# Patient Record
Sex: MALE | Race: OTHER | NOT HISPANIC OR LATINO | Employment: OTHER | URBAN - METROPOLITAN AREA
[De-identification: names, ages, dates, MRNs, and addresses within clinical notes are randomized per-mention and may not be internally consistent; named-entity substitution may affect disease eponyms.]

---

## 2017-05-09 LAB — CRC RECOMMENDATION EXT: NORMAL

## 2022-09-29 ENCOUNTER — OFFICE VISIT (OUTPATIENT)
Dept: FAMILY MEDICINE | Facility: CLINIC | Age: 67
End: 2022-09-29
Payer: MEDICARE

## 2022-09-29 VITALS
BODY MASS INDEX: 30.57 KG/M2 | HEART RATE: 57 BPM | DIASTOLIC BLOOD PRESSURE: 72 MMHG | SYSTOLIC BLOOD PRESSURE: 120 MMHG | OXYGEN SATURATION: 97 % | WEIGHT: 194.81 LBS | HEIGHT: 67 IN

## 2022-09-29 DIAGNOSIS — G47.33 OSA ON CPAP: ICD-10-CM

## 2022-09-29 DIAGNOSIS — J31.0 CHRONIC RHINITIS: ICD-10-CM

## 2022-09-29 DIAGNOSIS — M15.9 PRIMARY OSTEOARTHRITIS INVOLVING MULTIPLE JOINTS: ICD-10-CM

## 2022-09-29 DIAGNOSIS — E29.1 HYPOGONADISM IN MALE: ICD-10-CM

## 2022-09-29 DIAGNOSIS — Z11.59 NEED FOR HEPATITIS C SCREENING TEST: ICD-10-CM

## 2022-09-29 DIAGNOSIS — G89.29 CHRONIC RIGHT SHOULDER PAIN: ICD-10-CM

## 2022-09-29 DIAGNOSIS — E78.2 MIXED HYPERLIPIDEMIA: ICD-10-CM

## 2022-09-29 DIAGNOSIS — E11.9 DIABETES MELLITUS WITHOUT COMPLICATION: Primary | ICD-10-CM

## 2022-09-29 DIAGNOSIS — G89.4 CHRONIC PAIN SYNDROME: ICD-10-CM

## 2022-09-29 DIAGNOSIS — I10 ESSENTIAL HYPERTENSION, BENIGN: ICD-10-CM

## 2022-09-29 DIAGNOSIS — Z12.5 SCREENING PSA (PROSTATE SPECIFIC ANTIGEN): ICD-10-CM

## 2022-09-29 DIAGNOSIS — K21.9 GASTROESOPHAGEAL REFLUX DISEASE WITHOUT ESOPHAGITIS: ICD-10-CM

## 2022-09-29 DIAGNOSIS — M25.511 CHRONIC RIGHT SHOULDER PAIN: ICD-10-CM

## 2022-09-29 DIAGNOSIS — N40.0 BENIGN PROSTATIC HYPERPLASIA WITHOUT LOWER URINARY TRACT SYMPTOMS: ICD-10-CM

## 2022-09-29 PROBLEM — M15.0 PRIMARY OSTEOARTHRITIS INVOLVING MULTIPLE JOINTS: Status: ACTIVE | Noted: 2022-09-29

## 2022-09-29 PROCEDURE — 99204 PR OFFICE/OUTPT VISIT, NEW, LEVL IV, 45-59 MIN: ICD-10-PCS | Mod: S$PBB,,, | Performed by: FAMILY MEDICINE

## 2022-09-29 PROCEDURE — 99999 PR PBB SHADOW E&M-NEW PATIENT-LVL III: CPT | Mod: PBBFAC,,, | Performed by: FAMILY MEDICINE

## 2022-09-29 PROCEDURE — 99203 OFFICE O/P NEW LOW 30 MIN: CPT | Mod: PBBFAC,PN | Performed by: FAMILY MEDICINE

## 2022-09-29 PROCEDURE — 99204 OFFICE O/P NEW MOD 45 MIN: CPT | Mod: S$PBB,,, | Performed by: FAMILY MEDICINE

## 2022-09-29 PROCEDURE — 99999 PR PBB SHADOW E&M-NEW PATIENT-LVL III: ICD-10-PCS | Mod: PBBFAC,,, | Performed by: FAMILY MEDICINE

## 2022-09-29 RX ORDER — TRAZODONE HYDROCHLORIDE 50 MG/1
50 TABLET ORAL NIGHTLY
COMMUNITY
End: 2022-11-25 | Stop reason: SDUPTHER

## 2022-09-29 RX ORDER — NAPROXEN SODIUM 220 MG/1
81 TABLET, FILM COATED ORAL DAILY
COMMUNITY
End: 2023-08-29

## 2022-09-29 RX ORDER — OMEPRAZOLE 40 MG/1
40 CAPSULE, DELAYED RELEASE ORAL DAILY
COMMUNITY
End: 2022-11-25 | Stop reason: SDUPTHER

## 2022-09-29 RX ORDER — AMLODIPINE BESYLATE 5 MG/1
5 TABLET ORAL DAILY
COMMUNITY
End: 2022-11-25 | Stop reason: SDUPTHER

## 2022-09-29 RX ORDER — GLIPIZIDE 5 MG/1
5 TABLET ORAL
COMMUNITY
End: 2022-11-25 | Stop reason: SDUPTHER

## 2022-09-29 RX ORDER — FENOFIBRATE 160 MG/1
160 TABLET ORAL NIGHTLY
COMMUNITY
End: 2022-11-25 | Stop reason: SDUPTHER

## 2022-09-29 RX ORDER — ATORVASTATIN CALCIUM 10 MG/1
10 TABLET, FILM COATED ORAL NIGHTLY
COMMUNITY
End: 2022-11-25 | Stop reason: SDUPTHER

## 2022-09-29 RX ORDER — METFORMIN HYDROCHLORIDE 1000 MG/1
1000 TABLET ORAL 2 TIMES DAILY WITH MEALS
COMMUNITY
End: 2022-11-25 | Stop reason: SDUPTHER

## 2022-09-29 RX ORDER — TESTOSTERONE CYPIONATE 1000 MG/10ML
100 INJECTION, SOLUTION INTRAMUSCULAR
Qty: 5 ML | Refills: 3 | Status: SHIPPED | OUTPATIENT
Start: 2022-09-29 | End: 2022-11-25 | Stop reason: SDUPTHER

## 2022-09-29 RX ORDER — TAMSULOSIN HYDROCHLORIDE 0.4 MG/1
CAPSULE ORAL NIGHTLY
COMMUNITY
End: 2022-11-25 | Stop reason: SDUPTHER

## 2022-09-29 RX ORDER — GUANFACINE 2 MG/1
2 TABLET ORAL EVERY MORNING
COMMUNITY
End: 2022-11-25 | Stop reason: SDUPTHER

## 2022-09-29 RX ORDER — METOPROLOL TARTRATE 25 MG/1
25 TABLET, FILM COATED ORAL 2 TIMES DAILY
COMMUNITY
End: 2022-11-25 | Stop reason: SDUPTHER

## 2022-09-29 RX ORDER — LISINOPRIL AND HYDROCHLOROTHIAZIDE 12.5; 2 MG/1; MG/1
2 TABLET ORAL DAILY
COMMUNITY
End: 2022-11-25 | Stop reason: SDUPTHER

## 2022-09-29 RX ORDER — BUSPIRONE HYDROCHLORIDE 5 MG/1
5 TABLET ORAL NIGHTLY
COMMUNITY
End: 2022-11-25 | Stop reason: SDUPTHER

## 2022-09-29 NOTE — PATIENT INSTRUCTIONS
Thank you for allowing me to participate in your care today. It is an honor to be a part of your healthcare team at [facility]. If you had labs ordered today, you will receive notification via [employer], phone call or mailed letter regarding your results within 7 days. If you have any questions or concerns regarding your visit today, please do not hesitate to contact us.  Sincerely,   Kiley Vasquez M.D.

## 2022-09-29 NOTE — PROGRESS NOTES
Subjective:       Patient ID: Mathew Rodrigues is a 67 y.o. male.    Chief Complaint: Medication Refill (Pt requesting refill on Testosterone and needles)    Mr. Rodrigues presents today for medication refills. He wants to establish in [facility] but he could not get an appt with Dr. Osei as a new patient for some time so he is also getting established as an [facility] patient today but does not want me as a PCP.     Patient Active Problem List:     CRICKET on CPAP     Essential hypertension, benign- well controlled     Diabetes mellitus without complication - Needs labs     Mixed hyperlipidemia      Gastroesophageal reflux disease without esophagitis     Benign prostatic hyperplasia without lower urinary tract symptoms     Chronic pain syndrome     Chronic right shoulder pain     Hypogonadism in male- on testosterone therapy-  reviewed and consistent     Primary osteoarthritis involving multiple joints     Chronic rhinitis    Current Outpatient Medications:  amLODIPine (NORVASC) 5 MG tablet, Take 5 mg by mouth once daily.  aspirin 81 MG Chew, Take 81 mg by mouth once daily.  atorvastatin (LIPITOR) 10 MG tablet, Take 10 mg by mouth every evening.  busPIRone (BUSPAR) 5 MG Tab, Take 5 mg by mouth every evening.  CYANOCOBALAMIN, VITAMIN B-12, INJ, Inject as directed every 14 (fourteen) days.  fenofibrate 160 MG Tab, Take 160 mg by mouth nightly.  glipiZIDE (GLUCOTROL) 5 MG tablet, Take 5 mg by mouth 2 (two) times daily before meals.  guanFACINE (TENEX) 2 MG tablet, Take 2 mg by mouth every morning.  insulin degludec (TRESIBA FLEXTOUCH U-100 SUBQ), Inject 28 Units into the skin every morning.  lisinopriL-hydrochlorothiazide (PRINZIDE,ZESTORETIC) 20-12.5 mg per tablet, Take 2 tablets by mouth once daily.  metFORMIN (GLUCOPHAGE) 1000 MG tablet, Take 1,000 mg by mouth 2 (two) times daily with meals.  metoprolol tartrate (LOPRESSOR) 25 MG tablet, Take 25 mg by mouth 2 (two) times daily.  omeprazole (PRILOSEC) 40 MG capsule,  Take 40 mg by mouth once daily.  tamsulosin (FLOMAX) 0.4 mg Cap, Take by mouth nightly.  traZODone (DESYREL) 50 MG tablet, Take 50 mg by mouth every evening. Take 1-3 tabs nightly  UNABLE TO FIND, 4 mg. medication name: Clortabs 4mg Qam  testosterone cypionate (DEPOTESTOTERONE CYPIONATE) 100 mg/mL injection, Inject 1 mL (100 mg total) into the muscle every 7 days.    No current facility-administered medications for this visit.        Review of Systems   Constitutional:  Negative for activity change, appetite change, fatigue and fever.   Eyes:  Negative for visual disturbance.   Respiratory:  Negative for shortness of breath.    Cardiovascular:  Negative for chest pain.   Gastrointestinal:  Negative for abdominal pain, constipation and diarrhea.   Endocrine: Negative for polydipsia, polyphagia and polyuria.   Genitourinary:  Negative for decreased urine volume.   Integumentary:  Negative for rash.   Neurological:  Negative for headaches.   Psychiatric/Behavioral:  Negative for sleep disturbance.        Objective:      Physical Exam  Vitals and nursing note reviewed.   Constitutional:       General: He is not in acute distress.     Appearance: He is not ill-appearing.   Cardiovascular:      Rate and Rhythm: Normal rate and regular rhythm.      Heart sounds: No murmur heard.  Pulmonary:      Effort: Pulmonary effort is normal.      Breath sounds: Normal breath sounds. No wheezing.   Skin:     General: Skin is warm and dry.      Findings: No rash.   Neurological:      Mental Status: He is alert.   Psychiatric:         Mood and Affect: Mood normal.         Behavior: Behavior normal.       Assessment:       1. Diabetes mellitus without complication    2. CRICKET on CPAP    3. Essential hypertension, benign    4. Mixed hyperlipidemia    5. Gastroesophageal reflux disease without esophagitis    6. Benign prostatic hyperplasia without lower urinary tract symptoms    7. Chronic pain syndrome    8. Chronic right shoulder pain     9. Hypogonadism in male    10. Primary osteoarthritis involving multiple joints    11. Chronic rhinitis    12. Need for hepatitis C screening test    13. Screening PSA (prostate specific antigen)        Plan:       Problem List Items Addressed This Visit          Neuro    Chronic pain syndrome       ENT    Chronic rhinitis       Cardiac/Vascular    Essential hypertension, benign    Relevant Orders    CBC Auto Differential    Comprehensive Metabolic Panel    Mixed hyperlipidemia    Relevant Orders    Lipid Panel       Renal/    Benign prostatic hyperplasia without lower urinary tract symptoms       Endocrine    Diabetes mellitus without complication - Primary    Relevant Medications    metFORMIN (GLUCOPHAGE) 1000 MG tablet    glipiZIDE (GLUCOTROL) 5 MG tablet    insulin degludec (TRESIBA FLEXTOUCH U-100 SUBQ)    Other Relevant Orders    Comprehensive Metabolic Panel    Hemoglobin A1C    TSH    MICROALBUMIN / CREATININE RATIO URINE    Hypogonadism in male    Relevant Orders    Testosterone       GI    Gastroesophageal reflux disease without esophagitis       Orthopedic    Chronic right shoulder pain    Primary osteoarthritis involving multiple joints       Other    CRICKET on CPAP     Other Visit Diagnoses       Need for hepatitis C screening test        Relevant Orders    Hepatitis C Antibody    Screening PSA (prostate specific antigen)        Relevant Orders    PSA, Screening              All problems and medications reviewed. All medical conditions stable for patient at this time. NO labs to review. Medical records requested. Refilled medications as noted. Ordered all patients labs to be reviewed at his PCP visit in November.

## 2022-10-12 ENCOUNTER — PATIENT MESSAGE (OUTPATIENT)
Dept: FAMILY MEDICINE | Facility: CLINIC | Age: 67
End: 2022-10-12
Payer: MEDICARE

## 2022-10-12 DIAGNOSIS — E29.1 HYPOGONADISM IN MALE: Primary | ICD-10-CM

## 2022-10-12 RX ORDER — TESTOSTERONE CYPIONATE 200 MG/ML
100 INJECTION, SOLUTION INTRAMUSCULAR
Qty: 6 ML | Refills: 2 | Status: SHIPPED | OUTPATIENT
Start: 2022-10-12 | End: 2022-11-08

## 2022-10-24 ENCOUNTER — PATIENT MESSAGE (OUTPATIENT)
Dept: FAMILY MEDICINE | Facility: CLINIC | Age: 67
End: 2022-10-24
Payer: MEDICARE

## 2022-10-24 DIAGNOSIS — G89.4 CHRONIC PAIN SYNDROME: Primary | ICD-10-CM

## 2022-10-24 NOTE — TELEPHONE ENCOUNTER
I have signed ordered for Dr. Bentley pain referral. I do not write Nucynta. We will have try to get him in asap.

## 2022-11-04 ENCOUNTER — LAB VISIT (OUTPATIENT)
Dept: LAB | Facility: HOSPITAL | Age: 67
End: 2022-11-04
Attending: FAMILY MEDICINE
Payer: MEDICARE

## 2022-11-04 DIAGNOSIS — E11.9 DIABETES MELLITUS WITHOUT COMPLICATION: ICD-10-CM

## 2022-11-04 DIAGNOSIS — Z12.5 SCREENING PSA (PROSTATE SPECIFIC ANTIGEN): ICD-10-CM

## 2022-11-04 DIAGNOSIS — E29.1 HYPOGONADISM IN MALE: ICD-10-CM

## 2022-11-04 DIAGNOSIS — E78.2 MIXED HYPERLIPIDEMIA: ICD-10-CM

## 2022-11-04 DIAGNOSIS — I10 ESSENTIAL HYPERTENSION, BENIGN: ICD-10-CM

## 2022-11-04 DIAGNOSIS — Z11.59 NEED FOR HEPATITIS C SCREENING TEST: ICD-10-CM

## 2022-11-04 LAB
ALBUMIN SERPL BCP-MCNC: 3.8 G/DL (ref 3.5–5.2)
ALP SERPL-CCNC: 48 U/L (ref 55–135)
ALT SERPL W/O P-5'-P-CCNC: 21 U/L (ref 10–44)
ANION GAP SERPL CALC-SCNC: 10 MMOL/L (ref 8–16)
AST SERPL-CCNC: 15 U/L (ref 10–40)
BASOPHILS # BLD AUTO: 0.11 K/UL (ref 0–0.2)
BASOPHILS NFR BLD: 2 % (ref 0–1.9)
BILIRUB SERPL-MCNC: 0.4 MG/DL (ref 0.1–1)
BUN SERPL-MCNC: 24 MG/DL (ref 8–23)
CALCIUM SERPL-MCNC: 9.5 MG/DL (ref 8.7–10.5)
CHLORIDE SERPL-SCNC: 103 MMOL/L (ref 95–110)
CHOLEST SERPL-MCNC: 100 MG/DL (ref 120–199)
CHOLEST/HDLC SERPL: 2.9 {RATIO} (ref 2–5)
CO2 SERPL-SCNC: 26 MMOL/L (ref 23–29)
COMPLEXED PSA SERPL-MCNC: 1.9 NG/ML (ref 0–4)
CREAT SERPL-MCNC: 1.5 MG/DL (ref 0.5–1.4)
DIFFERENTIAL METHOD: ABNORMAL
EOSINOPHIL # BLD AUTO: 0.6 K/UL (ref 0–0.5)
EOSINOPHIL NFR BLD: 10.5 % (ref 0–8)
ERYTHROCYTE [DISTWIDTH] IN BLOOD BY AUTOMATED COUNT: 15.9 % (ref 11.5–14.5)
EST. GFR  (NO RACE VARIABLE): 50.7 ML/MIN/1.73 M^2
ESTIMATED AVG GLUCOSE: 148 MG/DL (ref 68–131)
GLUCOSE SERPL-MCNC: 83 MG/DL (ref 70–110)
HBA1C MFR BLD: 6.8 % (ref 4–5.6)
HCT VFR BLD AUTO: 43.6 % (ref 40–54)
HCV AB SERPL QL IA: NORMAL
HDLC SERPL-MCNC: 35 MG/DL (ref 40–75)
HDLC SERPL: 35 % (ref 20–50)
HGB BLD-MCNC: 13.9 G/DL (ref 14–18)
IMM GRANULOCYTES # BLD AUTO: 0.04 K/UL (ref 0–0.04)
IMM GRANULOCYTES NFR BLD AUTO: 0.7 % (ref 0–0.5)
LDLC SERPL CALC-MCNC: 41.2 MG/DL (ref 63–159)
LYMPHOCYTES # BLD AUTO: 1.7 K/UL (ref 1–4.8)
LYMPHOCYTES NFR BLD: 30.7 % (ref 18–48)
MCH RBC QN AUTO: 27 PG (ref 27–31)
MCHC RBC AUTO-ENTMCNC: 31.9 G/DL (ref 32–36)
MCV RBC AUTO: 85 FL (ref 82–98)
MONOCYTES # BLD AUTO: 0.6 K/UL (ref 0.3–1)
MONOCYTES NFR BLD: 10.7 % (ref 4–15)
NEUTROPHILS # BLD AUTO: 2.5 K/UL (ref 1.8–7.7)
NEUTROPHILS NFR BLD: 45.4 % (ref 38–73)
NONHDLC SERPL-MCNC: 65 MG/DL
NRBC BLD-RTO: 0 /100 WBC
PLATELET # BLD AUTO: 231 K/UL (ref 150–450)
PMV BLD AUTO: 9.6 FL (ref 9.2–12.9)
POTASSIUM SERPL-SCNC: 4.4 MMOL/L (ref 3.5–5.1)
PROT SERPL-MCNC: 7.3 G/DL (ref 6–8.4)
RBC # BLD AUTO: 5.14 M/UL (ref 4.6–6.2)
SODIUM SERPL-SCNC: 139 MMOL/L (ref 136–145)
TESTOST SERPL-MCNC: 258 NG/DL (ref 304–1227)
TRIGL SERPL-MCNC: 119 MG/DL (ref 30–150)
TSH SERPL DL<=0.005 MIU/L-ACNC: 2.77 UIU/ML (ref 0.4–4)
WBC # BLD AUTO: 5.44 K/UL (ref 3.9–12.7)

## 2022-11-04 PROCEDURE — 86803 HEPATITIS C AB TEST: CPT | Performed by: FAMILY MEDICINE

## 2022-11-04 PROCEDURE — 80061 LIPID PANEL: CPT | Performed by: FAMILY MEDICINE

## 2022-11-04 PROCEDURE — 85025 COMPLETE CBC W/AUTO DIFF WBC: CPT | Performed by: FAMILY MEDICINE

## 2022-11-04 PROCEDURE — 84153 ASSAY OF PSA TOTAL: CPT | Performed by: FAMILY MEDICINE

## 2022-11-04 PROCEDURE — 83036 HEMOGLOBIN GLYCOSYLATED A1C: CPT | Performed by: FAMILY MEDICINE

## 2022-11-04 PROCEDURE — 36415 COLL VENOUS BLD VENIPUNCTURE: CPT | Performed by: FAMILY MEDICINE

## 2022-11-04 PROCEDURE — 80053 COMPREHEN METABOLIC PANEL: CPT | Performed by: FAMILY MEDICINE

## 2022-11-04 PROCEDURE — 84443 ASSAY THYROID STIM HORMONE: CPT | Performed by: FAMILY MEDICINE

## 2022-11-04 PROCEDURE — 84403 ASSAY OF TOTAL TESTOSTERONE: CPT | Performed by: FAMILY MEDICINE

## 2022-11-07 ENCOUNTER — PATIENT MESSAGE (OUTPATIENT)
Dept: FAMILY MEDICINE | Facility: CLINIC | Age: 67
End: 2022-11-07
Payer: MEDICARE

## 2022-11-08 ENCOUNTER — PATIENT OUTREACH (OUTPATIENT)
Dept: ADMINISTRATIVE | Facility: HOSPITAL | Age: 67
End: 2022-11-08
Payer: MEDICARE

## 2022-11-08 ENCOUNTER — OFFICE VISIT (OUTPATIENT)
Dept: FAMILY MEDICINE | Facility: CLINIC | Age: 67
End: 2022-11-08
Payer: MEDICARE

## 2022-11-08 VITALS
SYSTOLIC BLOOD PRESSURE: 114 MMHG | WEIGHT: 194.13 LBS | HEART RATE: 57 BPM | HEIGHT: 67 IN | DIASTOLIC BLOOD PRESSURE: 70 MMHG | OXYGEN SATURATION: 96 % | BODY MASS INDEX: 30.47 KG/M2 | RESPIRATION RATE: 16 BRPM

## 2022-11-08 DIAGNOSIS — Z12.11 ENCOUNTER FOR SCREENING COLONOSCOPY: ICD-10-CM

## 2022-11-08 DIAGNOSIS — G89.29 CHRONIC RIGHT SHOULDER PAIN: ICD-10-CM

## 2022-11-08 DIAGNOSIS — N40.0 BENIGN PROSTATIC HYPERPLASIA WITHOUT LOWER URINARY TRACT SYMPTOMS: ICD-10-CM

## 2022-11-08 DIAGNOSIS — E29.1 HYPOGONADISM IN MALE: ICD-10-CM

## 2022-11-08 DIAGNOSIS — J31.0 CHRONIC RHINITIS: ICD-10-CM

## 2022-11-08 DIAGNOSIS — G47.33 OSA ON CPAP: ICD-10-CM

## 2022-11-08 DIAGNOSIS — G89.4 CHRONIC PAIN SYNDROME: ICD-10-CM

## 2022-11-08 DIAGNOSIS — E11.9 TYPE 2 DIABETES MELLITUS WITHOUT COMPLICATION, WITHOUT LONG-TERM CURRENT USE OF INSULIN: ICD-10-CM

## 2022-11-08 DIAGNOSIS — M25.511 CHRONIC RIGHT SHOULDER PAIN: ICD-10-CM

## 2022-11-08 DIAGNOSIS — E78.2 MIXED HYPERLIPIDEMIA: ICD-10-CM

## 2022-11-08 DIAGNOSIS — G47.00 INSOMNIA, UNSPECIFIED TYPE: ICD-10-CM

## 2022-11-08 DIAGNOSIS — M15.9 PRIMARY OSTEOARTHRITIS INVOLVING MULTIPLE JOINTS: ICD-10-CM

## 2022-11-08 DIAGNOSIS — K21.9 GASTROESOPHAGEAL REFLUX DISEASE WITHOUT ESOPHAGITIS: ICD-10-CM

## 2022-11-08 DIAGNOSIS — I10 ESSENTIAL HYPERTENSION, BENIGN: Primary | ICD-10-CM

## 2022-11-08 DIAGNOSIS — Z76.89 ENCOUNTER TO ESTABLISH CARE WITH NEW DOCTOR: ICD-10-CM

## 2022-11-08 PROCEDURE — 99999 PR PBB SHADOW E&M-EST. PATIENT-LVL V: ICD-10-PCS | Mod: PBBFAC,,, | Performed by: FAMILY MEDICINE

## 2022-11-08 PROCEDURE — 99215 OFFICE O/P EST HI 40 MIN: CPT | Mod: PBBFAC | Performed by: FAMILY MEDICINE

## 2022-11-08 PROCEDURE — 99999 PR PBB SHADOW E&M-EST. PATIENT-LVL V: CPT | Mod: PBBFAC,,, | Performed by: FAMILY MEDICINE

## 2022-11-08 PROCEDURE — 99214 OFFICE O/P EST MOD 30 MIN: CPT | Mod: S$PBB,,, | Performed by: FAMILY MEDICINE

## 2022-11-08 PROCEDURE — 99214 PR OFFICE/OUTPT VISIT, EST, LEVL IV, 30-39 MIN: ICD-10-PCS | Mod: S$PBB,,, | Performed by: FAMILY MEDICINE

## 2022-11-08 RX ORDER — FLUTICASONE PROPIONATE AND SALMETEROL 250; 50 UG/1; UG/1
1 POWDER RESPIRATORY (INHALATION) 2 TIMES DAILY
COMMUNITY
Start: 2022-06-02 | End: 2022-11-25 | Stop reason: SDUPTHER

## 2022-11-08 RX ORDER — TAPENTADOL HYDROCHLORIDE 50 MG/1
1 TABLET, FILM COATED ORAL 2 TIMES DAILY
COMMUNITY
Start: 2022-10-25 | End: 2022-12-13 | Stop reason: SDUPTHER

## 2022-11-08 RX ORDER — SILDENAFIL 100 MG/1
100 TABLET, FILM COATED ORAL DAILY PRN
COMMUNITY
Start: 2022-08-23 | End: 2022-11-25 | Stop reason: SDUPTHER

## 2022-11-08 RX ORDER — FLUTICASONE PROPIONATE 50 MCG
SPRAY, SUSPENSION (ML) NASAL
COMMUNITY
Start: 2015-11-01 | End: 2022-11-25 | Stop reason: SDUPTHER

## 2022-11-08 NOTE — PROGRESS NOTES
Population Health Outreach.  11/07/2022  EFAX SENT TO [facility] ENDOSCOPY MED REC DEPT FOR MOST RECENT COLONOSCOPY RESULTS  [phone]  [phone]  
5

## 2022-11-08 NOTE — LETTER
FAX      AUTHORIZATION FOR RELEASE OF   CONFIDENTIAL INFORMATION      [facility] ENDOSCOPY MED REC DEPT    Dr Kaiden Araya      We are seeing Mathew Rodrigues, date of birth 1955, in the clinic at [facility]. Jennyfer Osei MD is the patient's PCP. Mathew IDA Rodrigues has an outstanding lab/procedure at the time we reviewed their chart. In order to help keep their health information updated, Mathew Rodrigues has authorized us to request the following medical record(s):        ( X )  COLONOSCOPY (MOST RECENT WITHIN 10 YRS)        Please fax records to Jennyfer Osei MD at [facility]  [phone].     If you have any questions, please contact tIzel at [phone].    Itzel Conde LPN  Performance Improvement Coordinator  [facility]  [address] [place] [address] [phone]
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
## 2022-11-14 ENCOUNTER — PATIENT MESSAGE (OUTPATIENT)
Dept: ADMINISTRATIVE | Facility: HOSPITAL | Age: 67
End: 2022-11-14
Payer: MEDICARE

## 2022-11-16 ENCOUNTER — TELEPHONE (OUTPATIENT)
Dept: SURGERY | Facility: CLINIC | Age: 67
End: 2022-11-16
Payer: MEDICARE

## 2022-11-16 NOTE — TELEPHONE ENCOUNTER
Writer spoke to pt's wife.  was verified. Pt's wife stated pt was  not having any current issues, no bleeding, no pain in their rectal area or abdominal pain. Pt's wife verified there was No family hx of colon cancer and pt was not on any prescribed blood thinners. Pt's wife was asked if they would like a consult before procedure with surgeon to discuss anything. Pt's wife declined meeting and requested colonoscopy be scheduled without consult. Pt was scheduled nurse visit on 2022 to go over bowel prep. Pt  is scheduled for colonoscopy on . Pt's wife verbalized understanding

## 2022-11-18 DIAGNOSIS — Z12.11 ENCOUNTER FOR SCREENING COLONOSCOPY: Primary | ICD-10-CM

## 2022-11-18 RX ORDER — SODIUM CHLORIDE 9 MG/ML
INJECTION, SOLUTION INTRAVENOUS CONTINUOUS
Status: CANCELLED | OUTPATIENT
Start: 2022-11-18

## 2022-11-25 ENCOUNTER — PATIENT MESSAGE (OUTPATIENT)
Dept: FAMILY MEDICINE | Facility: CLINIC | Age: 67
End: 2022-11-25
Payer: MEDICARE

## 2022-11-25 DIAGNOSIS — G47.9 SLEEP DISTURBANCE: ICD-10-CM

## 2022-11-25 DIAGNOSIS — J31.0 CHRONIC RHINITIS: ICD-10-CM

## 2022-11-25 DIAGNOSIS — I10 ESSENTIAL HYPERTENSION, BENIGN: Primary | ICD-10-CM

## 2022-11-25 DIAGNOSIS — E11.9 TYPE 2 DIABETES MELLITUS WITHOUT COMPLICATION, WITHOUT LONG-TERM CURRENT USE OF INSULIN: ICD-10-CM

## 2022-11-25 DIAGNOSIS — K21.9 GASTROESOPHAGEAL REFLUX DISEASE WITHOUT ESOPHAGITIS: ICD-10-CM

## 2022-11-25 DIAGNOSIS — E78.2 MIXED HYPERLIPIDEMIA: ICD-10-CM

## 2022-11-25 DIAGNOSIS — F41.9 ANXIETY: ICD-10-CM

## 2022-11-25 DIAGNOSIS — F98.8 ATTENTION DEFICIT DISORDER, UNSPECIFIED HYPERACTIVITY PRESENCE: ICD-10-CM

## 2022-11-25 DIAGNOSIS — N40.0 BENIGN PROSTATIC HYPERPLASIA WITHOUT LOWER URINARY TRACT SYMPTOMS: ICD-10-CM

## 2022-11-25 DIAGNOSIS — N52.9 ERECTILE DYSFUNCTION, UNSPECIFIED ERECTILE DYSFUNCTION TYPE: ICD-10-CM

## 2022-11-25 DIAGNOSIS — E29.1 HYPOGONADISM IN MALE: ICD-10-CM

## 2022-11-25 RX ORDER — TAPENTADOL HYDROCHLORIDE 50 MG/1
50 TABLET, FILM COATED ORAL 2 TIMES DAILY
Qty: 180 TABLET | Refills: 3 | Status: CANCELLED | OUTPATIENT
Start: 2022-11-25 | End: 2023-11-25

## 2022-11-28 RX ORDER — LISINOPRIL AND HYDROCHLOROTHIAZIDE 12.5; 2 MG/1; MG/1
2 TABLET ORAL DAILY
Qty: 180 TABLET | Refills: 3 | Status: SHIPPED | OUTPATIENT
Start: 2022-11-28 | End: 2022-12-19 | Stop reason: SDUPTHER

## 2022-11-28 RX ORDER — OMEPRAZOLE 40 MG/1
40 CAPSULE, DELAYED RELEASE ORAL DAILY
Qty: 90 CAPSULE | Refills: 3 | Status: SHIPPED | OUTPATIENT
Start: 2022-11-28 | End: 2023-06-30

## 2022-11-28 RX ORDER — FENOFIBRATE 160 MG/1
160 TABLET ORAL NIGHTLY
Qty: 90 TABLET | Refills: 3 | Status: SHIPPED | OUTPATIENT
Start: 2022-11-28 | End: 2024-02-05 | Stop reason: SDUPTHER

## 2022-11-28 RX ORDER — FLUTICASONE PROPIONATE 50 MCG
2 SPRAY, SUSPENSION (ML) NASAL DAILY
Qty: 9.9 ML | Refills: 11 | Status: SHIPPED | OUTPATIENT
Start: 2022-11-28 | End: 2023-11-28

## 2022-11-28 RX ORDER — GLIPIZIDE 5 MG/1
5 TABLET ORAL
Qty: 180 TABLET | Refills: 3 | Status: SHIPPED | OUTPATIENT
Start: 2022-11-28 | End: 2024-02-16 | Stop reason: SDUPTHER

## 2022-11-28 RX ORDER — GUANFACINE 2 MG/1
2 TABLET ORAL EVERY MORNING
Qty: 30 TABLET | Refills: 2 | Status: SHIPPED | OUTPATIENT
Start: 2022-11-28 | End: 2023-03-06

## 2022-11-28 RX ORDER — SILDENAFIL 100 MG/1
100 TABLET, FILM COATED ORAL DAILY PRN
Qty: 30 TABLET | Refills: 5 | Status: SHIPPED | OUTPATIENT
Start: 2022-11-28 | End: 2023-12-06 | Stop reason: SDUPTHER

## 2022-11-28 RX ORDER — TESTOSTERONE CYPIONATE 1000 MG/10ML
100 INJECTION, SOLUTION INTRAMUSCULAR
Qty: 4 ML | Refills: 2 | Status: SHIPPED | OUTPATIENT
Start: 2022-11-28 | End: 2023-02-13 | Stop reason: DRUGHIGH

## 2022-11-28 RX ORDER — BUSPIRONE HYDROCHLORIDE 5 MG/1
5 TABLET ORAL NIGHTLY
Qty: 90 TABLET | Refills: 3 | Status: SHIPPED | OUTPATIENT
Start: 2022-11-28 | End: 2024-02-26 | Stop reason: SDUPTHER

## 2022-11-28 RX ORDER — INSULIN DEGLUDEC 100 U/ML
28 INJECTION, SOLUTION SUBCUTANEOUS EVERY MORNING
Qty: 3 PEN | Refills: 11 | Status: SHIPPED | OUTPATIENT
Start: 2022-11-28 | End: 2023-05-12 | Stop reason: SDUPTHER

## 2022-11-28 RX ORDER — TRAZODONE HYDROCHLORIDE 50 MG/1
50 TABLET ORAL NIGHTLY
Qty: 90 TABLET | Refills: 3 | Status: SHIPPED | OUTPATIENT
Start: 2022-11-28 | End: 2023-01-03 | Stop reason: SDUPTHER

## 2022-11-28 RX ORDER — TAMSULOSIN HYDROCHLORIDE 0.4 MG/1
0.4 CAPSULE ORAL NIGHTLY
Qty: 90 CAPSULE | Refills: 3 | Status: SHIPPED | OUTPATIENT
Start: 2022-11-28 | End: 2023-12-28 | Stop reason: SDUPTHER

## 2022-11-28 RX ORDER — AMLODIPINE BESYLATE 5 MG/1
5 TABLET ORAL DAILY
Qty: 90 TABLET | Refills: 3 | Status: SHIPPED | OUTPATIENT
Start: 2022-11-28 | End: 2022-12-19 | Stop reason: SDUPTHER

## 2022-11-28 RX ORDER — ATORVASTATIN CALCIUM 10 MG/1
10 TABLET, FILM COATED ORAL NIGHTLY
Qty: 90 TABLET | Refills: 3 | Status: SHIPPED | OUTPATIENT
Start: 2022-11-28 | End: 2022-12-19 | Stop reason: SDUPTHER

## 2022-11-28 RX ORDER — FLUTICASONE PROPIONATE AND SALMETEROL 250; 50 UG/1; UG/1
1 POWDER RESPIRATORY (INHALATION) 2 TIMES DAILY
Qty: 180 EACH | Refills: 3 | Status: SHIPPED | OUTPATIENT
Start: 2022-11-28 | End: 2023-05-12

## 2022-11-28 RX ORDER — METFORMIN HYDROCHLORIDE 1000 MG/1
1000 TABLET ORAL 2 TIMES DAILY WITH MEALS
Qty: 180 TABLET | Refills: 3 | Status: SHIPPED | OUTPATIENT
Start: 2022-11-28 | End: 2022-12-19 | Stop reason: SDUPTHER

## 2022-11-28 RX ORDER — METOPROLOL TARTRATE 25 MG/1
25 TABLET, FILM COATED ORAL 2 TIMES DAILY
Qty: 180 TABLET | Refills: 3 | Status: SHIPPED | OUTPATIENT
Start: 2022-11-28 | End: 2022-12-23 | Stop reason: SDUPTHER

## 2022-11-29 ENCOUNTER — PATIENT MESSAGE (OUTPATIENT)
Dept: SURGERY | Facility: HOSPITAL | Age: 67
End: 2022-11-29
Payer: MEDICARE

## 2022-11-29 ENCOUNTER — PATIENT MESSAGE (OUTPATIENT)
Dept: FAMILY MEDICINE | Facility: CLINIC | Age: 67
End: 2022-11-29
Payer: MEDICARE

## 2022-12-02 ENCOUNTER — PATIENT MESSAGE (OUTPATIENT)
Dept: FAMILY MEDICINE | Facility: CLINIC | Age: 67
End: 2022-12-02
Payer: MEDICARE

## 2022-12-02 DIAGNOSIS — G89.4 CHRONIC PAIN SYNDROME: Primary | ICD-10-CM

## 2022-12-07 ENCOUNTER — PATIENT OUTREACH (OUTPATIENT)
Dept: ADMINISTRATIVE | Facility: HOSPITAL | Age: 67
End: 2022-12-07
Payer: MEDICARE

## 2022-12-07 ENCOUNTER — PATIENT MESSAGE (OUTPATIENT)
Dept: ADMINISTRATIVE | Facility: HOSPITAL | Age: 67
End: 2022-12-07
Payer: MEDICARE

## 2022-12-07 DIAGNOSIS — Z13.1 DIABETES MELLITUS SCREENING: Primary | ICD-10-CM

## 2022-12-07 NOTE — PROGRESS NOTES
Working EE report for René Julian, chart reviewed at this time. Attempted to reach pt by phone, no answer, left VM, sending portal/letter outreach at this time.  Population Health Outreach.    12/07/2022  2nd fax sent to [facility] Endoscopy Med Rec Dept for most recent colonoscopy results

## 2022-12-08 ENCOUNTER — PATIENT OUTREACH (OUTPATIENT)
Dept: ADMINISTRATIVE | Facility: HOSPITAL | Age: 67
End: 2022-12-08
Payer: MEDICARE

## 2022-12-08 LAB
LEFT EYE DM RETINOPATHY: NEGATIVE
RIGHT EYE DM RETINOPATHY: NEGATIVE

## 2022-12-09 ENCOUNTER — LAB VISIT (OUTPATIENT)
Dept: LAB | Facility: HOSPITAL | Age: 67
End: 2022-12-09
Attending: FAMILY MEDICINE
Payer: MEDICARE

## 2022-12-09 ENCOUNTER — PATIENT MESSAGE (OUTPATIENT)
Dept: FAMILY MEDICINE | Facility: CLINIC | Age: 67
End: 2022-12-09
Payer: MEDICARE

## 2022-12-09 DIAGNOSIS — G89.29 CHRONIC RIGHT SHOULDER PAIN: ICD-10-CM

## 2022-12-09 DIAGNOSIS — G89.4 CHRONIC PAIN SYNDROME: Primary | ICD-10-CM

## 2022-12-09 DIAGNOSIS — M15.9 PRIMARY OSTEOARTHRITIS INVOLVING MULTIPLE JOINTS: ICD-10-CM

## 2022-12-09 DIAGNOSIS — Z13.1 DIABETES MELLITUS SCREENING: ICD-10-CM

## 2022-12-09 DIAGNOSIS — M25.511 CHRONIC RIGHT SHOULDER PAIN: ICD-10-CM

## 2022-12-09 LAB
ALBUMIN/CREAT UR: 13.8 UG/MG (ref 0–30)
CREAT UR-MCNC: 87 MG/DL (ref 23–375)
MICROALBUMIN UR DL<=1MG/L-MCNC: 12 UG/ML

## 2022-12-09 PROCEDURE — 82043 UR ALBUMIN QUANTITATIVE: CPT | Performed by: FAMILY MEDICINE

## 2022-12-09 PROCEDURE — 82570 ASSAY OF URINE CREATININE: CPT | Performed by: FAMILY MEDICINE

## 2022-12-13 ENCOUNTER — PATIENT MESSAGE (OUTPATIENT)
Dept: FAMILY MEDICINE | Facility: CLINIC | Age: 67
End: 2022-12-13
Payer: MEDICARE

## 2022-12-14 ENCOUNTER — PATIENT OUTREACH (OUTPATIENT)
Dept: ADMINISTRATIVE | Facility: HOSPITAL | Age: 67
End: 2022-12-14
Payer: MEDICARE

## 2022-12-14 LAB — CRC RECOMMENDATION EXT: NORMAL

## 2022-12-14 NOTE — PROGRESS NOTES
Records Received, hyper-linked into chart at this time. The following record(s)  below were uploaded for Health Maintenance .    05/09/2017 & 03/24/2000  COLONOSCOPY

## 2022-12-15 ENCOUNTER — PATIENT OUTREACH (OUTPATIENT)
Dept: ADMINISTRATIVE | Facility: HOSPITAL | Age: 67
End: 2022-12-15
Payer: MEDICARE

## 2022-12-15 NOTE — PROGRESS NOTES
Population Health Outreach.  12/15/2022  EFAX SENT TO [facility] EYE CLINIC FOR MOST RECENT DM EYE EXAM RESULTS

## 2022-12-15 NOTE — LETTER
FAX      AUTHORIZATION FOR RELEASE OF   CONFIDENTIAL INFORMATION        [facility] EYE [facility] MED REC DEPT      We are seeing Mathew Rodrigues, date of birth 1955, in the clinic at [facility]. Jennyfer Osei MD is the patient's PCP. Mathew PRIETO Lilia has an outstanding lab/procedure at the time we reviewed their chart. In order to help keep their health information updated, Mathew Rodrigues has authorized us to request the following medical record(s):                  ( X )  DIABETIC EYE EXAM WITH RETINOL SCREENING   (MOST RECENT WITHIN 48 MONTHS)            Please fax records to Jennyfer Osei MD at [facility]  [phone].     If you have any questions, please contact Itzel at [phone].    Itzel Conde LPN  Performance Improvement Coordinator  [facility]  [address] [place] [address] [phone]

## 2022-12-19 ENCOUNTER — PATIENT MESSAGE (OUTPATIENT)
Dept: FAMILY MEDICINE | Facility: CLINIC | Age: 67
End: 2022-12-19
Payer: MEDICARE

## 2022-12-19 DIAGNOSIS — E78.2 MIXED HYPERLIPIDEMIA: ICD-10-CM

## 2022-12-19 DIAGNOSIS — E11.9 TYPE 2 DIABETES MELLITUS WITHOUT COMPLICATION, WITHOUT LONG-TERM CURRENT USE OF INSULIN: ICD-10-CM

## 2022-12-19 DIAGNOSIS — I10 ESSENTIAL HYPERTENSION, BENIGN: ICD-10-CM

## 2022-12-20 RX ORDER — LISINOPRIL AND HYDROCHLOROTHIAZIDE 12.5; 2 MG/1; MG/1
2 TABLET ORAL DAILY
Qty: 180 TABLET | Refills: 3 | Status: SHIPPED | OUTPATIENT
Start: 2022-12-20 | End: 2024-02-27 | Stop reason: SDUPTHER

## 2022-12-20 RX ORDER — TAPENTADOL HYDROCHLORIDE 50 MG/1
50 TABLET, FILM COATED ORAL 2 TIMES DAILY PRN
Qty: 60 TABLET | Refills: 0 | Status: CANCELLED | OUTPATIENT
Start: 2022-12-20

## 2022-12-20 RX ORDER — METFORMIN HYDROCHLORIDE 1000 MG/1
1000 TABLET ORAL 2 TIMES DAILY WITH MEALS
Qty: 180 TABLET | Refills: 3 | Status: SHIPPED | OUTPATIENT
Start: 2022-12-20 | End: 2023-01-10 | Stop reason: SDUPTHER

## 2022-12-20 RX ORDER — TAPENTADOL HYDROCHLORIDE 200 MG/1
1 TABLET, FILM COATED, EXTENDED RELEASE ORAL 2 TIMES DAILY
Qty: 60 TABLET | Refills: 0 | Status: SHIPPED | OUTPATIENT
Start: 2022-12-20 | End: 2022-12-20 | Stop reason: SDUPTHER

## 2022-12-20 RX ORDER — TAPENTADOL HYDROCHLORIDE 50 MG/1
50 TABLET, FILM COATED ORAL 2 TIMES DAILY PRN
Qty: 60 TABLET | Refills: 0 | OUTPATIENT
Start: 2022-12-20

## 2022-12-20 RX ORDER — TAPENTADOL HYDROCHLORIDE 200 MG/1
1 TABLET, FILM COATED, EXTENDED RELEASE ORAL 2 TIMES DAILY
Qty: 60 TABLET | Refills: 0 | Status: SHIPPED | OUTPATIENT
Start: 2022-12-20

## 2022-12-20 RX ORDER — ATORVASTATIN CALCIUM 10 MG/1
10 TABLET, FILM COATED ORAL NIGHTLY
Qty: 90 TABLET | Refills: 3 | Status: SHIPPED | OUTPATIENT
Start: 2022-12-20 | End: 2023-03-03 | Stop reason: SDUPTHER

## 2022-12-20 RX ORDER — AMLODIPINE BESYLATE 5 MG/1
5 TABLET ORAL DAILY
Qty: 90 TABLET | Refills: 3 | Status: SHIPPED | OUTPATIENT
Start: 2022-12-20 | End: 2023-06-11

## 2022-12-22 ENCOUNTER — PATIENT OUTREACH (OUTPATIENT)
Dept: ADMINISTRATIVE | Facility: HOSPITAL | Age: 67
End: 2022-12-22
Payer: MEDICARE

## 2022-12-22 NOTE — PROGRESS NOTES
Records Received, hyper-linked into chart at this time. The following record(s)  below were uploaded for Health Maintenance .    03/14/2023  DM EYE EXAM                                                     No pallor, no cervical/supraclavicular/inguinal adenopathy.  No splenomegaly

## 2022-12-26 ENCOUNTER — OFFICE VISIT (OUTPATIENT)
Dept: URGENT CARE | Facility: CLINIC | Age: 67
End: 2022-12-26
Payer: MEDICARE

## 2022-12-26 VITALS
OXYGEN SATURATION: 96 % | HEIGHT: 67 IN | WEIGHT: 198 LBS | HEART RATE: 54 BPM | SYSTOLIC BLOOD PRESSURE: 114 MMHG | TEMPERATURE: 99 F | BODY MASS INDEX: 31.08 KG/M2 | DIASTOLIC BLOOD PRESSURE: 72 MMHG

## 2022-12-26 DIAGNOSIS — J40 BRONCHITIS: Primary | ICD-10-CM

## 2022-12-26 PROCEDURE — 96372 THER/PROPH/DIAG INJ SC/IM: CPT | Mod: S$GLB,,, | Performed by: NURSE PRACTITIONER

## 2022-12-26 PROCEDURE — 99203 OFFICE O/P NEW LOW 30 MIN: CPT | Mod: 25,S$GLB,, | Performed by: NURSE PRACTITIONER

## 2022-12-26 PROCEDURE — 96372 PR INJECTION,THERAP/PROPH/DIAG2ST, IM OR SUBCUT: ICD-10-PCS | Mod: S$GLB,,, | Performed by: NURSE PRACTITIONER

## 2022-12-26 PROCEDURE — 99203 PR OFFICE/OUTPT VISIT, NEW, LEVL III, 30-44 MIN: ICD-10-PCS | Mod: 25,S$GLB,, | Performed by: NURSE PRACTITIONER

## 2022-12-26 RX ORDER — ALBUTEROL SULFATE 90 UG/1
AEROSOL, METERED RESPIRATORY (INHALATION)
COMMUNITY
End: 2023-05-01 | Stop reason: SDUPTHER

## 2022-12-26 RX ORDER — DEXAMETHASONE SODIUM PHOSPHATE 100 MG/10ML
10 INJECTION INTRAMUSCULAR; INTRAVENOUS
Status: COMPLETED | OUTPATIENT
Start: 2022-12-26 | End: 2022-12-26

## 2022-12-26 RX ORDER — DOXYCYCLINE 100 MG/1
100 CAPSULE ORAL 2 TIMES DAILY
Qty: 20 CAPSULE | Refills: 0 | Status: SHIPPED | OUTPATIENT
Start: 2022-12-26 | End: 2023-01-05

## 2022-12-26 RX ORDER — PROMETHAZINE HYDROCHLORIDE AND DEXTROMETHORPHAN HYDROBROMIDE 6.25; 15 MG/5ML; MG/5ML
5 SYRUP ORAL EVERY 4 HOURS PRN
Qty: 120 ML | Refills: 0 | Status: SHIPPED | OUTPATIENT
Start: 2022-12-26 | End: 2023-01-05

## 2022-12-26 RX ORDER — ALBUTEROL SULFATE 90 UG/1
INHALANT RESPIRATORY (INHALATION)
COMMUNITY
End: 2023-11-09

## 2022-12-26 RX ADMIN — DEXAMETHASONE SODIUM PHOSPHATE 10 MG: 100 INJECTION INTRAMUSCULAR; INTRAVENOUS at 01:12

## 2022-12-26 NOTE — PATIENT INSTRUCTIONS
You must understand that you've received an Urgent Care treatment only and that you may be released before all your medical problems are known or treated. You, the patient, will arrange for follow up care as instructed.  Follow up with your PCP or specialty clinic as directed in the next 1-2 weeks if not improved or as needed.  You can call [phone] to schedule an appointment with the appropriate provider.  If your condition worsens we recommend that you receive another evaluation at the emergency room immediately or contact your primary medical clinics after hours call service to discuss your concerns.  Please return here or go to the Emergency Department for any concerns or worsening of condition.    If you were prescribed a narcotic or controlled medication, do not drive or operate heavy equipment or machinery while taking these medications.    Please drink plenty of fluids.  Please get plenty of rest.  Please return here or go to the Emergency Department for any concerns or worsening of condition.  If you were prescribed antibiotics, please take them to completion.  If you were given wait & see antibiotics, please wait 5-7 days before taking them, and only take them if your symptoms have worsened or not improved.  If you do begin taking the antibiotics, please take them to completion.  If you were prescribed a narcotic medication, do not drive or operate heavy equipment or machinery while taking these medications.  If you were given a steroid shot in the clinic and have also been given a prescription for a steroid such as Prednisone or a Medrol Dose Pack, please begin taking them tomorrow.  If you do not have Hypertension or any history of palpitations, it is ok to take over the counter Sudafed or Mucinex D or Allegra-D or Claritin-D or Zyrtec-D.  If you do take one of the above, it is ok to combine that with plain over the counter Mucinex or Allegra or Claritin or Zyrtec.  If for example you are taking  Zyrtec -D, you can combine that with Mucinex, but not Mucinex-D.  If you are taking Mucinex-D, you can combine that with plain Allegra or Claritin or Zyrtec.   If you do have Hypertension or palpitations, it is safe to take Coricidin HBP for relief of sinus symptoms.  If not allergic, please take over the counter Tylenol (Acetaminophen) and/or Motrin (Ibuprofen) as directed for control of pain and/or fever.  Please follow up with your primary care doctor or specialist as needed.    If you  smoke, please stop smoking.

## 2022-12-26 NOTE — PROGRESS NOTES
"Subjective:       Patient ID: Mathew Rodrigues is a 67 y.o. male.    Vitals:  height is 5' 7" (1.702 m) and weight is 89.8 kg (198 lb). His oral temperature is 98.7 °F (37.1 °C). His blood pressure is 114/72 and his pulse is 54 (abnormal). His oxygen saturation is 96%.     Chief Complaint: Sinus Problem    Sinus congestion with a cough x 1 week.    Sinus Problem  This is a new problem. The current episode started in the past 7 days. The problem has been gradually worsening since onset. There has been no fever. His pain is at a severity of 0/10. He is experiencing no pain. Associated symptoms include congestion, coughing and sneezing. Pertinent negatives include no sore throat. Treatments tried: OTC Mucinex and multiple cough medications. The treatment provided no relief.     HENT:  Positive for congestion. Negative for sore throat.    Respiratory:  Positive for cough.    Allergic/Immunologic: Positive for sneezing.         Objective:      Physical Exam   Constitutional: He is oriented to person, place, and time. He appears well-developed. He is cooperative.  Non-toxic appearance. He does not appear ill. No distress.   HENT:   Head: Normocephalic and atraumatic.   Ears:   Right Ear: Hearing, external ear and ear canal normal. There is tenderness. Tympanic membrane is injected, erythematous and bulging. A middle ear effusion is present.   Left Ear: Hearing, external ear and ear canal normal. There is tenderness. Tympanic membrane is injected, erythematous and bulging. A middle ear effusion is present.   Nose: Rhinorrhea and purulent discharge present. No mucosal edema or nasal deformity. No epistaxis. Right sinus exhibits no maxillary sinus tenderness and no frontal sinus tenderness. Left sinus exhibits no maxillary sinus tenderness and no frontal sinus tenderness.   Mouth/Throat: Uvula is midline and mucous membranes are normal. No trismus in the jaw. Normal dentition. No uvula swelling. Posterior oropharyngeal edema " and posterior oropharyngeal erythema present. No oropharyngeal exudate.   Eyes: Conjunctivae and lids are normal. Right eye exhibits no discharge. Left eye exhibits no discharge. No scleral icterus.   Neck: Trachea normal and phonation normal. Neck supple. No edema present. No erythema present. No neck rigidity present.   Cardiovascular: Normal rate, regular rhythm, normal heart sounds and normal pulses.   Pulmonary/Chest: Effort normal. No respiratory distress. He has no decreased breath sounds. He has no rhonchi. He has rales (mild) in the right upper field and the right lower field.   Abdominal: Normal appearance and bowel sounds are normal. He exhibits no distension and no mass. Soft. There is no abdominal tenderness.   Musculoskeletal: Normal range of motion.         General: No deformity. Normal range of motion.   Neurological: He is alert and oriented to person, place, and time. He exhibits normal muscle tone. Coordination normal.   Skin: Skin is warm, dry, intact, not diaphoretic and not pale.   Psychiatric: His speech is normal and behavior is normal. Judgment and thought content normal.   Nursing note and vitals reviewed.      Past medical history and current medications reviewed.       Assessment:           1. Bronchitis              Plan:         Bronchitis  -     dexAMETHasone injection 10 mg    Other orders  -     doxycycline (VIBRAMYCIN) 100 MG Cap; Take 1 capsule (100 mg total) by mouth 2 (two) times daily. for 10 days  Dispense: 20 capsule; Refill: 0  -     promethazine-dextromethorphan (PROMETHAZINE-DM) 6.25-15 mg/5 mL Syrp; Take 5 mLs by mouth every 4 (four) hours as needed.  Dispense: 120 mL; Refill: 0             Patient Instructions     You must understand that you've received an Urgent Care treatment only and that you may be released before all your medical problems are known or treated. You, the patient, will arrange for follow up care as instructed.  Follow up with your PCP or specialty  clinic as directed in the next 1-2 weeks if not improved or as needed.  You can call [phone] to schedule an appointment with the appropriate provider.  If your condition worsens we recommend that you receive another evaluation at the emergency room immediately or contact your primary medical clinics after hours call service to discuss your concerns.  Please return here or go to the Emergency Department for any concerns or worsening of condition.    If you were prescribed a narcotic or controlled medication, do not drive or operate heavy equipment or machinery while taking these medications.    Please drink plenty of fluids.  Please get plenty of rest.  Please return here or go to the Emergency Department for any concerns or worsening of condition.  If you were prescribed antibiotics, please take them to completion.  If you were given wait & see antibiotics, please wait 5-7 days before taking them, and only take them if your symptoms have worsened or not improved.  If you do begin taking the antibiotics, please take them to completion.  If you were prescribed a narcotic medication, do not drive or operate heavy equipment or machinery while taking these medications.  If you were given a steroid shot in the clinic and have also been given a prescription for a steroid such as Prednisone or a Medrol Dose Pack, please begin taking them tomorrow.  If you do not have Hypertension or any history of palpitations, it is ok to take over the counter Sudafed or Mucinex D or Allegra-D or Claritin-D or Zyrtec-D.  If you do take one of the above, it is ok to combine that with plain over the counter Mucinex or Allegra or Claritin or Zyrtec.  If for example you are taking Zyrtec -D, you can combine that with Mucinex, but not Mucinex-D.  If you are taking Mucinex-D, you can combine that with plain Allegra or Claritin or Zyrtec.   If you do have Hypertension or palpitations, it is safe to take Coricidin HBP for relief of sinus  symptoms.  If not allergic, please take over the counter Tylenol (Acetaminophen) and/or Motrin (Ibuprofen) as directed for control of pain and/or fever.  Please follow up with your primary care doctor or specialist as needed.    If you  smoke, please stop smoking.

## 2022-12-27 ENCOUNTER — PATIENT MESSAGE (OUTPATIENT)
Dept: FAMILY MEDICINE | Facility: CLINIC | Age: 67
End: 2022-12-27
Payer: MEDICARE

## 2022-12-30 ENCOUNTER — PATIENT MESSAGE (OUTPATIENT)
Dept: FAMILY MEDICINE | Facility: CLINIC | Age: 67
End: 2022-12-30
Payer: MEDICARE

## 2022-12-30 DIAGNOSIS — E53.8 VITAMIN B12 DEFICIENCY: Primary | ICD-10-CM

## 2022-12-30 DIAGNOSIS — G47.9 SLEEP DISTURBANCE: ICD-10-CM

## 2023-01-03 ENCOUNTER — PATIENT MESSAGE (OUTPATIENT)
Dept: FAMILY MEDICINE | Facility: CLINIC | Age: 68
End: 2023-01-03
Payer: MEDICARE

## 2023-01-03 RX ORDER — TRAZODONE HYDROCHLORIDE 50 MG/1
50 TABLET ORAL NIGHTLY
Qty: 90 TABLET | Refills: 3 | Status: SHIPPED | OUTPATIENT
Start: 2023-01-03 | End: 2023-12-20 | Stop reason: SDUPTHER

## 2023-01-04 ENCOUNTER — PATIENT OUTREACH (OUTPATIENT)
Dept: ADMINISTRATIVE | Facility: HOSPITAL | Age: 68
End: 2023-01-04
Payer: MEDICARE

## 2023-01-09 ENCOUNTER — CLINICAL SUPPORT (OUTPATIENT)
Dept: SURGERY | Facility: CLINIC | Age: 68
End: 2023-01-09
Payer: MEDICARE

## 2023-01-09 ENCOUNTER — PATIENT MESSAGE (OUTPATIENT)
Dept: FAMILY MEDICINE | Facility: CLINIC | Age: 68
End: 2023-01-09
Payer: MEDICARE

## 2023-01-09 DIAGNOSIS — Z12.11 ENCOUNTER FOR SCREENING COLONOSCOPY: ICD-10-CM

## 2023-01-09 DIAGNOSIS — E11.9 TYPE 2 DIABETES MELLITUS WITHOUT COMPLICATION, WITHOUT LONG-TERM CURRENT USE OF INSULIN: ICD-10-CM

## 2023-01-09 PROCEDURE — 99999 PR PBB SHADOW E&M-EST. PATIENT-LVL II: CPT | Mod: PBBFAC,,,

## 2023-01-09 PROCEDURE — 99999 PR PBB SHADOW E&M-EST. PATIENT-LVL II: ICD-10-PCS | Mod: PBBFAC,,,

## 2023-01-09 PROCEDURE — 99212 OFFICE O/P EST SF 10 MIN: CPT | Mod: PBBFAC

## 2023-01-09 NOTE — PATIENT INSTRUCTIONS
Sutab Bowel Prep Instructions     Date of procedure:  January 13, 2023    Bowel Prep Instructions:  Do not follow packaging instructions for the Sutab.    Day 1 January 12, 2023   All day you will be on a Clear Liquid Diet   You may have the following chicken, beef or bone broth, Jell-O, Popsicles, Sprite, Water, Gatorade, Powerade, and Black Coffee.   Do not eat or drink anything RED OR PURPLE IN COLOR     The Sutab Bowel Kit is split into two (2) dose Regimen:   1st Dose   Step 1: AT 2:00 pm, Take the 1st bottle (12 tablets total) with 16 oz glass of water.     Step 2: You MUST drink 2 more 16 oz glasses of water over the next hour.     2nd Dose   Step 1: At 6:00 pm, Take the 2nd bottle (12 tablets total) with 16 oz glass of water.     Step 2: You MUST drink 2 more 16-ounce glasses of water over the hour.     Do not eat or drink after Midnight       Day 2 January 13, 2023   Do not eat or drink anything till after the procedure    You may take your blood pressure medications with a small sip of water before the procedure. Take all other morning medications after the procedure.     Do not take the following Medications for 3 days prior to your procedure:   Aspirin, Excedrin, BC powder or goodies powders   Ibuprofen or Motrin  Naproxen or Aleve  Fish oils  Vitamin E        Location of Department:   [facility] [address], [place] [address]    Parking:    Use parking lot A  Enter at the main hospital entrance  Check in with outpatient registration    Contact:   Someone from surgery will call you with a time of arrival.   If you surgery is on Monday, someone will contact you on Friday.  If you do not receive a call from surgery by 2pm the business day (January 12, 2023) before your procedure, please call [phone].     Transportation:   You will NOT be able to drive yourself.  You are required to have someone drive you home from the hospital due to the anesthesia.

## 2023-01-09 NOTE — PROGRESS NOTES
Patient, Mathew Rodrigues, was instructed on Suprep Tabs bowel prep. Patient was given instructions on pre-op, tejinder-op, and post-op scheduled appointments.  Patient received blue folder containing all written instructions.  Colonoscopy scheduled for 01/13/2023    Ang Sagastume MA

## 2023-01-10 RX ORDER — SOD SULF/POT CHLORIDE/MAG SULF 1.479 G
24 TABLET ORAL DAILY
Qty: 24 TABLET | Refills: 0 | Status: SHIPPED | OUTPATIENT
Start: 2023-01-10 | End: 2023-08-29

## 2023-01-10 RX ORDER — METFORMIN HYDROCHLORIDE 1000 MG/1
1000 TABLET ORAL 2 TIMES DAILY WITH MEALS
Qty: 180 TABLET | Refills: 3 | Status: SHIPPED | OUTPATIENT
Start: 2023-01-10 | End: 2024-01-17 | Stop reason: SDUPTHER

## 2023-01-13 ENCOUNTER — ANESTHESIA (OUTPATIENT)
Dept: SURGERY | Facility: HOSPITAL | Age: 68
End: 2023-01-13
Payer: MEDICARE

## 2023-01-13 ENCOUNTER — ANESTHESIA EVENT (OUTPATIENT)
Dept: SURGERY | Facility: HOSPITAL | Age: 68
End: 2023-01-13
Payer: MEDICARE

## 2023-01-13 ENCOUNTER — HOSPITAL ENCOUNTER (OUTPATIENT)
Facility: HOSPITAL | Age: 68
Discharge: HOME | End: 2023-01-13
Attending: SURGERY | Admitting: SURGERY
Payer: MEDICARE

## 2023-01-13 VITALS
TEMPERATURE: 98 F | WEIGHT: 192 LBS | OXYGEN SATURATION: 95 % | SYSTOLIC BLOOD PRESSURE: 130 MMHG | DIASTOLIC BLOOD PRESSURE: 79 MMHG | RESPIRATION RATE: 20 BRPM | BODY MASS INDEX: 30.13 KG/M2 | HEIGHT: 67 IN | HEART RATE: 76 BPM

## 2023-01-13 DIAGNOSIS — Z12.11 ENCOUNTER FOR SCREENING COLONOSCOPY: ICD-10-CM

## 2023-01-13 LAB
POCT GLUCOSE: 117 MG/DL (ref 70–110)
POCT GLUCOSE: 163 MG/DL (ref 70–110)

## 2023-01-13 PROCEDURE — 37000009 HC ANESTHESIA EA ADD 15 MINS: Performed by: SURGERY

## 2023-01-13 PROCEDURE — D9220A PRA ANESTHESIA: ICD-10-PCS | Mod: ANES,,, | Performed by: ANESTHESIOLOGY

## 2023-01-13 PROCEDURE — 63600175 PHARM REV CODE 636 W HCPCS: Performed by: ANESTHESIOLOGY

## 2023-01-13 PROCEDURE — G0121 COLON CA SCRN NOT HI RSK IND: ICD-10-PCS | Mod: ,,, | Performed by: SURGERY

## 2023-01-13 PROCEDURE — D9220A PRA ANESTHESIA: ICD-10-PCS | Mod: CRNA,,, | Performed by: NURSE ANESTHETIST, CERTIFIED REGISTERED

## 2023-01-13 PROCEDURE — G0121 COLON CA SCRN NOT HI RSK IND: HCPCS | Mod: ,,, | Performed by: SURGERY

## 2023-01-13 PROCEDURE — G0121 COLON CA SCRN NOT HI RSK IND: HCPCS | Performed by: SURGERY

## 2023-01-13 PROCEDURE — D9220A PRA ANESTHESIA: Mod: CRNA,,, | Performed by: NURSE ANESTHETIST, CERTIFIED REGISTERED

## 2023-01-13 PROCEDURE — D9220A PRA ANESTHESIA: Mod: ANES,,, | Performed by: ANESTHESIOLOGY

## 2023-01-13 PROCEDURE — 25000003 PHARM REV CODE 250: Performed by: NURSE ANESTHETIST, CERTIFIED REGISTERED

## 2023-01-13 PROCEDURE — 63600175 PHARM REV CODE 636 W HCPCS: Performed by: NURSE ANESTHETIST, CERTIFIED REGISTERED

## 2023-01-13 PROCEDURE — 37000008 HC ANESTHESIA 1ST 15 MINUTES: Performed by: SURGERY

## 2023-01-13 RX ORDER — LIDOCAINE HYDROCHLORIDE 20 MG/ML
INJECTION, SOLUTION EPIDURAL; INFILTRATION; INTRACAUDAL; PERINEURAL
Status: DISCONTINUED | OUTPATIENT
Start: 2023-01-13 | End: 2023-01-13

## 2023-01-13 RX ORDER — EPHEDRINE SULFATE 50 MG/ML
INJECTION, SOLUTION INTRAVENOUS
Status: DISCONTINUED | OUTPATIENT
Start: 2023-01-13 | End: 2023-01-13

## 2023-01-13 RX ORDER — PHENYLEPHRINE HYDROCHLORIDE 10 MG/ML
INJECTION INTRAVENOUS
Status: DISCONTINUED | OUTPATIENT
Start: 2023-01-13 | End: 2023-01-13

## 2023-01-13 RX ORDER — SODIUM CHLORIDE 9 MG/ML
INJECTION, SOLUTION INTRAVENOUS CONTINUOUS
Status: DISCONTINUED | OUTPATIENT
Start: 2023-01-13 | End: 2023-01-13 | Stop reason: HOSPADM

## 2023-01-13 RX ORDER — GLUCAGON 1 MG
KIT INJECTION
Status: DISCONTINUED | OUTPATIENT
Start: 2023-01-13 | End: 2023-01-13

## 2023-01-13 RX ORDER — LIDOCAINE HYDROCHLORIDE 10 MG/ML
1 INJECTION, SOLUTION EPIDURAL; INFILTRATION; INTRACAUDAL; PERINEURAL ONCE
Status: DISCONTINUED | OUTPATIENT
Start: 2023-01-13 | End: 2023-01-13 | Stop reason: HOSPADM

## 2023-01-13 RX ORDER — PROPOFOL 10 MG/ML
VIAL (ML) INTRAVENOUS
Status: DISCONTINUED | OUTPATIENT
Start: 2023-01-13 | End: 2023-01-13

## 2023-01-13 RX ORDER — SODIUM CHLORIDE, SODIUM LACTATE, POTASSIUM CHLORIDE, CALCIUM CHLORIDE 600; 310; 30; 20 MG/100ML; MG/100ML; MG/100ML; MG/100ML
125 INJECTION, SOLUTION INTRAVENOUS CONTINUOUS
Status: DISCONTINUED | OUTPATIENT
Start: 2023-01-13 | End: 2023-01-13 | Stop reason: HOSPADM

## 2023-01-13 RX ORDER — ONDANSETRON 2 MG/ML
4 INJECTION INTRAMUSCULAR; INTRAVENOUS DAILY PRN
Status: DISCONTINUED | OUTPATIENT
Start: 2023-01-13 | End: 2023-01-13 | Stop reason: HOSPADM

## 2023-01-13 RX ORDER — SODIUM CHLORIDE, SODIUM LACTATE, POTASSIUM CHLORIDE, CALCIUM CHLORIDE 600; 310; 30; 20 MG/100ML; MG/100ML; MG/100ML; MG/100ML
INJECTION, SOLUTION INTRAVENOUS CONTINUOUS
Status: DISCONTINUED | OUTPATIENT
Start: 2023-01-13 | End: 2023-01-13 | Stop reason: HOSPADM

## 2023-01-13 RX ORDER — DIPHENHYDRAMINE HYDROCHLORIDE 50 MG/ML
12.5 INJECTION INTRAMUSCULAR; INTRAVENOUS
Status: DISCONTINUED | OUTPATIENT
Start: 2023-01-13 | End: 2023-01-13 | Stop reason: HOSPADM

## 2023-01-13 RX ADMIN — EPHEDRINE SULFATE 10 MG: 50 INJECTION INTRAVENOUS at 10:01

## 2023-01-13 RX ADMIN — EPHEDRINE SULFATE 5 MG: 50 INJECTION INTRAVENOUS at 10:01

## 2023-01-13 RX ADMIN — PROPOFOL 50 MG: 10 INJECTION, EMULSION INTRAVENOUS at 10:01

## 2023-01-13 RX ADMIN — SODIUM CHLORIDE, SODIUM LACTATE, POTASSIUM CHLORIDE, AND CALCIUM CHLORIDE: .6; .31; .03; .02 INJECTION, SOLUTION INTRAVENOUS at 09:01

## 2023-01-13 RX ADMIN — PHENYLEPHRINE HYDROCHLORIDE 100 MCG: 10 INJECTION INTRAVENOUS at 10:01

## 2023-01-13 RX ADMIN — PROPOFOL 100 MG: 10 INJECTION, EMULSION INTRAVENOUS at 10:01

## 2023-01-13 RX ADMIN — LIDOCAINE HYDROCHLORIDE 40 MG: 20 INJECTION, SOLUTION EPIDURAL; INFILTRATION; INTRACAUDAL; PERINEURAL at 10:01

## 2023-01-13 RX ADMIN — GLUCAGON HYDROCHLORIDE 0.5 MG: KIT at 10:01

## 2023-01-13 NOTE — DISCHARGE SUMMARY
Discharge Note        SUMMARY     Admit Date: 1/13/2023    Attending Physician: Kenney Keller MD     Discharge Physician: eKnney Keller MD    Discharge Date: 1/13/2023 10:50 AM      Hospital Course: Patient tolerated procedure well.     Disposition: Home or Self Care    Patient Instructions:   Current Discharge Medication List        CONTINUE these medications which have NOT CHANGED    Details   amLODIPine (NORVASC) 5 MG tablet Take 1 tablet (5 mg total) by mouth once daily.  Qty: 90 tablet, Refills: 3    Comments: .  Associated Diagnoses: Essential hypertension, benign      aspirin 81 MG Chew Take 81 mg by mouth once daily.      metoprolol tartrate (LOPRESSOR) 25 MG tablet Take 1 tablet (25 mg total) by mouth 2 (two) times daily.  Qty: 180 tablet, Refills: 3    Comments: .  Associated Diagnoses: Essential hypertension, benign      albuterol (PROVENTIL/VENTOLIN HFA) 90 mcg/actuation inhaler albuterol sulfate HFA 90 mcg/actuation aerosol inhaler      albuterol sulfate (PROAIR DIGIHALER) 90 mcg/actuation aebs Proair Digihaler 90 mcg/actuation aerosol powder breath act, sensor   Inhale 2 puffs every 4 hours by inhalation route.      atorvastatin (LIPITOR) 10 MG tablet Take 1 tablet (10 mg total) by mouth every evening.  Qty: 90 tablet, Refills: 3    Associated Diagnoses: Type 2 diabetes mellitus without complication, without long-term current use of insulin; Mixed hyperlipidemia      busPIRone (BUSPAR) 5 MG Tab Take 1 tablet (5 mg total) by mouth every evening.  Qty: 90 tablet, Refills: 3    Associated Diagnoses: Anxiety      cyanocobalamin, vitamin B-12, 1,000 mcg/mL Kit Inject 1 mL as directed every 14 (fourteen) days.  Qty: 1 kit, Refills: 11    Associated Diagnoses: Vitamin B12 deficiency      fenofibrate 160 MG Tab Take 1 tablet (160 mg total) by mouth nightly.  Qty: 90 tablet, Refills: 3    Associated Diagnoses: Mixed hyperlipidemia      fluticasone propionate (FLONASE) 50 mcg/actuation nasal  spray 2 sprays (100 mcg total) by Each Nostril route once daily.  Qty: 9.9 mL, Refills: 11    Associated Diagnoses: Chronic rhinitis      fluticasone-salmeterol diskus inhaler 250-50 mcg Inhale 1 puff into the lungs 2 (two) times daily.  Qty: 180 each, Refills: 3      glipiZIDE (GLUCOTROL) 5 MG tablet Take 1 tablet (5 mg total) by mouth 2 (two) times daily before meals.  Qty: 180 tablet, Refills: 3    Associated Diagnoses: Type 2 diabetes mellitus without complication, without long-term current use of insulin      guanFACINE (TENEX) 2 MG tablet Take 1 tablet (2 mg total) by mouth every morning.  Qty: 30 tablet, Refills: 2    Associated Diagnoses: Attention deficit disorder, unspecified hyperactivity presence      insulin degludec (TRESIBA FLEXTOUCH U-100) 100 unit/mL (3 mL) insulin pen Inject 28 Units into the skin every morning.  Qty: 3 pen, Refills: 11    Associated Diagnoses: Type 2 diabetes mellitus without complication, without long-term current use of insulin      lisinopriL-hydrochlorothiazide (PRINZIDE,ZESTORETIC) 20-12.5 mg per tablet Take 2 tablets by mouth once daily.  Qty: 180 tablet, Refills: 3    Comments: .  Associated Diagnoses: Essential hypertension, benign      metFORMIN (GLUCOPHAGE) 1000 MG tablet Take 1 tablet (1,000 mg total) by mouth 2 (two) times daily with meals.  Qty: 180 tablet, Refills: 3    Associated Diagnoses: Type 2 diabetes mellitus without complication, without long-term current use of insulin      NUCYNTA 50 mg Tab Take 1 tablet (50 mg total) by mouth 2 (two) times daily as needed (pain).  Qty: 60 tablet, Refills: 0    Comments: n/a       omeprazole (PRILOSEC) 40 MG capsule Take 1 capsule (40 mg total) by mouth once daily.  Qty: 90 capsule, Refills: 3    Associated Diagnoses: Gastroesophageal reflux disease without esophagitis      sildenafiL (VIAGRA) 100 MG tablet Take 1 tablet (100 mg total) by mouth daily as needed for Erectile Dysfunction.  Qty: 30 tablet, Refills: 5     Associated Diagnoses: Erectile dysfunction, unspecified erectile dysfunction type      sod sulf-pot chloride-mag sulf (SUTAB) 1.479-0.188- 0.225 gram tablet Take 24 tablets by mouth once daily. Take according to CLINIC instructions with indicated amount of water.  Qty: 24 tablet, Refills: 0      tamsulosin (FLOMAX) 0.4 mg Cap Take 1 capsule (0.4 mg total) by mouth nightly.  Qty: 90 capsule, Refills: 3    Associated Diagnoses: Benign prostatic hyperplasia without lower urinary tract symptoms      tapentadoL (NUCYNTA ER) 200 mg Tb12 Take 1 tablet by mouth 2 (two) times a day.  Qty: 60 tablet, Refills: 0    Comments: n/a       testosterone cypionate (DEPOTESTOTERONE CYPIONATE) 100 mg/mL injection Inject 1 mL (100 mg total) into the muscle every 7 days.  Qty: 4 mL, Refills: 2    Associated Diagnoses: Hypogonadism in male      traZODone (DESYREL) 50 MG tablet Take 1 tablet (50 mg total) by mouth every evening. Take 1-3 tabs nightly  Qty: 90 tablet, Refills: 3    Associated Diagnoses: Sleep disturbance      UNABLE TO FIND 4 mg. medication name: Clortabs 4mg Qam             Discharge Procedure Orders (must include Diet, Follow-up, Activity):   Discharge Procedure Orders (must include Diet, Follow-up, Activity)   Diet general     Call MD for:  temperature >100.4     Call MD for:  persistent nausea and vomiting     Call MD for:  severe uncontrolled pain     Call MD for:  difficulty breathing, headache or visual disturbances     Call MD for:  redness, tenderness, or signs of infection (pain, swelling, redness, odor or green/yellow discharge around incision site)     Call MD for:  persistent dizziness or light-headedness        Follow Up:  Follow up as scheduled.  Resume routine diet.  Activity as tolerated.    No driving day of procedure.

## 2023-01-13 NOTE — TRANSFER OF CARE
"Anesthesia Transfer of Care Note    Patient: Mathew Rodrigues    Procedure(s) Performed: Procedure(s) (LRB):  COLONOSCOPY (N/A)    Patient location: PACU    Anesthesia Type: general    Transport from OR: Transported from OR on room air with adequate spontaneous ventilation    Post pain: adequate analgesia    Post assessment: no apparent anesthetic complications    Post vital signs: stable    Level of consciousness: awake    Complications: none    Transfer of care protocol was followed      Last vitals:   Visit Vitals  /81 (BP Location: Right arm, Patient Position: Lying)   Pulse 60   Temp 36.9 °C (98.4 °F)   Resp 18   Ht 5' 7" (1.702 m)   Wt 87.1 kg (192 lb)   SpO2 97%   BMI 30.07 kg/m²     "
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
## 2023-01-13 NOTE — ANESTHESIA PREPROCEDURE EVALUATION
01/13/2023  Mathew IDA Rodrigues is a 67 y.o., male.      Pre-op Assessment    I have reviewed the Patient Summary Reports.     I have reviewed the Nursing Notes. I have reviewed the NPO Status.   I have reviewed the Medications.     Review of Systems  Social:  Former Smoker    Cardiovascular:   Hypertension CHF    Pulmonary:   COPD Sleep Apnea, CPAP    Renal/:   Chronic Renal Disease, CKD    Hepatic/GI:   GERD    Musculoskeletal:   Arthritis     Endocrine:   Diabetes    Psych:   Psychiatric History (PTSD)          Physical Exam    Airway:  Mallampati: III   Mouth Opening: Normal  TM Distance: Normal  Tongue: Normal  Neck ROM: Normal ROM    Chest/Lungs:  Clear to auscultation    Heart:  Rate: Normal  Rhythm: Regular Rhythm        Anesthesia Plan  Type of Anesthesia, risks & benefits discussed:    Anesthesia Type: Gen Natural Airway  Intra-op Monitoring Plan: Standard ASA Monitors  Post Op Pain Control Plan: multimodal analgesia  Induction:  IV  Informed Consent: Informed consent signed with the Patient and all parties understand the risks and agree with anesthesia plan.  All questions answered.   ASA Score: 3  Day of Surgery Review of History & Physical: H&P Update referred to the surgeon/provider.    Ready For Surgery From Anesthesia Perspective.     .

## 2023-01-13 NOTE — DISCHARGE INSTRUCTIONS

## 2023-01-13 NOTE — [employer] PATIENT INSTRUCTIONS
Discharge Summary/Instructions after an Endoscopic Procedure  Patient Name: Mathew Rodrigues  Patient MRN: [id number]  Patient YOB: 1955 Friday, January 13, 2023  Kenney Keller MD  RESTRICTIONS:  During your procedure today, you received medications for sedation.  These   medications may affect your judgment, balance and coordination.  Therefore,   for 24 hours, you have the following restrictions:   - DO NOT drive a car, operate machinery, make legal/financial decisions,   sign important papers or drink alcohol.    ACTIVITY:  Today: no heavy lifting, straining or running due to procedural   sedation/anesthesia.  The following day: return to full activity including work.  DIET:  Eat and drink normally unless instructed otherwise.     TREATMENT FOR COMMON SIDE EFFECTS:  - Mild abdominal pain, nausea, belching, bloating or excessive gas:  rest,   eat lightly and use a heating pad.  - Sore Throat: treat with throat lozenges and/or gargle with warm salt   water.  - Because air was used during the procedure, expelling large amounts of air   from your rectum or belching is normal.  - If a bowel prep was taken, you may not have a bowel movement for 1-3 days.    This is normal.  SYMPTOMS TO WATCH FOR AND REPORT TO YOUR PHYSICIAN:  1. Abdominal pain or bloating, other than gas cramps.  2. Chest pain.  3. Back pain.  4. Signs of infection such as: chills or fever occurring within 24 hours   after the procedure.  5. Rectal bleeding, which would show as bright red, maroon, or black stools.   (A tablespoon of blood from the rectum is not serious, especially if   hemorrhoids are present.)  6. Vomiting.  7. Weakness or dizziness.  GO DIRECTLY TO THE NEAREST EMERGENCY ROOM IF YOU HAVE ANY OF THE FOLLOWING:      Difficulty breathing              Chills and/or fever over 101 F   Persistent vomiting and/or vomiting blood   Severe abdominal pain   Severe chest pain   Black, tarry stools   Bleeding- more than one  tablespoon   Any other symptom or condition that you feel may need urgent attention  Your doctor recommends these additional instructions:  If any biopsies were taken, your doctors clinic will contact you in 1 to 2   weeks with any results.  - Patient has a contact number available for emergencies.  The signs and   symptoms of potential delayed complications were discussed with the   patient.  Return to normal activities tomorrow.  Written discharge   instructions were provided to the patient.   - Resume previous diet.   - Discharge patient to home (ambulatory).   - Continue present medications.   - Repeat colonoscopy in 5 years for surveillance.   - Return to GI clinic PRN.  For questions, problems or results please call your physician - Kenney Keller MD at Work:  [phone].  [facility] EMERGENCY ROOM PHONE NUMBER: [phone]  IF A COMPLICATION OR EMERGENCY SITUATION ARISES AND YOU ARE UNABLE TO REACH   YOUR PHYSICIAN - GO DIRECTLY TO THE EMERGENCY ROOM.  MD Kenney Hall MD  1/13/2023 10:51:57 AM  This report has been verified and signed electronically.  Dear patient,  As a result of recent federal legislation (The Federal Cures Act), you may   receive lab or pathology results from your procedure in your [facility]   account before your physician is able to contact you. Your physician or   their representative will relay the results to you with their   recommendations at their soonest availability.  Thank you,  [employer]

## 2023-01-13 NOTE — ANESTHESIA POSTPROCEDURE EVALUATION
Anesthesia Post Evaluation    Patient: Mathewmarielle Rodrigues    Procedure(s) Performed: Procedure(s) (LRB):  COLONOSCOPY (N/A)    Final Anesthesia Type: general      Patient location during evaluation: PACU  Patient participation: Yes- Able to Participate  Level of consciousness: awake and alert  Post-procedure vital signs: reviewed and stable  Pain management: adequate  Airway patency: patent    PONV status at discharge: No PONV  Anesthetic complications: no      Cardiovascular status: blood pressure returned to baseline  Respiratory status: unassisted  Hydration status: euvolemic  Follow-up not needed.          Vitals Value Taken Time   /79 01/13/23 1135   Temp 36.9 °C (98.4 °F) 01/13/23 1050   Pulse 75 01/13/23 1145   Resp 13 01/13/23 1145   SpO2 98 % 01/13/23 1145   Vitals shown include unvalidated device data.      Event Time   Out of Recovery 11:20:00         Pain/Jesus Manuel Score: Jesus Manuel Score: 10 (1/13/2023 11:20 AM)  Modified Jesus Manuel Score: 20 (1/13/2023 11:57 AM)

## 2023-01-13 NOTE — PLAN OF CARE
Has met unit/department guidelines for discharge from each phase of the post procedure continuum. Leaving floor per w/c with RN. DENNIS x3. Resp even and unlabored room air. No distress noted. Tolerating Po fluids without c/o nausea/vomiting. Denies c/o pain. All personal belongings returned to pt.

## 2023-02-13 NOTE — TELEPHONE ENCOUNTER
----- Message from Anita Rice sent at 2/13/2023  2:18 PM CST -----  Regarding: advise pharmacy  Contact: Pharmacy  Type: Needs Medical Advice  Who Called: [facility]  Symptoms (please be specific):  testosterone cypionate (DEPOTESTOTERONE CYPIONATE) 100 mg/mL injection  How long has patient had these symptoms:    Pharmacy name and phone #:    [id number] - [place] [address] AT [facility] [address] [facility] [address]  [address] [address]  Phone: [phone] Fax: [phone]          [facility] Call Back Number: [facility]     Additional Information: They currently do not have this due to there manufacture they are asking for you too call in the 200 mg or something else. Thanks!

## 2023-02-14 ENCOUNTER — PATIENT MESSAGE (OUTPATIENT)
Dept: FAMILY MEDICINE | Facility: CLINIC | Age: 68
End: 2023-02-14
Payer: MEDICARE

## 2023-02-17 ENCOUNTER — PATIENT MESSAGE (OUTPATIENT)
Dept: FAMILY MEDICINE | Facility: CLINIC | Age: 68
End: 2023-02-17
Payer: MEDICARE

## 2023-03-03 ENCOUNTER — PATIENT MESSAGE (OUTPATIENT)
Dept: FAMILY MEDICINE | Facility: CLINIC | Age: 68
End: 2023-03-03
Payer: MEDICARE

## 2023-03-03 DIAGNOSIS — E11.9 TYPE 2 DIABETES MELLITUS WITHOUT COMPLICATION, WITHOUT LONG-TERM CURRENT USE OF INSULIN: ICD-10-CM

## 2023-03-03 DIAGNOSIS — E78.2 MIXED HYPERLIPIDEMIA: ICD-10-CM

## 2023-03-06 RX ORDER — ATORVASTATIN CALCIUM 10 MG/1
10 TABLET, FILM COATED ORAL NIGHTLY
Qty: 90 TABLET | Refills: 3 | Status: SHIPPED | OUTPATIENT
Start: 2023-03-06 | End: 2024-03-05

## 2023-03-14 LAB
LEFT EYE DM RETINOPATHY: NEGATIVE
RIGHT EYE DM RETINOPATHY: NEGATIVE

## 2023-04-27 ENCOUNTER — PATIENT OUTREACH (OUTPATIENT)
Dept: ADMINISTRATIVE | Facility: HOSPITAL | Age: 68
End: 2023-04-27
Payer: MEDICARE

## 2023-04-27 NOTE — PROGRESS NOTES
Records Received, hyper-linked into chart at this time. The following record(s)  below were uploaded for Health Maintenance .    03/14/2023  DM EYE EXAM

## 2023-05-01 ENCOUNTER — OFFICE VISIT (OUTPATIENT)
Dept: FAMILY MEDICINE | Facility: CLINIC | Age: 68
End: 2023-05-01
Payer: MEDICARE

## 2023-05-01 ENCOUNTER — HOSPITAL ENCOUNTER (OUTPATIENT)
Dept: RADIOLOGY | Facility: HOSPITAL | Age: 68
Discharge: HOME | End: 2023-05-01
Attending: FAMILY MEDICINE
Payer: MEDICARE

## 2023-05-01 VITALS
OXYGEN SATURATION: 95 % | BODY MASS INDEX: 32.8 KG/M2 | DIASTOLIC BLOOD PRESSURE: 74 MMHG | SYSTOLIC BLOOD PRESSURE: 131 MMHG | HEART RATE: 54 BPM | RESPIRATION RATE: 15 BRPM | WEIGHT: 209 LBS | HEIGHT: 67 IN

## 2023-05-01 DIAGNOSIS — G47.00 INSOMNIA, UNSPECIFIED TYPE: ICD-10-CM

## 2023-05-01 DIAGNOSIS — F41.9 ANXIETY: ICD-10-CM

## 2023-05-01 DIAGNOSIS — N40.0 BENIGN PROSTATIC HYPERPLASIA WITHOUT LOWER URINARY TRACT SYMPTOMS: ICD-10-CM

## 2023-05-01 DIAGNOSIS — J44.9 CHRONIC OBSTRUCTIVE PULMONARY DISEASE, UNSPECIFIED COPD TYPE: ICD-10-CM

## 2023-05-01 DIAGNOSIS — I10 ESSENTIAL HYPERTENSION, BENIGN: ICD-10-CM

## 2023-05-01 DIAGNOSIS — G47.33 OSA ON CPAP: ICD-10-CM

## 2023-05-01 DIAGNOSIS — E11.9 TYPE 2 DIABETES MELLITUS WITHOUT COMPLICATION, WITH LONG-TERM CURRENT USE OF INSULIN: Primary | ICD-10-CM

## 2023-05-01 DIAGNOSIS — K21.9 GASTROESOPHAGEAL REFLUX DISEASE WITHOUT ESOPHAGITIS: ICD-10-CM

## 2023-05-01 DIAGNOSIS — E78.2 MIXED HYPERLIPIDEMIA: ICD-10-CM

## 2023-05-01 DIAGNOSIS — N52.9 ERECTILE DYSFUNCTION, UNSPECIFIED ERECTILE DYSFUNCTION TYPE: ICD-10-CM

## 2023-05-01 DIAGNOSIS — Z79.4 TYPE 2 DIABETES MELLITUS WITHOUT COMPLICATION, WITH LONG-TERM CURRENT USE OF INSULIN: Primary | ICD-10-CM

## 2023-05-01 DIAGNOSIS — J44.1 CHRONIC OBSTRUCTIVE PULMONARY DISEASE WITH ACUTE EXACERBATION: ICD-10-CM

## 2023-05-01 DIAGNOSIS — E29.1 HYPOGONADISM IN MALE: ICD-10-CM

## 2023-05-01 DIAGNOSIS — N18.31 CHRONIC KIDNEY DISEASE, STAGE 3A: ICD-10-CM

## 2023-05-01 PROCEDURE — 99999 PR PBB SHADOW E&M-EST. PATIENT-LVL V: ICD-10-PCS | Mod: PBBFAC,,, | Performed by: FAMILY MEDICINE

## 2023-05-01 PROCEDURE — 71046 X-RAY EXAM CHEST 2 VIEWS: CPT | Mod: 26,,, | Performed by: RADIOLOGY

## 2023-05-01 PROCEDURE — 71046 X-RAY EXAM CHEST 2 VIEWS: CPT | Mod: TC

## 2023-05-01 PROCEDURE — 99214 PR OFFICE/OUTPT VISIT, EST, LEVL IV, 30-39 MIN: ICD-10-PCS | Mod: S$PBB,,, | Performed by: FAMILY MEDICINE

## 2023-05-01 PROCEDURE — 99214 OFFICE O/P EST MOD 30 MIN: CPT | Mod: S$PBB,,, | Performed by: FAMILY MEDICINE

## 2023-05-01 PROCEDURE — 99999 PR PBB SHADOW E&M-EST. PATIENT-LVL V: CPT | Mod: PBBFAC,,, | Performed by: FAMILY MEDICINE

## 2023-05-01 PROCEDURE — 71046 XR CHEST PA AND LATERAL: ICD-10-PCS | Mod: 26,,, | Performed by: RADIOLOGY

## 2023-05-01 PROCEDURE — 99215 OFFICE O/P EST HI 40 MIN: CPT | Mod: PBBFAC,25 | Performed by: FAMILY MEDICINE

## 2023-05-01 RX ORDER — AZITHROMYCIN 250 MG/1
TABLET, FILM COATED ORAL
Qty: 6 TABLET | Refills: 0 | Status: SHIPPED | OUTPATIENT
Start: 2023-05-01 | End: 2023-05-06

## 2023-05-01 RX ORDER — PREDNISONE 20 MG/1
40 TABLET ORAL DAILY
Qty: 10 TABLET | Refills: 0 | Status: SHIPPED | OUTPATIENT
Start: 2023-05-01 | End: 2023-05-06

## 2023-05-01 RX ORDER — ALBUTEROL SULFATE 90 UG/1
AEROSOL, METERED RESPIRATORY (INHALATION)
Qty: 18 G | Refills: 11 | Status: SHIPPED | OUTPATIENT
Start: 2023-05-01

## 2023-05-01 NOTE — PROGRESS NOTES
[facility] - Clinic Note    Subjective      Mr. Rodrigues is a 68 y.o. male who presents to clinic for a follow up of chronic conditions.        HTN: currently on amlodipine, lisinopril-hctz, metoprolol  Bp well controlled.      Diabetes Type 2: metformin, tresiba 28 units daily, glipizide  A1C 6.8      On testosterone. Feeling fatigued and no energy as well has ED.      On Nucynta from previous PM.        GERD: prilosec     HLD: lipitor and fenofibrate.      Insomnia: trazodone     BPH: flomax       H/o COPD: reports increasing shortness of breath and cough. Using albuterol inhaler.     [facility] Mathew has a past medical history of Arthritis, COPD (chronic obstructive pulmonary disease), Diabetes mellitus, type 2, Hyperlipidemia, Hypertension, Kidney stones, LVH (left ventricular hypertrophy) due to hypertensive disease, with heart failure, Neuropathy, Personal history of colonic polyps (03/24/2000), PTSD (post-traumatic stress disorder), Sleep apnea, unspecified, and Torn rotator cuff.   PSXH Mathew has a past surgical history that includes Rotator cuff repair (Right, 09/25/2015); skin grafts (Right, 07/2016); Rotator cuff repair (Left, 04/08/2016); Hand surgery (Right, 04/25/2017); Hand surgery (Right, 05/12/2017); finger joint replacement (Right, 01/22/2020); Elbow surgery (Right, 07/24/2020); Nasal sinus surgery (08/2015); Vasectomy; Cataract extraction (Left, 2004); Cataract extraction (Left, 2005); achilles repair (Left, 2003); hair follicle (1998); Ankle fracture surgery (Right, 1987); Adenoidectomy; Eye surgery (2005); Tonsillectomy; Colonoscopy w/ polypectomy (N/A, 03/24/2000); Colonoscopy (N/A, 05/09/2017); and Colonoscopy (N/A, 1/13/2023).    Mathew's family history includes Arthritis in his father and mother; COPD in his brother; Cancer in his brother; Diabetes in his father; Hearing loss in his father; Hypertension in his father and mother.    Mathew reports that he has quit smoking. His smoking use included  "cigarettes. He has a 45.00 pack-year smoking history. He has never used smokeless tobacco. He reports current alcohol use. He reports that he does not use drugs.   JOO Carmona is allergic to ibuprofen.   DEYANIRA Carmona has a current medication list which includes the following prescription(s): proair digihaler, aspirin, atorvastatin, buspirone, cyanocobalamin (vitamin b-12), fenofibrate, fluticasone propionate, glipizide, lisinopril-hydrochlorothiazide, metformin, metoprolol tartrate, nucynta, omeprazole, sildenafil, sutab, tamsulosin, nucynta er, trazodone, UNABLE TO FIND, albuterol, amlodipine, fluticasone furoate-vilanterol, trelegy ellipta, guanfacine, insulin degludec, ketorolac, and testosterone cypionate.     Review of Systems   Constitutional:  Positive for fatigue. Negative for activity change, appetite change, chills and fever.   Eyes:  Negative for visual disturbance.   Respiratory:  Negative for cough and shortness of breath.    Cardiovascular:  Negative for chest pain, palpitations and leg swelling.   Gastrointestinal:  Negative for abdominal pain, nausea and vomiting.   Skin:  Negative for wound.   Neurological:  Negative for dizziness and headaches.   Psychiatric/Behavioral:  Negative for confusion.    Objective     /74 (BP Location: Left arm, Patient Position: Sitting, BP Method: Medium (Manual))   Pulse (!) 54   Resp 15   Ht 5' 7" (1.702 m)   Wt 94.8 kg (209 lb)   SpO2 95%   BMI 32.73 kg/m²     Physical Exam   Constitutional: normal appearance. He appears obese.  Non-toxic appearance. No distress. He does not appear ill.   HENT:   Head: Normocephalic and atraumatic.   Eyes: Right eye exhibits no discharge. Left eye exhibits no discharge.   Cardiovascular: Normal rate, regular rhythm, normal heart sounds and normal pulses. Exam reveals no gallop and no friction rub.   No murmur heard.  Pulses:       Dorsalis pedis pulses are 2+ on the right side and 2+ on the left side. Pulmonary:      Effort: " Pulmonary effort is normal. No respiratory distress.      Breath sounds: Normal breath sounds. No wheezing, rhonchi or rales.     Abdominal: Normal appearance.   Musculoskeletal:      Right lower leg: No edema.      Left lower leg: No edema.      Right foot: Normal range of motion. No deformity, bunion, Charcot foot or foot drop.      Left foot: Normal range of motion. No deformity, bunion, Charcot foot or foot drop.   Feet:   Right Foot:   Protective Sensation: 7 sites tested.  7 sites sensed.   Left Foot:   Protective Sensation: 7 sites tested.  7 sites sensed.   Lymphadenopathy:     He has no cervical adenopathy.   Neurological: He is alert.   Skin: Skin is warm and dry. Capillary refill takes less than 2 seconds. He is not diaphoretic.   Psychiatric: His behavior is normal. Mood, judgment and thought content normal.   Vitals reviewed.   Assessment/Plan     Mathew was seen today for follow-up.    Diagnoses and all orders for this visit:    Type 2 diabetes mellitus without complication, with long-term current use of insulin  -     Foot Exam Performed  -     Hemoglobin A1C; Future  -     Comprehensive metabolic panel; Future    Hypogonadism in male  -     Testosterone; Future  -     TSH; Future    Erectile dysfunction, unspecified erectile dysfunction type  -     Testosterone; Future  -     TSH; Future    Chronic obstructive pulmonary disease, unspecified COPD type  -     X-Ray Chest PA And Lateral; Future  -     albuterol (PROVENTIL/VENTOLIN HFA) 90 mcg/actuation inhaler; albuterol sulfate HFA 90 mcg/actuation aerosol inhaler    Chronic obstructive pulmonary disease with acute exacerbation  -     predniSONE (DELTASONE) 20 MG tablet; Take 2 tablets (40 mg total) by mouth once daily. for 5 days  -     azithromycin (Z-BRANDON) 250 MG tablet; Take 2 tablets by mouth on day 1; Take 1 tablet by mouth on days 2-5    Mixed hyperlipidemia  -     Comprehensive metabolic panel; Future    Insomnia, unspecified type    Essential  hypertension, benign  -     Comprehensive metabolic panel; Future    Anxiety    Gastroesophageal reflux disease without esophagitis    Benign prostatic hyperplasia without lower urinary tract symptoms    CRICKET on CPAP    Chronic kidney disease, stage 3a  -avoid nephrotoxic meds. Monitor q6 months.     --chronic conditions stable. Continue current regimen.  -Obtain labs and adjust regimen as indicated      Follow up in about 6 months (around 11/1/2023), or if symptoms worsen or fail to improve.    Future Appointments   Date Time Provider Department Center   8/8/2023  7:00 AM [facility], LABORATORY [facility] LAB [facility]   11/7/2023  8:40 AM Jennyfer Osei MD [facility]   11/13/2023 10:30 AM Feliciano June MD [facility] ORTHO [facility]       Jennyfer Osei MD  Family Medicine  [facility]

## 2023-05-05 ENCOUNTER — LAB VISIT (OUTPATIENT)
Dept: LAB | Facility: HOSPITAL | Age: 68
End: 2023-05-05
Attending: FAMILY MEDICINE
Payer: MEDICARE

## 2023-05-05 ENCOUNTER — PATIENT MESSAGE (OUTPATIENT)
Dept: FAMILY MEDICINE | Facility: CLINIC | Age: 68
End: 2023-05-05
Payer: MEDICARE

## 2023-05-05 DIAGNOSIS — E29.1 HYPOGONADISM IN MALE: ICD-10-CM

## 2023-05-05 DIAGNOSIS — N52.9 ERECTILE DYSFUNCTION, UNSPECIFIED ERECTILE DYSFUNCTION TYPE: ICD-10-CM

## 2023-05-05 DIAGNOSIS — E11.9 TYPE 2 DIABETES MELLITUS WITHOUT COMPLICATION, WITHOUT LONG-TERM CURRENT USE OF INSULIN: ICD-10-CM

## 2023-05-05 LAB
ALBUMIN SERPL BCP-MCNC: 4 G/DL (ref 3.5–5.2)
ALP SERPL-CCNC: 55 U/L (ref 55–135)
ALT SERPL W/O P-5'-P-CCNC: 25 U/L (ref 10–44)
ANION GAP SERPL CALC-SCNC: 14 MMOL/L (ref 8–16)
AST SERPL-CCNC: 14 U/L (ref 10–40)
BILIRUB SERPL-MCNC: 0.4 MG/DL (ref 0.1–1)
BUN SERPL-MCNC: 43 MG/DL (ref 8–23)
CALCIUM SERPL-MCNC: 10 MG/DL (ref 8.7–10.5)
CHLORIDE SERPL-SCNC: 101 MMOL/L (ref 95–110)
CO2 SERPL-SCNC: 24 MMOL/L (ref 23–29)
CREAT SERPL-MCNC: 1.8 MG/DL (ref 0.5–1.4)
EST. GFR  (NO RACE VARIABLE): 40.5 ML/MIN/1.73 M^2
ESTIMATED AVG GLUCOSE: 177 MG/DL (ref 68–131)
GLUCOSE SERPL-MCNC: 127 MG/DL (ref 70–110)
HBA1C MFR BLD: 7.8 % (ref 4–5.6)
POTASSIUM SERPL-SCNC: 4.2 MMOL/L (ref 3.5–5.1)
PROT SERPL-MCNC: 7.4 G/DL (ref 6–8.4)
SODIUM SERPL-SCNC: 139 MMOL/L (ref 136–145)
TESTOST SERPL-MCNC: 349 NG/DL (ref 304–1227)
TSH SERPL DL<=0.005 MIU/L-ACNC: 1.54 UIU/ML (ref 0.4–4)

## 2023-05-05 PROCEDURE — 84403 ASSAY OF TOTAL TESTOSTERONE: CPT | Performed by: FAMILY MEDICINE

## 2023-05-05 PROCEDURE — 84443 ASSAY THYROID STIM HORMONE: CPT | Performed by: FAMILY MEDICINE

## 2023-05-05 PROCEDURE — 83036 HEMOGLOBIN GLYCOSYLATED A1C: CPT | Performed by: FAMILY MEDICINE

## 2023-05-05 PROCEDURE — 36415 COLL VENOUS BLD VENIPUNCTURE: CPT | Performed by: FAMILY MEDICINE

## 2023-05-05 PROCEDURE — 80053 COMPREHEN METABOLIC PANEL: CPT | Performed by: FAMILY MEDICINE

## 2023-05-12 ENCOUNTER — PATIENT MESSAGE (OUTPATIENT)
Dept: FAMILY MEDICINE | Facility: CLINIC | Age: 68
End: 2023-05-12
Payer: MEDICARE

## 2023-05-12 DIAGNOSIS — E11.9 TYPE 2 DIABETES MELLITUS WITHOUT COMPLICATION, WITHOUT LONG-TERM CURRENT USE OF INSULIN: ICD-10-CM

## 2023-05-12 DIAGNOSIS — J44.9 CHRONIC OBSTRUCTIVE PULMONARY DISEASE, UNSPECIFIED COPD TYPE: Primary | ICD-10-CM

## 2023-05-12 DIAGNOSIS — E29.1 HYPOGONADISM IN MALE: ICD-10-CM

## 2023-05-12 RX ORDER — INSULIN DEGLUDEC 100 U/ML
32 INJECTION, SOLUTION SUBCUTANEOUS EVERY MORNING
Qty: 10 PEN | Refills: 3 | Status: SHIPPED | OUTPATIENT
Start: 2023-05-12 | End: 2023-06-13 | Stop reason: SDUPTHER

## 2023-05-12 RX ORDER — FLUTICASONE FUROATE, UMECLIDINIUM BROMIDE AND VILANTEROL TRIFENATATE 200; 62.5; 25 UG/1; UG/1; UG/1
1 POWDER RESPIRATORY (INHALATION) DAILY
Qty: 28 EACH | Refills: 11 | Status: SHIPPED | OUTPATIENT
Start: 2023-05-12 | End: 2023-08-29

## 2023-05-12 NOTE — TELEPHONE ENCOUNTER
Spoke to wife in clinic about this message as she sent it via patient's portal.   Will change advair to trelegy for COPD as he continues to be short of breath.   Elevated serum Cr. Increase hydration. Avoid nsaids.   A1C elevated. Taking 28 units lantus, metformin, and glipizide. Admits to not eating healthy. Will increase insulin to 30 units daily for 3 days and if FBS >130s in 3 dayson 30 units then increase to 32 units daily. If fbs>130s on 32 unit then to inform me.   Testosterone on lower limit of normal. Takes 100mg weekly. Feeling fatigue and having problems with ED. Will increase to 150mg weekly.   Recheck BMP, A1C, and testosterone in 3 months.

## 2023-05-18 ENCOUNTER — PATIENT MESSAGE (OUTPATIENT)
Dept: FAMILY MEDICINE | Facility: CLINIC | Age: 68
End: 2023-05-18
Payer: MEDICARE

## 2023-05-18 DIAGNOSIS — J44.9 CHRONIC OBSTRUCTIVE PULMONARY DISEASE, UNSPECIFIED COPD TYPE: Primary | ICD-10-CM

## 2023-05-18 NOTE — TELEPHONE ENCOUNTER
Dear Dr. Vasquez     In the absence of Jennyfer Roberts MD, can you please address this patients concern or request?       Alternative?      Thank you,  Aditi Booker LPN

## 2023-05-24 RX ORDER — FLUTICASONE FUROATE AND VILANTEROL 100; 25 UG/1; UG/1
1 POWDER RESPIRATORY (INHALATION) DAILY
Qty: 60 EACH | Refills: 1 | Status: SHIPPED | OUTPATIENT
Start: 2023-05-24 | End: 2023-12-24

## 2023-05-25 ENCOUNTER — TELEPHONE (OUTPATIENT)
Dept: SPORTS MEDICINE | Facility: CLINIC | Age: 68
End: 2023-05-25
Payer: MEDICARE

## 2023-05-25 NOTE — TELEPHONE ENCOUNTER
Left v/m for patient. Trying to get him scheduled with Flako Encarnacion for trish shoulder workers' comp injury.    [facility]   Athletic   [facility]

## 2023-05-30 ENCOUNTER — PATIENT MESSAGE (OUTPATIENT)
Dept: SPORTS MEDICINE | Facility: CLINIC | Age: 68
End: 2023-05-30
Payer: MEDICARE

## 2023-05-30 ENCOUNTER — TELEPHONE (OUTPATIENT)
Dept: SPORTS MEDICINE | Facility: CLINIC | Age: 68
End: 2023-05-30
Payer: MEDICARE

## 2023-06-05 ENCOUNTER — OFFICE VISIT (OUTPATIENT)
Dept: SPORTS MEDICINE | Facility: CLINIC | Age: 68
End: 2023-06-05
Payer: OTHER GOVERNMENT

## 2023-06-05 ENCOUNTER — HOSPITAL ENCOUNTER (OUTPATIENT)
Dept: RADIOLOGY | Facility: HOSPITAL | Age: 68
Discharge: HOME | End: 2023-06-05
Attending: PHYSICIAN ASSISTANT
Payer: OTHER GOVERNMENT

## 2023-06-05 VITALS
SYSTOLIC BLOOD PRESSURE: 98 MMHG | DIASTOLIC BLOOD PRESSURE: 61 MMHG | HEIGHT: 66 IN | WEIGHT: 204 LBS | HEART RATE: 51 BPM | BODY MASS INDEX: 32.78 KG/M2

## 2023-06-05 DIAGNOSIS — G89.29 CHRONIC PAIN OF BOTH SHOULDERS: Primary | ICD-10-CM

## 2023-06-05 DIAGNOSIS — M25.511 CHRONIC PAIN OF BOTH SHOULDERS: ICD-10-CM

## 2023-06-05 DIAGNOSIS — G89.29 CHRONIC PAIN OF BOTH SHOULDERS: ICD-10-CM

## 2023-06-05 DIAGNOSIS — M19.111 POST-TRAUMATIC OSTEOARTHRITIS OF SHOULDERS, BILATERAL: ICD-10-CM

## 2023-06-05 DIAGNOSIS — M25.512 CHRONIC PAIN OF BOTH SHOULDERS: Primary | ICD-10-CM

## 2023-06-05 DIAGNOSIS — M75.122 COMPLETE TEAR OF LEFT ROTATOR CUFF, UNSPECIFIED WHETHER TRAUMATIC: ICD-10-CM

## 2023-06-05 DIAGNOSIS — M25.512 CHRONIC PAIN OF BOTH SHOULDERS: ICD-10-CM

## 2023-06-05 DIAGNOSIS — M25.511 CHRONIC PAIN OF BOTH SHOULDERS: Primary | ICD-10-CM

## 2023-06-05 DIAGNOSIS — M19.112 POST-TRAUMATIC OSTEOARTHRITIS OF SHOULDERS, BILATERAL: ICD-10-CM

## 2023-06-05 DIAGNOSIS — M75.121 COMPLETE TEAR OF RIGHT ROTATOR CUFF, UNSPECIFIED WHETHER TRAUMATIC: ICD-10-CM

## 2023-06-05 PROCEDURE — 99204 OFFICE O/P NEW MOD 45 MIN: CPT | Mod: 25,S$GLB,, | Performed by: PHYSICIAN ASSISTANT

## 2023-06-05 PROCEDURE — 73030 XR SHOULDER COMPLETE 2 OR MORE VIEWS BILATERAL: ICD-10-PCS | Mod: 26,50,, | Performed by: RADIOLOGY

## 2023-06-05 PROCEDURE — 73030 X-RAY EXAM OF SHOULDER: CPT | Mod: 26,50,, | Performed by: RADIOLOGY

## 2023-06-05 PROCEDURE — 20610 PR DRAIN/INJECT LARGE JOINT/BURSA: ICD-10-PCS | Mod: 50,S$GLB,, | Performed by: PHYSICIAN ASSISTANT

## 2023-06-05 PROCEDURE — 99204 PR OFFICE/OUTPT VISIT, NEW, LEVL IV, 45-59 MIN: ICD-10-PCS | Mod: 25,S$GLB,, | Performed by: PHYSICIAN ASSISTANT

## 2023-06-05 PROCEDURE — 20610 DRAIN/INJ JOINT/BURSA W/O US: CPT | Mod: 50,S$GLB,, | Performed by: PHYSICIAN ASSISTANT

## 2023-06-05 PROCEDURE — 99999 PR PBB SHADOW E&M-EST. PATIENT-LVL V: CPT | Mod: PBBFAC,,, | Performed by: PHYSICIAN ASSISTANT

## 2023-06-05 PROCEDURE — 99999 PR PBB SHADOW E&M-EST. PATIENT-LVL V: ICD-10-PCS | Mod: PBBFAC,,, | Performed by: PHYSICIAN ASSISTANT

## 2023-06-05 PROCEDURE — 73030 X-RAY EXAM OF SHOULDER: CPT | Mod: TC,50

## 2023-06-05 RX ORDER — TRIAMCINOLONE ACETONIDE 40 MG/ML
40 INJECTION, SUSPENSION INTRA-ARTICULAR; INTRAMUSCULAR
Status: COMPLETED | OUTPATIENT
Start: 2023-06-05 | End: 2023-06-05

## 2023-06-05 RX ADMIN — TRIAMCINOLONE ACETONIDE 40 MG: 40 INJECTION, SUSPENSION INTRA-ARTICULAR; INTRAMUSCULAR at 11:06

## 2023-06-05 NOTE — PROGRESS NOTES
Subjective:     Chief Complaint: Mathew Rodrigues is a 68 y.o. male who had concerns including Pain of the Left Shoulder and Pain of the Right Shoulder.    Mathew Rodrigues is a right handed on disability, WC injury 9/1/2015, previously treated in [place]. Followed by pain management in [place]. Previous surgeon said he is a candidate for right total shoulder replacement and hes currently managing the pain with CSI twice a year and physical therapy 2x weekly for 2+ year. Last CSI was July 2022. Pain is located over (points to) anterior and lateral shoulder bilaterally. He reports that the pain is a 8 /10 sore, aching, throbbing, and pain with movement pain today. Currently on Nucenta and Lyrica. Pain not responding adequately to conservative measures which have included activity modifications, rest, and oral medication. Is affecting ADLs and limiting desired level of activity. Baseline intermittent numbness, tingling, radiation, and neck pain or radicular symptoms.  Pain is 10 /10 at its worst    Mechanical symptoms: none  Subjective instability: (--)   Worse with reaching and overhead movements  Better with rest.   Nocturnal symptoms: (+)    PSXH Mathew has a past surgical history that includes Rotator cuff repair (Right, 09/25/2015); skin grafts (Right, 07/2016); Rotator cuff repair (Left, 04/08/2016); Hand surgery (Right, 04/25/2017); Hand surgery (Right, 05/12/2017); finger joint replacement (Right, 01/22/2020); Elbow surgery (Right, 07/24/2020);           Review of Systems   Constitutional: Negative for chills and fever.   HENT:  Negative for congestion and sore throat.    Eyes:  Negative for discharge and double vision.   Cardiovascular:  Negative for chest pain, palpitations and syncope.   Respiratory:  Negative for cough and shortness of breath.    Endocrine: Negative for cold intolerance and heat intolerance.   Skin:  Negative for dry skin and rash.   Musculoskeletal:  Positive for arthritis, back pain,  joint pain, muscle weakness, myalgias and neck pain.   Gastrointestinal:  Negative for abdominal pain, nausea and vomiting.   Neurological:  Negative for focal weakness, numbness and paresthesias.               Objective:     General: Mathew is well-developed, well-nourished, appears stated age, in no acute distress, alert and oriented to time, place and person.     General    Nursing note and vitals reviewed.  Constitutional: He is oriented to person, place, and time. He appears well-developed and well-nourished. No distress.   HENT:   Head: Normocephalic and atraumatic.   Nose: Nose normal.   Eyes: Conjunctivae and EOM are normal. Pupils are equal, round, and reactive to light.   Cardiovascular:  Normal rate, regular rhythm and intact distal pulses.            Pulmonary/Chest: Effort normal and breath sounds normal.   Abdominal: Soft. Bowel sounds are normal. He exhibits no distension.   Neurological: He is alert and oriented to person, place, and time. He has normal reflexes.   Psychiatric: He has a normal mood and affect. His behavior is normal. Judgment and thought content normal.         Right Shoulder Exam     Inspection/Observation   Swelling: absent  Bruising: absent  Scars: present  Deformity: absent  Scapular Winging: absent  Scapular Dyskinesia: negative  Atrophy: absent    Tenderness   The patient is tender to palpation of the greater tuberosity and biceps tendon.    Crepitus   The patient has crepitus of the greater tuberosity.    Range of Motion   Active abduction:  100 abnormal   Passive abduction:  110 abnormal   Extension:  50   Forward Flexion:  110 abnormal   Forward Elevation: 110 abnormal  Adduction: 30   External Rotation 0 degrees:  40 abnormal   Internal rotation 0 degrees:  Sacrum abnormal   Internal rotation 90 degrees:  40     Tests & Signs   Apprehension: negative  Cross arm: positive  Drop arm: positive  Mcclendon test: positive  Impingement: positive  Rotator Cuff Painful Arc/Range:  moderate  Lift Off Sign: negative  Belly Press: negative  Active Compression Test (Yoakum's Sign): negative  Yergason's Test: negative  Speed's Test: positive  Relocation 90 degrees: negative  Jerk Test: negative    Other   Sensation: normal    Left Shoulder Exam     Inspection/Observation   Swelling: absent  Bruising: absent  Scars: present  Deformity: absent  Scapular Winging: absent  Scapular Dyskinesia: negative  Atrophy: absent    Tenderness   The patient is tender to palpation of the greater tuberosity and biceps tendon.    Range of Motion   Active abduction:  110 abnormal   Passive abduction:  110 abnormal   Extension:  50   Forward Flexion:  100 abnormal   Forward Elevation: 100 abnormal  Adduction: 30   External Rotation 0 degrees:  50 abnormal   Internal rotation 0 degrees:  Sacrum abnormal   Internal rotation 90 degrees:  60     Tests & Signs   Apprehension: negative  Cross arm: negative  Drop arm: positive  Mcclendon test: positive  Impingement: positive  Rotator Cuff Painful Arc/Range: moderate  Anterosuperior Escape: positive  Lift Off Sign: negative  Belly Press: positive  Active Compression test (Yoakum's Sign): negative  Yergasons's Test: negative  Speed's Test: positive  Relocation 90 degrees: negative  Jerk Test: negative    Other   Sensation: normal       Muscle Strength   Right Upper Extremity   Shoulder Abduction: 3/5   Shoulder Internal Rotation: 4/5   Shoulder External Rotation: 4/5   Supraspinatus: 3/5   Subscapularis: 4/5   Biceps: 4/5   Left Upper Extremity  Shoulder Abduction: 3/5   Shoulder Internal Rotation: 4/5   Shoulder External Rotation: 4/5   Supraspinatus: 3/5   Subscapularis: 5/5   Biceps: 4/5     Vascular Exam     Right Pulses      Radial:                    2+      Left Pulses      Radial:                    2+      Capillary Refill  Right Hand: normal capillary refill  Left Hand: normal capillary refill      Radiographic findings today:      Xrays of the right shoulder and  left shoulder were ordered and reviewed by me today. These findings were discussed and reviewed with the patient.      Assessment:     Encounter Diagnoses   Name Primary?    Chronic pain of both shoulders Yes    Complete tear of left rotator cuff, unspecified whether traumatic     Complete tear of right rotator cuff, unspecified whether traumatic     Post-traumatic osteoarthritis of shoulders, bilateral         Plan:     We have discussed a variety of treatment options including medications, injections, physical therapy and other alternative treatments. I also explained the indications, risks and benefits of surgery. Patient chooses to proceed with bilateral CSI and to establish care with a surgeon in [place] at this time.    I made the decision to obtain old records of the patient including previous notes and imaging. I independently reviewed and interpreted lab results today as well as prior imaging.     1. Injection Procedure  A time out was performed, including verification of patient ID, procedure, site and side, availability of information and equipment, review of safety issues, and agreement with consent, the procedure site was marked.    After time out was performed, the patient was prepped aseptically with povidone-iodine swabsticks. A diagnostic and therapeutic injection of 1:4cc Kenalog/Marcaine was given under sterile technique using a 22g x 1.5 needle from the Superolateral  aspect of the bilateral Subacromial in the sitting position.      Mathew Rodrigues had no adverse reactions to the medication. Pain decreased. He was instructed to apply ice to the joint for 20 minutes and avoid strenuous activities for 24-36 hours following the injection. He was warned of possible blood sugar and/or blood pressure changes during that time. Following that time, he can resume regular activities.    He was reminded to call the clinic immediately for any adverse side effects as explained in clinic today.    2. Shoulder  pain controlled with CSI x2 annually. He has been told he needs TSA, would like to hold off for now.   3. Ambulatory referral to Sports Medicine in [place] to establish care.     All of the patient's questions were answered and the patient will contact us if they have any questions or concerns in the interim.      Patient questionnaires may have been collected.

## 2023-06-09 DIAGNOSIS — I10 ESSENTIAL HYPERTENSION, BENIGN: ICD-10-CM

## 2023-06-09 DIAGNOSIS — F98.8 ATTENTION DEFICIT DISORDER, UNSPECIFIED HYPERACTIVITY PRESENCE: ICD-10-CM

## 2023-06-11 ENCOUNTER — PATIENT MESSAGE (OUTPATIENT)
Dept: FAMILY MEDICINE | Facility: CLINIC | Age: 68
End: 2023-06-11
Payer: MEDICARE

## 2023-06-11 RX ORDER — AMLODIPINE BESYLATE 5 MG/1
TABLET ORAL
Qty: 30 TABLET | Refills: 1 | Status: SHIPPED | OUTPATIENT
Start: 2023-06-11 | End: 2023-07-31

## 2023-06-11 RX ORDER — GUANFACINE 2 MG/1
TABLET ORAL
Qty: 30 TABLET | Refills: 1 | Status: SHIPPED | OUTPATIENT
Start: 2023-06-11 | End: 2023-07-31

## 2023-06-13 ENCOUNTER — PATIENT MESSAGE (OUTPATIENT)
Dept: FAMILY MEDICINE | Facility: CLINIC | Age: 68
End: 2023-06-13
Payer: MEDICARE

## 2023-06-13 DIAGNOSIS — E29.1 HYPOGONADISM IN MALE: ICD-10-CM

## 2023-06-13 DIAGNOSIS — E11.9 TYPE 2 DIABETES MELLITUS WITHOUT COMPLICATION, WITHOUT LONG-TERM CURRENT USE OF INSULIN: ICD-10-CM

## 2023-06-13 RX ORDER — INSULIN DEGLUDEC 100 U/ML
32 INJECTION, SOLUTION SUBCUTANEOUS EVERY MORNING
Qty: 10 PEN | Refills: 3 | Status: SHIPPED | OUTPATIENT
Start: 2023-06-13 | End: 2024-06-12

## 2023-06-14 ENCOUNTER — PATIENT MESSAGE (OUTPATIENT)
Dept: FAMILY MEDICINE | Facility: CLINIC | Age: 68
End: 2023-06-14
Payer: MEDICARE

## 2023-06-15 ENCOUNTER — HOSPITAL ENCOUNTER (EMERGENCY)
Facility: HOSPITAL | Age: 68
Discharge: HOME | End: 2023-06-15
Attending: EMERGENCY MEDICINE
Payer: MEDICARE

## 2023-06-15 ENCOUNTER — TELEPHONE (OUTPATIENT)
Dept: URGENT CARE | Facility: CLINIC | Age: 68
End: 2023-06-15
Payer: MEDICARE

## 2023-06-15 VITALS
DIASTOLIC BLOOD PRESSURE: 83 MMHG | RESPIRATION RATE: 18 BRPM | WEIGHT: 204 LBS | HEIGHT: 68 IN | SYSTOLIC BLOOD PRESSURE: 133 MMHG | OXYGEN SATURATION: 96 % | HEART RATE: 60 BPM | TEMPERATURE: 98 F | BODY MASS INDEX: 30.92 KG/M2

## 2023-06-15 DIAGNOSIS — L02.91 ABSCESS: Primary | ICD-10-CM

## 2023-06-15 LAB
ALBUMIN SERPL BCP-MCNC: 3.8 G/DL (ref 3.5–5.2)
ALP SERPL-CCNC: 57 U/L (ref 55–135)
ALT SERPL W/O P-5'-P-CCNC: 22 U/L (ref 10–44)
ANION GAP SERPL CALC-SCNC: 12 MMOL/L (ref 8–16)
AST SERPL-CCNC: 12 U/L (ref 10–40)
BASOPHILS # BLD AUTO: 0.08 K/UL (ref 0–0.2)
BASOPHILS NFR BLD: 0.9 % (ref 0–1.9)
BILIRUB SERPL-MCNC: 0.3 MG/DL (ref 0.1–1)
BUN SERPL-MCNC: 29 MG/DL (ref 8–23)
CALCIUM SERPL-MCNC: 9.3 MG/DL (ref 8.7–10.5)
CHLORIDE SERPL-SCNC: 104 MMOL/L (ref 95–110)
CO2 SERPL-SCNC: 21 MMOL/L (ref 23–29)
CREAT SERPL-MCNC: 1.6 MG/DL (ref 0.5–1.4)
DIFFERENTIAL METHOD: ABNORMAL
EOSINOPHIL # BLD AUTO: 0.2 K/UL (ref 0–0.5)
EOSINOPHIL NFR BLD: 1.7 % (ref 0–8)
ERYTHROCYTE [DISTWIDTH] IN BLOOD BY AUTOMATED COUNT: 15.9 % (ref 11.5–14.5)
EST. GFR  (NO RACE VARIABLE): 46.6 ML/MIN/1.73 M^2
ESTIMATED AVG GLUCOSE: 180 MG/DL (ref 68–131)
GLUCOSE SERPL-MCNC: 214 MG/DL (ref 70–110)
HBA1C MFR BLD: 7.9 % (ref 4–5.6)
HCT VFR BLD AUTO: 45.1 % (ref 40–54)
HCV AB SERPL QL IA: NORMAL
HGB BLD-MCNC: 14.6 G/DL (ref 14–18)
HIV 1+2 AB+HIV1 P24 AG SERPL QL IA: NORMAL
IMM GRANULOCYTES # BLD AUTO: 0.11 K/UL (ref 0–0.04)
IMM GRANULOCYTES NFR BLD AUTO: 1.2 % (ref 0–0.5)
LYMPHOCYTES # BLD AUTO: 1.2 K/UL (ref 1–4.8)
LYMPHOCYTES NFR BLD: 13.2 % (ref 18–48)
MCH RBC QN AUTO: 26.2 PG (ref 27–31)
MCHC RBC AUTO-ENTMCNC: 32.4 G/DL (ref 32–36)
MCV RBC AUTO: 81 FL (ref 82–98)
MONOCYTES # BLD AUTO: 0.8 K/UL (ref 0.3–1)
MONOCYTES NFR BLD: 8.5 % (ref 4–15)
NEUTROPHILS # BLD AUTO: 6.8 K/UL (ref 1.8–7.7)
NEUTROPHILS NFR BLD: 74.5 % (ref 38–73)
NRBC BLD-RTO: 0 /100 WBC
PLATELET # BLD AUTO: 279 K/UL (ref 150–450)
PMV BLD AUTO: 9.6 FL (ref 9.2–12.9)
POTASSIUM SERPL-SCNC: 4.5 MMOL/L (ref 3.5–5.1)
PROT SERPL-MCNC: 7 G/DL (ref 6–8.4)
RBC # BLD AUTO: 5.58 M/UL (ref 4.6–6.2)
SODIUM SERPL-SCNC: 137 MMOL/L (ref 136–145)
WBC # BLD AUTO: 9.08 K/UL (ref 3.9–12.7)

## 2023-06-15 PROCEDURE — 86803 HEPATITIS C AB TEST: CPT | Performed by: EMERGENCY MEDICINE

## 2023-06-15 PROCEDURE — 63600175 PHARM REV CODE 636 W HCPCS: Performed by: EMERGENCY MEDICINE

## 2023-06-15 PROCEDURE — 25000003 PHARM REV CODE 250: Performed by: EMERGENCY MEDICINE

## 2023-06-15 PROCEDURE — 83036 HEMOGLOBIN GLYCOSYLATED A1C: CPT | Performed by: EMERGENCY MEDICINE

## 2023-06-15 PROCEDURE — 87389 HIV-1 AG W/HIV-1&-2 AB AG IA: CPT | Performed by: EMERGENCY MEDICINE

## 2023-06-15 PROCEDURE — 80053 COMPREHEN METABOLIC PANEL: CPT | Performed by: EMERGENCY MEDICINE

## 2023-06-15 PROCEDURE — 85025 COMPLETE CBC W/AUTO DIFF WBC: CPT | Performed by: EMERGENCY MEDICINE

## 2023-06-15 PROCEDURE — 99284 EMERGENCY DEPT VISIT MOD MDM: CPT | Mod: 25

## 2023-06-15 PROCEDURE — 96365 THER/PROPH/DIAG IV INF INIT: CPT

## 2023-06-15 PROCEDURE — 96375 TX/PRO/DX INJ NEW DRUG ADDON: CPT

## 2023-06-15 RX ORDER — KETOROLAC TROMETHAMINE 10 MG/1
10 TABLET, FILM COATED ORAL EVERY 6 HOURS
Qty: 15 TABLET | Refills: 0 | Status: SHIPPED | OUTPATIENT
Start: 2023-06-15 | End: 2023-08-29

## 2023-06-15 RX ORDER — CLINDAMYCIN PHOSPHATE 900 MG/50ML
900 INJECTION, SOLUTION INTRAVENOUS
Status: COMPLETED | OUTPATIENT
Start: 2023-06-15 | End: 2023-06-15

## 2023-06-15 RX ORDER — CLINDAMYCIN HYDROCHLORIDE 150 MG/1
300 CAPSULE ORAL 4 TIMES DAILY
Qty: 56 CAPSULE | Refills: 0 | Status: SHIPPED | OUTPATIENT
Start: 2023-06-15 | End: 2023-06-22

## 2023-06-15 RX ORDER — KETOROLAC TROMETHAMINE 30 MG/ML
30 INJECTION, SOLUTION INTRAMUSCULAR; INTRAVENOUS
Status: COMPLETED | OUTPATIENT
Start: 2023-06-15 | End: 2023-06-15

## 2023-06-15 RX ADMIN — KETOROLAC TROMETHAMINE 30 MG: 30 INJECTION, SOLUTION INTRAMUSCULAR; INTRAVENOUS at 08:06

## 2023-06-15 RX ADMIN — CLINDAMYCIN PHOSPHATE 900 MG: 900 INJECTION, SOLUTION INTRAVENOUS at 08:06

## 2023-06-15 NOTE — DISCHARGE INSTRUCTIONS
Follow-up with the primary care physician in a week for further evaluation.  Return emergency room for further definitive therapy if this does not improve over the next 2-3 days.  Return with any signs of worsening redness, swelling, pain or possibly drainage.  Take all the antibiotics without fail until completed.  These antibiotics may cause a particular type of diarrhea called Clostridium difficile.  Be aware of this and should you develop any profuse diarrhea return to the emergency room for further evaluation and treatment of this individually.

## 2023-06-15 NOTE — Clinical Note
"Mathew "Junie Rodrigues was seen and treated in our emergency department on 6/15/2023.  He may return to work on 06/17/2023.       If you have any questions or concerns, please don't hesitate to call.      Arash Morfin MD"

## 2023-06-15 NOTE — ED PROVIDER NOTES
Encounter Date: 6/15/2023       History     Chief Complaint   Patient presents with    Abscess     Patient states has a painful bump close to his anus that started about 4 days ago.     68-year-old male comes emergency with complaints of possible abscess at the superior aspect of his buttocks crease.  Patient states 3-4 days duration.  No fevers or chills.  No previous episodes in this region.  Has had abscesses in the axillary region secondary to hair follicles that became infected.  No recent episodes.  No drainage.  Only painful with sitting.  Denies any bowel problems associated with this.  Past medical history of arthritis COPD diabetes hyperlipidemia hypertension kidney stones LVH neuropathy occasional sleep apnea.  PTSD as well.  Stable otherwise.    Review of patient's allergies indicates:   Allergen Reactions    Ibuprofen Itching     Past Medical History:   Diagnosis Date    Arthritis     COPD (chronic obstructive pulmonary disease)     Diabetes mellitus, type 2     Hyperlipidemia     Hypertension     Kidney stones     LVH (left ventricular hypertrophy) due to hypertensive disease, with heart failure     Neuropathy     Personal history of colonic polyps 03/24/2000    colonoscopy report in media    PTSD (post-traumatic stress disorder)     Sleep apnea, unspecified     Torn rotator cuff      Past Surgical History:   Procedure Laterality Date    achilles repair Left 2003    ADENOIDECTOMY      ANKLE FRACTURE SURGERY Right 1987    CATARACT EXTRACTION Left 2004    CATARACT EXTRACTION Left 2005 2nd    COLONOSCOPY N/A 05/09/2017    results in media    COLONOSCOPY N/A 1/13/2023    Procedure: COLONOSCOPY;  Surgeon: Kenney Keller MD;  Location: [facility];  Service: General;  Laterality: N/A;    COLONOSCOPY W/ POLYPECTOMY N/A 03/24/2000    results in media    ELBOW SURGERY Right 07/24/2020    EYE SURGERY  2005    finger joint replacement Right 01/22/2020    middle finger    hair follicle  1998    HAND  SURGERY Right 04/25/2017    HAND SURGERY Right 05/12/2017    2nd surgery    NASAL SINUS SURGERY  08/2015    ROTATOR CUFF REPAIR Right 09/25/2015    ROTATOR CUFF REPAIR Left 04/08/2016    skin grafts Right 07/2016    shin from stingray wound    TONSILLECTOMY      VASECTOMY       Family History   Problem Relation Age of Onset    Hypertension Mother     Arthritis Mother     Hypertension Father     Diabetes Father     Arthritis Father     Hearing loss Father     Cancer Brother     COPD Brother      Social History     Tobacco Use    Smoking status: Former     Packs/day: 1.50     Years: 30.00     Pack years: 45.00     Types: Cigarettes    Smokeless tobacco: Never   Substance Use Topics    Alcohol use: Yes     Comment: occassionally    Drug use: Never     Review of Systems   Constitutional:  Negative for diaphoresis, fatigue, fever and unexpected weight change.   HENT:  Negative for congestion and sore throat.    Respiratory:  Negative for chest tightness and shortness of breath.    Cardiovascular:  Negative for chest pain.   Gastrointestinal:  Negative for abdominal pain, constipation, nausea and vomiting.   Genitourinary:  Negative for dysuria and frequency.   Musculoskeletal:  Negative for back pain.   Skin:  Negative for rash.        Abscess for the last 3 days superior buttocks crease.   Neurological:  Negative for dizziness and weakness.   Hematological:  Does not bruise/bleed easily.   All other systems reviewed and are negative.    Physical Exam     Initial Vitals [06/15/23 0724]   BP Pulse Resp Temp SpO2   (!) 146/75 72 14 98 °F (36.7 °C) 96 %      MAP       --         Physical Exam    Nursing note and vitals reviewed.  Constitutional: He appears well-developed and well-nourished.   HENT:   Head: Normocephalic and atraumatic.   Eyes: EOM are normal. Pupils are equal, round, and reactive to light. No scleral icterus.   Neck: Neck supple.   Normal range of motion.  Cardiovascular:  Normal rate and regular rhythm.            Murmur (4/6 systolic flow murmur.  Radiates down to the left lower quadrant.  Likely mitral valve oriented) heard.  Pulmonary/Chest: Breath sounds normal. He has no wheezes. He has no rales. He exhibits no tenderness.   Abdominal: Abdomen is soft. Bowel sounds are normal.   Musculoskeletal:         General: Normal range of motion.      Cervical back: Normal range of motion and neck supple.     Neurological: He is alert and oriented to person, place, and time. He has normal strength. GCS score is 15. GCS eye subscore is 4. GCS verbal subscore is 5. GCS motor subscore is 6.   Skin: Skin is warm and dry. Abscess (Superior midline buttocks crease demonstrates a very mildly fluctuant mostly firm non indurated non red non warmth to touch early abscess.  Possibly a cm by 0.5 cm.  Very deep.) noted. No pallor.   Psychiatric: He has a normal mood and affect. His behavior is normal. Judgment and thought content normal.       ED Course   Procedures  Labs Reviewed   CBC W/ AUTO DIFFERENTIAL - Abnormal; Notable for the following components:       Result Value    MCV 81 (*)     MCH 26.2 (*)     RDW 15.9 (*)     Immature Granulocytes 1.2 (*)     Immature Grans (Abs) 0.11 (*)     Gran % 74.5 (*)     Lymph % 13.2 (*)     All other components within normal limits   HIV 1 / 2 ANTIBODY   HEPATITIS C ANTIBODY   COMPREHENSIVE METABOLIC PANEL   HEMOGLOBIN A1C          Imaging Results    None          Medications   clindamycin in D5W 900 mg/50 mL IVPB 900 mg (900 mg Intravenous New Bag 6/15/23 0846)   ketorolac injection 30 mg (30 mg Intravenous Given 6/15/23 0843)     Medical Decision Making:   Differential Diagnosis:   Abscess, pilonidal cyst, fissure.  ED Management:  This is certainly an early abscess forming.  Does not appear to be ready for incision and drainage at this point in time.  It may respond well to IV antibiotics with oral antibiotics afterwards.  I explained this to the patient.  I will give the patient NSAIDs.   Antibiotics.  Treat expectantly.  Patient clearly understands with his wife that should this become worse to include increasing redness, pain, swelling then the patient will return emergency room for further evaluation and likely incision and drainage.                        Clinical Impression:   Final diagnoses:  [L02.91] Abscess (Primary)        ED Disposition Condition    Discharge Stable          ED Prescriptions       Medication Sig Dispense Start Date End Date Auth. Provider    clindamycin (CLEOCIN) 150 MG capsule Take 2 capsules (300 mg total) by mouth 4 (four) times daily. for 7 days 56 capsule 6/15/2023 6/22/2023 Arash Morfin MD    ketorolac (TORADOL) 10 mg tablet Take 1 tablet (10 mg total) by mouth every 6 (six) hours. 15 tablet 6/15/2023 -- Arash Morfin MD          Follow-up Information       Follow up With Specialties Details Why Contact Info    Jennyfer Osei MD Family Medicine In 1 week As needed, If symptoms worsen [address] [facility] [place] [address] [phone]               rAash Morfin MD  06/15/23 0967

## 2023-06-20 ENCOUNTER — TELEPHONE (OUTPATIENT)
Dept: URGENT CARE | Facility: CLINIC | Age: 68
End: 2023-06-20
Payer: MEDICARE

## 2023-06-20 ENCOUNTER — TELEPHONE (OUTPATIENT)
Dept: ORTHOPEDICS | Facility: CLINIC | Age: 68
End: 2023-06-20
Payer: MEDICARE

## 2023-06-20 NOTE — TELEPHONE ENCOUNTER
Left message to schedule WC second opinion, [facility] closer to [place]  OK per Sonny Stroud

## 2023-06-26 PROBLEM — N18.31 CHRONIC KIDNEY DISEASE, STAGE 3A: Status: ACTIVE | Noted: 2023-06-26

## 2023-06-26 PROBLEM — Z79.4 TYPE 2 DIABETES MELLITUS WITHOUT COMPLICATION, WITH LONG-TERM CURRENT USE OF INSULIN: Status: ACTIVE | Noted: 2022-09-29

## 2023-06-26 PROBLEM — G47.00 INSOMNIA: Status: ACTIVE | Noted: 2023-06-26

## 2023-06-30 ENCOUNTER — OFFICE VISIT (OUTPATIENT)
Dept: OTOLARYNGOLOGY | Facility: CLINIC | Age: 68
End: 2023-06-30
Payer: MEDICARE

## 2023-06-30 VITALS
SYSTOLIC BLOOD PRESSURE: 110 MMHG | WEIGHT: 196.81 LBS | BODY MASS INDEX: 29.92 KG/M2 | DIASTOLIC BLOOD PRESSURE: 64 MMHG

## 2023-06-30 DIAGNOSIS — K21.9 LARYNGOPHARYNGEAL REFLUX (LPR): Primary | ICD-10-CM

## 2023-06-30 DIAGNOSIS — R49.0 HOARSENESS: ICD-10-CM

## 2023-06-30 PROCEDURE — 99999 PR PBB SHADOW E&M-EST. PATIENT-LVL IV: CPT | Mod: PBBFAC,,, | Performed by: OTOLARYNGOLOGY

## 2023-06-30 PROCEDURE — 31575 DIAGNOSTIC LARYNGOSCOPY: CPT | Mod: PBBFAC,PN | Performed by: OTOLARYNGOLOGY

## 2023-06-30 PROCEDURE — 31575 DIAGNOSTIC LARYNGOSCOPY: CPT | Mod: S$PBB,,, | Performed by: OTOLARYNGOLOGY

## 2023-06-30 PROCEDURE — 31575 PR LARYNGOSCOPY, FLEXIBLE; DIAGNOSTIC: ICD-10-PCS | Mod: S$PBB,,, | Performed by: OTOLARYNGOLOGY

## 2023-06-30 PROCEDURE — 99999 PR PBB SHADOW E&M-EST. PATIENT-LVL IV: ICD-10-PCS | Mod: PBBFAC,,, | Performed by: OTOLARYNGOLOGY

## 2023-06-30 PROCEDURE — 99203 PR OFFICE/OUTPT VISIT, NEW, LEVL III, 30-44 MIN: ICD-10-PCS | Mod: 25,S$PBB,, | Performed by: OTOLARYNGOLOGY

## 2023-06-30 PROCEDURE — 99203 OFFICE O/P NEW LOW 30 MIN: CPT | Mod: 25,S$PBB,, | Performed by: OTOLARYNGOLOGY

## 2023-06-30 PROCEDURE — 99214 OFFICE O/P EST MOD 30 MIN: CPT | Mod: PBBFAC,PN,25 | Performed by: OTOLARYNGOLOGY

## 2023-06-30 RX ORDER — OMEPRAZOLE 40 MG/1
40 CAPSULE, DELAYED RELEASE ORAL
Qty: 60 CAPSULE | Refills: 11 | Status: SHIPPED | OUTPATIENT
Start: 2023-06-30 | End: 2023-08-29

## 2023-06-30 NOTE — PROGRESS NOTES
Subjective:       Patient ID: Mathew Rodrigues is a 68 y.o. male.    Chief Complaint: Sore Throat (Pt c/o dry throat, drainage,cough and hoarseness for two months. )      This 68-year-old gentleman with sleep apnea who wear CPAP nightly has a three to four-week history of fairly prominent hoarseness but for a much longer period of time he is had an issue with chronic cough multiple throat clearing sometimes at night waking him up at throughout the day and he does this fairly forceful throat clearing type behavior that he demonstrates and that bothers his wife because it is so loud he says he just has to do it that hard to get relief.  This been going on for a very long time he attributes a postnasal drip he says it feels very dry back there he drinks tons of water he suggests but it does not help this particular problem.  He has not smoked in 30 years and he does take omeprazole but he takes it at 8:30 a.m. every night perhaps not well timed in general.    [place] sinuplasty and a septoplasty and a turbinate reduction.    Sore Throat         Objective:      ENT Physical Exam  Constitutional  Appearance: patient appears well-developed, well-nourished and well-groomed,  Communication/Voice: communication appropriate for developmental age; Communication comments: His voice is weak and rough clearly abnormal there was moments where he has pauses but it is not breathy and that is times when he just feels like he can not talk.  Head and Face  Appearance: head appears normal, face appears normal and face appears atraumatic;  Salivary: glands normal;  Ear  Hearing: intact;  Auricles: right auricle normal; left auricle normal;  Ear Canals: right ear canal normal; left ear canal normal;  Tympanic Membranes: right tympanic membrane normal; left tympanic membrane normal;  Nose  External Nose: nares patent bilaterally; external nose normal;  Internal Nose: nasal mucosa normal; septum  normal;  Nose comments: He is had fairly aggressive inferior turbinate reductions bilaterally and he has a very straight septum suggesting a septoplasty.  Oral Cavity/Oropharynx  Lips: normal;  Teeth: normal;  Gums: gingiva normal;  Tongue: normal;  Oral mucosa: normal;  Hard palate: normal;  Soft palate: normal;  Tonsils: normal;  Base of Tongue: normal;  Posterior pharyngeal wall: normal;  Nasopharynx/Hypopharynx/Larynx  NP/HP/Lx comments: We discussed the benefits of a fiberoptic exam that include a better view of the larynx a better view of the anterior aspect of the larynx a more magnified view of the larynx hypopharynx and nasopharynx in the context of the patient's particular complaint and the patient wishes to proceed with this minor examination.    Afrin and lidocaine were atomized into the nasal airway 5 or so minutes were given for the decongestant and anesthetic to take effect and then the flexible fiberoptic laryngoscope was passed through the nasal cavity and the exam proceeded.     The scope was passed easily on the left side he is had fairly aggressive inferior turbinate reduction so there was plenty of room for the scope to maneuver.  There was no evidence of inflammation or abnormality in his nasal cavity except for the postsurgical changes mentioned    The scope was then passed into the nasopharynx which was normal with no evidence of inflammation crusting or purulence or lesions.  The larynx was easy to examine as was the hypopharynx base of tongue and vallecula which were normal with the exception of the larynx which had fairly obvious bilateral near ulcerations and on the left probably a complete ulceration of the medial arytenoid a bit more superior on the arytenoid that is sometimes seen and symmetric with that on the other side.  The arytenoid redness was confined to the medial aspect but it was relatively prominent and there was not much in the way of swelling of the esophageal inlet and  the true cords themselves were free of any kind of nodular masses and both cords move symmetrically and normally.          Neck  Neck: neck normal; neck palpation normal;  Thyroid: thyroid normal;  Lymphatic  Palpation: lymph nodes normal;        Assessment:       1. Laryngopharyngeal reflux (LPR)    2. Hoarseness         Plan:          His exam in his story are consistent with nighttime reflux and despite him already being on a PPIs his timing isn't very good I wanted him to take it twice a day 30 minutes before breakfast and 30 minutes before supper and then see him back in six weeks to discuss his progress or lack thereof and if he has not done much better consider reexamine him.

## 2023-07-03 ENCOUNTER — PATIENT MESSAGE (OUTPATIENT)
Dept: OTOLARYNGOLOGY | Facility: CLINIC | Age: 68
End: 2023-07-03
Payer: MEDICARE

## 2023-07-05 RX ORDER — DEXAMETHASONE 2 MG/1
TABLET ORAL
Qty: 5 TABLET | Refills: 2 | Status: SHIPPED | OUTPATIENT
Start: 2023-07-05 | End: 2023-08-29

## 2023-07-05 RX ORDER — OMEPRAZOLE 40 MG/1
40 CAPSULE, DELAYED RELEASE ORAL
Qty: 60 CAPSULE | Refills: 11 | Status: SHIPPED | OUTPATIENT
Start: 2023-07-05 | End: 2024-07-04

## 2023-07-21 DIAGNOSIS — Z12.5 SCREENING FOR PROSTATE CANCER: ICD-10-CM

## 2023-07-21 DIAGNOSIS — E11.9 TYPE 2 DIABETES MELLITUS WITHOUT COMPLICATION, WITHOUT LONG-TERM CURRENT USE OF INSULIN: Primary | ICD-10-CM

## 2023-07-29 DIAGNOSIS — F98.8 ATTENTION DEFICIT DISORDER, UNSPECIFIED HYPERACTIVITY PRESENCE: ICD-10-CM

## 2023-07-29 DIAGNOSIS — I10 ESSENTIAL HYPERTENSION, BENIGN: ICD-10-CM

## 2023-07-31 RX ORDER — AMLODIPINE BESYLATE 5 MG/1
TABLET ORAL
Qty: 90 TABLET | Refills: 3 | Status: SHIPPED | OUTPATIENT
Start: 2023-07-31

## 2023-07-31 RX ORDER — GUANFACINE 2 MG/1
TABLET ORAL
Qty: 30 TABLET | Refills: 5 | Status: SHIPPED | OUTPATIENT
Start: 2023-07-31 | End: 2024-02-05 | Stop reason: SDUPTHER

## 2023-08-07 ENCOUNTER — HOSPITAL ENCOUNTER (EMERGENCY)
Facility: HOSPITAL | Age: 68
Discharge: HOME | End: 2023-08-07
Attending: EMERGENCY MEDICINE
Payer: MEDICARE

## 2023-08-07 VITALS
SYSTOLIC BLOOD PRESSURE: 136 MMHG | BODY MASS INDEX: 29.82 KG/M2 | DIASTOLIC BLOOD PRESSURE: 87 MMHG | TEMPERATURE: 100 F | HEART RATE: 73 BPM | OXYGEN SATURATION: 95 % | RESPIRATION RATE: 14 BRPM | HEIGHT: 67 IN | WEIGHT: 190 LBS

## 2023-08-07 DIAGNOSIS — I82.812 THROMBOSIS OF LEFT SAPHENOUS VEIN: Primary | ICD-10-CM

## 2023-08-07 DIAGNOSIS — M79.662 PAIN OF LEFT CALF: ICD-10-CM

## 2023-08-07 PROCEDURE — 93971 EXTREMITY STUDY: CPT | Mod: 26,LT,, | Performed by: RADIOLOGY

## 2023-08-07 PROCEDURE — 99284 EMERGENCY DEPT VISIT MOD MDM: CPT | Mod: 25

## 2023-08-07 PROCEDURE — 93971 EXTREMITY STUDY: CPT | Mod: TC,LT

## 2023-08-07 PROCEDURE — 93971 US LOWER EXTREMITY VEINS LEFT: ICD-10-PCS | Mod: 26,LT,, | Performed by: RADIOLOGY

## 2023-08-07 NOTE — ED PROVIDER NOTES
Encounter Date: 8/7/2023       History     Chief Complaint   Patient presents with    Leg Pain     Patient states since Saturday his left calf has had a painful red streak.       68-year-old male here from home via private vehicle for evaluation and treatment left calf pain, with a painful red streak.  Symptoms started on Saturday, about 3 days ago.  Denies fever or chills.  Denies injury.  Denies any skin wounds.  Denies history of clotting disorder or DVT.  Positive history of hypertension, diabetes, hyperlipidemia, and COPD,      Review of patient's allergies indicates:   Allergen Reactions    Ibuprofen Itching     Past Medical History:   Diagnosis Date    Arthritis     COPD (chronic obstructive pulmonary disease)     Diabetes mellitus, type 2     Hyperlipidemia     Hypertension     Kidney stones     LVH (left ventricular hypertrophy) due to hypertensive disease, with heart failure     Neuropathy     Personal history of colonic polyps 03/24/2000    colonoscopy report in media    PTSD (post-traumatic stress disorder)     Sleep apnea, unspecified     Torn rotator cuff      Past Surgical History:   Procedure Laterality Date    achilles repair Left 2003    ADENOIDECTOMY      ANKLE FRACTURE SURGERY Right 1987    CATARACT EXTRACTION Left 2004    CATARACT EXTRACTION Left 2005    2nd    COLONOSCOPY N/A 05/09/2017    results in media    COLONOSCOPY N/A 1/13/2023    Procedure: COLONOSCOPY;  Surgeon: Kenney Keller MD;  Location: [facility];  Service: General;  Laterality: N/A;    COLONOSCOPY W/ POLYPECTOMY N/A 03/24/2000    results in media    ELBOW SURGERY Right 07/24/2020    EYE SURGERY  2005    finger joint replacement Right 01/22/2020    middle finger    hair follicle  1998    HAND SURGERY Right 04/25/2017    HAND SURGERY Right 05/12/2017    2nd surgery    NASAL SINUS SURGERY  08/2015    ROTATOR CUFF REPAIR Right 09/25/2015    ROTATOR CUFF REPAIR Left 04/08/2016    skin grafts Right 07/2016    shin from stingray  wound    TONSILLECTOMY      VASECTOMY       Family History   Problem Relation Age of Onset    Hypertension Mother     Arthritis Mother     Hypertension Father     Diabetes Father     Arthritis Father     Hearing loss Father     Cancer Brother     COPD Brother      Social History     Tobacco Use    Smoking status: Former     Current packs/day: 1.50     Average packs/day: 1.5 packs/day for 30.0 years (45.0 ttl pk-yrs)     Types: Cigarettes    Smokeless tobacco: Never   Substance Use Topics    Alcohol use: Yes     Comment: occassionally    Drug use: Never     Review of Systems   Constitutional: Negative.  Negative for chills and fever.   HENT: Negative.  Negative for congestion, rhinorrhea and sore throat.    Eyes: Negative.    Respiratory:  Negative for cough, shortness of breath and wheezing.    Cardiovascular:  Negative for chest pain, palpitations and leg swelling.        No leg swelling, but patient a palpable cord over the left lower greater saphenous vein, with mild erythema of the skin overlying the vein.   Gastrointestinal:  Negative for blood in stool, constipation, diarrhea, nausea and vomiting.   Endocrine: Negative.    Genitourinary: Negative.    Musculoskeletal: Negative.    Allergic/Immunologic: Negative.    Neurological: Negative.    Psychiatric/Behavioral: Negative.         Physical Exam     Initial Vitals [08/07/23 0921]   BP Pulse Resp Temp SpO2   (!) 140/88 70 16 99.7 °F (37.6 °C) (!) 94 %      MAP       --         Physical Exam    Nursing note and vitals reviewed.  Constitutional: He appears well-developed and well-nourished. He is not diaphoretic. No distress.   HENT:   Head: Normocephalic and atraumatic.   Nose: Nose normal.   Mouth/Throat: Oropharynx is clear and moist. No oropharyngeal exudate.   Eyes: Conjunctivae and EOM are normal. Pupils are equal, round, and reactive to light. No scleral icterus.   Neck: Neck supple. No JVD present.   Normal range of motion.  Cardiovascular:  Normal rate,  regular rhythm, normal heart sounds and intact distal pulses.           No murmur heard.  Pulmonary/Chest: Breath sounds normal. No stridor. No respiratory distress. He has no wheezes. He has no rhonchi. He has no rales.   Abdominal: Abdomen is soft. Bowel sounds are normal. He exhibits no distension. There is no abdominal tenderness.   Musculoskeletal:         General: No tenderness or edema. Normal range of motion.      Cervical back: Normal range of motion and neck supple.      Comments: Palpable cord corresponding to the location of the left lower greater saphenous vein, with mild erythema overlying the vein.  Distal pulses normal, capillary refill normal, sensory and motor normal, compartments are soft and nontender.     Neurological: He is alert and oriented to person, place, and time. He has normal strength. No cranial nerve deficit or sensory deficit. GCS score is 15. GCS eye subscore is 4. GCS verbal subscore is 5. GCS motor subscore is 6.   Skin: Skin is warm and dry. Capillary refill takes less than 2 seconds. No rash noted. No erythema.   Psychiatric: He has a normal mood and affect.         ED Course   Procedures  Labs Reviewed - No data to display       Imaging Results               US Lower Extremity Veins Left (Final result)  Result time 08/07/23 11:13:53      Final result by Maynor Martinez MD (08/07/23 11:13:53)                   Impression:      Intraluminal thrombus involving the proximal and distal greater saphenous vein.    This report was flagged in Epic as abnormal.      Electronically signed by: Maynor Martinez  Date:    08/07/2023  Time:    11:13               Narrative:    EXAMINATION:  US LOWER EXTREMITY VEINS LEFT    CLINICAL HISTORY:  Pain in left lower leg    TECHNIQUE:  Duplex and color flow Doppler evaluation and graded compression of the left lower extremity veins was performed.    COMPARISON:  None    FINDINGS:  Left thigh veins: The common femoral, femoral, popliteal and deep  femoral veins are patent and free of thrombus. The veins are normally compressible and have normal phasic flow and augmentation response.  There is intraluminal thrombus involving the proximal and distal greater saphenous vein.    Left calf veins: The visualized calf veins are patent.    Contralateral CFV: The contralateral (right) common femoral vein is patent and free of thrombus.    Miscellaneous: None                                    X-Rays:   Independently Interpreted Readings:   Other Readings:  Ultrasound of the left leg personally reviewed by me shows thrombus in both the upper and lower greater saphenous veins of the left leg.    Medications - No data to display  Medical Decision Making:   Differential Diagnosis:   Phlebitis, thrombophlebitis, DVT, superficial in his thrombosis, lymphadenitis, etc.  ED Management:  Ultrasound shows venous thrombus in both the upper and lower greater saphenous veins of the left leg.  Literature supports treating with anticoagulation.  Will start on Eliquis.  Patient will follow-up with PCP via telephone after leaving here, and will return here as needed or if worse in any way.  Patient is comfortable, normal vital signs, minimal discomfort                          Clinical Impression:   Final diagnoses:  [M79.662] Pain of left calf  [I82.812] Thrombosis of left saphenous vein (Primary)        ED Disposition Condition    Discharge Stable          ED Prescriptions       Medication Sig Dispense Start Date End Date Auth. Provider    apixaban (ELIQUIS) 5 mg Tab Take 1 tablet (5 mg total) by mouth 2 (two) times daily. 60 tablet 8/7/2023 9/6/2023 Álvaro López MD          Follow-up Information       Follow up With Specialties Details Why Contact Info    Jennyfer Osei MD Family Medicine Call today  [address] [facility] [place] [address] [phone]      [facility] [place], operated by [facility] Emergency Dept Emergency Medicine  As needed, If symptoms worsen [address] [facility]  [place] [address]  [phone]             Álvaro López MD  08/07/23 115

## 2023-08-07 NOTE — DISCHARGE INSTRUCTIONS
Start taking Eliquis as prescribed, and call primary care doctor's office today for an appointment within the next few days.  Elevated when able, and apply warm compresses.  Avoid NSAIDs but continue to take your daily aspirin until told otherwise by your doctors.    Return here for any worsening signs or symptoms.

## 2023-08-08 ENCOUNTER — LAB VISIT (OUTPATIENT)
Dept: LAB | Facility: HOSPITAL | Age: 68
End: 2023-08-08
Attending: FAMILY MEDICINE
Payer: MEDICARE

## 2023-08-08 DIAGNOSIS — E11.9 TYPE 2 DIABETES MELLITUS WITHOUT COMPLICATION, WITHOUT LONG-TERM CURRENT USE OF INSULIN: ICD-10-CM

## 2023-08-08 DIAGNOSIS — Z12.5 SCREENING FOR PROSTATE CANCER: ICD-10-CM

## 2023-08-08 DIAGNOSIS — E29.1 HYPOGONADISM IN MALE: ICD-10-CM

## 2023-08-08 LAB
ALBUMIN/CREAT UR: 21.6 UG/MG (ref 0–30)
ANION GAP SERPL CALC-SCNC: 10 MMOL/L (ref 8–16)
BASOPHILS # BLD AUTO: 0.11 K/UL (ref 0–0.2)
BASOPHILS NFR BLD: 1.8 % (ref 0–1.9)
BUN SERPL-MCNC: 28 MG/DL (ref 8–23)
CALCIUM SERPL-MCNC: 9.7 MG/DL (ref 8.7–10.5)
CHLORIDE SERPL-SCNC: 100 MMOL/L (ref 95–110)
CHOLEST SERPL-MCNC: 126 MG/DL (ref 120–199)
CHOLEST/HDLC SERPL: 3.4 {RATIO} (ref 2–5)
CO2 SERPL-SCNC: 26 MMOL/L (ref 23–29)
COMPLEXED PSA SERPL-MCNC: 1.8 NG/ML (ref 0–4)
CREAT SERPL-MCNC: 1.7 MG/DL (ref 0.5–1.4)
CREAT UR-MCNC: 116 MG/DL (ref 23–375)
DIFFERENTIAL METHOD: ABNORMAL
EOSINOPHIL # BLD AUTO: 0.3 K/UL (ref 0–0.5)
EOSINOPHIL NFR BLD: 5 % (ref 0–8)
ERYTHROCYTE [DISTWIDTH] IN BLOOD BY AUTOMATED COUNT: 18.3 % (ref 11.5–14.5)
EST. GFR  (NO RACE VARIABLE): 43.4 ML/MIN/1.73 M^2
ESTIMATED AVG GLUCOSE: 177 MG/DL (ref 68–131)
GLUCOSE SERPL-MCNC: 133 MG/DL (ref 70–110)
HBA1C MFR BLD: 7.8 % (ref 4–5.6)
HCT VFR BLD AUTO: 45.4 % (ref 40–54)
HDLC SERPL-MCNC: 37 MG/DL (ref 40–75)
HDLC SERPL: 29.4 % (ref 20–50)
HGB BLD-MCNC: 14.3 G/DL (ref 14–18)
IMM GRANULOCYTES # BLD AUTO: 0.07 K/UL (ref 0–0.04)
IMM GRANULOCYTES NFR BLD AUTO: 1.2 % (ref 0–0.5)
LDLC SERPL CALC-MCNC: 56.6 MG/DL (ref 63–159)
LYMPHOCYTES # BLD AUTO: 1.9 K/UL (ref 1–4.8)
LYMPHOCYTES NFR BLD: 31.5 % (ref 18–48)
MCH RBC QN AUTO: 26.4 PG (ref 27–31)
MCHC RBC AUTO-ENTMCNC: 31.5 G/DL (ref 32–36)
MCV RBC AUTO: 84 FL (ref 82–98)
MICROALBUMIN UR DL<=1MG/L-MCNC: 25 UG/ML
MONOCYTES # BLD AUTO: 0.6 K/UL (ref 0.3–1)
MONOCYTES NFR BLD: 10.6 % (ref 4–15)
NEUTROPHILS # BLD AUTO: 3 K/UL (ref 1.8–7.7)
NEUTROPHILS NFR BLD: 49.9 % (ref 38–73)
NONHDLC SERPL-MCNC: 89 MG/DL
NRBC BLD-RTO: 0 /100 WBC
PLATELET # BLD AUTO: 258 K/UL (ref 150–450)
PMV BLD AUTO: 10.1 FL (ref 9.2–12.9)
POTASSIUM SERPL-SCNC: 4.2 MMOL/L (ref 3.5–5.1)
RBC # BLD AUTO: 5.42 M/UL (ref 4.6–6.2)
SODIUM SERPL-SCNC: 136 MMOL/L (ref 136–145)
TESTOST SERPL-MCNC: 807 NG/DL (ref 304–1227)
TRIGL SERPL-MCNC: 162 MG/DL (ref 30–150)
WBC # BLD AUTO: 5.96 K/UL (ref 3.9–12.7)

## 2023-08-08 PROCEDURE — 83036 HEMOGLOBIN GLYCOSYLATED A1C: CPT | Performed by: FAMILY MEDICINE

## 2023-08-08 PROCEDURE — 80061 LIPID PANEL: CPT | Performed by: FAMILY MEDICINE

## 2023-08-08 PROCEDURE — 85025 COMPLETE CBC W/AUTO DIFF WBC: CPT | Performed by: FAMILY MEDICINE

## 2023-08-08 PROCEDURE — 36415 COLL VENOUS BLD VENIPUNCTURE: CPT | Performed by: FAMILY MEDICINE

## 2023-08-08 PROCEDURE — 80048 BASIC METABOLIC PNL TOTAL CA: CPT | Performed by: FAMILY MEDICINE

## 2023-08-08 PROCEDURE — 84153 ASSAY OF PSA TOTAL: CPT | Performed by: FAMILY MEDICINE

## 2023-08-08 PROCEDURE — 82570 ASSAY OF URINE CREATININE: CPT | Performed by: FAMILY MEDICINE

## 2023-08-08 PROCEDURE — 84403 ASSAY OF TOTAL TESTOSTERONE: CPT | Performed by: FAMILY MEDICINE

## 2023-08-11 DIAGNOSIS — E53.8 VITAMIN B12 DEFICIENCY: ICD-10-CM

## 2023-08-13 RX ORDER — CYANOCOBALAMIN 1000 UG/ML
INJECTION, SOLUTION INTRAMUSCULAR; SUBCUTANEOUS
Qty: 1 ML | Refills: 2 | Status: SHIPPED | OUTPATIENT
Start: 2023-08-13 | End: 2023-09-25

## 2023-08-17 ENCOUNTER — OFFICE VISIT (OUTPATIENT)
Dept: OTOLARYNGOLOGY | Facility: CLINIC | Age: 68
End: 2023-08-17
Payer: MEDICARE

## 2023-08-17 VITALS — WEIGHT: 199.63 LBS | BODY MASS INDEX: 31.26 KG/M2 | SYSTOLIC BLOOD PRESSURE: 98 MMHG | DIASTOLIC BLOOD PRESSURE: 62 MMHG

## 2023-08-17 DIAGNOSIS — K21.9 LARYNGOPHARYNGEAL REFLUX (LPR): Primary | ICD-10-CM

## 2023-08-17 DIAGNOSIS — R49.0 HOARSENESS: ICD-10-CM

## 2023-08-17 PROCEDURE — 99999 PR PBB SHADOW E&M-EST. PATIENT-LVL IV: CPT | Mod: PBBFAC,,, | Performed by: OTOLARYNGOLOGY

## 2023-08-17 PROCEDURE — 99213 OFFICE O/P EST LOW 20 MIN: CPT | Mod: S$PBB,,, | Performed by: OTOLARYNGOLOGY

## 2023-08-17 PROCEDURE — 99213 PR OFFICE/OUTPT VISIT, EST, LEVL III, 20-29 MIN: ICD-10-PCS | Mod: S$PBB,,, | Performed by: OTOLARYNGOLOGY

## 2023-08-17 PROCEDURE — 99999 PR PBB SHADOW E&M-EST. PATIENT-LVL IV: ICD-10-PCS | Mod: PBBFAC,,, | Performed by: OTOLARYNGOLOGY

## 2023-08-17 PROCEDURE — 99214 OFFICE O/P EST MOD 30 MIN: CPT | Mod: PBBFAC,PN | Performed by: OTOLARYNGOLOGY

## 2023-08-17 NOTE — PROGRESS NOTES
Subjective:       Patient ID: Mathew Rodrigues is a 68 y.o. male.    Chief Complaint: Follow-up (Pt is here to follow up on laryngopharyngeal reflux )      This gentleman presents with follow-up for hoarseness sore throat chronic cough and a finding on fiberoptic exam last visit suggestive of reflux laryngitis I put him on b.i.d. PPIs which was not up dose from his usual q.day and he seeing great improvement in his voice sore throat and not complete resolution but improvement in his cough.        Follow-up          Objective:      ENT Physical Exam    I did not repeat a fiberoptic scope was he is doing better and there was nothing dangerous found last visit his oropharynx is normal in his neck is without adenopathy today.        Assessment:       1. Laryngopharyngeal reflux (LPR)    2. Hoarseness         Plan:          He is doing much better our discussion today mostly centered around how to proceed with the medication I have asked him to stay on the twice a day PPIs for another couple of weeks in the hopes that we will get more control the cough and then revert back to the once a day dosing he has been on for a long time.  We have also discussed how in the future if he gets flare-ups of this type he can retreat himself with twice a day PPIs for some period of time and hopefully he will not need this to often or for too long of a stretch it each flare-up should they occur again.

## 2023-08-29 ENCOUNTER — OFFICE VISIT (OUTPATIENT)
Dept: FAMILY MEDICINE | Facility: CLINIC | Age: 68
End: 2023-08-29
Payer: MEDICARE

## 2023-08-29 VITALS
HEART RATE: 79 BPM | BODY MASS INDEX: 31.55 KG/M2 | DIASTOLIC BLOOD PRESSURE: 72 MMHG | OXYGEN SATURATION: 99 % | WEIGHT: 201 LBS | HEIGHT: 67 IN | SYSTOLIC BLOOD PRESSURE: 138 MMHG

## 2023-08-29 DIAGNOSIS — G47.00 INSOMNIA, UNSPECIFIED TYPE: ICD-10-CM

## 2023-08-29 DIAGNOSIS — Z79.4 TYPE 2 DIABETES MELLITUS WITHOUT COMPLICATION, WITH LONG-TERM CURRENT USE OF INSULIN: ICD-10-CM

## 2023-08-29 DIAGNOSIS — N40.0 BENIGN PROSTATIC HYPERPLASIA WITHOUT LOWER URINARY TRACT SYMPTOMS: ICD-10-CM

## 2023-08-29 DIAGNOSIS — Z00.00 ANNUAL PHYSICAL EXAM: Primary | ICD-10-CM

## 2023-08-29 DIAGNOSIS — J44.9 CHRONIC OBSTRUCTIVE PULMONARY DISEASE, UNSPECIFIED COPD TYPE: ICD-10-CM

## 2023-08-29 DIAGNOSIS — F41.9 ANXIETY: ICD-10-CM

## 2023-08-29 DIAGNOSIS — I82.812 THROMBOSIS OF LEFT SAPHENOUS VEIN: ICD-10-CM

## 2023-08-29 DIAGNOSIS — E29.1 HYPOGONADISM IN MALE: ICD-10-CM

## 2023-08-29 DIAGNOSIS — G47.33 OSA ON CPAP: ICD-10-CM

## 2023-08-29 DIAGNOSIS — E11.9 TYPE 2 DIABETES MELLITUS WITHOUT COMPLICATION, WITH LONG-TERM CURRENT USE OF INSULIN: ICD-10-CM

## 2023-08-29 DIAGNOSIS — E78.2 MIXED HYPERLIPIDEMIA: ICD-10-CM

## 2023-08-29 DIAGNOSIS — K21.9 GASTROESOPHAGEAL REFLUX DISEASE WITHOUT ESOPHAGITIS: ICD-10-CM

## 2023-08-29 DIAGNOSIS — I10 ESSENTIAL HYPERTENSION, BENIGN: ICD-10-CM

## 2023-08-29 PROCEDURE — 99999 PR PBB SHADOW E&M-EST. PATIENT-LVL V: ICD-10-PCS | Mod: PBBFAC,,, | Performed by: FAMILY MEDICINE

## 2023-08-29 PROCEDURE — 99999 PR PBB SHADOW E&M-EST. PATIENT-LVL V: CPT | Mod: PBBFAC,,, | Performed by: FAMILY MEDICINE

## 2023-08-29 PROCEDURE — 99397 PR PREVENTIVE VISIT,EST,65 & OVER: ICD-10-PCS | Mod: S$PBB,GZ,, | Performed by: FAMILY MEDICINE

## 2023-08-29 PROCEDURE — 99397 PER PM REEVAL EST PAT 65+ YR: CPT | Mod: S$PBB,GZ,, | Performed by: FAMILY MEDICINE

## 2023-08-29 PROCEDURE — 99215 OFFICE O/P EST HI 40 MIN: CPT | Mod: PBBFAC | Performed by: FAMILY MEDICINE

## 2023-08-29 NOTE — PROGRESS NOTES
[facility] - Clinic Note    Subjective      Mr. Rodrigues is a 68 y.o. male who presents to clinic for an annual.    Seen in the ED 3 weeks ago for left calf pain.   US revealed Intraluminal thrombus involving the proximal and distal greater saphenous vein.  Patient on eliquis.     HTN: currently on amlodipine, lisinopril-hctz, metoprolol  Bp well controlled.      Diabetes Type 2: metformin, tresiba 30 units daily, glipizide  A1C 7.8  Microalbumin/cr ratio normal.   On a statin     On testosterone. recent level was WNL.     On Nucynta from previous PM.      Anxiety: on buspar    GERD: prilosec     HLD: lipitor and fenofibrate.      Insomnia: trazodone     BPH: flomax     H/o COPD: ob breo ellipta and albuterol prn.    Labs done prior to appt and discussed with patient.      [facility] Mathew has a past medical history of Arthritis, COPD (chronic obstructive pulmonary disease), Diabetes mellitus, type 2, Hyperlipidemia, Hypertension, Kidney stones, LVH (left ventricular hypertrophy) due to hypertensive disease, with heart failure, Neuropathy, Personal history of colonic polyps (03/24/2000), PTSD (post-traumatic stress disorder), Sleep apnea, unspecified, and Torn rotator cuff.   PSX[facility] Mathew has a past surgical history that includes Rotator cuff repair (Right, 09/25/2015); skin grafts (Right, 07/2016); Rotator cuff repair (Left, 04/08/2016); Hand surgery (Right, 04/25/2017); Hand surgery (Right, 05/12/2017); finger joint replacement (Right, 01/22/2020); Elbow surgery (Right, 07/24/2020); Nasal sinus surgery (08/2015); Vasectomy; Cataract extraction (Left, 2004); Cataract extraction (Left, 2005); achilles repair (Left, 2003); hair follicle (1998); Ankle fracture surgery (Right, 1987); Adenoidectomy; Eye surgery (2005); Tonsillectomy; Colonoscopy w/ polypectomy (N/A, 03/24/2000); Colonoscopy (N/A, 05/09/2017); and Colonoscopy (N/A, 1/13/2023).    Mathew's family history includes Arthritis in his father and mother; COPD in his  "brother; Cancer in his brother; Diabetes in his father; Hearing loss in his father; Hypertension in his father and mother.   JUSTYN Carmona reports that he has quit smoking. His smoking use included cigarettes. He has a 45.0 pack-year smoking history. He has never used smokeless tobacco. He reports current alcohol use. He reports that he does not use drugs.   JOO Carmona is allergic to ibuprofen.   DEYANIRA Carmona has a current medication list which includes the following prescription(s): albuterol, proair digihaler, amlodipine, apixaban, atorvastatin, buspirone, cyanocobalamin, fenofibrate, fenofibrate nanocrystallized, fluticasone furoate-vilanterol, fluticasone propionate, glipizide, guanfacine, insulin degludec, lisinopril-hydrochlorothiazide, metformin, metoprolol dolan/hydrochlorothiaz, metoprolol tartrate, omeprazole, sildenafil, tamsulosin, nucynta er, testosterone cypionate, trazodone, UNABLE TO FIND, and nucynta.     Review of Systems   Constitutional:  Negative for activity change, appetite change, chills, fatigue and fever.   Eyes:  Negative for visual disturbance.   Respiratory:  Negative for cough and shortness of breath.    Cardiovascular:  Negative for chest pain, palpitations and leg swelling.   Gastrointestinal:  Negative for abdominal pain, nausea and vomiting.   Skin:  Negative for wound.   Neurological:  Negative for dizziness and headaches.   Psychiatric/Behavioral:  Negative for confusion.      Objective     /72   Pulse 79   Ht 5' 7" (1.702 m)   Wt 91.2 kg (201 lb)   SpO2 99%   BMI 31.48 kg/m²     Physical Exam   Constitutional: normal appearance. He appears obese.  Non-toxic appearance. No distress. He does not appear ill.   HENT:   Head: Normocephalic and atraumatic.   Eyes: Right eye exhibits no discharge. Left eye exhibits no discharge.   Cardiovascular: Normal rate, regular rhythm, normal heart sounds and normal pulses. Exam reveals no gallop and no friction rub.   No murmur heard.Pulmonary:      " Effort: Pulmonary effort is normal. No respiratory distress.      Breath sounds: Normal breath sounds. No wheezing, rhonchi or rales.     Abdominal: Normal appearance.   Musculoskeletal:      Right lower leg: No edema.      Left lower leg: No edema.   Lymphadenopathy:     He has no cervical adenopathy.   Neurological: He is alert.   Skin: Skin is warm and dry. Capillary refill takes less than 2 seconds. He is not diaphoretic.   Psychiatric: His behavior is normal. Mood, judgment and thought content normal.   Vitals reviewed.     Assessment/Plan     Mathew was seen today for follow-up.    Diagnoses and all orders for this visit:    Annual physical exam    Thrombosis of left saphenous vein    Type 2 diabetes mellitus without complication, with long-term current use of insulin    Hypogonadism in male    Essential hypertension, benign    Insomnia, unspecified type    Mixed hyperlipidemia    Chronic obstructive pulmonary disease, unspecified COPD type    Anxiety    Gastroesophageal reflux disease without esophagitis    Benign prostatic hyperplasia without lower urinary tract symptoms    CRICKET on CPAP      --chronic conditions stable. Continue current regimen.  -continue eliquis for 6 months and then repeat US.     Follow up in about 6 months (around 2/29/2024), or if symptoms worsen or fail to improve.    Future Appointments   Date Time Provider Department Center   11/7/2023  8:40 AM Jennyfer Osei MD [facility]   11/13/2023 10:30 AM Feliciano June MD [facility]       Jennyfer Osei MD  Family Medicine  [facility]

## 2023-09-06 ENCOUNTER — PATIENT MESSAGE (OUTPATIENT)
Dept: FAMILY MEDICINE | Facility: CLINIC | Age: 68
End: 2023-09-06
Payer: MEDICARE

## 2023-09-06 DIAGNOSIS — I82.812 THROMBOSIS OF LEFT SAPHENOUS VEIN: ICD-10-CM

## 2023-09-22 DIAGNOSIS — E53.8 VITAMIN B12 DEFICIENCY: ICD-10-CM

## 2023-09-25 RX ORDER — CYANOCOBALAMIN 1000 UG/ML
INJECTION, SOLUTION INTRAMUSCULAR; SUBCUTANEOUS
Qty: 1 ML | Refills: 2 | Status: SHIPPED | OUTPATIENT
Start: 2023-09-25 | End: 2023-09-26 | Stop reason: SDUPTHER

## 2023-09-26 ENCOUNTER — PATIENT MESSAGE (OUTPATIENT)
Dept: FAMILY MEDICINE | Facility: CLINIC | Age: 68
End: 2023-09-26
Payer: MEDICARE

## 2023-09-26 DIAGNOSIS — E53.8 VITAMIN B12 DEFICIENCY: ICD-10-CM

## 2023-09-26 RX ORDER — CYANOCOBALAMIN 1000 UG/ML
1000 INJECTION, SOLUTION INTRAMUSCULAR; SUBCUTANEOUS
Qty: 3 ML | Refills: 1 | Status: SHIPPED | OUTPATIENT
Start: 2023-09-26 | End: 2023-10-09 | Stop reason: SDUPTHER

## 2023-10-06 ENCOUNTER — PATIENT MESSAGE (OUTPATIENT)
Dept: FAMILY MEDICINE | Facility: CLINIC | Age: 68
End: 2023-10-06
Payer: MEDICARE

## 2023-10-09 DIAGNOSIS — E53.8 VITAMIN B12 DEFICIENCY: ICD-10-CM

## 2023-10-09 DIAGNOSIS — E29.1 HYPOGONADISM IN MALE: Primary | ICD-10-CM

## 2023-10-09 DIAGNOSIS — E29.1 HYPOGONADISM IN MALE: ICD-10-CM

## 2023-10-09 RX ORDER — TESTOSTERONE CYPIONATE 200 MG/ML
INJECTION, SOLUTION INTRAMUSCULAR
COMMUNITY
Start: 2023-08-25 | End: 2023-10-09 | Stop reason: SDUPTHER

## 2023-10-09 NOTE — TELEPHONE ENCOUNTER
Pt needs refill for testosterone. The pharmacy received a order for a kit.  The auth for this medicaion expires in November. Also, The pt is requesting refills for the cyanocobalamin.

## 2023-10-11 RX ORDER — CYANOCOBALAMIN 1000 UG/ML
1000 INJECTION, SOLUTION INTRAMUSCULAR; SUBCUTANEOUS
Qty: 3 ML | Refills: 3 | Status: SHIPPED | OUTPATIENT
Start: 2023-10-11 | End: 2023-12-28 | Stop reason: SDUPTHER

## 2023-10-11 RX ORDER — TESTOSTERONE CYPIONATE 200 MG/ML
150 INJECTION, SOLUTION INTRAMUSCULAR
Qty: 3 ML | Refills: 2 | Status: SHIPPED | OUTPATIENT
Start: 2023-10-11 | End: 2023-12-20 | Stop reason: SDUPTHER

## 2023-11-01 ENCOUNTER — PATIENT MESSAGE (OUTPATIENT)
Dept: FAMILY MEDICINE | Facility: CLINIC | Age: 68
End: 2023-11-01
Payer: MEDICARE

## 2023-11-08 DIAGNOSIS — M25.512 BILATERAL SHOULDER PAIN, UNSPECIFIED CHRONICITY: Primary | ICD-10-CM

## 2023-11-08 DIAGNOSIS — M25.511 BILATERAL SHOULDER PAIN, UNSPECIFIED CHRONICITY: Primary | ICD-10-CM

## 2023-11-09 ENCOUNTER — HOSPITAL ENCOUNTER (EMERGENCY)
Facility: HOSPITAL | Age: 68
Discharge: HOME | End: 2023-11-09
Attending: EMERGENCY MEDICINE
Payer: MEDICARE

## 2023-11-09 VITALS
BODY MASS INDEX: 31.56 KG/M2 | RESPIRATION RATE: 20 BRPM | DIASTOLIC BLOOD PRESSURE: 78 MMHG | OXYGEN SATURATION: 98 % | HEART RATE: 66 BPM | SYSTOLIC BLOOD PRESSURE: 161 MMHG | TEMPERATURE: 98 F | HEIGHT: 67 IN | WEIGHT: 201.06 LBS

## 2023-11-09 DIAGNOSIS — M77.32 HEEL SPUR, LEFT: ICD-10-CM
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
## 2023-11-09 DIAGNOSIS — S90.32XA CONTUSION OF LEFT HEEL, INITIAL ENCOUNTER: ICD-10-CM

## 2023-11-09 PROCEDURE — 99283 EMERGENCY DEPT VISIT LOW MDM: CPT

## 2023-11-09 PROCEDURE — 25000003 PHARM REV CODE 250: Performed by: NURSE PRACTITIONER

## 2023-11-09 PROCEDURE — 73630 X-RAY EXAM OF FOOT: CPT | Mod: 26,LT,, | Performed by: RADIOLOGY

## 2023-11-09 PROCEDURE — 73630 XR FOOT COMPLETE 3 VIEW LEFT: ICD-10-PCS | Mod: 26,LT,, | Performed by: RADIOLOGY

## 2023-11-09 PROCEDURE — 73630 X-RAY EXAM OF FOOT: CPT | Mod: TC,LT

## 2023-11-09 RX ORDER — HYDROCODONE BITARTRATE AND ACETAMINOPHEN 5; 325 MG/1; MG/1
1 TABLET ORAL
Status: COMPLETED | OUTPATIENT
Start: 2023-11-09 | End: 2023-11-09

## 2023-11-09 RX ADMIN — HYDROCODONE BITARTRATE AND ACETAMINOPHEN 1 TABLET: 5; 325 TABLET ORAL at 06:11
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
## 2023-11-10 ENCOUNTER — OFFICE VISIT (OUTPATIENT)
Dept: PODIATRY | Facility: CLINIC | Age: 68
End: 2023-11-10
Payer: MEDICARE

## 2023-11-10 ENCOUNTER — PATIENT OUTREACH (OUTPATIENT)
Dept: ADMINISTRATIVE | Facility: HOSPITAL | Age: 68
End: 2023-11-10
Payer: MEDICARE

## 2023-11-10 VITALS — HEIGHT: 67 IN | WEIGHT: 201 LBS | BODY MASS INDEX: 31.55 KG/M2

## 2023-11-10 DIAGNOSIS — E11.9 COMPREHENSIVE DIABETIC FOOT EXAMINATION, TYPE 2 DM, ENCOUNTER FOR: ICD-10-CM

## 2023-11-10 DIAGNOSIS — S90.32XD CONTUSION OF LEFT HEEL, SUBSEQUENT ENCOUNTER: ICD-10-CM

## 2023-11-10 PROCEDURE — 99203 PR OFFICE/OUTPT VISIT, NEW, LEVL III, 30-44 MIN: ICD-10-PCS | Mod: S$GLB,,, | Performed by: PODIATRIST

## 2023-11-10 PROCEDURE — 99203 OFFICE O/P NEW LOW 30 MIN: CPT | Mod: S$GLB,,, | Performed by: PODIATRIST

## 2023-11-10 RX ORDER — PEN NEEDLE, DIABETIC 32GX 5/32"
NEEDLE, DISPOSABLE MISCELLANEOUS
COMMUNITY
Start: 2023-09-29

## 2023-11-10 NOTE — DISCHARGE INSTRUCTIONS
Apply ice on your heel, take OTC acetaminophen as needed for pain. Return for any worsening or new symptoms. Follow up with podiatry in the next 2-3 days.

## 2023-11-10 NOTE — PROGRESS NOTES
ED FOLLOW UP REPORT:                                                  Will follow with specialty/externally

## 2023-11-13 ENCOUNTER — OFFICE VISIT (OUTPATIENT)
Dept: ORTHOPEDICS | Facility: CLINIC | Age: 68
End: 2023-11-13
Payer: OTHER GOVERNMENT

## 2023-11-13 ENCOUNTER — HOSPITAL ENCOUNTER (OUTPATIENT)
Dept: RADIOLOGY | Facility: HOSPITAL | Age: 68
Discharge: HOME | End: 2023-11-13
Attending: ORTHOPAEDIC SURGERY
Payer: OTHER GOVERNMENT

## 2023-11-13 VITALS — BODY MASS INDEX: 31.55 KG/M2 | HEIGHT: 67 IN | WEIGHT: 201 LBS

## 2023-11-13 DIAGNOSIS — M25.512 BILATERAL SHOULDER PAIN, UNSPECIFIED CHRONICITY: ICD-10-CM

## 2023-11-13 DIAGNOSIS — M75.100 ROTATOR CUFF SYNDROME, UNSPECIFIED LATERALITY: ICD-10-CM

## 2023-11-13 DIAGNOSIS — M25.512 BILATERAL SHOULDER PAIN, UNSPECIFIED CHRONICITY: Primary | ICD-10-CM

## 2023-11-13 DIAGNOSIS — M25.511 BILATERAL SHOULDER PAIN, UNSPECIFIED CHRONICITY: ICD-10-CM

## 2023-11-13 DIAGNOSIS — M25.511 BILATERAL SHOULDER PAIN, UNSPECIFIED CHRONICITY: Primary | ICD-10-CM

## 2023-11-13 PROCEDURE — 20610 DRAIN/INJ JOINT/BURSA W/O US: CPT | Mod: 50,S$GLB,, | Performed by: ORTHOPAEDIC SURGERY

## 2023-11-13 PROCEDURE — 99999 PR PBB SHADOW E&M-EST. PATIENT-LVL II: CPT | Mod: PBBFAC,,, | Performed by: ORTHOPAEDIC SURGERY

## 2023-11-13 PROCEDURE — 99999 PR PBB SHADOW E&M-EST. PATIENT-LVL II: ICD-10-PCS | Mod: PBBFAC,,, | Performed by: ORTHOPAEDIC SURGERY

## 2023-11-13 PROCEDURE — 73030 X-RAY EXAM OF SHOULDER: CPT | Mod: 26,50,, | Performed by: RADIOLOGY

## 2023-11-13 PROCEDURE — 20610 LARGE JOINT ASPIRATION/INJECTION: BILATERAL SUBACROMIAL BURSA: ICD-10-PCS | Mod: 50,S$GLB,, | Performed by: ORTHOPAEDIC SURGERY

## 2023-11-13 PROCEDURE — 73030 XR SHOULDER COMPLETE 2 OR MORE VIEWS BILATERAL: ICD-10-PCS | Mod: 26,50,, | Performed by: RADIOLOGY

## 2023-11-13 PROCEDURE — 73030 X-RAY EXAM OF SHOULDER: CPT | Mod: TC,50,PO

## 2023-11-13 PROCEDURE — 99214 OFFICE O/P EST MOD 30 MIN: CPT | Mod: 25,S$GLB,, | Performed by: ORTHOPAEDIC SURGERY

## 2023-11-13 PROCEDURE — 99214 PR OFFICE/OUTPT VISIT, EST, LEVL IV, 30-39 MIN: ICD-10-PCS | Mod: 25,S$GLB,, | Performed by: ORTHOPAEDIC SURGERY

## 2023-11-13 RX ORDER — TRIAMCINOLONE ACETONIDE 40 MG/ML
40 INJECTION, SUSPENSION INTRA-ARTICULAR; INTRAMUSCULAR
Status: DISCONTINUED | OUTPATIENT
Start: 2023-11-13 | End: 2023-11-13 | Stop reason: HOSPADM

## 2023-11-13 RX ADMIN — TRIAMCINOLONE ACETONIDE 40 MG: 40 INJECTION, SUSPENSION INTRA-ARTICULAR; INTRAMUSCULAR at 10:11

## 2023-11-13 NOTE — PROCEDURES
Large Joint Aspiration/Injection: bilateral subacromial bursa    Date/Time: 11/13/2023 10:30 AM    Performed by: Feliciano June MD  Authorized by: Feliciano June MD    Consent Done?:  Yes (Verbal)  Indications:  Pain  Site marked: the procedure site was marked    Timeout: prior to procedure the correct patient, procedure, and site was verified      Local anesthesia used?: Yes    Local anesthetic: Ropivicaine.  Anesthetic total (ml):  3      Details:  Needle Size:  20 G  Ultrasonic Guidance for needle placement?: No    Approach:  Posterior  Location:  Shoulder  Laterality:  Bilateral  Site:  Bilateral subacromial bursa  Medications (Right):  40 mg triamcinolone acetonide 40 mg/mL  Medications (Left):  40 mg triamcinolone acetonide 40 mg/mL  Patient tolerance:  Patient tolerated the procedure well with no immediate complications
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
## 2023-11-29 NOTE — PROGRESS NOTES
Subjective:     Patient ID: Mathew Rodrigues is a 68 y.o. male.    Chief Complaint: Diabetes Mellitus and Heel Pain    Mathew is a 68 y.o. male who presents to the clinic complaining of heel pain in the left foot, especially with the first step in the morning. The pain is described as Throbbing, Grabbing, and Tight. The onset of the pain was sudden and has moderately improved over the past several days.  Patient reports that the pain was initiated by a jump down from a counter inside his home work shop.  Mathew rates the pain as 2/10. He denies a history of trauma. Prior treatments include rest and OTC analgesics which have been moderately effective.  Patient also requests diabetic foot examination.    Review of Systems   Constitutional: Negative for chills and fever.   Cardiovascular:  Negative for chest pain and leg swelling.   Respiratory:  Negative for cough and shortness of breath.    Gastrointestinal:  Negative for diarrhea, nausea and vomiting.        Objective:     Physical Exam  Vitals reviewed.   Constitutional:       General: He is not in acute distress.     Appearance: Normal appearance. He is not ill-appearing.   HENT:      Head: Normocephalic.      Nose: Nose normal.   Cardiovascular:      Rate and Rhythm: Normal rate.   Pulmonary:      Effort: Pulmonary effort is normal. No respiratory distress.   Skin:     Capillary Refill: Capillary refill takes 2 to 3 seconds.   Neurological:      Mental Status: He is alert and oriented to person, place, and time.   Psychiatric:         Mood and Affect: Mood normal.         Behavior: Behavior normal.         Thought Content: Thought content normal.     Neurologic: Protective and light touch sensation intact bilateral lower extremity, positive paresthesias reported  Musculoskeletal:  5/5 muscle strength noted bilateral foot, ankle joint range of motion is full without pain, mild tenderness with palpation of the plantar aspect of the left foot at site of previous foot  contusion, no no masses noted bilateral foot   Dermatologic:  No ecchymosis or bruising noted to the plantar left heel or swelling, mild hyperkeratotic skin lesion noted to the plantar aspect of the right 5th MPJ, no open lesions noted bilateral foot, no rashes noted bilateral foot, no interdigital maceration noted bilateral foot   Vascular: DP and PT pulses palpable 1/4 bilateral foot, capillary fill time less than 3 seconds to distal aspect of the digits bilateral foot      Assessment:      Encounter Diagnoses   Name Primary?    Comprehensive diabetic foot examination, type 2 DM, encounter for     Contusion of left heel, subsequent encounter      Plan:     Mathew was seen today for diabetes mellitus and heel pain.    Diagnoses and all orders for this visit:    Comprehensive diabetic foot examination, type 2 DM, encounter for    Contusion of left heel, subsequent encounter      I counseled the patient on his conditions, their implications and medical management.        1. Patient was examined and evaluated.    2. Reviewed patient's previous radiographic examination obtain at the ER for possible left foot injury.  Patient made aware that there was no fracture in the area.  Patient was advised to continue with ice and elevation of the left lower extremity at end of days.  Patient was encouraged to adjunct with OTC analgesics or NSAIDs for pain relief.  Discussed a walking boot with the patient.  Patient states his pain symptoms seem not to warrant dispensing a walking boot.    3. Discussed with patient possible progression to local corticosteroid injection or oral Medrol Dosepak should left heel pain continue over time.     4. Patient was advised to continue with daily foot monitoring.  Patient will continue efforts proper glycemic control, lowering hemoglobin A1c, and adherence to diabetic medication regimen.  Patient was made aware that he should seek annual diabetic foot examination  only.    5. Patient will  continue with comfortable shoes inside and outside the home   6. Patient will follow-up in 2 weeks or p.r.n. for complaints

## 2023-12-05 ENCOUNTER — PATIENT MESSAGE (OUTPATIENT)
Dept: FAMILY MEDICINE | Facility: CLINIC | Age: 68
End: 2023-12-05
Payer: MEDICARE

## 2023-12-06 DIAGNOSIS — N52.9 ERECTILE DYSFUNCTION, UNSPECIFIED ERECTILE DYSFUNCTION TYPE: ICD-10-CM

## 2023-12-06 RX ORDER — SILDENAFIL 100 MG/1
100 TABLET, FILM COATED ORAL DAILY PRN
Qty: 30 TABLET | Refills: 5 | Status: SHIPPED | OUTPATIENT
Start: 2023-12-06 | End: 2024-12-05

## 2023-12-20 ENCOUNTER — OFFICE VISIT (OUTPATIENT)
Dept: FAMILY MEDICINE | Facility: CLINIC | Age: 68
End: 2023-12-20
Payer: MEDICARE

## 2023-12-20 VITALS
HEIGHT: 67 IN | RESPIRATION RATE: 17 BRPM | DIASTOLIC BLOOD PRESSURE: 78 MMHG | WEIGHT: 208.38 LBS | HEART RATE: 60 BPM | SYSTOLIC BLOOD PRESSURE: 126 MMHG | OXYGEN SATURATION: 98 % | TEMPERATURE: 99 F | BODY MASS INDEX: 32.71 KG/M2

## 2023-12-20 DIAGNOSIS — I10 ESSENTIAL HYPERTENSION, BENIGN: Primary | ICD-10-CM

## 2023-12-20 DIAGNOSIS — E29.1 HYPOGONADISM IN MALE: ICD-10-CM

## 2023-12-20 DIAGNOSIS — I82.812 THROMBOSIS OF LEFT SAPHENOUS VEIN: ICD-10-CM

## 2023-12-20 DIAGNOSIS — N18.31 CHRONIC KIDNEY DISEASE, STAGE 3A: ICD-10-CM

## 2023-12-20 DIAGNOSIS — E78.2 MIXED HYPERLIPIDEMIA: ICD-10-CM

## 2023-12-20 DIAGNOSIS — J20.9 ACUTE BRONCHITIS, UNSPECIFIED ORGANISM: ICD-10-CM

## 2023-12-20 DIAGNOSIS — N40.0 BENIGN PROSTATIC HYPERPLASIA WITHOUT LOWER URINARY TRACT SYMPTOMS: ICD-10-CM

## 2023-12-20 DIAGNOSIS — G47.9 SLEEP DISTURBANCE: ICD-10-CM

## 2023-12-20 DIAGNOSIS — Z79.4 TYPE 2 DIABETES MELLITUS WITHOUT COMPLICATION, WITH LONG-TERM CURRENT USE OF INSULIN: ICD-10-CM

## 2023-12-20 DIAGNOSIS — E11.9 TYPE 2 DIABETES MELLITUS WITHOUT COMPLICATION, WITH LONG-TERM CURRENT USE OF INSULIN: ICD-10-CM

## 2023-12-20 PROCEDURE — 99999PBSHW PR PBB SHADOW TECHNICAL ONLY FILED TO HB: Mod: PBBFAC,,,

## 2023-12-20 PROCEDURE — 99214 PR OFFICE/OUTPT VISIT, EST, LEVL IV, 30-39 MIN: ICD-10-PCS | Mod: S$PBB,,, | Performed by: FAMILY MEDICINE

## 2023-12-20 PROCEDURE — 99214 OFFICE O/P EST MOD 30 MIN: CPT | Mod: S$PBB,,, | Performed by: FAMILY MEDICINE

## 2023-12-20 PROCEDURE — 96372 THER/PROPH/DIAG INJ SC/IM: CPT | Mod: PBBFAC,PN

## 2023-12-20 PROCEDURE — 99999PBSHW PR PBB SHADOW TECHNICAL ONLY FILED TO HB: ICD-10-PCS | Mod: PBBFAC,,,

## 2023-12-20 PROCEDURE — 99215 OFFICE O/P EST HI 40 MIN: CPT | Mod: PBBFAC,PN | Performed by: FAMILY MEDICINE

## 2023-12-20 PROCEDURE — 99999 PR PBB SHADOW E&M-EST. PATIENT-LVL V: CPT | Mod: PBBFAC,,, | Performed by: FAMILY MEDICINE

## 2023-12-20 PROCEDURE — 99999 PR PBB SHADOW E&M-EST. PATIENT-LVL V: ICD-10-PCS | Mod: PBBFAC,,, | Performed by: FAMILY MEDICINE

## 2023-12-20 RX ORDER — SEMAGLUTIDE 0.68 MG/ML
INJECTION, SOLUTION SUBCUTANEOUS
Qty: 9 ML | Refills: 0 | Status: SHIPPED | OUTPATIENT
Start: 2023-12-20 | End: 2024-03-13

## 2023-12-20 RX ORDER — TRAZODONE HYDROCHLORIDE 50 MG/1
50-150 TABLET ORAL NIGHTLY
Qty: 270 TABLET | Refills: 3 | Status: SHIPPED | OUTPATIENT
Start: 2023-12-20 | End: 2024-12-19

## 2023-12-20 RX ORDER — TESTOSTERONE CYPIONATE 200 MG/ML
150 INJECTION, SOLUTION INTRAMUSCULAR
Qty: 10 ML | Refills: 1 | Status: SHIPPED | OUTPATIENT
Start: 2023-12-20 | End: 2024-04-01

## 2023-12-20 RX ORDER — PROMETHAZINE HYDROCHLORIDE AND DEXTROMETHORPHAN HYDROBROMIDE 6.25; 15 MG/5ML; MG/5ML
5 SYRUP ORAL EVERY 4 HOURS PRN
Qty: 240 ML | Refills: 0 | Status: SHIPPED | OUTPATIENT
Start: 2023-12-20 | End: 2023-12-30

## 2023-12-20 RX ORDER — DEXAMETHASONE SODIUM PHOSPHATE 4 MG/ML
8 INJECTION, SOLUTION INTRA-ARTICULAR; INTRALESIONAL; INTRAMUSCULAR; INTRAVENOUS; SOFT TISSUE
Status: COMPLETED | OUTPATIENT
Start: 2023-12-20 | End: 2023-12-20

## 2023-12-20 RX ADMIN — DEXAMETHASONE SODIUM PHOSPHATE 8 MG: 4 INJECTION INTRA-ARTICULAR; INTRALESIONAL; INTRAMUSCULAR; INTRAVENOUS; SOFT TISSUE at 09:12

## 2023-12-20 NOTE — PROGRESS NOTES
"Subjective:       Patient ID: Mathew Rodrigues is a 68 y.o. male.    Chief Complaint: Medication Refill    Mathew Rodrigues presents today to establish care.   They are new to me but established with [facility].  Former patient of Dr. Osei.     Patient Active Problem List:     CRICKET on CPAP     Essential hypertension, benign     Type 2 diabetes mellitus without complication, with long-term current use of insulin     Mixed hyperlipidemia     Gastroesophageal reflux disease without esophagitis     Benign prostatic hyperplasia without lower urinary tract symptoms     Chronic pain syndrome     Chronic right shoulder pain     Hypogonadism in male     Primary osteoarthritis involving multiple joints     Chronic rhinitis     Insomnia     Chronic kidney disease, stage 3a    Current Outpatient Medications:  albuterol (PROVENTIL/VENTOLIN HFA) 90 mcg/actuation inhaler, albuterol sulfate HFA 90 mcg/actuation aerosol inhaler  amLODIPine (NORVASC) 5 MG tablet, TAKE 1 TABLET BY MOUTH EVERY DAY  apixaban (ELIQUIS) 5 mg Tab, Take 5 mg by mouth 2 (two) times daily.  atorvastatin (LIPITOR) 10 MG tablet, Take 1 tablet (10 mg total) by mouth every evening.  BD SHAHZAD 2ND GEN PEN NEEDLE 32 gauge x 5/32" Ndle, USE ONCE DAILY WITH TRESIBA  busPIRone (BUSPAR) 5 MG Tab, Take 1 tablet (5 mg total) by mouth every evening.  cyanocobalamin 1,000 mcg/mL injection, Inject 1 mL (1,000 mcg total) into the muscle every 30 days.  fenofibrate 160 MG Tab, Take 1 tablet (160 mg total) by mouth nightly.  fluticasone furoate-vilanteroL (BREO ELLIPTA) 100-25 mcg/dose diskus inhaler, Inhale 1 puff into the lungs once daily. Controller  glipiZIDE (GLUCOTROL) 5 MG tablet, Take 1 tablet (5 mg total) by mouth 2 (two) times daily before meals.  guanFACINE (TENEX) 2 MG tablet, TAKE 1 TABLET(2 MG) BY MOUTH EVERY MORNING  insulin degludec (TRESIBA FLEXTOUCH U-100) 100 unit/mL (3 mL) insulin pen, Inject 32 Units into the skin every " morning.  lisinopriL-hydrochlorothiazide (PRINZIDE,ZESTORETIC) 20-12.5 mg per tablet, Take 2 tablets by mouth once daily.  metFORMIN (GLUCOPHAGE) 1000 MG tablet, Take 1 tablet (1,000 mg total) by mouth 2 (two) times daily with meals.  metoprolol tartrate (LOPRESSOR) 25 MG tablet, Take 1 tablet (25 mg total) by mouth 2 (two) times daily.  omeprazole (PRILOSEC) 40 MG capsule, Take 1 capsule (40 mg total) by mouth 2 (two) times daily before meals.  sildenafiL (VIAGRA) 100 MG tablet, Take 1 tablet (100 mg total) by mouth daily as needed for Erectile Dysfunction.  tamsulosin (FLOMAX) 0.4 mg Cap, Take 1 capsule (0.4 mg total) by mouth nightly.  tapentadoL (NUCYNTA ER) 200 mg Tb12, Take 1 tablet by mouth 2 (two) times a day.  testosterone cypionate (DEPOTESTOTERONE CYPIONATE) 200 mg/mL injection, Inject 0.75 mLs (150 mg total) into the muscle every 7 days.  traZODone (DESYREL) 50 MG tablet, Take 1 tablet (50 mg total) by mouth every evening. Take 1-3 tabs nightly  UNABLE TO FIND, 4 mg. medication name: Clortabs 4mg Qam    Head and chest cold. Caught wife's illness.   She is flu, covid, strep negative.       Review of Systems   Constitutional:  Negative for activity change, appetite change, fatigue and fever.   Respiratory:  Negative for shortness of breath.    Gastrointestinal:  Negative for abdominal pain.   Integumentary:  Negative for rash.         Objective:      Physical Exam  Vitals and nursing note reviewed.   Constitutional:       General: He is not in acute distress.     Appearance: He is not ill-appearing.   Cardiovascular:      Rate and Rhythm: Normal rate and regular rhythm.      Heart sounds: No murmur heard.  Pulmonary:      Effort: Pulmonary effort is normal.      Breath sounds: Normal breath sounds. No wheezing.   Skin:     General: Skin is warm and dry.      Findings: No rash.   Neurological:      Mental Status: He is alert.   Psychiatric:         Mood and Affect: Mood normal.         Behavior: Behavior  normal.         Assessment:       1. Essential hypertension, benign    2. Mixed hyperlipidemia    3. Chronic kidney disease, stage 3a    4. Benign prostatic hyperplasia without lower urinary tract symptoms    5. Type 2 diabetes mellitus without complication, with long-term current use of insulin    6. Acute bronchitis, unspecified organism    7. Hypogonadism in male    8. Thrombosis of left saphenous vein    9. Sleep disturbance        Plan:       Problem List Items Addressed This Visit          Cardiac/Vascular    Essential hypertension, benign - Primary     Stable. Well controlled. Continue current medications.            Relevant Orders    Basic Metabolic Panel    Mixed hyperlipidemia       Renal/    Benign prostatic hyperplasia without lower urinary tract symptoms    Chronic kidney disease, stage 3a       Endocrine    Type 2 diabetes mellitus without complication, with long-term current use of insulin     Lab Results   Component Value Date    HGBA1C 7.8 (H) 08/08/2023            Relevant Medications    semaglutide (OZEMPIC) 0.25 mg or 0.5 mg (2 mg/3 mL) pen injector    Other Relevant Orders    Hemoglobin A1C    Hypogonadism in male    Relevant Medications    testosterone cypionate (DEPOTESTOTERONE CYPIONATE) 200 mg/mL injection    Other Relevant Orders    Testosterone    CBC Auto Differential     Other Visit Diagnoses       Acute bronchitis, unspecified organism        Relevant Medications    dexAMETHasone injection 8 mg    promethazine-dextromethorphan (PROMETHAZINE-DM) 6.25-15 mg/5 mL Syrp    Thrombosis of left saphenous vein        Relevant Orders    US Lower Extremity Veins Left    Sleep disturbance        Relevant Medications    traZODone (DESYREL) 50 MG tablet

## 2023-12-24 DIAGNOSIS — J44.9 CHRONIC OBSTRUCTIVE PULMONARY DISEASE, UNSPECIFIED COPD TYPE: ICD-10-CM

## 2023-12-24 RX ORDER — FLUTICASONE FUROATE AND VILANTEROL 100; 25 UG/1; UG/1
1 POWDER RESPIRATORY (INHALATION)
Qty: 180 EACH | Refills: 3 | Status: SHIPPED | OUTPATIENT
Start: 2023-12-24

## 2023-12-24 NOTE — TELEPHONE ENCOUNTER
Provider Staff:  Action required for this patient    Requires labs      Please see care gap opportunities below in Care Due Message.    Thanks!  [facility]     Appointments      Date Provider   Last Visit   12/20/2023 Kiley Vasquez MD   Next Visit   Visit date not found Kiley Vasquez MD     Refill Decision Note   Mathewmarielle Rodrigues  is requesting a refill authorization.  Brief Assessment and Rationale for Refill:  Approve     Medication Therapy Plan:         Comments:     Note composed:2:15 PM 12/24/2023

## 2023-12-24 NOTE — TELEPHONE ENCOUNTER
Care Due:                  Date            Visit Type   Department     Provider  --------------------------------------------------------------------------------                                MYCHART                              FOLLOWUP/OF  [facility]  Last Visit: 12-      FICE VISIT   MEDICINE       Kiley Vasquez  Next Visit: None Scheduled  None         None Found                                                            Last  Test          Frequency    Reason                     Performed    Due Date  --------------------------------------------------------------------------------    HBA1C.......  6 months...  semaglutide..............  08- 02-    [facility] Embedded Care Due Messages. Reference number: [id number].   12/24/2023 2:14:32 PM CST

## 2023-12-28 ENCOUNTER — PATIENT MESSAGE (OUTPATIENT)
Dept: FAMILY MEDICINE | Facility: CLINIC | Age: 68
End: 2023-12-28
Payer: MEDICARE

## 2023-12-28 DIAGNOSIS — E53.8 VITAMIN B12 DEFICIENCY: ICD-10-CM

## 2023-12-28 DIAGNOSIS — N40.0 BENIGN PROSTATIC HYPERPLASIA WITHOUT LOWER URINARY TRACT SYMPTOMS: ICD-10-CM

## 2023-12-28 RX ORDER — TAMSULOSIN HYDROCHLORIDE 0.4 MG/1
0.4 CAPSULE ORAL NIGHTLY
Qty: 90 CAPSULE | Refills: 3 | Status: SHIPPED | OUTPATIENT
Start: 2023-12-28 | End: 2024-12-27

## 2023-12-28 RX ORDER — CYANOCOBALAMIN 1000 UG/ML
1000 INJECTION, SOLUTION INTRAMUSCULAR; SUBCUTANEOUS
Qty: 3 ML | Refills: 3 | Status: SHIPPED | OUTPATIENT
Start: 2023-12-28 | End: 2023-12-29 | Stop reason: SDUPTHER

## 2023-12-28 NOTE — TELEPHONE ENCOUNTER
No care due was identified.  [facility] Embedded Care Due Messages. Reference number: [id number].   12/28/2023 10:09:51 AM CST

## 2023-12-29 RX ORDER — CYANOCOBALAMIN 1000 UG/ML
1000 INJECTION, SOLUTION INTRAMUSCULAR; SUBCUTANEOUS
Qty: 6 ML | Refills: 3 | Status: SHIPPED | OUTPATIENT
Start: 2023-12-29

## 2024-01-02 ENCOUNTER — PATIENT MESSAGE (OUTPATIENT)
Dept: FAMILY MEDICINE | Facility: CLINIC | Age: 69
End: 2024-01-02
Payer: MEDICARE

## 2024-01-05 ENCOUNTER — LAB VISIT (OUTPATIENT)
Dept: LAB | Facility: HOSPITAL | Age: 69
End: 2024-01-05
Attending: FAMILY MEDICINE
Payer: MEDICARE

## 2024-01-05 DIAGNOSIS — E11.9 TYPE 2 DIABETES MELLITUS WITHOUT COMPLICATION, WITH LONG-TERM CURRENT USE OF INSULIN: ICD-10-CM

## 2024-01-05 DIAGNOSIS — I10 ESSENTIAL HYPERTENSION, BENIGN: ICD-10-CM

## 2024-01-05 DIAGNOSIS — E29.1 HYPOGONADISM IN MALE: ICD-10-CM

## 2024-01-05 DIAGNOSIS — Z79.4 TYPE 2 DIABETES MELLITUS WITHOUT COMPLICATION, WITH LONG-TERM CURRENT USE OF INSULIN: ICD-10-CM

## 2024-01-05 LAB
ANION GAP SERPL CALC-SCNC: 12 MMOL/L (ref 8–16)
BASOPHILS # BLD AUTO: 0.11 K/UL (ref 0–0.2)
BASOPHILS NFR BLD: 1.9 % (ref 0–1.9)
BUN SERPL-MCNC: 26 MG/DL (ref 8–23)
CALCIUM SERPL-MCNC: 9.8 MG/DL (ref 8.7–10.5)
CHLORIDE SERPL-SCNC: 103 MMOL/L (ref 95–110)
CO2 SERPL-SCNC: 24 MMOL/L (ref 23–29)
CREAT SERPL-MCNC: 1.7 MG/DL (ref 0.5–1.4)
DIFFERENTIAL METHOD BLD: ABNORMAL
EOSINOPHIL # BLD AUTO: 0.2 K/UL (ref 0–0.5)
EOSINOPHIL NFR BLD: 3.5 % (ref 0–8)
ERYTHROCYTE [DISTWIDTH] IN BLOOD BY AUTOMATED COUNT: 16.8 % (ref 11.5–14.5)
EST. GFR  (NO RACE VARIABLE): 43.4 ML/MIN/1.73 M^2
ESTIMATED AVG GLUCOSE: 186 MG/DL (ref 68–131)
GLUCOSE SERPL-MCNC: 136 MG/DL (ref 70–110)
HBA1C MFR BLD: 8.1 % (ref 4–5.6)
HCT VFR BLD AUTO: 47.5 % (ref 40–54)
HGB BLD-MCNC: 14.6 G/DL (ref 14–18)
IMM GRANULOCYTES # BLD AUTO: 0.04 K/UL (ref 0–0.04)
IMM GRANULOCYTES NFR BLD AUTO: 0.7 % (ref 0–0.5)
LYMPHOCYTES # BLD AUTO: 1.6 K/UL (ref 1–4.8)
LYMPHOCYTES NFR BLD: 27.5 % (ref 18–48)
MCH RBC QN AUTO: 25.5 PG (ref 27–31)
MCHC RBC AUTO-ENTMCNC: 30.7 G/DL (ref 32–36)
MCV RBC AUTO: 83 FL (ref 82–98)
MONOCYTES # BLD AUTO: 0.6 K/UL (ref 0.3–1)
MONOCYTES NFR BLD: 10 % (ref 4–15)
NEUTROPHILS # BLD AUTO: 3.3 K/UL (ref 1.8–7.7)
NEUTROPHILS NFR BLD: 56.4 % (ref 38–73)
NRBC BLD-RTO: 0 /100 WBC
PLATELET # BLD AUTO: 245 K/UL (ref 150–450)
PMV BLD AUTO: 9.5 FL (ref 9.2–12.9)
POTASSIUM SERPL-SCNC: 5.2 MMOL/L (ref 3.5–5.1)
RBC # BLD AUTO: 5.72 M/UL (ref 4.6–6.2)
SODIUM SERPL-SCNC: 139 MMOL/L (ref 136–145)
TESTOST SERPL-MCNC: 804 NG/DL (ref 304–1227)
WBC # BLD AUTO: 5.79 K/UL (ref 3.9–12.7)

## 2024-01-05 PROCEDURE — 36415 COLL VENOUS BLD VENIPUNCTURE: CPT | Performed by: FAMILY MEDICINE

## 2024-01-05 PROCEDURE — 85025 COMPLETE CBC W/AUTO DIFF WBC: CPT | Performed by: FAMILY MEDICINE

## 2024-01-05 PROCEDURE — 84403 ASSAY OF TOTAL TESTOSTERONE: CPT | Performed by: FAMILY MEDICINE

## 2024-01-05 PROCEDURE — 83036 HEMOGLOBIN GLYCOSYLATED A1C: CPT | Performed by: FAMILY MEDICINE

## 2024-01-05 PROCEDURE — 80048 BASIC METABOLIC PNL TOTAL CA: CPT | Performed by: FAMILY MEDICINE

## 2024-01-17 DIAGNOSIS — E11.9 TYPE 2 DIABETES MELLITUS WITHOUT COMPLICATION, WITHOUT LONG-TERM CURRENT USE OF INSULIN: ICD-10-CM

## 2024-01-17 NOTE — TELEPHONE ENCOUNTER
Refill Routing Note   Medication(s) are not appropriate for processing by [facility] for the following reason(s):        No active prescription written by provider    [facility] action(s):  Defer               Appointments  past 12m or future 3m with PCP    Date Provider   Last Visit   12/20/2023 Kiley Vasquez MD   Next Visit   5/15/2024 Kiley Vasquez MD   ED visits in past 90 days: 1        Note composed:2:40 PM 01/17/2024

## 2024-01-17 NOTE — TELEPHONE ENCOUNTER
No care due was identified.  [facility] Embedded Care Due Messages. Reference number: [id number].   1/17/2024 2:38:31 PM CST

## 2024-01-18 ENCOUNTER — PATIENT MESSAGE (OUTPATIENT)
Dept: FAMILY MEDICINE | Facility: CLINIC | Age: 69
End: 2024-01-18
Payer: MEDICARE

## 2024-01-18 RX ORDER — METFORMIN HYDROCHLORIDE 1000 MG/1
1000 TABLET ORAL 2 TIMES DAILY WITH MEALS
Qty: 180 TABLET | Refills: 1 | Status: SHIPPED | OUTPATIENT
Start: 2024-01-18

## 2024-02-02 ENCOUNTER — PATIENT MESSAGE (OUTPATIENT)
Dept: FAMILY MEDICINE | Facility: CLINIC | Age: 69
End: 2024-02-02
Payer: MEDICARE

## 2024-02-05 DIAGNOSIS — E78.2 MIXED HYPERLIPIDEMIA: ICD-10-CM

## 2024-02-05 DIAGNOSIS — F98.8 ATTENTION DEFICIT DISORDER, UNSPECIFIED HYPERACTIVITY PRESENCE: ICD-10-CM

## 2024-02-05 RX ORDER — GUANFACINE 2 MG/1
TABLET ORAL
Qty: 90 TABLET | Refills: 0 | Status: SHIPPED | OUTPATIENT
Start: 2024-02-05 | End: 2024-04-29

## 2024-02-05 RX ORDER — FENOFIBRATE 160 MG/1
160 TABLET ORAL NIGHTLY
Qty: 90 TABLET | Refills: 1 | Status: SHIPPED | OUTPATIENT
Start: 2024-02-05

## 2024-02-05 NOTE — TELEPHONE ENCOUNTER
Refill Routing Note   Medication(s) are not appropriate for processing by [facility] for the following reason(s):        No active prescription written by provider: Tricor  Required labs abnormal: Tricor (Cr)  Outside of protocol: Tenex    [facility] action(s):  Defer  Route               Appointments  past 12m or future 3m with PCP    Date Provider   Last Visit   12/20/2023 Kiley Vasquez MD   Next Visit   5/15/2024 Kiley Vasquez MD   ED visits in past 90 days: 1        Note composed:11:55 AM 02/05/2024

## 2024-02-05 NOTE — TELEPHONE ENCOUNTER
No care due was identified.  [facility] Embedded Care Due Messages. Reference number: [id number].   2/05/2024 10:40:56 AM CST

## 2024-02-07 ENCOUNTER — HOSPITAL ENCOUNTER (OUTPATIENT)
Dept: RADIOLOGY | Facility: HOSPITAL | Age: 69
Discharge: HOME | End: 2024-02-07
Attending: FAMILY MEDICINE
Payer: MEDICARE

## 2024-02-07 ENCOUNTER — PATIENT MESSAGE (OUTPATIENT)
Dept: FAMILY MEDICINE | Facility: CLINIC | Age: 69
End: 2024-02-07
Payer: MEDICARE

## 2024-02-07 DIAGNOSIS — I82.812 THROMBOSIS OF LEFT SAPHENOUS VEIN: ICD-10-CM

## 2024-02-07 PROCEDURE — 93971 EXTREMITY STUDY: CPT | Mod: 26,LT,, | Performed by: RADIOLOGY

## 2024-02-07 PROCEDURE — 93971 EXTREMITY STUDY: CPT | Mod: TC,LT

## 2024-02-16 ENCOUNTER — PATIENT MESSAGE (OUTPATIENT)
Dept: FAMILY MEDICINE | Facility: CLINIC | Age: 69
End: 2024-02-16
Payer: MEDICARE

## 2024-02-16 DIAGNOSIS — E11.9 TYPE 2 DIABETES MELLITUS WITHOUT COMPLICATION, WITHOUT LONG-TERM CURRENT USE OF INSULIN: ICD-10-CM

## 2024-02-16 RX ORDER — GLIPIZIDE 5 MG/1
5 TABLET ORAL
Qty: 180 TABLET | Refills: 1 | Status: SHIPPED | OUTPATIENT
Start: 2024-02-16

## 2024-02-16 NOTE — TELEPHONE ENCOUNTER
Refill Authorization Note     Refill Decision Note   Mathewmarielle Rodrigues  is requesting a refill authorization.  Brief Assessment and Rationale for Refill:  Approve     Medication Therapy Plan:       Medication Reconciliation Completed: No   Comments:     No Care Gaps recommended.     Note composed:11:16 AM 02/16/2024

## 2024-02-16 NOTE — TELEPHONE ENCOUNTER
No care due was identified.  [facility] Embedded Care Due Messages. Reference number: [id number].   2/16/2024 10:47:48 AM CST

## 2024-02-26 ENCOUNTER — PATIENT MESSAGE (OUTPATIENT)
Dept: FAMILY MEDICINE | Facility: CLINIC | Age: 69
End: 2024-02-26
Payer: MEDICARE

## 2024-02-26 DIAGNOSIS — F41.9 ANXIETY: ICD-10-CM

## 2024-02-26 RX ORDER — BUSPIRONE HYDROCHLORIDE 5 MG/1
5 TABLET ORAL NIGHTLY
Qty: 90 TABLET | Refills: 3 | Status: SHIPPED | OUTPATIENT
Start: 2024-02-26 | End: 2025-02-25

## 2024-02-26 NOTE — TELEPHONE ENCOUNTER
No care due was identified.  [facility] Embedded Care Due Messages. Reference number: [id number].   2/26/2024 1:53:55 PM CST

## 2024-02-26 NOTE — TELEPHONE ENCOUNTER
Refill Routing Note   Medication(s) are not appropriate for processing by [facility] for the following reason(s):        Outside of protocol    [facility] action(s):  Route               Appointments  past 12m or future 3m with PCP    Date Provider   Last Visit   12/20/2023 Kiley Vasquez MD   Next Visit   5/15/2024 Kiley Vasquez MD   ED visits in past 90 days: 0        Note composed:2:03 PM 02/26/2024

## 2024-02-27 ENCOUNTER — PATIENT MESSAGE (OUTPATIENT)
Dept: FAMILY MEDICINE | Facility: CLINIC | Age: 69
End: 2024-02-27
Payer: MEDICARE

## 2024-02-27 DIAGNOSIS — I10 ESSENTIAL HYPERTENSION, BENIGN: ICD-10-CM

## 2024-02-27 NOTE — TELEPHONE ENCOUNTER
No care due was identified.  Health [facility] Embedded Care Due Messages. Reference number: [id number].   2/27/2024 4:59:03 PM CST

## 2024-02-28 RX ORDER — LISINOPRIL AND HYDROCHLOROTHIAZIDE 12.5; 2 MG/1; MG/1
2 TABLET ORAL DAILY
Qty: 180 TABLET | Refills: 3 | Status: SHIPPED | OUTPATIENT
Start: 2024-02-28

## 2024-02-28 NOTE — TELEPHONE ENCOUNTER
Refill Routing Note   Medication(s) are not appropriate for processing by [facility] for the following reason(s):        Required labs abnormal  No active prescription written by provider    [facility] action(s):  Defer        Medication Therapy Plan:  on the med list 23.      Appointments  past 12m or future 3m with PCP    Date Provider   Last Visit   2023 Kiley Vasquez MD   Next Visit   5/15/2024 Kiley Vasquez MD   ED visits in past 90 days: 0        Note composed:9:38 PM 2024

## 2024-03-08 ENCOUNTER — PATIENT MESSAGE (OUTPATIENT)
Dept: FAMILY MEDICINE | Facility: CLINIC | Age: 69
End: 2024-03-08
Payer: MEDICARE

## 2024-03-08 DIAGNOSIS — I10 ESSENTIAL HYPERTENSION, BENIGN: ICD-10-CM

## 2024-03-08 RX ORDER — METOPROLOL TARTRATE 25 MG/1
25 TABLET, FILM COATED ORAL 2 TIMES DAILY
Qty: 180 TABLET | Refills: 3 | Status: SHIPPED | OUTPATIENT
Start: 2024-03-08

## 2024-03-08 NOTE — TELEPHONE ENCOUNTER
No care due was identified.  Health [facility] Embedded Care Due Messages. Reference number: [id number].   3/08/2024 10:00:03 AM CST

## 2024-03-08 NOTE — TELEPHONE ENCOUNTER
Refill Routing Note   Medication(s) are not appropriate for processing by [facility] for the following reason(s):        No active prescription written by provider: not yet signed by current PCP    [facility] action(s):  Defer      Medication Therapy Plan:         Appointments  past 12m or future 3m with PCP    Date Provider   Last Visit   12/20/2023 Kiley Vasquez MD   Next Visit   5/15/2024 Kiley Vasquez MD   ED visits in past 90 days: 0        Note composed:11:32 AM 03/08/2024

## 2024-03-19 ENCOUNTER — HOSPITAL ENCOUNTER (EMERGENCY)
Facility: HOSPITAL | Age: 69
Discharge: HOME | End: 2024-03-20
Attending: FAMILY MEDICINE
Payer: MEDICARE

## 2024-03-19 DIAGNOSIS — R10.84 GENERALIZED ABDOMINAL PAIN: ICD-10-CM

## 2024-03-19 DIAGNOSIS — R10.9 ABDOMINAL PAIN: ICD-10-CM

## 2024-03-19 DIAGNOSIS — R19.7 DIARRHEA, UNSPECIFIED TYPE: Primary | ICD-10-CM

## 2024-03-19 LAB
ALBUMIN SERPL BCP-MCNC: 3.6 G/DL (ref 3.5–5.2)
ALP SERPL-CCNC: 70 U/L (ref 55–135)
ALT SERPL W/O P-5'-P-CCNC: 12 U/L (ref 10–44)
ANION GAP SERPL CALC-SCNC: 15 MMOL/L (ref 8–16)
AST SERPL-CCNC: 11 U/L (ref 10–40)
BASOPHILS # BLD AUTO: 0.05 K/UL (ref 0–0.2)
BASOPHILS NFR BLD: 0.6 % (ref 0–1.9)
BILIRUB SERPL-MCNC: 0.4 MG/DL (ref 0.1–1)
BUN SERPL-MCNC: 16 MG/DL (ref 8–23)
CALCIUM SERPL-MCNC: 9 MG/DL (ref 8.7–10.5)
CHLORIDE SERPL-SCNC: 105 MMOL/L (ref 95–110)
CO2 SERPL-SCNC: 19 MMOL/L (ref 23–29)
CREAT SERPL-MCNC: 1.6 MG/DL (ref 0.5–1.4)
DIFFERENTIAL METHOD BLD: ABNORMAL
EOSINOPHIL # BLD AUTO: 0.7 K/UL (ref 0–0.5)
EOSINOPHIL NFR BLD: 7.9 % (ref 0–8)
ERYTHROCYTE [DISTWIDTH] IN BLOOD BY AUTOMATED COUNT: 18.1 % (ref 11.5–14.5)
EST. GFR  (NO RACE VARIABLE): 46.4 ML/MIN/1.73 M^2
GLUCOSE SERPL-MCNC: 116 MG/DL (ref 70–110)
HCT VFR BLD AUTO: 51.3 % (ref 40–54)
HGB BLD-MCNC: 16.3 G/DL (ref 14–18)
IMM GRANULOCYTES # BLD AUTO: 0.06 K/UL (ref 0–0.04)
IMM GRANULOCYTES NFR BLD AUTO: 0.7 % (ref 0–0.5)
LYMPHOCYTES # BLD AUTO: 1.7 K/UL (ref 1–4.8)
LYMPHOCYTES NFR BLD: 19.3 % (ref 18–48)
MAGNESIUM SERPL-MCNC: 1.5 MG/DL (ref 1.6–2.6)
MCH RBC QN AUTO: 25.9 PG (ref 27–31)
MCHC RBC AUTO-ENTMCNC: 31.8 G/DL (ref 32–36)
MCV RBC AUTO: 81 FL (ref 82–98)
MONOCYTES # BLD AUTO: 0.9 K/UL (ref 0.3–1)
MONOCYTES NFR BLD: 9.7 % (ref 4–15)
NEUTROPHILS # BLD AUTO: 5.6 K/UL (ref 1.8–7.7)
NEUTROPHILS NFR BLD: 61.8 % (ref 38–73)
NRBC BLD-RTO: 0 /100 WBC
PLATELET # BLD AUTO: 320 K/UL (ref 150–450)
PMV BLD AUTO: 10.1 FL (ref 9.2–12.9)
POTASSIUM SERPL-SCNC: 4.1 MMOL/L (ref 3.5–5.1)
PROT SERPL-MCNC: 7.3 G/DL (ref 6–8.4)
RBC # BLD AUTO: 6.3 M/UL (ref 4.6–6.2)
SODIUM SERPL-SCNC: 139 MMOL/L (ref 136–145)
WBC # BLD AUTO: 9.01 K/UL (ref 3.9–12.7)

## 2024-03-19 PROCEDURE — 96361 HYDRATE IV INFUSION ADD-ON: CPT

## 2024-03-19 PROCEDURE — 87449 NOS EACH ORGANISM AG IA: CPT | Performed by: FAMILY MEDICINE

## 2024-03-19 PROCEDURE — 74019 RADEX ABDOMEN 2 VIEWS: CPT | Mod: 26,,, | Performed by: RADIOLOGY

## 2024-03-19 PROCEDURE — 99284 EMERGENCY DEPT VISIT MOD MDM: CPT | Mod: 25

## 2024-03-19 PROCEDURE — 85025 COMPLETE CBC W/AUTO DIFF WBC: CPT | Performed by: FAMILY MEDICINE

## 2024-03-19 PROCEDURE — 63600175 PHARM REV CODE 636 W HCPCS: Performed by: FAMILY MEDICINE

## 2024-03-19 PROCEDURE — 96374 THER/PROPH/DIAG INJ IV PUSH: CPT

## 2024-03-19 PROCEDURE — 74019 RADEX ABDOMEN 2 VIEWS: CPT | Mod: TC

## 2024-03-19 PROCEDURE — 83735 ASSAY OF MAGNESIUM: CPT | Performed by: FAMILY MEDICINE

## 2024-03-19 PROCEDURE — 80053 COMPREHEN METABOLIC PANEL: CPT | Performed by: FAMILY MEDICINE

## 2024-03-19 PROCEDURE — 25000003 PHARM REV CODE 250: Performed by: FAMILY MEDICINE

## 2024-03-19 RX ORDER — HYOSCYAMINE SULFATE 0.12 MG/1
0.12 TABLET SUBLINGUAL
Status: COMPLETED | OUTPATIENT
Start: 2024-03-19 | End: 2024-03-19

## 2024-03-19 RX ORDER — HYDROMORPHONE HYDROCHLORIDE 1 MG/ML
1 INJECTION, SOLUTION INTRAMUSCULAR; INTRAVENOUS; SUBCUTANEOUS
Status: COMPLETED | OUTPATIENT
Start: 2024-03-19 | End: 2024-03-19

## 2024-03-19 RX ADMIN — HYOSCYAMINE SULFATE 0.12 MG: 0.12 TABLET, ORALLY DISINTEGRATING SUBLINGUAL at 09:03

## 2024-03-19 RX ADMIN — SODIUM CHLORIDE 1000 ML: 9 INJECTION, SOLUTION INTRAVENOUS at 09:03

## 2024-03-19 RX ADMIN — HYDROMORPHONE HYDROCHLORIDE 1 MG: 1 INJECTION, SOLUTION INTRAMUSCULAR; INTRAVENOUS; SUBCUTANEOUS at 10:03

## 2024-03-20 ENCOUNTER — PATIENT OUTREACH (OUTPATIENT)
Dept: ADMINISTRATIVE | Facility: HOSPITAL | Age: 69
End: 2024-03-20
Payer: MEDICARE

## 2024-03-20 VITALS
BODY MASS INDEX: 30.55 KG/M2 | DIASTOLIC BLOOD PRESSURE: 39 MMHG | HEIGHT: 67 IN | WEIGHT: 194.63 LBS | TEMPERATURE: 98 F | OXYGEN SATURATION: 95 % | SYSTOLIC BLOOD PRESSURE: 117 MMHG | RESPIRATION RATE: 16 BRPM | HEART RATE: 92 BPM

## 2024-03-20 LAB
C DIFF GDH STL QL: NEGATIVE
C DIFF TOX A+B STL QL IA: NEGATIVE

## 2024-03-20 PROCEDURE — 25000003 PHARM REV CODE 250: Performed by: FAMILY MEDICINE

## 2024-03-20 RX ORDER — HYDROCODONE BITARTRATE AND ACETAMINOPHEN 10; 325 MG/1; MG/1
1 TABLET ORAL
Status: COMPLETED | OUTPATIENT
Start: 2024-03-20 | End: 2024-03-20

## 2024-03-20 RX ORDER — HYOSCYAMINE SULFATE 0.12 MG/1
0.12 TABLET SUBLINGUAL
Status: COMPLETED | OUTPATIENT
Start: 2024-03-20 | End: 2024-03-20

## 2024-03-20 RX ORDER — HYOSCYAMINE SULFATE 0.12 MG/1
0.12 TABLET SUBLINGUAL EVERY 4 HOURS PRN
Qty: 20 TABLET | Refills: 0 | Status: SHIPPED | OUTPATIENT
Start: 2024-03-20 | End: 2024-06-18

## 2024-03-20 RX ADMIN — HYOSCYAMINE SULFATE 0.12 MG: 0.12 TABLET, ORALLY DISINTEGRATING SUBLINGUAL at 12:03

## 2024-03-20 RX ADMIN — HYDROCODONE BITARTRATE AND ACETAMINOPHEN 1 TABLET: 10; 325 TABLET ORAL at 12:03

## 2024-03-20 NOTE — DISCHARGE INSTRUCTIONS
Follow-up with primary care physician.  Follow dietary plan as discussed.  Take medications as needed.

## 2024-03-20 NOTE — ED PROVIDER NOTES
"Encounter Date: 3/19/2024       History     Chief Complaint   Patient presents with    Abdominal Pain     Pt c/o Abd pain since Friday, Fever this evening, Diarrhea, gas, cramping since Sunday; stool odor becoming foul smell. Stool has "fatty ring around it". Last tylenol around 1840pm tonight.     Fever     The patient is a 69-year-old male with a past medical history COPD, diabetes, hypertension, hyperlipidemia will heart failure, obstructive sleep apnea who comes our facility with complaints of abdominal pain and diarrhea.  Patient states that he has had abdominal pain for about 4 days.  He has had low-grade fevers.  Patient stated that he has had diarrhea over the last 3 days with multiple bowel movements and flatulence.  Patient describes a diffuse sore abdomen and describes abdominal cramping.  Patient states he has had 4-5 loose bowel movements on 03/19/2024.  Patient denies any blood per rectum.  He denies any consumption of any suspicious food.  Patient denies any contact with any sick individuals.  Patient denies any travel anywhere or recent antibiotic use.      Review of patient's allergies indicates:   Allergen Reactions    Ibuprofen Itching     Past Medical History:   Diagnosis Date    Arthritis     COPD (chronic obstructive pulmonary disease)     Diabetes mellitus, type 2     DVT (deep vein thrombosis) in pregnancy     Hyperlipidemia     Hypertension     Kidney stones     LVH (left ventricular hypertrophy) due to hypertensive disease, with heart failure     Neuropathy     Personal history of colonic polyps 03/24/2000    colonoscopy report in media    PTSD (post-traumatic stress disorder)     Sleep apnea, unspecified     Torn rotator cuff      Past Surgical History:   Procedure Laterality Date    achilles repair Left 2003    ADENOIDECTOMY      ANKLE FRACTURE SURGERY Right 1987    CATARACT EXTRACTION Left 2004    CATARACT EXTRACTION Left 2005    2nd    COLONOSCOPY N/A 05/09/2017    results in media    " COLONOSCOPY N/A 01/13/2023    Procedure: COLONOSCOPY;  Surgeon: Kenney Keller MD;  Location: [facility];  Service: General;  Laterality: N/A;    COLONOSCOPY W/ POLYPECTOMY N/A 03/24/2000    results in media    ELBOW SURGERY Right 07/24/2020    EYE SURGERY  2005    finger joint replacement Right 01/22/2020    middle finger    hair follicle  1998    HAND SURGERY Right 04/25/2017    HAND SURGERY Right 05/12/2017    2nd surgery    NASAL SINUS SURGERY  08/2015    ROTATOR CUFF REPAIR Right 09/25/2015    ROTATOR CUFF REPAIR Left 04/08/2016    skin grafts Right 07/2016    shin from stingray wound    VASECTOMY       Family History   Problem Relation Age of Onset    Hypertension Mother     Arthritis Mother     Hypertension Father     Diabetes Father     Arthritis Father     Hearing loss Father     Cancer Brother     COPD Brother      Social History     Tobacco Use    Smoking status: Former     Current packs/day: 1.50     Average packs/day: 1.5 packs/day for 30.0 years (45.0 ttl pk-yrs)     Types: Cigarettes    Smokeless tobacco: Never   Substance Use Topics    Alcohol use: Yes     Comment: occassionally    Drug use: Never     Review of Systems   Constitutional:  Positive for fever.   Gastrointestinal:  Positive for abdominal pain and diarrhea. Negative for abdominal distention, blood in stool, constipation, nausea and vomiting.   All other systems reviewed and are negative.      Physical Exam     Initial Vitals [03/19/24 1957]   BP Pulse Resp Temp SpO2   132/82 92 18 98.1 °F (36.7 °C) 96 %      MAP       --         Physical Exam    Nursing note and vitals reviewed.  Constitutional: He appears well-developed and well-nourished. He is not diaphoretic. No distress.   HENT:   Head: Normocephalic and atraumatic.   Nose: Nose normal.   Eyes: Conjunctivae and EOM are normal. Right eye exhibits no discharge. Left eye exhibits no discharge.   Neck: Neck supple. No thyromegaly present. No tracheal deviation present.   Normal  range of motion.  Cardiovascular:  Normal rate, regular rhythm, normal heart sounds and intact distal pulses.           No murmur heard.  Pulmonary/Chest: Breath sounds normal. No respiratory distress. He exhibits no tenderness.   Abdominal: Abdomen is soft. Bowel sounds are normal. He exhibits no distension and no mass. There is abdominal tenderness.   Mild abdominal discomfort on palpation mainly central abdominal and left lower quadrant. There is no rebound and no guarding.   Musculoskeletal:         General: No tenderness or edema. Normal range of motion.      Cervical back: Normal range of motion and neck supple.     Neurological: He is alert and oriented to person, place, and time. He has normal strength. No cranial nerve deficit or sensory deficit. GCS score is 15. GCS eye subscore is 4. GCS verbal subscore is 5. GCS motor subscore is 6.   Skin: Skin is dry. Capillary refill takes less than 2 seconds. No rash and no abscess noted. No erythema. No pallor.   Psychiatric: He has a normal mood and affect. His behavior is normal. Judgment and thought content normal.         ED Course   Procedures  Labs Reviewed   CBC W/ AUTO DIFFERENTIAL - Abnormal; Notable for the following components:       Result Value    RBC 6.30 (*)     MCV 81 (*)     MCH 25.9 (*)     MCHC 31.8 (*)     RDW 18.1 (*)     Immature Granulocytes 0.7 (*)     Immature Grans (Abs) 0.06 (*)     Eos # 0.7 (*)     All other components within normal limits   COMPREHENSIVE METABOLIC PANEL - Abnormal; Notable for the following components:    CO2 19 (*)     Glucose 116 (*)     Creatinine 1.6 (*)     eGFR 46.4 (*)     All other components within normal limits   MAGNESIUM - Abnormal; Notable for the following components:    Magnesium 1.5 (*)     All other components within normal limits   CLOSTRIDIUM DIFFICILE          Imaging Results              X-Ray Abdomen Flat And Erect (Final result)  Result time 03/19/24 22:27:36      Final result by Sushil Mike  MD CONRADO (03/19/24 22:27:36)                   Impression:      Nonobstructed bowel-gas pattern.      Electronically signed by: Sushil Mike MD  Date:    03/19/2024  Time:    22:27               Narrative:    EXAMINATION:  XR ABDOMEN FLAT AND ERECT    CLINICAL HISTORY:  Unspecified abdominal pain    TECHNIQUE:  Flat and erect AP views of the abdomen were preformed.    COMPARISON:  None    FINDINGS:  There are no calcifications overlying the kidneys.  Bowel gas pattern is non-obstructive.  No evidence for pneumoperitoneum.  Regional osseous structures are unremarkable.                                       Medications   hyoscyamine ODT tablet 0.125 mg (0.125 mg Oral Given 3/19/24 2116)   sodium chloride 0.9% bolus 1,000 mL 1,000 mL (0 mLs Intravenous Stopped 3/19/24 2318)   HYDROmorphone injection 1 mg (1 mg Intravenous Given 3/19/24 2229)   HYDROcodone-acetaminophen  mg per tablet 1 tablet (1 tablet Oral Given 3/20/24 0056)   hyoscyamine ODT tablet 0.125 mg (0.125 mg Oral Given 3/20/24 0056)     Medical Decision Making  The patient endorses a story of diarrhea over the last 4-5 days with abdominal cramping.  Patient's electrolytes are still within normal limits.  Patient's vitals have been stable.  Patient was given hyoscyamine with some mild improvement of his abdominal cramping.  Frequency of diarrhea appears to be slowing down.  C diff was negative.  KUB showed no acute findings.  Patient did have mild-to-moderate increase in bowel gas.  I believe this is primarily where patient's discomfort is coming from.  Patient was discharged home with prescription of Levsin.  Patient was given some pain medication in the ER.  Patient is on pain management and did not receive any prescriptions for narcotics.    Amount and/or Complexity of Data Reviewed  Labs: ordered.  Radiology: ordered.    Risk  Prescription drug management.                                      Clinical Impression:  Final diagnoses:  [R10.9]  Abdominal pain  [R19.7] Diarrhea, unspecified type (Primary)  [R10.84] Generalized abdominal pain - cramping          ED Disposition Condition    Discharge Stable          ED Prescriptions       Medication Sig Dispense Start Date End Date Auth. Provider    hyoscyamine (LEVSIN) 0.125 mg Subl Place 1 tablet (0.125 mg total) under the tongue every 4 (four) hours as needed (abdominal cramping). 20 tablet 3/20/2024 -- Joseph Tran MD          Follow-up Information    None     Follow-up with PCP.  Return to ER as needed.     Joseph Tran MD  03/20/24 0258

## 2024-03-20 NOTE — PROGRESS NOTES
Population Health Chart Review & Patient Outreach Details      Additional [facility] Health Notes:               Updates Requested / Reviewed:      Updated Care Coordination Note, Care Everywhere, , and Immunizations Reconciliation Completed or Queried: [facility] Topics Overdue:      [facility] Score: 2     Eye Exam  AAA Screening    Pneumonia Vaccine  Tetanus Vaccine  Shingles/Zoster Vaccine  RSV Vaccine                  Health Maintenance Topic(s) Outreach Outcomes & Actions Taken:    Medication Adherence / Statins - Outreach Outcomes & Actions Taken  : Primary Care Appt Scheduled

## 2024-03-21 ENCOUNTER — PATIENT OUTREACH (OUTPATIENT)
Dept: EMERGENCY MEDICINE | Facility: HOSPITAL | Age: 69
End: 2024-03-21

## 2024-03-29 DIAGNOSIS — E29.1 HYPOGONADISM IN MALE: ICD-10-CM

## 2024-03-29 NOTE — TELEPHONE ENCOUNTER
No care due was identified.  Health [facility] Embedded Care Due Messages. Reference number: [id number].   3/29/2024 10:28:30 AM CDT

## 2024-04-01 RX ORDER — TESTOSTERONE CYPIONATE 200 MG/ML
INJECTION, SOLUTION INTRAMUSCULAR
Qty: 12 ML | Refills: 0 | Status: SHIPPED | OUTPATIENT
Start: 2024-04-01 | End: 2024-06-10

## 2024-04-12 DIAGNOSIS — E11.9 TYPE 2 DIABETES MELLITUS WITHOUT COMPLICATION, WITH LONG-TERM CURRENT USE OF INSULIN: ICD-10-CM

## 2024-04-12 DIAGNOSIS — Z79.4 TYPE 2 DIABETES MELLITUS WITHOUT COMPLICATION, WITH LONG-TERM CURRENT USE OF INSULIN: ICD-10-CM

## 2024-04-12 NOTE — TELEPHONE ENCOUNTER
Care Due:                  Date            Visit Type   Department     Provider  --------------------------------------------------------------------------------                                MYCHART                              FOLLOWUP/OF  [facility] FAMILY  Last Visit: 12-      FICE VISIT   MEDICINE       Kiley Vasquez                              EP -                              PRIMARY      [facility]  Next Visit: 05-      CARE ([facility])   MEDICINE       Kiley Vasquez                                                            Last  Test          Frequency    Reason                     Performed    Due Date  --------------------------------------------------------------------------------    HBA1C.......  6 months...  glipiZIDE, metFORMIN.....  01- 07-    Health [facility] Embedded Care Due Messages. Reference number: [id number].   4/12/2024 9:22:24 AM CDT

## 2024-04-13 NOTE — TELEPHONE ENCOUNTER
Refill Routing Note   Medication(s) are not appropriate for processing by [facility] for the following reason(s):        ED/Hospital Visit since last OV with provider: 3/19/24 for diarrhea  No active prescription written by provider: script  3/13/24    [facility] action(s):  Defer     Requires labs : Yes - A1C due 7/3/24            Appointments  past 12m or future 3m with PCP    Date Provider   Last Visit   2023 Kiley Vasquez MD   Next Visit   5/15/2024 Kiley Vasquez MD   ED visits in past 90 days: 1        Note composed:10:59 AM 2024

## 2024-04-15 RX ORDER — SEMAGLUTIDE 0.68 MG/ML
0.5 INJECTION, SOLUTION SUBCUTANEOUS
Qty: 9 ML | Refills: 0 | Status: SHIPPED | OUTPATIENT
Start: 2024-04-15 | End: 2024-05-12

## 2024-04-17 ENCOUNTER — PATIENT MESSAGE (OUTPATIENT)
Dept: ADMINISTRATIVE | Facility: HOSPITAL | Age: 69
End: 2024-04-17
Payer: MEDICARE

## 2024-04-17 DIAGNOSIS — E11.9 TYPE 2 DIABETES MELLITUS WITHOUT COMPLICATION: ICD-10-CM

## 2024-04-23 ENCOUNTER — PATIENT MESSAGE (OUTPATIENT)
Dept: FAMILY MEDICINE | Facility: CLINIC | Age: 69
End: 2024-04-23
Payer: MEDICARE

## 2024-04-24 ENCOUNTER — LAB VISIT (OUTPATIENT)
Dept: LAB | Facility: HOSPITAL | Age: 69
End: 2024-04-24
Attending: FAMILY MEDICINE
Payer: MEDICARE

## 2024-04-24 DIAGNOSIS — E11.9 TYPE 2 DIABETES MELLITUS WITHOUT COMPLICATION: ICD-10-CM

## 2024-04-24 LAB
ESTIMATED AVG GLUCOSE: 140 MG/DL (ref 68–131)
HBA1C MFR BLD: 6.5 % (ref 4–5.6)

## 2024-04-24 PROCEDURE — 36415 COLL VENOUS BLD VENIPUNCTURE: CPT | Performed by: FAMILY MEDICINE

## 2024-04-24 PROCEDURE — 83036 HEMOGLOBIN GLYCOSYLATED A1C: CPT | Performed by: FAMILY MEDICINE

## 2024-04-27 DIAGNOSIS — F98.8 ATTENTION DEFICIT DISORDER, UNSPECIFIED HYPERACTIVITY PRESENCE: ICD-10-CM

## 2024-04-27 NOTE — TELEPHONE ENCOUNTER
No care due was identified.  Health [facility] Embedded Care Due Messages. Reference number: [id number].   4/27/2024 10:26:26 AM CDT

## 2024-04-29 RX ORDER — GUANFACINE 2 MG/1
TABLET ORAL
Qty: 90 TABLET | Refills: 0 | Status: SHIPPED | OUTPATIENT
Start: 2024-04-29

## 2024-04-29 NOTE — TELEPHONE ENCOUNTER
Refill Routing Note   Medication(s) are not appropriate for processing by [facility] for the following reason(s):        Outside of protocol    [facility] action(s):  Route               Appointments  past 12m or future 3m with PCP    Date Provider   Last Visit   12/20/2023 Kiley Vasquez MD   Next Visit   5/15/2024 Kiley Vasquez MD   ED visits in past 90 days: 1        Note composed:9:12 AM 04/29/2024

## 2024-05-06 DIAGNOSIS — E11.9 TYPE 2 DIABETES MELLITUS WITHOUT COMPLICATION, WITHOUT LONG-TERM CURRENT USE OF INSULIN: ICD-10-CM

## 2024-05-06 DIAGNOSIS — E78.2 MIXED HYPERLIPIDEMIA: ICD-10-CM

## 2024-05-06 RX ORDER — ATORVASTATIN CALCIUM 10 MG/1
10 TABLET, FILM COATED ORAL NIGHTLY
Qty: 90 TABLET | Refills: 3 | Status: SHIPPED | OUTPATIENT
Start: 2024-05-06 | End: 2025-05-06

## 2024-05-10 DIAGNOSIS — E11.9 TYPE 2 DIABETES MELLITUS WITHOUT COMPLICATION, WITH LONG-TERM CURRENT USE OF INSULIN: ICD-10-CM

## 2024-05-10 DIAGNOSIS — Z79.4 TYPE 2 DIABETES MELLITUS WITHOUT COMPLICATION, WITH LONG-TERM CURRENT USE OF INSULIN: ICD-10-CM

## 2024-05-10 NOTE — TELEPHONE ENCOUNTER
Care Due:                  Date            Visit Type   Department     Provider  --------------------------------------------------------------------------------                                MYCHART                              FOLLOWUP/OF  [facility] FAMILY  Last Visit: 12-      FICE VISIT   MEDICINE       Kiley Vasquez                              EP -                              PRIMARY      [facility]  Next Visit: 05-      CARE ([facility])   MEDICINE       Kiley Vasquez                                                            Last  Test          Frequency    Reason                     Performed    Due Date  --------------------------------------------------------------------------------    Lipid Panel.  12 months..  atorvastatin, fenofibrate  08- 08-    [facility] Embedded Care Due Messages. Reference number: [id number].   5/10/2024 3:46:22 PM CDT

## 2024-05-11 NOTE — TELEPHONE ENCOUNTER
Refill Routing Note   Medication(s) are not appropriate for processing by [facility] for the following reason(s):        New or recently adjusted medication  ED/Hospital Visit since last OV with provider    [facility] action(s):  Defer     Requires labs : Yes      Medication Therapy Plan: New start (4/15/24)      Appointments  past 12m or future 3m with PCP    Date Provider   Last Visit   12/20/2023 Kiley Vasquez MD   Next Visit   5/15/2024 Kiley Vasquez MD   ED visits in past 90 days: 1        Note composed:10:03 PM 05/10/2024

## 2024-05-12 RX ORDER — SEMAGLUTIDE 0.68 MG/ML
INJECTION, SOLUTION SUBCUTANEOUS
Qty: 9 ML | Refills: 0 | Status: SHIPPED | OUTPATIENT
Start: 2024-05-12

## 2024-05-15 ENCOUNTER — OFFICE VISIT (OUTPATIENT)
Dept: FAMILY MEDICINE | Facility: CLINIC | Age: 69
End: 2024-05-15
Payer: MEDICARE

## 2024-05-15 VITALS
BODY MASS INDEX: 32.22 KG/M2 | HEIGHT: 67 IN | OXYGEN SATURATION: 97 % | WEIGHT: 205.31 LBS | HEART RATE: 63 BPM | SYSTOLIC BLOOD PRESSURE: 136 MMHG | DIASTOLIC BLOOD PRESSURE: 72 MMHG | RESPIRATION RATE: 18 BRPM

## 2024-05-15 DIAGNOSIS — J44.9 CHRONIC OBSTRUCTIVE PULMONARY DISEASE, UNSPECIFIED COPD TYPE: ICD-10-CM

## 2024-05-15 DIAGNOSIS — Z12.5 SCREENING PSA (PROSTATE SPECIFIC ANTIGEN): ICD-10-CM

## 2024-05-15 DIAGNOSIS — I50.30 DIASTOLIC HEART FAILURE, UNSPECIFIED HF CHRONICITY: ICD-10-CM

## 2024-05-15 DIAGNOSIS — J44.1 CHRONIC OBSTRUCTIVE PULMONARY DISEASE WITH ACUTE EXACERBATION: Primary | ICD-10-CM

## 2024-05-15 DIAGNOSIS — E11.9 TYPE 2 DIABETES MELLITUS WITHOUT COMPLICATION, WITH LONG-TERM CURRENT USE OF INSULIN: ICD-10-CM

## 2024-05-15 DIAGNOSIS — Z79.4 TYPE 2 DIABETES MELLITUS WITHOUT COMPLICATION, WITH LONG-TERM CURRENT USE OF INSULIN: ICD-10-CM

## 2024-05-15 DIAGNOSIS — N18.31 CHRONIC KIDNEY DISEASE, STAGE 3A: ICD-10-CM

## 2024-05-15 PROCEDURE — 99215 OFFICE O/P EST HI 40 MIN: CPT | Mod: PBBFAC,PN | Performed by: FAMILY MEDICINE

## 2024-05-15 PROCEDURE — 99999 PR PBB SHADOW E&M-EST. PATIENT-LVL V: CPT | Mod: PBBFAC,,, | Performed by: FAMILY MEDICINE

## 2024-05-15 PROCEDURE — 99214 OFFICE O/P EST MOD 30 MIN: CPT | Mod: S$PBB,,, | Performed by: FAMILY MEDICINE

## 2024-05-15 NOTE — PATIENT INSTRUCTIONS
Thank you for allowing me to participate in your care today. It is an honor to be a part of your healthcare team at [facility]. If you had labs ordered today, you will receive notification via Bergt, phone call or mailed letter regarding your results within 7 days. If you have any questions or concerns regarding your visit today, please do not hesitate to contact us.  Sincerely,   Kiley Vasquez M.D.

## 2024-05-15 NOTE — PROGRESS NOTES
Subjective:       Patient ID: Mathew Rodrigues is a 69 y.o. male.    Chief Complaint: Annual Exam (Coughing up mucus, shortness of breath, low endurance )    Mr. Rodrigues presents today for follow up.     He is chronically coughing up phlegm all morning and during the day. He is over it.   He had covid back in 2020.         .  Patient Active Problem List   Diagnosis    CRICKET on CPAP    Essential hypertension, benign    Type 2 diabetes mellitus without complication, with long-term current use of insulin    Mixed hyperlipidemia    Gastroesophageal reflux disease without esophagitis    Benign prostatic hyperplasia without lower urinary tract symptoms    Chronic pain syndrome    Chronic right shoulder pain    Hypogonadism in male    Primary osteoarthritis involving multiple joints    Chronic rhinitis    Insomnia    Chronic kidney disease, stage 3a    Chronic obstructive lung disease    Diastolic heart failure, unspecified HF chronicity     Mathew has a current medication list which includes the following prescription(s): albuterol, amlodipine, apixaban, atorvastatin, bd almas 2nd gen pen needle, buspirone, cyanocobalamin, fenofibrate, glipizide, guanfacine, insulin degludec, lisinopril-hydrochlorothiazide, metformin, metoprolol tartrate, omeprazole, ozempic, sildenafil, tamsulosin, nucynta er, testosterone cypionate, trazodone, UNABLE TO FIND, fluticasone-umeclidin-vilanter, and hyoscyamine.    Review of Systems   Constitutional:  Negative for activity change, appetite change, fatigue and fever.   Respiratory:  Negative for shortness of breath.    Gastrointestinal:  Negative for abdominal pain.   Integumentary:  Negative for rash.         Health Maintenance Due   Topic Date Due    TETANUS VACCINE  Never done    Shingles Vaccine (1 of 2) Never done    RSV Vaccine (Age 60+ and Pregnant patients) (1 - 1-dose 60+ series) Never done    Abdominal Aortic Aneurysm Screening  Never done    Pneumococcal Vaccines (Age 65+) (2 of 2 - PCV)  08/24/2021    COVID-19 Vaccine (5 - 2023-24 season) 09/01/2023    Eye Exam  03/14/2024      Health Maintenance reviewed and discussed- No vaccines desired.   Objective:      Physical Exam  Vitals and nursing note reviewed.   Constitutional:       General: He is not in acute distress.     Appearance: He is not ill-appearing.   Cardiovascular:      Rate and Rhythm: Normal rate and regular rhythm.      Heart sounds: No murmur heard.  Pulmonary:      Effort: Pulmonary effort is normal.      Breath sounds: Normal breath sounds. No wheezing.   Skin:     General: Skin is warm and dry.      Findings: No rash.   Neurological:      Mental Status: He is alert.   Psychiatric:         Mood and Affect: Mood normal.         Behavior: Behavior normal.         Assessment:       1. Chronic obstructive pulmonary disease with acute exacerbation    2. Chronic obstructive pulmonary disease, unspecified COPD type    3. Diastolic heart failure, unspecified HF chronicity    4. Type 2 diabetes mellitus without complication, with long-term current use of insulin    5. Chronic kidney disease, stage 3a        Plan:       1. Chronic obstructive pulmonary disease with acute exacerbation  -     Ambulatory referral/consult to Pulmonology; Future; Expected date: 05/22/2024  -     fluticasone-umeclidin-vilanter (TRELEGY ELLIPTA) 200-62.5-25 mcg inhaler; Inhale 1 puff into the lungs once daily.  Dispense: 180 each; Refill: 1    2. Chronic obstructive pulmonary disease, unspecified COPD type    3. Diastolic heart failure, unspecified HF chronicity  Assessment & Plan:  Has upcoming appt with cardiology.         4. Type 2 diabetes mellitus without complication, with long-term current use of insulin  Assessment & Plan:  Lab Results   Component Value Date    HGBA1C 6.5 (H) 04/24/2024   Very well controlled.       5. Chronic kidney disease, stage 3a  Assessment & Plan:  BMP  Lab Results   Component Value Date     03/19/2024    K 4.1 03/19/2024    CL  105 03/19/2024    CO2 19 (L) 03/19/2024    BUN 16 03/19/2024    CREATININE 1.6 (H) 03/19/2024    CALCIUM 9.0 03/19/2024    ANIONGAP 15 03/19/2024    EGFRNORACEVR 46.4 (A) 03/19/2024    Chronic Stable. Most recent labs show stability.

## 2024-05-15 NOTE — ASSESSMENT & PLAN NOTE
BMP  Lab Results   Component Value Date     03/19/2024    K 4.1 03/19/2024     03/19/2024    CO2 19 (L) 03/19/2024    BUN 16 03/19/2024    CREATININE 1.6 (H) 03/19/2024    CALCIUM 9.0 03/19/2024    ANIONGAP 15 03/19/2024    EGFRNORACEVR 46.4 (A) 03/19/2024    Chronic Stable. Most recent labs show stability.

## 2024-05-16 ENCOUNTER — PATIENT OUTREACH (OUTPATIENT)
Dept: ADMINISTRATIVE | Facility: HOSPITAL | Age: 69
End: 2024-05-16
Payer: MEDICARE

## 2024-05-16 NOTE — PROGRESS NOTES
Population Health Chart Review & Patient Outreach Details      Additional [facility] Health Notes:               Updates Requested / Reviewed:      Care Team Updated         Health Maintenance Topics Overdue:      VB Score: 2     Eye Exam  AAA Screening    Pneumonia Vaccine  Tetanus Vaccine  Shingles/Zoster Vaccine  RSV Vaccine                  Health Maintenance Topic(s) Outreach Outcomes & Actions Taken:    Eye Exam - Outreach Outcomes & Actions Taken  : External Records Requested & Care Team Updated if Applicable

## 2024-05-16 NOTE — LETTER
AUTHORIZATION FOR RELEASE OF   CONFIDENTIAL INFORMATION    Dear Kiel Stephens OD    We are seeing Mathew Rodrigues, date of birth 1955, in the clinic at [facility]. Kiley Vasquez MD is the patient's PCP. Mathew Rodrigues has an outstanding lab/procedure at the time we reviewed his chart. In order to help keep his health information updated, he has authorized us to request the following medical record(s):                                              ( XX )  EYE EXAM                  Please fax records to [facility], [place], Sarah E., MD, [phone]    Thanks in advance, REAL Patel CCC          Patient Name: Mathew Rodrigues  : 1955  Patient Phone #: [phone]   Mathew Rodrigues  MRN: [id number]  : 1955  Age: 68 y.o.  Sex: male         Patient/Legal Guardian Signature  This signature was collected at 2023           _______________________________   Printed Name/Relationship to Patient      Consent for Examination and Treatment: I hereby authorize the providers and employees of [facility] ([facility]) to provide medical treatment/services which includes, but is not limited to, performing and administering tests and diagnostic procedures that are deemed necessary, including, but not limited to, imaging examinations, blood tests and other laboratory procedures as may be required by the hospital, clinic, or may be ordered by my physician(s) or persons working under the general and/or special instructions of my physician(s).      I understand and agree that this consent covers all authorized persons, including but not limited to physicians, residents, nurse practitioners, physicians' assistants, specialists, consultants, student nurses, and independently contracted physicians, who are called upon by the physician in charge, to carry out the diagnostic procedures and medical or surgical treatment.     I hereby authorize [email or web address] to retain or dispose of any specimens or tissue,  should there be such remaining from any test or procedure.     I hereby authorize and give consent for [facility] providers and employees to take photographs, images or videotapes of such diagnostic, surgical or treatment procedures of Patient as may be required by [facility] or as may be ordered by a physician. I further acknowledge and agree that [facility] may use cameras or other devices for patient monitoring.     I am aware that the practice of medicine is not an exact science, and I acknowledge that no guarantees have been made to me as to the outcome of any tests, procedures or treatment.     Authorization for Release of Information: I understand that my insurance company and/or their agents may need information necessary to make determinations about payment/reimbursement. I hereby provide authorization to release to all insurance companies, their successors, assignees, other parties with whom they may have contracted, or others acting on their behalf, that are involved with payment for any hospital and/or clinic charges incurred by the patient, any information that they request and deem necessary for payment/reimbursement, and/or quality review.  I further authorize the release of my health information to physicians or other health care practitioners on staff who are involved in my health care now and in the future, and to other health care providers, entities, or institutions for the purpose of my continued care and treatment, including referrals.     REGISTRATION AUTHORIZATION  Form No. 23303 (Rev. 7/13/2022)       Medicare Patient's Certification and Authorization to Release Information and Payment Request:  I certify that the information given by me in applying for payment under Title XVIII of the Social Security Act is correct. I authorize any toth of medical or other information about me to release to the Social SecurityAdministration, or its intermediaries or carriers, any information needed for this or a  related Medicare claim. I request that payment of authorized benefits be made on my behalf.     Assignment of Insurance Benefits:   I hereby authorize any and all insurance companies, health plans, defined   benefit plans, health insurers or any entity that is or may be responsible for payment of my medical expenses to pay all hospital and medical benefits now due, and to become due and payable to me under any hospital benefits, sick benefits, injury benefits or any other benefit for services rendered to me, including Major Medical Benefits, direct to [facility] and all independently contracted physicians. I assign any and all rights that I may have against any and all insurance companies, health plans, defined benefit plans, health insurers or any entity that is or may be responsible for payment of my medical expenses, including, but not limited to any right to appeal a denial of a claim, any right to bring any action, lawsuit, administrative proceeding, or other cause of action on my behalf. I specifically assign my right to pursue litigation against any and all insurance companies, health plans, defined benefit plans, health insurers or any entity that is or may be responsible for payment of my medical expenses based upon a refusal to pay charges.            E. Valuables: It is understood and agreed that [facility] is not liable for the damage to or loss of any money, jewelry,   documents, dentures, eye glasses, hearing aids, prosthetics, or other property of value.     F. Computer Equipment: I understand and agree that should I choose to use computer equipment owned by [facility] or if I choose to access the Internet via [facility]s network, I do so at my own risk. [facility] is not responsible for any damage to my computer equipment or to any damages of any type that might arise from my loss of equipment or data.     G. Acceptance of Financial Responsibility:  I agree that in consideration of the services and   supplies that  have been   or will be furnished to the patient, I am hereby obligated to pay all charges made for or on the account of the patient according to the standard rates (in effect at the time the services and supplies are delivered) established by [facility], including its Patient Financial Assistance Policy to the extent it is applicable. I understand that I am responsible for all charges, or portions thereof, not covered by insurance or other sources. Patient refunds will be distributed only after balances at all [facility] facilities are paid.     H. Communication Authorization:  I hereby authorize [facility] and its representatives, along with any billing service   or  who may work on their behalf, to contact me on   my cell phone and/or home phone using pre- recorded messages, artificial voice messages, automatic telephone dialing devices or other computer assisted technology, or by electronic      mail, text messaging, or by any other form of electronic communication. This includes, but is not limited to, appointment reminders, yearly physical exam reminders, preventive care reminders, patient campaigns, welcome calls, and calls about account balances on my account or any account on which I am listed as a guarantor. I understand I have the right to opt out of these communications at any time.      Relationship  Between  Facility and  Provider:      I understand that some, but not all, providers furnishing services to the patient are not employees or agents of [facility]. The patient is under the care and supervision of his/her attending physician, and it is the responsibility of the facility and its nursing staff to carry out the instructions of such physicians. It is the responsibility of the patient's physician/designee to obtain the patient's informed consent, when required, for medical or surgical treatment, special diagnostic or therapeutic procedures, or hospital services rendered for the patient under  the special instructions of the physician/designee.     REGISTRATION AUTHORIZATION  Form No. 58847 (Rev. 7/13/2022)      Notice of Privacy Practices: I acknowledge I have received a copy of [facility]'s Notice of Privacy Practices.     Facility  Directory: I have discussed with the organization my desire to be either included or excluded  in the facility directory in the event of my being an inpatient at an [facility] facility. I understand that if my choice is to opt-out of being identified in the facility directory that the facility will not provide any information about me such as my condition (e.g. fair, stable, etc.) or my location in the facility (e.g., room number, department).     Immunizations: [facility] shares immunization information with state sponsored health departments to help you and your doctor keep track of your immunization records. By signing, you consent to have this information shared with the health department in your state:                                [place] - LINKS ([place] Immunization Network for Kids Statewide)                                [place] - MIIX ([place] Immunization Information eXchange)                                [place] - ImmPRINT (Immunization Patient Registry with Integrated Technology)     TERM: This authorization is valid for this and subsequent care/treatment I receive at [facility] and will remain valid unless/until revoked in writing by me.     [facility]: As used in this document, [facility] means all [facility] owned and managed facilities, including, but not limited to, all health centers, surgery centers, clinics, urgent care centers, and hospitals.         [facility] complies with applicable Federal civil rights laws and does not discriminate on the basis of race, color,

## 2024-05-16 NOTE — PROGRESS NOTES
Population Health Chart Review & Patient Outreach Details      Additional [facility] Notes:               Updates Requested / Reviewed:      Updated Care Coordination Note, Care Everywhere, , External Sources: DIS, Care Team Updated, and Immunizations Reconciliation Completed or Queried: [facility] Topics Overdue:      [facility] Score: 2     Eye Exam  AAA Screening    Pneumonia Vaccine  Tetanus Vaccine  Shingles/Zoster Vaccine  RSV Vaccine                  Health Maintenance Topic(s) Outreach Outcomes & Actions Taken:    Eye Exam - Outreach Outcomes & Actions Taken  : External Records Requested & Care Team Updated if Applicable    AAA Screening - Outreach Outcomes & Actions Taken  : External Records Requested & Care Team Updated if Applicable

## 2024-05-16 NOTE — LETTER
"       AUTHORIZATION FOR RELEASE OF   CONFIDENTIAL INFORMATION    Dear [facility],    We are seeing Mathew Rodrigues, date of birth 1955, in the clinic at [facility]. Kiley Vasquez MD is the patient's PCP. Mathew Rodrigues has an outstanding lab/procedure at the time we reviewed his chart. In order to help keep his health information updated, he has authorized us to request the following medical record(s):                                                 ( X )  EYE EXAM                    Please fax records to [facility], [place], Sarah E., MD, [phone].    Thank you  Karen Mari LPN  Clinical Care Coordinator  [facility]           Patient Name: Mathew Rodrigues  : 1955  Patient Phone #: [phone]              A. Consent for Examination and Treatment: I hereby authorize the providers and employees of [facility] ("[facility] to provide medical treatment/services which includes, but is not limited to, performing and administering tests and diagnostic procedures that are deemed necessary, Including, but not limited to, imaging examinations, blood tests and other laboratory procedures as may be required by the hospital, clinic, or may be ordered by my physician(s) or persons working under the general and/or special instructions of my physician(s).                   1.      I understand and agree that this consent covers all authorized persons, including but not limited to physicians, residents, nurse practitioners, physicians' assistants, specialists, consultants, student nurses, and independently contracted physicians, who are called upon by the physician in charge, to carry out the diagnostic procedures and medical or surgical treatment.  2.      I hereby authorize [facility] to retain or dispose of any specimens or tissue, should there be such remaining from any test or procedure.  3.      I hereby authorize and give consent for [facility] providers and " employees to take photographs, images or videotapes of such diagnostic, surgical or treatment procedures of Patient as may be required by [facility] or as may be ordered by a physician.  I further acknowledge and agree that [facility] may use cameras or other devices for patient monitoring.  4.      I am aware that the practice of medicine is not an exact science, and I acknowledge that no guarantees have been made to me as to the outcome of any tests, procedures or treatment.                   B. Authorization for Release of Information:  I understand that my insurance company and/or their agents may need information necessary to make determinations about payment/reimbursement.  I hereby provide authorization to release to all insurance companies, their successors, assignees, other parties with whom they may have contracted, or others acting on their behalf, that are involved with payment for any hospital and/or clinic charges incurred by the patient, any information that they request and deem necessary for payment/reimbursement, and/or quality review.  I further authorize the release of my health information to physicians or other health care practitioners on staff who are involved in my health care now and in the future, and to other health care providers, entities, or institutions for the purpose of my continued care and treatment, including referrals.     C. Medicare Patient's Certification and Authorization to Release Information and Payment Request:  I certify that the information given by me in applying for payment under Title XVIII of the Social Security Act is correct.  I authorize any toth of medical or other information about me to release to the Social Security Administration, or its intermediaries or carriers, any information needed for this or a related Medicare claim.  I request that payment of authorized benefits be made on my behalf.     D. Assignment of Insurance Benefits:  I hereby authorize any and  all insurance companies, health plans, defined benefit plans, health insurers or any entity that is or may be responsible for payment of my medical expenses to pay all hospital and medical benefits now due, and to become due and payable to me under any hospital benefits, sick benefits, injury benefits or any other benefit for services rendered to me, including Major Medical Benefits, direct to [facility] and all independently contracted physicians.  I assign any and all rights that I may have against any and all insurance companies, health plans, defined benefit plans, health insurers or any entity that is or may be responsible for payment of my medical expenses, including, but not limited to any right to appeal a denial of a claim, any right to bring any action, lawsuit, administrative proceeding, or other cause of action on my behalf.  I specifically assign my right to pursue litigation against any and all insurance companies, health plans, defined benefit plans, health insurers or any entity that is or may be responsible for payment of medical expenses based upon a refusal to pay charges.              REGISTRATION  AUTHORIZATION                    E. Valuables:  It is understood and agreed that [facility] is not liable for the damage to or loss of any money, jewelry, documents, dentures, eye glasses, hearing aids, prosthetics, or other property of value.     F. Computer Equipment:  I understand and agree that should I choose to use computer equipment owned by [facility] or if I choose to access the Internet via [facility]'s network, I do so at my own risk.  [facility] is not responsible for any damage to my computer equipment or to any damages of any type that might arise from my loss of equipment or data.                   G. Acceptance of Financial Responsibility:  I agree that in consideration of the services and supplies that have been or will be furnished to the patient,  I am hereby obligated to pay all charges made for  or on the account of the patient according to the standard rates (in effect at the time the services and supplies are delivered) established by [facility], including its Patient Financial Assistance Policy to the extent it is applicable.  I understand that I am responsible for all charges, or portions thereof, not covered by insurance or other sources.  Patient refunds will be distributed only after balances at all [facility] facilities are paid.                   H. Communication Authorization:  I hereby authorize [facility] and its representatives, along with any billing service or  who may work on their behalf, to contact me on my cell phone and/or home phone using prerecorded messages, artificial voice messages, automatic telephone dialing devices or other computer assisted technology, or by electronic mail, text messaging, or by any other form of electronic communication.  This includes, but is not limited to appointment reminders, yearly physical exam reminders, preventive care reminders, patient campaigns, welcome calls, and calls about account balances on my account or any account on which I am listed as a guarantor.  I understand I have the right to opt out of these communications at any time.     I.  Relationship Between Facility and Physician:  I understand that some, but not all, providers furnishing services to the patient are not employees or agents of [facility].  The patient is under the care and supervision of his/her attending physician, and it is the responsibility of the facility and its nursing staff to carry out the instructions of such physicians.  It is the responsibility of the patient's physician/designee to obtain the patient's informed consent, when required, for medical or surgical treatment, special diagnostic or therapeutic procedures, or hospital services rendered for the patient under the special instructions of the physician/designee.     J. Notice of Private Practices:  I  acknowledge I have received a copy of [facility]'s Notice of Privacy Practices.     K. Facility Directory:  I have discussed with the organization my desire to be either included or excluded in the facility directory.  I understand that if my choice is to opt-out of being identified in the facility directory that the facility will not provide any information about me such as my condition (e.g. Fair, stable, etc.) or my location in the facility (e.g. Room number, department).     L. LINKS:  For [place] Residents:  [facility] is a LINKS ([place] Immunization [facility]) participating facility.  LINKS is a [facility]-sponsored confidential computer system that helps you and your doctor keep track of you and your child's immunization history.  I acknowledge that I am allowing [facility] to share this information with [facility].  For [place] Residents:  [facility] is a MIIX ([place] Immunization Information eXchange) participant.  MIeshtery is a [place] Department of Health-sponsored confidential computer system that helps you and your doctor keep track of you and your child's immunization history.  I acknowledge that I am allowing [facility] to share information with [employer].     M. Term:  This authorization is valid for this and subsequent care/treatment I receive at [facility] and will remain valid unless/until revoked in writing by me.      ACKNOWLEDGMENT OF POTENTIAL RISKS OF COVID-19 VACCINE  It is important that you, as the patient or patients legally authorized representative(s), understand and acknowledge the following, with regard to administration of the COVID-19 vaccine offered by [facility]:  The SARS-CoV-2 virus (COVID-19) has caused an unprecedented modern global pandemic that has mobilized scientists and drug manufacturers to work to create safe and effective vaccines to get the crisis under control.  No vaccine is released in the [place] without undergoing rigorous, multi-layered testing and  approval by the Food and Drug Administration.  During a public health emergency, however, vaccines can be released for patient administration by the FDA prior to completion of multi-phase clinical trials and approval. This is done by the FDAs granting of Emergency Use Authorization (EUA) when the vaccine meets reasonable thresholds for safety and effectiveness and people are in urgent need of care. Under an EUA, the FDA has found that known and potential benefits outweigh its known and potential risks.  The vaccine for which you are presenting to [facility] has been released under an EUA, which [facility] is honoring in its distribution of the vaccine to the public. While the FDAs authorization indicates its belief that usage is recommended over possible risks, there is still the possibility that unknown risks of the vaccine could exist.  By signing this document, you acknowledge and assume these risks. Further, you waive any and all claims of liability against and hold harmless any [facility] entity or provider for any harm caused to you by said possible unknown risks of the vaccine.        N. [facility]:  As used in this document, [facility] means all [facility] affiliated entities including all health centers, surgery centers, clinics, and hospitals.  It includes more specifically, the following entities: [facility], a not for profit [place] [employer], and its subsidiaries and affiliates, including [facility], [facility], [facility], L[facility]iden[facility]., [facility], [facility], [facility], L.LKaidenC., [facility]ANC[facility].d/b/a [facility], L.LKaidenC.d/b/a [facility] [employer], L.L.C. As manager of Daniel ADAM [facility], [facility], L.LFRANCISCO [facility]  [employer], L.L.C.d/b/a [facility], [facility], L[facility] and [facility] as manager of [facility].                                                                Patient/Legal Guardian Signature                                                    This signature was collected at 03/19/2024      JAMIEIRENE/Self                                                                            Printed Name/Relationship to Patient

## 2024-05-16 NOTE — LETTER
"       AUTHORIZATION FOR RELEASE OF   CONFIDENTIAL INFORMATION    Dear [facility] Medical Records,    We are seeing Mathew Rodrigues, date of birth 1955, in the clinic at [facility]. Kiley Vasquez MD is the patient's PCP. Mathew Rodrigues has an outstanding lab/procedure at the time we reviewed his chart. In order to help keep his health information updated, he has authorized us to request the following medical record(s):                                              ( X ) AAA Abdominal US         Please fax records to [facility], [place], Sarah E., MD, [phone].     Thank you  Karen Mari LPN  Clinical Care Coordinator  [facility]           Patient Name: Mathew Rodrigues  : 1955  Patient Phone #: [phone]              A. Consent for Examination and Treatment: I hereby authorize the providers and employees of [facility] to provide medical treatment/services which includes, but is not limited to, performing and administering tests and diagnostic procedures that are deemed necessary, Including, but not limited to, imaging examinations, blood tests and other laboratory procedures as may be required by the hospital, clinic, or may be ordered by my physician(s) or persons working under the general and/or special instructions of my physician(s).                   1.      I understand and agree that this consent covers all authorized persons, including but not limited to physicians, residents, nurse practitioners, physicians' assistants, specialists, consultants, student nurses, and independently contracted physicians, who are called upon by the physician in charge, to carry out the diagnostic procedures and medical or surgical treatment.  2.      I hereby authorize [facility] to retain or dispose of any specimens or tissue, should there be such remaining from any test or procedure.  3.      I hereby authorize and give consent for [facility] providers and employees " to take photographs, images or videotapes of such diagnostic, surgical or treatment procedures of Patient as may be required by [facility] or as may be ordered by a physician.  I further acknowledge and agree that [facility] may use cameras or other devices for patient monitoring.  4.      I am aware that the practice of medicine is not an exact science, and I acknowledge that no guarantees have been made to me as to the outcome of any tests, procedures or treatment.                   B. Authorization for Release of Information:  I understand that my insurance company and/or their agents may need information necessary to make determinations about payment/reimbursement.  I hereby provide authorization to release to all insurance companies, their successors, assignees, other parties with whom they may have contracted, or others acting on their behalf, that are involved with payment for any hospital and/or clinic charges incurred by the patient, any information that they request and deem necessary for payment/reimbursement, and/or quality review.  I further authorize the release of my health information to physicians or other health care practitioners on staff who are involved in my health care now and in the future, and to other health care providers, entities, or institutions for the purpose of my continued care and treatment, including referrals.     C. Medicare Patient's Certification and Authorization to Release Information and Payment Request:  I certify that the information given by me in applying for payment under Title XVIII of the Social Security Act is correct.  I authorize any toth of medical or other information about me to release to the Social Security Administration, or its intermediaries or carriers, any information needed for this or a related Medicare claim.  I request that payment of authorized benefits be made on my behalf.     D. Assignment of Insurance Benefits:  I hereby authorize any and all  insurance companies, health plans, defined benefit plans, health insurers or any entity that is or may be responsible for payment of my medical expenses to pay all hospital and medical benefits now due, and to become due and payable to me under any hospital benefits, sick benefits, injury benefits or any other benefit for services rendered to me, including Major Medical Benefits, direct to [facility] and all independently contracted physicians.  I assign any and all rights that I may have against any and all insurance companies, health plans, defined benefit plans, health insurers or any entity that is or may be responsible for payment of my medical expenses, including, but not limited to any right to appeal a denial of a claim, any right to bring any action, lawsuit, administrative proceeding, or other cause of action on my behalf.  I specifically assign my right to pursue litigation against any and all insurance companies, health plans, defined benefit plans, health insurers or any entity that is or may be responsible for payment of medical expenses based upon a refusal to pay charges.              REGISTRATION  AUTHORIZATION                    E. Valuables:  It is understood and agreed that [facility] is not liable for the damage to or loss of any money, jewelry, documents, dentures, eye glasses, hearing aids, prosthetics, or other property of value.     F. Computer Equipment:  I understand and agree that should I choose to use computer equipment owned by [facility] or if I choose to access the Internet via [facility]'s network, I do so at my own risk.  [facility] is not responsible for any damage to my computer equipment or to any damages of any type that might arise from my loss of equipment or data.                   G. Acceptance of Financial Responsibility:  I agree that in consideration of the services and supplies that have been or will be furnished to the patient,  I am hereby obligated to pay all charges made for or on  the account of the patient according to the standard rates (in effect at the time the services and supplies are delivered) established by [facility], including its Patient Financial Assistance Policy to the extent it is applicable.  I understand that I am responsible for all charges, or portions thereof, not covered by insurance or other sources.  Patient refunds will be distributed only after balances at all [facility] facilities are paid.                   H. Communication Authorization:  I hereby authorize [facility] and its representatives, along with any billing service or  who may work on their behalf, to contact me on my cell phone and/or home phone using prerecorded messages, artificial voice messages, automatic telephone dialing devices or other computer assisted technology, or by electronic mail, text messaging, or by any other form of electronic communication.  This includes, but is not limited to appointment reminders, yearly physical exam reminders, preventive care reminders, patient campaigns, welcome calls, and calls about account balances on my account or any account on which I am listed as a guarantor.  I understand I have the right to opt out of these communications at any time.     I.  Relationship Between Facility and Physician:  I understand that some, but not all, providers furnishing services to the patient are not employees or agents of [facility].  The patient is under the care and supervision of his/her attending physician, and it is the responsibility of the facility and its nursing staff to carry out the instructions of such physicians.  It is the responsibility of the patient's physician/designee to obtain the patient's informed consent, when required, for medical or surgical treatment, special diagnostic or therapeutic procedures, or hospital services rendered for the patient under the special instructions of the physician/designee.     J. Notice of Private Practices:  I acknowledge  I have received a copy of [facility]'s Notice of Privacy Practices.     K. Facility Directory:  I have discussed with the organization my desire to be either included or excluded in the facility directory.  I understand that if my choice is to opt-out of being identified in the facility directory that the facility will not provide any information about me such as my condition (e.g. Fair, stable, etc.) or my location in the facility (e.g. Room number, department).     L. LINKS:  For [place] Residents:  [facility] is a LINKS ([place] [facility]) participating facility.  LINKS is a [facility]-sponsored confidential computer system that helps you and your doctor keep track of you and your child's immunization history.  I acknowledge that I am allowing [facility] to share this information with [facility].  For [place] Residents:  [facility] is a MIIX ([place] Immunization Information eXchange) participant.  [email or web address].ng is a [place] Department of Health-sponsored confidential computer system that helps you and your doctor keep track of you and your child's immunization history.  I acknowledge that I am allowing [facility] to share information with [facility].     M. Term:  This authorization is valid for this and subsequent care/treatment I receive at [facility] and will remain valid unless/until revoked in writing by me.      ACKNOWLEDGMENT OF POTENTIAL RISKS OF COVID-19 VACCINE  It is important that you, as the patient or patients legally authorized representative(s), understand and acknowledge the following, with regard to administration of the COVID-19 vaccine offered by [facility]:  The SARS-CoV-2 virus (COVID-19) has caused an unprecedented modern global pandemic that has mobilized scientists and drug manufacturers to work to create safe and effective vaccines to get the crisis under control.  No vaccine is released in the [place] without undergoing rigorous, multi-layered testing and approval by the  Food and Drug Administration.  During a public health emergency, however, vaccines can be released for patient administration by the FDA prior to completion of multi-phase clinical trials and approval. This is done by the FDAs granting of Emergency Use Authorization (EUA) when the vaccine meets reasonable thresholds for safety and effectiveness and people are in urgent need of care. Under an EUA, the FDA has found that known and potential benefits outweigh its known and potential risks.  The vaccine for which you are presenting to [facility] has been released under an EUA, which [facility] is honoring in its distribution of the vaccine to the public. While the FDAs authorization indicates its belief that usage is recommended over possible risks, there is still the possibility that unknown risks of the vaccine could exist.  By signing this document, you acknowledge and assume these risks. Further, you waive any and all claims of liability against and hold harmless any [facility] entity or provider for any harm caused to you by said possible unknown risks of the vaccine.        N. [facility]:  As used in this document, [facility] means all [facility] affiliated entities including all health centers, surgery centers, clinics, and hospitals.  It includes more specifically, the following entities: [facility], a not for profit [facility], and its subsidiaries and affiliates, including [facility], [facility], [facility], [facility]., [facility], [facility].d/b/a [facility] As manager of [facility], [facility], L.L.C.d/b/a [facility], [facility] and [facility] as manager of [facility].                                                                Patient/Legal Guardian Signature                                                    This signature was collected at 03/19/2024      JAMIEIRENE/Self                                                                            Printed Name/Relationship to Patient

## 2024-05-22 ENCOUNTER — PATIENT OUTREACH (OUTPATIENT)
Dept: ADMINISTRATIVE | Facility: HOSPITAL | Age: 69
End: 2024-05-22
Payer: MEDICARE

## 2024-05-22 NOTE — LETTER
AUTHORIZATION FOR RELEASE OF   CONFIDENTIAL INFORMATION    Dear Dr.Phillip Stephens,    We are seeing Mathew Rodrigues, date of birth 1955, in the clinic at [facility]. Kiley Vasquez MD is the patient's PCP. Mathew Rodrigues has an outstanding lab/procedure at the time we reviewed his chart. In order to help keep his health information updated, he has authorized us to request the following medical record(s):        (  )  MAMMOGRAM                                      (  )  COLONOSCOPY      (  )  PAP SMEAR                                          (  )  OUTSIDE LAB RESULTS     (  )  DEXA SCAN                                          (X )  EYE EXAM            (  )  FOOT EXAM                                          (  )  ENTIRE RECORD     (  )  OUTSIDE IMMUNIZATIONS                 (  )  _______________         Please fax records to [facility], [place], Sarah E., MD, [phone]    Thanks in advance,REAL Patel CCC          Patient Name: Mathew Rodrigues  : 1955  Patient Phone #: [phone]   Mathew Rodrigues  MRN: [id number]  : 1955  Age: 68 y.o.  Sex: male         Patient/Legal Guardian Signature  This signature was collected at 2023           _______________________________   Printed Name/Relationship to Patient      Consent for Examination and Treatment: I hereby authorize the providers and employees of [facility] ([facility]) to provide medical treatment/services which includes, but is not limited to, performing and administering tests and diagnostic procedures that are deemed necessary, including, but not limited to, imaging examinations, blood tests and other laboratory procedures as may be required by the hospital, clinic, or may be ordered by my physician(s) or persons working under the general and/or special instructions of my physician(s).      I understand and agree that this consent covers all authorized persons, including but not limited to physicians, residents,  nurse practitioners, physicians' assistants, specialists, consultants, student nurses, and independently contracted physicians, who are called upon by the physician in charge, to carry out the diagnostic procedures and medical or surgical treatment.     I hereby authorize [facility] to retain or dispose of any specimens or tissue, should there be such remaining from any test or procedure.     I hereby authorize and give consent for [facility] providers and employees to take photographs, images or videotapes of such diagnostic, surgical or treatment procedures of Patient as may be required by [facility] or as may be ordered by a physician. I further acknowledge and agree that [facility] may use cameras or other devices for patient monitoring.     I am aware that the practice of medicine is not an exact science, and I acknowledge that no guarantees have been made to me as to the outcome of any tests, procedures or treatment.     Authorization for Release of Information: I understand that my insurance company and/or their agents may need information necessary to make determinations about payment/reimbursement. I hereby provide authorization to release to all insurance companies, their successors, assignees, other parties with whom they may have contracted, or others acting on their behalf, that are involved with payment for any hospital and/or clinic charges incurred by the patient, any information that they request and deem necessary for payment/reimbursement, and/or quality review.  I further authorize the release of my health information to physicians or other health care practitioners on staff who are involved in my health care now and in the future, and to other health care providers, entities, or institutions for the purpose of my continued care and treatment, including referrals.     REGISTRATION AUTHORIZATION  Form No. 35538 (Rev. 7/13/2022)       Medicare Patient's Certification and Authorization to Release Information  and Payment Request:  I certify that the information given by me in applying for payment under Title XVIII of the Social Security Act is correct. I authorize any toth of medical or other information about me to release to the Social SecurityAdministration, or its intermediaries or carriers, any information needed for this or a related Medicare claim. I request that payment of authorized benefits be made on my behalf.     Assignment of Insurance Benefits:   I hereby authorize any and all insurance companies, health plans, defined   benefit plans, health insurers or any entity that is or may be responsible for payment of my medical expenses to pay all hospital and medical benefits now due, and to become due and payable to me under any hospital benefits, sick benefits, injury benefits or any other benefit for services rendered to me, including Major Medical Benefits, direct to [facility] and all independently contracted physicians. I assign any and all rights that I may have against any and all insurance companies, health plans, defined benefit plans, health insurers or any entity that is or may be responsible for payment of my medical expenses, including, but not limited to any right to appeal a denial of a claim, any right to bring any action, lawsuit, administrative proceeding, or other cause of action on my behalf. I specifically assign my right to pursue litigation against any and all insurance companies, health plans, defined benefit plans, health insurers or any entity that is or may be responsible for payment of my medical expenses based upon a refusal to pay charges.            E. Valuables: It is understood and agreed that [facility] is not liable for the damage to or loss of any money, jewelry,   documents, dentures, eye glasses, hearing aids, prosthetics, or other property of value.     F. Computer Equipment: I understand and agree that should I choose to use computer equipment owned by [facility] or if I choose  to access the Internet via [facility]s network, I do so at my own risk. [facility] is not responsible for any damage to my computer equipment or to any damages of any type that might arise from my loss of equipment or data.     G. Acceptance of Financial Responsibility:  I agree that in consideration of the services and   supplies that have been   or will be furnished to the patient, I am hereby obligated to pay all charges made for or on the account of the patient according to the standard rates (in effect at the time the services and supplies are delivered) established by [facility], including its Patient Financial Assistance Policy to the extent it is applicable. I understand that I am responsible for all charges, or portions thereof, not covered by insurance or other sources. Patient refunds will be distributed only after balances at all [facility] facilities are paid.     H. Communication Authorization:  I hereby authorize [facility] and its representatives, along with any billing service   or  who may work on their behalf, to contact me on   my cell phone and/or home phone using pre- recorded messages, artificial voice messages, automatic telephone dialing devices or other computer assisted technology, or by electronic      mail, text messaging, or by any other form of electronic communication. This includes, but is not limited to, appointment reminders, yearly physical exam reminders, preventive care reminders, patient campaigns, welcome calls, and calls about account balances on my account or any account on which I am listed as a guarantor. I understand I have the right to opt out of these communications at any time.      Relationship  Between  Facility and  Provider:      I understand that some, but not all, providers furnishing services to the patient are not employees or agents of [facility]. The patient is under the care and supervision of his/her attending physician, and it is the responsibility of the  facility and its nursing staff to carry out the instructions of such physicians. It is the responsibility of the patient's physician/designee to obtain the patient's informed consent, when required, for medical or surgical treatment, special diagnostic or therapeutic procedures, or hospital services rendered for the patient under the special instructions of the physician/designee.     REGISTRATION AUTHORIZATION  Form No. 83806 (Rev. 7/13/2022)      Notice of Privacy Practices: I acknowledge I have received a copy of [facility]'s Notice of Privacy Practices.     Facility  Directory: I have discussed with the organization my desire to be either included or excluded  in the facility directory in the event of my being an inpatient at an [facility] facility. I understand that if my choice is to opt-out of being identified in the facility directory that the facility will not provide any information about me such as my condition (e.g. fair, stable, etc.) or my location in the facility (e.g., room number, department).     Immunizations: [facility] shares immunization information with state sponsored health departments to help you and your doctor keep track of your immunization records. By signing, you consent to have this information shared with the health department in your state:                                [place] - LINKS ([place] Immunization Network for Kids Statewide)                                [place] - MIIX ([place] Immunization Information eXchange)                                [place] - ImmPRINT (Immunization Patient Registry with Integrated Technology)     TERM: This authorization is valid for this and subsequent care/treatment I receive at [facility] and will remain valid unless/until revoked in writing by me.     [facility]: As used in this document, [facility] means all [facility] owned and managed facilities, including, but not limited to, all health centers, surgery centers, clinics,  urgent care centers, and hospitals.         [facility] complies with applicable Federal civil rights laws and does not discriminate on the basis of race, color, national origin, age, disability, or sex.  ATENCIÓN: si habla abdulkadir, tiene a dolan disposición servicios gratuitos de asistencia lingüística. Llfrida al [phone].  CHÚ Ý: N?u b?n nói Ti?ng Vi?t, có các d?ch v? h? tr? ngôn ng? mi?n phí dành cho b?n. G?i s? [phone].        REGISTRATION AUTHORIZATION  Form No. 96248 (Rev. 7/13/2022)

## 2024-05-22 NOTE — PROGRESS NOTES
Population Health Chart Review & Patient Outreach Details      Additional [facility] Health Notes:               Updates Requested / Reviewed:      Other    2nd request for eye exam          Health Maintenance Topics Overdue:      [facility] Score: 2     Eye Exam  AAA Screening    Pneumonia Vaccine  Tetanus Vaccine  Shingles/Zoster Vaccine  RSV Vaccine                  Health Maintenance Topic(s) Outreach Outcomes & Actions Taken:    Eye Exam - Outreach Outcomes & Actions Taken  : External Records Requested & Care Team Updated if Applicable

## 2024-05-23 ENCOUNTER — PATIENT MESSAGE (OUTPATIENT)
Dept: FAMILY MEDICINE | Facility: CLINIC | Age: 69
End: 2024-05-23
Payer: MEDICARE

## 2024-05-23 DIAGNOSIS — J44.9 CHRONIC OBSTRUCTIVE PULMONARY DISEASE, UNSPECIFIED COPD TYPE: ICD-10-CM

## 2024-05-23 NOTE — TELEPHONE ENCOUNTER
No care due was identified.  [facility] Embedded Care Due Messages. Reference number: [id number].   5/23/2024 1:57:59 PM CDT

## 2024-05-23 NOTE — TELEPHONE ENCOUNTER
Refill Routing Note   Medication(s) are not appropriate for processing by [facility] for the following reason(s):        No active prescription written by provider    [facility] action(s):  Defer        Medication Therapy Plan: Last ordered: 1 year ago (5/1/2023) by Jennyfer Osei MD      Appointments  past 12m or future 3m with PCP    Date Provider   Last Visit   5/15/2024 Kiley Vasquez MD   Next Visit   8/16/2024 Kiley Vasquez MD   ED visits in past 90 days: 1        Note composed:5:34 PM 05/23/2024

## 2024-05-24 RX ORDER — ALBUTEROL SULFATE 90 UG/1
AEROSOL, METERED RESPIRATORY (INHALATION)
Qty: 18 G | Refills: 11 | Status: SHIPPED | OUTPATIENT
Start: 2024-05-24

## 2024-06-01 ENCOUNTER — HOSPITAL ENCOUNTER (EMERGENCY)
Facility: HOSPITAL | Age: 69
Discharge: HOME | End: 2024-06-01
Attending: EMERGENCY MEDICINE
Payer: MEDICARE

## 2024-06-01 VITALS
SYSTOLIC BLOOD PRESSURE: 148 MMHG | DIASTOLIC BLOOD PRESSURE: 75 MMHG | WEIGHT: 195 LBS | TEMPERATURE: 98 F | RESPIRATION RATE: 22 BRPM | HEIGHT: 68 IN | BODY MASS INDEX: 29.55 KG/M2 | HEART RATE: 84 BPM | OXYGEN SATURATION: 96 %

## 2024-06-01 DIAGNOSIS — M53.3 PAIN OF LEFT SACROILIAC JOINT: ICD-10-CM
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
## 2024-06-01 LAB
ABO + RH BLD: NORMAL
ALBUMIN SERPL BCP-MCNC: 3.7 G/DL (ref 3.5–5.2)
ALLENS TEST: ABNORMAL
ALP SERPL-CCNC: 62 U/L (ref 55–135)
ALT SERPL W/O P-5'-P-CCNC: 17 U/L (ref 10–44)
AMPHET+METHAMPHET UR QL: NEGATIVE
ANION GAP SERPL CALC-SCNC: 13 MMOL/L (ref 8–16)
APTT PPP: 27.6 SEC (ref 21–32)
AST SERPL-CCNC: 16 U/L (ref 10–40)
BARBITURATES UR QL SCN>200 NG/ML: NEGATIVE
BASOPHILS # BLD AUTO: 0.07 K/UL (ref 0–0.2)
BASOPHILS NFR BLD: 0.6 % (ref 0–1.9)
BENZODIAZ UR QL SCN>200 NG/ML: NEGATIVE
BILIRUB SERPL-MCNC: 0.4 MG/DL (ref 0.1–1)
BILIRUB UR QL STRIP: NEGATIVE
BLD GP AB SCN CELLS X3 SERPL QL: NORMAL
BUN SERPL-MCNC: 23 MG/DL (ref 8–23)
BZE UR QL SCN: NEGATIVE
CALCIUM SERPL-MCNC: 9.6 MG/DL (ref 8.7–10.5)
CANNABINOIDS UR QL SCN: NEGATIVE
CHLORIDE SERPL-SCNC: 104 MMOL/L (ref 95–110)
CLARITY UR: CLEAR
CO2 SERPL-SCNC: 20 MMOL/L (ref 23–29)
COLOR UR: YELLOW
CREAT SERPL-MCNC: 1.6 MG/DL (ref 0.5–1.4)
CREAT SERPL-MCNC: 1.8 MG/DL (ref 0.5–1.4)
CREAT UR-MCNC: 62.4 MG/DL (ref 23–375)
DIFFERENTIAL METHOD BLD: ABNORMAL
EOSINOPHIL # BLD AUTO: 1.4 K/UL (ref 0–0.5)
EOSINOPHIL NFR BLD: 12.6 % (ref 0–8)
ERYTHROCYTE [DISTWIDTH] IN BLOOD BY AUTOMATED COUNT: 16 % (ref 11.5–14.5)
EST. GFR  (NO RACE VARIABLE): 46.4 ML/MIN/1.73 M^2
ETHANOL SERPL-MCNC: <10 MG/DL (ref 0–10)
GLUCOSE SERPL-MCNC: 147 MG/DL (ref 70–110)
GLUCOSE UR QL STRIP: NEGATIVE
HCT VFR BLD AUTO: 45.4 % (ref 40–54)
HGB BLD-MCNC: 14.1 G/DL (ref 14–18)
HGB UR QL STRIP: NEGATIVE
IMM GRANULOCYTES # BLD AUTO: 0.16 K/UL (ref 0–0.04)
IMM GRANULOCYTES NFR BLD AUTO: 1.5 % (ref 0–0.5)
INR PPP: 1.2 (ref 0.8–1.2)
KETONES UR QL STRIP: NEGATIVE
LACTATE SERPL-SCNC: 2.5 MMOL/L (ref 0.5–2.2)
LEUKOCYTE ESTERASE UR QL STRIP: NEGATIVE
LYMPHOCYTES # BLD AUTO: 2.4 K/UL (ref 1–4.8)
LYMPHOCYTES NFR BLD: 21.8 % (ref 18–48)
MCH RBC QN AUTO: 25.8 PG (ref 27–31)
MCHC RBC AUTO-ENTMCNC: 31.1 G/DL (ref 32–36)
MCV RBC AUTO: 83 FL (ref 82–98)
METHADONE UR QL SCN>300 NG/ML: ABNORMAL
MONOCYTES # BLD AUTO: 0.7 K/UL (ref 0.3–1)
MONOCYTES NFR BLD: 6.2 % (ref 4–15)
NEUTROPHILS # BLD AUTO: 6.3 K/UL (ref 1.8–7.7)
NEUTROPHILS NFR BLD: 57.3 % (ref 38–73)
NITRITE UR QL STRIP: NEGATIVE
NRBC BLD-RTO: 0 /100 WBC
OPIATES UR QL SCN: NEGATIVE
PCP UR QL SCN>25 NG/ML: NEGATIVE
PH UR STRIP: 7 [PH] (ref 5–8)
PLATELET # BLD AUTO: 280 K/UL (ref 150–450)
PMV BLD AUTO: 9.7 FL (ref 9.2–12.9)
POTASSIUM SERPL-SCNC: 4.2 MMOL/L (ref 3.5–5.1)
PROT SERPL-MCNC: 7.3 G/DL (ref 6–8.4)
PROT UR QL STRIP: NEGATIVE
PROTHROMBIN TIME: 12.4 SEC (ref 9–12.5)
RBC # BLD AUTO: 5.46 M/UL (ref 4.6–6.2)
SAMPLE: ABNORMAL
SITE: ABNORMAL
SODIUM SERPL-SCNC: 137 MMOL/L (ref 136–145)
SP GR UR STRIP: 1.01 (ref 1–1.03)
SPECIMEN OUTDATE: NORMAL
TOXICOLOGY INFORMATION: ABNORMAL
URN SPEC COLLECT METH UR: NORMAL
UROBILINOGEN UR STRIP-ACNC: NEGATIVE EU/DL
WBC # BLD AUTO: 10.94 K/UL (ref 3.9–12.7)

## 2024-06-01 PROCEDURE — 63600175 PHARM REV CODE 636 W HCPCS: Performed by: EMERGENCY MEDICINE

## 2024-06-01 PROCEDURE — 80053 COMPREHEN METABOLIC PANEL: CPT | Performed by: EMERGENCY MEDICINE

## 2024-06-01 PROCEDURE — 70450 CT HEAD/BRAIN W/O DYE: CPT | Mod: 26,,, | Performed by: RADIOLOGY

## 2024-06-01 PROCEDURE — 85730 THROMBOPLASTIN TIME PARTIAL: CPT | Performed by: EMERGENCY MEDICINE

## 2024-06-01 PROCEDURE — 81003 URINALYSIS AUTO W/O SCOPE: CPT | Performed by: EMERGENCY MEDICINE

## 2024-06-01 PROCEDURE — G0390 TRAUMA RESPONS W/HOSP CRITI: HCPCS | Performed by: EMERGENCY MEDICINE

## 2024-06-01 PROCEDURE — 82077 ASSAY SPEC XCP UR&BREATH IA: CPT | Performed by: EMERGENCY MEDICINE

## 2024-06-01 PROCEDURE — 73630 X-RAY EXAM OF FOOT: CPT | Mod: 26,RT,, | Performed by: RADIOLOGY

## 2024-06-01 PROCEDURE — 73610 X-RAY EXAM OF ANKLE: CPT | Mod: 26,RT,, | Performed by: RADIOLOGY

## 2024-06-01 PROCEDURE — 96374 THER/PROPH/DIAG INJ IV PUSH: CPT | Mod: 59

## 2024-06-01 PROCEDURE — 36415 COLL VENOUS BLD VENIPUNCTURE: CPT | Performed by: EMERGENCY MEDICINE

## 2024-06-01 PROCEDURE — 85025 COMPLETE CBC W/AUTO DIFF WBC: CPT | Performed by: EMERGENCY MEDICINE

## 2024-06-01 PROCEDURE — 25000003 PHARM REV CODE 250: Performed by: EMERGENCY MEDICINE

## 2024-06-01 PROCEDURE — 74177 CT ABD & PELVIS W/CONTRAST: CPT | Mod: 26,,, | Performed by: RADIOLOGY

## 2024-06-01 PROCEDURE — 71045 X-RAY EXAM CHEST 1 VIEW: CPT | Mod: 26,,, | Performed by: RADIOLOGY

## 2024-06-01 PROCEDURE — 83605 ASSAY OF LACTIC ACID: CPT | Performed by: EMERGENCY MEDICINE

## 2024-06-01 PROCEDURE — 86850 RBC ANTIBODY SCREEN: CPT | Performed by: EMERGENCY MEDICINE

## 2024-06-01 PROCEDURE — 73630 X-RAY EXAM OF FOOT: CPT | Mod: TC,RT

## 2024-06-01 PROCEDURE — 70450 CT HEAD/BRAIN W/O DYE: CPT | Mod: TC

## 2024-06-01 PROCEDURE — 74177 CT ABD & PELVIS W/CONTRAST: CPT | Mod: TC

## 2024-06-01 PROCEDURE — 73610 X-RAY EXAM OF ANKLE: CPT | Mod: TC,RT

## 2024-06-01 PROCEDURE — 99284 EMERGENCY DEPT VISIT MOD MDM: CPT | Mod: 25

## 2024-06-01 PROCEDURE — 96375 TX/PRO/DX INJ NEW DRUG ADDON: CPT

## 2024-06-01 PROCEDURE — 25500020 PHARM REV CODE 255: Performed by: EMERGENCY MEDICINE

## 2024-06-01 PROCEDURE — 63600175 PHARM REV CODE 636 W HCPCS

## 2024-06-01 PROCEDURE — 96376 TX/PRO/DX INJ SAME DRUG ADON: CPT

## 2024-06-01 PROCEDURE — 96361 HYDRATE IV INFUSION ADD-ON: CPT

## 2024-06-01 PROCEDURE — 85610 PROTHROMBIN TIME: CPT | Performed by: EMERGENCY MEDICINE

## 2024-06-01 PROCEDURE — 71045 X-RAY EXAM CHEST 1 VIEW: CPT | Mod: TC

## 2024-06-01 PROCEDURE — 80307 DRUG TEST PRSMV CHEM ANLYZR: CPT | Performed by: EMERGENCY MEDICINE

## 2024-06-01 RX ORDER — OXYCODONE AND ACETAMINOPHEN 5; 325 MG/1; MG/1
2 TABLET ORAL EVERY 4 HOURS PRN
Qty: 28 TABLET | Refills: 0 | Status: SHIPPED | OUTPATIENT
Start: 2024-06-01 | End: 2024-06-08

## 2024-06-01 RX ORDER — ONDANSETRON HYDROCHLORIDE 2 MG/ML
4 INJECTION, SOLUTION INTRAVENOUS
Status: COMPLETED | OUTPATIENT
Start: 2024-06-01 | End: 2024-06-01

## 2024-06-01 RX ORDER — ONDANSETRON HYDROCHLORIDE 2 MG/ML
INJECTION, SOLUTION INTRAVENOUS
Status: COMPLETED
Start: 2024-06-01 | End: 2024-06-01

## 2024-06-01 RX ORDER — MORPHINE SULFATE 4 MG/ML
4 INJECTION, SOLUTION INTRAMUSCULAR; INTRAVENOUS
Status: COMPLETED | OUTPATIENT
Start: 2024-06-01 | End: 2024-06-01

## 2024-06-01 RX ORDER — LIDOCAINE 50 MG/G
1 PATCH TOPICAL
Status: DISCONTINUED | OUTPATIENT
Start: 2024-06-01 | End: 2024-06-01 | Stop reason: HOSPADM

## 2024-06-01 RX ORDER — MORPHINE SULFATE 4 MG/ML
6 INJECTION, SOLUTION INTRAMUSCULAR; INTRAVENOUS
Status: COMPLETED | OUTPATIENT
Start: 2024-06-01 | End: 2024-06-01

## 2024-06-01 RX ORDER — LIDOCAINE 50 MG/G
1 PATCH TOPICAL DAILY PRN
Qty: 15 PATCH | Refills: 0 | Status: SHIPPED | OUTPATIENT
Start: 2024-06-01 | End: 2024-06-16

## 2024-06-01 RX ORDER — HYDROMORPHONE HYDROCHLORIDE 1 MG/ML
1 INJECTION, SOLUTION INTRAMUSCULAR; INTRAVENOUS; SUBCUTANEOUS
Status: COMPLETED | OUTPATIENT
Start: 2024-06-01 | End: 2024-06-01

## 2024-06-01 RX ORDER — DICLOFENAC SODIUM 10 MG/G
2 GEL TOPICAL 3 TIMES DAILY PRN
Qty: 100 G | Refills: 0 | Status: SHIPPED | OUTPATIENT
Start: 2024-06-01 | End: 2024-06-08

## 2024-06-01 RX ADMIN — IOHEXOL 75 ML: 350 INJECTION, SOLUTION INTRAVENOUS at 12:06

## 2024-06-01 RX ADMIN — MORPHINE SULFATE 6 MG: 4 INJECTION, SOLUTION INTRAMUSCULAR; INTRAVENOUS at 02:06

## 2024-06-01 RX ADMIN — ONDANSETRON 4 MG: 2 INJECTION INTRAMUSCULAR; INTRAVENOUS at 11:06

## 2024-06-01 RX ADMIN — LIDOCAINE 5% 1 PATCH: 700 PATCH TOPICAL at 02:06

## 2024-06-01 RX ADMIN — ONDANSETRON 4 MG: 2 INJECTION INTRAMUSCULAR; INTRAVENOUS at 12:06

## 2024-06-01 RX ADMIN — HYDROMORPHONE HYDROCHLORIDE 1 MG: 1 INJECTION, SOLUTION INTRAMUSCULAR; INTRAVENOUS; SUBCUTANEOUS at 11:06

## 2024-06-01 RX ADMIN — MORPHINE SULFATE 4 MG: 4 INJECTION, SOLUTION INTRAMUSCULAR; INTRAVENOUS at 12:06

## 2024-06-01 RX ADMIN — SODIUM CHLORIDE, POTASSIUM CHLORIDE, SODIUM LACTATE AND CALCIUM CHLORIDE 1000 ML: 600; 310; 30; 20 INJECTION, SOLUTION INTRAVENOUS at 12:06

## 2024-06-01 NOTE — ED PROVIDER NOTES
History     Chief Complaint   Patient presents with    Fall     HPI:  Mathew Rodrigues is a 69 y.o. male with PMH as below who presents to the [facility] emergency department for evaluation of a fall 8 feet from his home elevator he was working on just prior to arrival. He is on anticoagulants foir DVT. He fell, landing on his buttock/low back and right foot/leg. His pain level is severe. He has no other complaints.     Patient activated as a trauma bravo on arrival with immediate team response.     PCP: Kiley Vasquez MD    Review of patient's allergies indicates:   Allergen Reactions    Ibuprofen Itching      Past Medical History:   Diagnosis Date    Arthritis     COPD (chronic obstructive pulmonary disease)     Diabetes mellitus, type 2     DVT (deep vein thrombosis) in pregnancy     Hyperlipidemia     Hypertension     Kidney stones     LVH (left ventricular hypertrophy) due to hypertensive disease, with heart failure     Neuropathy     Personal history of colonic polyps 03/24/2000    colonoscopy report in media    PTSD (post-traumatic stress disorder)     Sleep apnea, unspecified     Torn rotator cuff      Past Surgical History:   Procedure Laterality Date    achilles repair Left 2003    ADENOIDECTOMY      ANKLE FRACTURE SURGERY Right 1987    CATARACT EXTRACTION Left 2004    CATARACT EXTRACTION Left 2005    2nd    COLONOSCOPY N/A 05/09/2017    results in media    COLONOSCOPY N/A 01/13/2023    Procedure: COLONOSCOPY;  Surgeon: Kenney Keller MD;  Location: [facility];  Service: General;  Laterality: N/A;    COLONOSCOPY W/ POLYPECTOMY N/A 03/24/2000    results in media    ELBOW SURGERY Right 07/24/2020    EYE SURGERY  2005    finger joint replacement Right 01/22/2020    middle finger    hair follicle  1998    HAND SURGERY Right 04/25/2017    HAND SURGERY Right 05/12/2017    2nd surgery    NASAL SINUS SURGERY  08/2015    ROTATOR CUFF REPAIR Right 09/25/2015    ROTATOR CUFF REPAIR Left 04/08/2016     skin grafts Right 07/2016    shin from stingray wound    VASECTOMY         Family History   Problem Relation Name Age of Onset    Hypertension Mother Sierra     Arthritis Mother Sierra     Hypertension Father Misael     Diabetes Father Misael     Arthritis Father Misael     Hearing loss Father Misael     Cancer Brother Mickey Douglas     COPD Brother Mickey Douglas      Social History     Tobacco Use    Smoking status: Former     Current packs/day: 1.50     Average packs/day: 1.5 packs/day for 30.0 years (45.0 ttl pk-yrs)     Types: Cigarettes    Smokeless tobacco: Never   Substance and Sexual Activity    Alcohol use: Yes     Comment: occassionally    Drug use: Never    Sexual activity: Yes     Partners: Female      Review of Systems     Review of Systems   Constitutional: Negative.    HENT: Negative.     Eyes: Negative.    Respiratory: Negative.     Cardiovascular: Negative.  Negative for chest pain.   Gastrointestinal:  Positive for abdominal pain.   Endocrine: Negative.    Genitourinary: Negative.    Musculoskeletal: Negative.    Skin: Negative.    Allergic/Immunologic: Negative.    Neurological: Negative.    Hematological: Negative.    Psychiatric/Behavioral: Negative.     All other systems reviewed and are negative.       Physical Exam     Initial Vitals [06/01/24 1131]   BP Pulse Resp Temp SpO2   (!) 173/85 70 (!) 24 97.6 °F (36.4 °C) 98 %      MAP       --          Nursing notes and vital signs reviewed.  Constitutional: Patient is in severe acute distress.   Head: Normocephalic. Atraumatic.   Eyes:  Conjunctivae are not pale. No scleral icterus.   ENT: Mucous membranes moist.   Neck: Supple.   Cardiovascular: Regular rate. Regular rhythm. No murmurs, rubs, or gallops   Pulmonary: No respiratory distress. Clear to auscultation bilaterally   Abdominal: Soft. Moderate generalized tenderness. Nondistended.    Musculoskeletal: Moves all extremities. All extremity joints isolated w/ FROM and NTTP except right ankle and heel  "tenderness..  Midline spine with no TTP. Left parasacral tenderness. Radial/DP/PT pulses 2+ bilat.    Skin: Warm and dry.   Neurological:  Alert, awake, and appropriate. Normal speech. No acute lateralizing neurologic deficits appreciated.   Psychiatric: Normal affect.       ED Course   Critical Care    Date/Time: 6/1/2024 11:42 AM    Performed by: Braden Villarreal MD  Authorized by: Braden Villarreal MD  Direct patient critical care time: 15 minutes  Additional history critical care time: 5 minutes  Ordering / reviewing critical care time: 7 minutes  Documentation critical care time: 7 minutes  Total critical care time (exclusive of procedural time) : 34 minutes  Critical care time was exclusive of separately billable procedures and treating other patients and teaching time.  Critical care was time spent personally by me on the following activities: blood draw for specimens, development of treatment plan with patient or surrogate, interpretation of cardiac output measurements, evaluation of patient's response to treatment, examination of patient, obtaining history from patient or surrogate, ordering and performing treatments and interventions, ordering and review of laboratory studies, ordering and review of radiographic studies, pulse oximetry, re-evaluation of patient's condition and review of old charts.        Vitals:    06/01/24 1131 06/01/24 1153 06/01/24 1245 06/01/24 1355   BP: (!) 173/85   (!) 148/75   Pulse: 70   84   Resp: (!) 24 (!) 24 (!) 22 (!) 22   Temp: 97.6 °F (36.4 °C)      TempSrc: Oral      SpO2: 98%   96%   Weight: 88.5 kg (195 lb)      Height: 5' 8" (1.727 m)        Lab Results Interpreted as Abnormal:  Labs Reviewed   CBC W/ AUTO DIFFERENTIAL - Abnormal; Notable for the following components:       Result Value    MCH 25.8 (*)     MCHC 31.1 (*)     RDW 16.0 (*)     Immature Granulocytes 1.5 (*)     Immature Grans (Abs) 0.16 (*)     Eos # 1.4 (*)     Eosinophil % 12.6 (*)     All other " components within normal limits   COMPREHENSIVE METABOLIC PANEL - Abnormal; Notable for the following components:    CO2 20 (*)     Glucose 147 (*)     Creatinine 1.6 (*)     eGFR 46.4 (*)     All other components within normal limits   LACTIC ACID, PLASMA - Abnormal; Notable for the following components:    Lactate (Lactic Acid) 2.5 (*)     All other components within normal limits   DRUG SCREEN PANEL, URINE EMERGENCY - Abnormal; Notable for the following components:    Methadone metabolites Presumptive Positive (*)     All other components within normal limits    Narrative:     Specimen Source->Urine   ISTAT CREATININE - Abnormal; Notable for the following components:    POC Creatinine 1.8 (*)     All other components within normal limits   PROTIME-INR   APTT   URINALYSIS, REFLEX TO URINE CULTURE    Narrative:     Specimen Source->Urine   ALCOHOL,MEDICAL (ETHANOL)   TYPE & SCREEN      All Lab Results:  Results for orders placed or performed during the hospital encounter of 06/01/24   CBC auto differential   Result Value Ref Range    WBC 10.94 3.90 - 12.70 K/uL    RBC 5.46 4.60 - 6.20 M/uL    Hemoglobin 14.1 14.0 - 18.0 g/dL    Hematocrit 45.4 40.0 - 54.0 %    MCV 83 82 - 98 fL    MCH 25.8 (L) 27.0 - 31.0 pg    MCHC 31.1 (L) 32.0 - 36.0 g/dL    RDW 16.0 (H) 11.5 - 14.5 %    Platelets 280 150 - 450 K/uL    MPV 9.7 9.2 - 12.9 fL    Immature Granulocytes 1.5 (H) 0.0 - 0.5 %    Gran # (ANC) 6.3 1.8 - 7.7 K/uL    Immature Grans (Abs) 0.16 (H) 0.00 - 0.04 K/uL    Lymph # 2.4 1.0 - 4.8 K/uL    Mono # 0.7 0.3 - 1.0 K/uL    Eos # 1.4 (H) 0.0 - 0.5 K/uL    Baso # 0.07 0.00 - 0.20 K/uL    nRBC 0 0 /100 WBC    Gran % 57.3 38.0 - 73.0 %    Lymph % 21.8 18.0 - 48.0 %    Mono % 6.2 4.0 - 15.0 %    Eosinophil % 12.6 (H) 0.0 - 8.0 %    Basophil % 0.6 0.0 - 1.9 %    Differential Method Automated    Comprehensive metabolic panel   Result Value Ref Range    Sodium 137 136 - 145 mmol/L    Potassium 4.2 3.5 - 5.1 mmol/L    Chloride 104  95 - 110 mmol/L    CO2 20 (L) 23 - 29 mmol/L    Glucose 147 (H) 70 - 110 mg/dL    BUN 23 8 - 23 mg/dL    Creatinine 1.6 (H) 0.5 - 1.4 mg/dL    Calcium 9.6 8.7 - 10.5 mg/dL    Total Protein 7.3 6.0 - 8.4 g/dL    Albumin 3.7 3.5 - 5.2 g/dL    Total Bilirubin 0.4 0.1 - 1.0 mg/dL    Alkaline Phosphatase 62 55 - 135 U/L    AST 16 10 - 40 U/L    ALT 17 10 - 44 U/L    eGFR 46.4 (A) >60 mL/min/1.73 m^2    Anion Gap 13 8 - 16 mmol/L   Protime-INR   Result Value Ref Range    Prothrombin Time 12.4 9.0 - 12.5 sec    INR 1.2 0.8 - 1.2   APTT   Result Value Ref Range    aPTT 27.6 21.0 - 32.0 sec   Lactic Acid, Plasma   Result Value Ref Range    Lactate (Lactic Acid) 2.5 (H) 0.5 - 2.2 mmol/L   Urinalysis, Reflex to Urine Culture    Specimen: Urine   Result Value Ref Range    Specimen UA Urine, Unspecified     Color, UA Yellow Yellow, Straw, Meron    Appearance, UA Clear Clear    pH, UA 7.0 5.0 - 8.0    Specific Gravity, UA 1.015 1.005 - 1.030    Protein, UA Negative Negative    Glucose, UA Negative Negative    Ketones, UA Negative Negative    Bilirubin (UA) Negative Negative    Occult Blood UA Negative Negative    Nitrite, UA Negative Negative    Urobilinogen, UA Negative Negative EU/dL    Leukocytes, UA Negative Negative   Drug screen panel, emergency   Result Value Ref Range    Benzodiazepines Negative Negative    Methadone metabolites Presumptive Positive (A) Negative    Cocaine (Metab.) Negative Negative    Opiate Scrn, Ur Negative Negative    Barbiturate Screen, Ur Negative Negative    Amphetamine Screen, Ur Negative Negative    THC Negative Negative    Phencyclidine Negative Negative    Creatinine, Urine 62.4 23.0 - 375.0 mg/dL    Toxicology Information SEE COMMENT    Ethanol   Result Value Ref Range    Alcohol, Serum <10 0 - 10 mg/dL   Type & Screen   Result Value Ref Range    Group & Rh A POS     [place] NEG     Specimen Outdate 06/04/2024 23:59    ISTAT CREATININE   Result Value Ref Range    POC Creatinine 1.8 (H)  0.5 - 1.4 mg/dL    Sample Other     Site Other     Allens Test N/A      Imaging Results              CT Abdomen Pelvis With IV Contrast NO Oral Contrast (Final result)  Result time 06/01/24 13:22:36      Final result by Huang Luis MD (06/01/24 13:22:36)                   Impression:      No acute findings in the abdomen.  Simple right renal cysts.  Prostate enlargement warranting correlation with PSA.  Colonic diverticulosis.      Electronically signed by: Huang Luis  Date:    06/01/2024  Time:    13:22               Narrative:    EXAMINATION:  CT ABDOMEN PELVIS WITH IV CONTRAST    CLINICAL HISTORY:  Abdominal trauma, blunt;    TECHNIQUE:  Low dose axial images, sagittal and coronal reformations were obtained from the lung bases to the pubic symphysis following the IV administration of 75 mL of Omnipaque 350 .  Oral contrast was not given.    COMPARISON:  None.    FINDINGS:  Lung bases are clear.  Normal size heart.  Unremarkable gallbladder.    Subcentimeter hypodense lesion inferior right hepatic lobe possibly a cyst but too small to adequately characterize.  Four fluid density lesions along the right renal cortex and several additional subcentimeter hypodense lesions along each kidney cortex which are too small to adequately characterize.  Remaining solid abdominal organs are unremarkable.    There is no enteric contrast which limits bowel assessment.  Stomach is mildly distended.  Remaining bowel loops are normal in caliber.  There are a few scattered colonic diverticula.  Moderate degree of stool in the right colon.    Atherosclerosis.  No focal abdominal fluid collection.  Prostate 5 cm diameter.    There is advanced degenerative change throughout the lumbar spine worst at L1-2 and L2-3 levels.  There is between mild-to-moderate multilevel spinal degenerative change elsewhere.  No acute osseous finding.                                       CT Head Without Contrast (Final result)  Result time  "06/01/24 13:16:05      Final result by Huang Luis MD (06/01/24 13:16:05)                   Impression:      No acute intracranial finding.      Electronically signed by: Huang Luis  Date:    06/01/2024  Time:    13:16               Narrative:    EXAMINATION:  CT HEAD WITHOUT CONTRAST    CLINICAL HISTORY:  Head trauma, moderate-severe;    TECHNIQUE:  Low dose axial images were obtained through the head.  Coronal and sagittal reformations were also performed. Contrast was not administered.    COMPARISON:  None.    FINDINGS:  Normal size of the ventricular system. No hemorrhage or major vascular distribution infarct. No intra or extra-axial mass. No mass effect or midline shift. Surgical changes of the globes.   Paranasal sinuses and mastoid air cells are clear. No acute osseous abnormality.                                       X-Ray Ankle Complete Right (Final result)  Result time 06/01/24 12:58:32      Final result by Huang Luis MD (06/01/24 12:58:32)                   Narrative:    EXAMINATION:  XR FOOT COMPLETE 3 VIEW RIGHT; XR ANKLE COMPLETE 3 VIEW RIGHT    CLINICAL HISTORY:  . Unspecified fall, initial encounter    TECHNIQUE:  AP, lateral, and oblique views of the right foot and ankle were performed.    COMPARISON:  None    FINDINGS:  No displaced fracture. Os peroneum, anatomic variant.  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    There is advanced arthropathy of the talonavicular joint.  There is advanced arthropathy and/or partial bony ankylosis of the subtalar facet joints.  There is flexion deformity of the 2nd toe.    There is subchondral lucency along the medial talar dome which could be projectional as it is only seen in one view.  Osteochondral lesion is in the differential, however.      Electronically signed by: Huang Luis  Date:    06/01/2024  Time:    12:58                                     X-Ray Foot Complete " "Right (Final result)  Result time 06/01/24 12:58:32      Final result by Huang Luis MD (06/01/24 12:58:32)                   Narrative:    EXAMINATION:  XR FOOT COMPLETE 3 VIEW RIGHT; XR ANKLE COMPLETE 3 VIEW RIGHT    CLINICAL HISTORY:  . Unspecified fall, initial encounter    TECHNIQUE:  AP, lateral, and oblique views of the right foot and ankle were performed.    COMPARISON:  None    FINDINGS:  No displaced fracture. Os peroneum, anatomic variant.  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    There is advanced arthropathy of the talonavicular joint.  There is advanced arthropathy and/or partial bony ankylosis of the subtalar facet joints.  There is flexion deformity of the 2nd toe.    There is subchondral lucency along the medial talar dome which could be projectional as it is only seen in one view.  Osteochondral lesion is in the differential, however.      Electronically signed by: Huang Luis  Date:    06/01/2024  Time:    12:58                                     X-Ray Chest 1 View (Final result)  Result time 06/01/24 12:52:52      Final result by Huang Luis MD (06/01/24 12:52:52)                   Narrative:    EXAMINATION:  XR CHEST 1 VIEW    CLINICAL HISTORY:  r/o bleeding or hemorrhage;    TECHNIQUE:  Single frontal view of the chest was performed.    COMPARISON:  05/01/2023    FINDINGS:  Lungs are clear.Normal cardiomediastinal silhouette.Normal pulmonary vascular distribution.No pleural effusion or pneumothorax.No acute osseous abnormality.      Electronically signed by: Huang Luis  Date:    06/01/2024  Time:    12:52                                   The emergency physician reviewed the vital signs and test results, which are outlined above.     ED Discussion      Patient's evaluation in the ED does not suggest any emergent or life-threatening medical conditions requiring immediate intervention beyond what was " provided in the ED, and I believe patient is safe for discharge. Regardless, an unremarkable evaluation in the ED does not preclude the development or presence of a serious or life-threatening condition. As such, patient was given return instructions for any change or worsening of symptoms.       ED Medication(s) Administered:  Medications   HYDROmorphone injection 1 mg (1 mg Intravenous Given 6/1/24 1153)   ondansetron injection 4 mg (4 mg Intravenous Given 6/1/24 1154)   iohexoL (OMNIPAQUE 350) injection 75 mL (75 mLs Intravenous Given 6/1/24 1225)   lactated ringers bolus 1,000 mL (0 mLs Intravenous Stopped 6/1/24 1354)   morphine injection 4 mg (4 mg Intravenous Given 6/1/24 1245)   ondansetron 4 mg/2 mL injection (4 mg  Given 6/1/24 1257)   morphine injection 6 mg (6 mg Intravenous Given 6/1/24 1400)       Prescription Management: I performed a review of the patient's current Rx medication list as input by nursing staff.    Discharge Medication List as of 6/1/2024  1:38 PM        START taking these medications    Details   diclofenac sodium (VOLTAREN ARTHRITIS PAIN) 1 % Gel Apply 2 g topically 3 (three) times daily as needed (pain)., Starting Sat 6/1/2024, Until Sat 6/8/2024 at 2359, Normal      LIDOcaine (LIDODERM) 5 % Place 1 patch onto the skin daily as needed (pain). Remove & Discard patch within 12 hours, Starting Sat 6/1/2024, Until Sun 6/16/2024 at 2359, Normal           CONTINUE these medications which have NOT CHANGED    Details   albuterol (PROVENTIL/VENTOLIN HFA) 90 mcg/actuation inhaler albuterol sulfate HFA 90 mcg/actuation aerosol inhaler, Normal      amLODIPine (NORVASC) 5 MG tablet TAKE 1 TABLET BY MOUTH EVERY DAY, Normal      apixaban (ELIQUIS) 5 mg Tab Take 5 mg by mouth 2 (two) times daily., Historical Med      atorvastatin (LIPITOR) 10 MG tablet Take 1 tablet (10 mg total) by mouth every evening., Starting Mon 5/6/2024, Until Tue 5/6/2025, Normal      BD SHAHZAD 2ND GEN PEN NEEDLE 32 gauge x  "5/32" Ndle USE ONCE DAILY WITH TRESIBA, Historical Med      busPIRone (BUSPAR) 5 MG Tab Take 1 tablet (5 mg total) by mouth every evening., Starting Mon 2/26/2024, Until Tue 2/25/2025, Normal      cyanocobalamin 1,000 mcg/mL injection Inject 1 mL (1,000 mcg total) into the muscle every 14 (fourteen) days., Starting Fri 12/29/2023, Normal      fenofibrate 160 MG Tab Take 1 tablet (160 mg total) by mouth nightly., Starting Mon 2/5/2024, Normal      fluticasone-umeclidin-vilanter (TRELEGY ELLIPTA) 200-62.5-25 mcg inhaler Inhale 1 puff into the lungs once daily., Starting Wed 5/15/2024, Normal      glipiZIDE (GLUCOTROL) 5 MG tablet Take 1 tablet (5 mg total) by mouth 2 (two) times daily before meals., Starting Fri 2/16/2024, Normal      guanFACINE (TENEX) 2 MG tablet TAKE 1 TABLET(2 MG) BY MOUTH EVERY MORNING, Normal      hyoscyamine (LEVSIN) 0.125 mg Subl Place 1 tablet (0.125 mg total) under the tongue every 4 (four) hours as needed (abdominal cramping)., Starting Wed 3/20/2024, Normal      insulin degludec (TRESIBA FLEXTOUCH U-100) 100 unit/mL (3 mL) insulin pen Inject 32 Units into the skin every morning., Starting Tue 6/13/2023, Until Wed 6/12/2024, Normal      lisinopriL-hydrochlorothiazide (PRINZIDE,ZESTORETIC) 20-12.5 mg per tablet Take 2 tablets by mouth once daily., Starting Wed 2/28/2024, Normal      metFORMIN (GLUCOPHAGE) 1000 MG tablet Take 1 tablet (1,000 mg total) by mouth 2 (two) times daily with meals., Starting Thu 1/18/2024, Normal      metoprolol tartrate (LOPRESSOR) 25 MG tablet Take 1 tablet (25 mg total) by mouth 2 (two) times daily., Starting Fri 3/8/2024, Normal      omeprazole (PRILOSEC) 40 MG capsule Take 1 capsule (40 mg total) by mouth 2 (two) times daily before meals., Starting Wed 7/5/2023, Until Thu 7/4/2024, Normal      OZEMPIC 0.25 mg or 0.5 mg (2 mg/3 mL) pen injector INJECT 0.5 MG UNDER THE SKIN EVERY 7 DAYS, Normal      sildenafiL (VIAGRA) 100 MG tablet Take 1 tablet (100 mg total) " by mouth daily as needed for Erectile Dysfunction., Starting Wed 12/6/2023, Until Thu 12/5/2024 at 2359, Normal      tamsulosin (FLOMAX) 0.4 mg Cap Take 1 capsule (0.4 mg total) by mouth nightly., Starting Thu 12/28/2023, Until Fri 12/27/2024, Normal      tapentadoL (NUCYNTA ER) 200 mg Tb12 Take 1 tablet by mouth 2 (two) times a day., Starting Tue 12/20/2022, Normal      testosterone cypionate (DEPOTESTOTERONE CYPIONATE) 200 mg/mL injection ADMINISTER 0.75 ML(150 MG) IN THE MUSCLE EVERY 7 DAYS, Normal      traZODone (DESYREL) 50 MG tablet Take 1-3 tablets ( mg total) by mouth every evening., Starting Wed 12/20/2023, Until Thu 12/19/2024, Normal      UNABLE TO FIND 4 mg. medication name: Clortabs 4mg Qam, Historical Med               Follow-up Information       Schedule an appointment as soon as possible for a visit  with Kiley Vasquez MD.    Specialty: Family Medicine  Contact information:  [address] [place] [address]  [address]  [place] [address] [phone]               [facility] Emergency Dept.    Specialty: Emergency Medicine  Why: As needed, If symptoms worsen  Contact information:  [address] [place] [id number] [phone]                          Clinical Impression       ICD-10-CM ICD-9-CM   1. Fall  W19.XXXA E888.9   2. Pain of left sacroiliac joint  M53.3 724.6      ED Disposition Condition    Discharge Stable             Braden Villarreal MD  06/02/24 3365

## 2024-06-01 NOTE — PROGRESS NOTES
Omni 350 75 cc used (75cc due to GFR @ 40)  IV Blood Return verified.  Per communication w provider (Dr Villarreal) pts GFR is @ 40 & he will hydrated pt. JESSICA  ISTAT creatinine done for CT exam - creatinine @ 1.8 & GFR calculated @  40 using [employer] GFR calculator. JESSICA    1139 hrs - code bravo called  1137 hrs CT/xray orders placed  1153 xrays ended  1210 CTs started

## 2024-06-01 NOTE — ED TRIAGE NOTES
Pt brought in by wife s/p fall from home elevator. Pt reports elevator fell 8 ft then tumbled out landing on buttocks. Pt c/o back pain. C collar placed. Pt denies LOC. Reports being on blood thinners, unsure which one. Pt bravo activation per MD.

## 2024-06-04 ENCOUNTER — HOSPITAL ENCOUNTER (OUTPATIENT)
Dept: RADIOLOGY | Facility: HOSPITAL | Age: 69
Discharge: HOME | End: 2024-06-04
Attending: STUDENT IN AN ORGANIZED HEALTH CARE EDUCATION/TRAINING PROGRAM
Payer: MEDICARE

## 2024-06-04 ENCOUNTER — OFFICE VISIT (OUTPATIENT)
Dept: FAMILY MEDICINE | Facility: CLINIC | Age: 69
End: 2024-06-04
Payer: MEDICARE

## 2024-06-04 VITALS
HEIGHT: 68 IN | SYSTOLIC BLOOD PRESSURE: 117 MMHG | BODY MASS INDEX: 30.46 KG/M2 | OXYGEN SATURATION: 96 % | HEART RATE: 76 BPM | DIASTOLIC BLOOD PRESSURE: 70 MMHG | RESPIRATION RATE: 16 BRPM | WEIGHT: 201 LBS

## 2024-06-04 DIAGNOSIS — M54.50 ACUTE MIDLINE LOW BACK PAIN WITHOUT SCIATICA: ICD-10-CM
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
## 2024-06-04 DIAGNOSIS — M25.571 ACUTE RIGHT ANKLE PAIN: ICD-10-CM

## 2024-06-04 DIAGNOSIS — G89.4 CHRONIC PAIN SYNDROME: ICD-10-CM
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
## 2024-06-04 PROCEDURE — 99999 PR PBB SHADOW E&M-EST. PATIENT-LVL V: CPT | Mod: PBBFAC,,, | Performed by: STUDENT IN AN ORGANIZED HEALTH CARE EDUCATION/TRAINING PROGRAM

## 2024-06-04 PROCEDURE — 73630 X-RAY EXAM OF FOOT: CPT | Mod: TC,PN,RT

## 2024-06-04 PROCEDURE — 72220 X-RAY EXAM SACRUM TAILBONE: CPT | Mod: TC,PN

## 2024-06-04 PROCEDURE — 73610 X-RAY EXAM OF ANKLE: CPT | Mod: TC,PN,RT

## 2024-06-04 PROCEDURE — 72100 X-RAY EXAM L-S SPINE 2/3 VWS: CPT | Mod: 26,,, | Performed by: RADIOLOGY

## 2024-06-04 PROCEDURE — 73610 X-RAY EXAM OF ANKLE: CPT | Mod: 26,RT,, | Performed by: RADIOLOGY

## 2024-06-04 PROCEDURE — 99215 OFFICE O/P EST HI 40 MIN: CPT | Mod: PBBFAC,PN,25 | Performed by: STUDENT IN AN ORGANIZED HEALTH CARE EDUCATION/TRAINING PROGRAM

## 2024-06-04 PROCEDURE — 72220 X-RAY EXAM SACRUM TAILBONE: CPT | Mod: 26,,, | Performed by: RADIOLOGY

## 2024-06-04 PROCEDURE — 73630 X-RAY EXAM OF FOOT: CPT | Mod: 26,RT,, | Performed by: RADIOLOGY

## 2024-06-04 PROCEDURE — 99214 OFFICE O/P EST MOD 30 MIN: CPT | Mod: S$PBB,,, | Performed by: STUDENT IN AN ORGANIZED HEALTH CARE EDUCATION/TRAINING PROGRAM

## 2024-06-04 PROCEDURE — 72100 X-RAY EXAM L-S SPINE 2/3 VWS: CPT | Mod: TC,PN

## 2024-06-04 NOTE — PROGRESS NOTES
Subjective:       Patient ID: Mathew Rodrigues is a 69 y.o. male.    Chief Complaint: Follow-up (Fell 8 to 10ft in elevator on Saturday )    Mr Rodrigues had an accident  He was in his cable elevator and the cable broke and it caused him to fall 10 feet.  He is having pain where he had trauma to his shoulder, back, hip/abdomen/buttocks, knee, ankle  He has had extensive bruising.  He was given pain meds in the ED and that has caused constipation  He was given percocet but did not fill it  He is on pain management with a different doctor.  He is still having pain.  He has been using lidocaine patches, using flexeril, and his pain meds with tylenol  He is walking with a walker  He went to the ED and was evaluated    CT abdomen  Lung bases are clear.  Normal size heart.  Unremarkable gallbladder.  Subcentimeter hypodense lesion inferior right hepatic lobe possibly a cyst but too small to adequately characterize.  Four fluid density lesions along the right renal cortex and several additional subcentimeter hypodense lesions along each kidney cortex which are too small to adequately characterize.  Remaining solid abdominal organs are unremarkable.  There is no enteric contrast which limits bowel assessment.  Stomach is mildly distended.  Remaining bowel loops are normal in caliber.  There are a few scattered colonic diverticula.  Moderate degree of stool in the right colon.  Atherosclerosis.  No focal abdominal fluid collection.  Prostate 5 cm diameter.  There is advanced degenerative change throughout the lumbar spine worst at L1-2 and L2-3 levels.  There is between mild-to-moderate multilevel spinal degenerative change elsewhere.  No acute osseous finding.     Impression:  No acute findings in the abdomen.  Simple right renal cysts.   Since the fall he has continued pain and difficulty sitting, lying down, manipulating  His pain is improving some around a 4 now (10 at his max)  He has a hard time walking and putting  "pressure on his right foot when he walks.     CT head:  Normal size of the ventricular system. No hemorrhage or major vascular distribution infarct. No intra or extra-axial mass. No mass effect or midline shift. Surgical changes of the globes.   Paranasal sinuses and mastoid air cells are clear. No acute osseous abnormality.  Impression:No acute intracranial finding.    Xray ankle  No displaced fracture. Os peroneum, anatomic variant.  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     There is advanced arthropathy of the talonavicular joint.  There is advanced arthropathy and/or partial bony ankylosis of the subtalar facet joints.  There is flexion deformity of the 2nd toe.     There is subchondral lucency along the medial talar dome which could be projectional as it is only seen in one view.  Osteochondral lesion is in the differential, however.    XR foot:  No displaced fracture. Os peroneum, anatomic variant.  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     There is advanced arthropathy of the talonavicular joint.  There is advanced arthropathy and/or partial bony ankylosis of the subtalar facet joints.  There is flexion deformity of the 2nd toe.     There is subchondral lucency along the medial talar dome which could be projectional as it is only seen in one view.  Osteochondral lesion is in the differential, however.               .  Patient Active Problem List   Diagnosis    CRICKET on CPAP    Essential hypertension, benign    Type 2 diabetes mellitus without complication, with long-term current use of insulin    Mixed hyperlipidemia    Gastroesophageal reflux disease without esophagitis    Benign prostatic hyperplasia without lower urinary tract symptoms    Chronic pain syndrome    Chronic right shoulder pain    Hypogonadism in male    Primary osteoarthritis involving multiple joints    Chronic rhinitis "    Insomnia    Chronic kidney disease, stage 3a    Chronic obstructive lung disease    Diastolic heart failure, unspecified HF chronicity     Mathew has a current medication list which includes the following prescription(s): albuterol, amlodipine, apixaban, atorvastatin, bd almas 2nd gen pen needle, buspirone, cyanocobalamin, diclofenac sodium, fenofibrate, fluticasone-umeclidin-vilanter, glipizide, guanfacine, hyoscyamine, insulin degludec, lidocaine, lisinopril-hydrochlorothiazide, metformin, metoprolol tartrate, omeprazole, oxycodone-acetaminophen, ozempic, sildenafil, tamsulosin, nucynta er, testosterone cypionate, trazodone, and UNABLE TO FIND.    Review of Systems   Constitutional:  Negative for activity change and appetite change.   Respiratory:  Negative for shortness of breath.    Cardiovascular:  Negative for chest pain.   Gastrointestinal:  Negative for abdominal pain.   Genitourinary:  Negative for dysuria.   Musculoskeletal:  Positive for back pain, gait problem and leg pain.        Foot pain   Integumentary:  Negative for rash.   Psychiatric/Behavioral:  Negative for sleep disturbance.          Health Maintenance Due   Topic Date Due    TETANUS VACCINE  Never done    Shingles Vaccine (1 of 2) Never done    RSV Vaccine (Age 60+ and Pregnant patients) (1 - 1-dose 60+ series) Never done    Abdominal Aortic Aneurysm Screening  Never done    Pneumococcal Vaccines (Age 65+) (2 of 2 - PCV) 08/24/2021    COVID-19 Vaccine (5 - 2023-24 season) 09/01/2023    Eye Exam  03/14/2024        Objective:      Physical Exam  Constitutional:       General: He is not in acute distress.     Appearance: Normal appearance. He is not ill-appearing.   Eyes:      Conjunctiva/sclera: Conjunctivae normal.   Cardiovascular:      Rate and Rhythm: Normal rate and regular rhythm.      Heart sounds: Normal heart sounds. No murmur heard.  Pulmonary:      Effort: Pulmonary effort is normal. No respiratory distress.      Breath sounds: Normal  breath sounds.   Musculoskeletal:      Right lower leg: No edema.      Left lower leg: No edema.   Skin:     General: Skin is warm and dry.   Neurological:      Mental Status: He is alert. Mental status is at baseline.      Gait: Gait abnormal (walking with a walker).   Psychiatric:         Mood and Affect: Mood normal.         Behavior: Behavior normal.         Thought Content: Thought content normal.         Judgment: Judgment normal.         Assessment:       1. Fall, initial encounter    2. Acute right ankle pain    3. Acute midline low back pain without sciatica    4. Chronic pain syndrome        Plan:       1. Fall, initial encounter  Comments:  from an elevator.  doing some better. continued back and foot pain.  Orders:  -     X-Ray Lumbar Spine AP And Lateral; Future; Expected date: 06/04/2024  -     X-Ray Sacrum And Coccyx; Future; Expected date: 06/04/2024  -     X-Ray Ankle Complete Right; Future; Expected date: 06/04/2024  -     X-Ray Foot Complete Right; Future; Expected date: 06/04/2024    2. Acute right ankle pain  Comments:  repeat xray  Orders:  -     X-Ray Ankle Complete Right; Future; Expected date: 06/04/2024  -     X-Ray Foot Complete Right; Future; Expected date: 06/04/2024    3. Acute midline low back pain without sciatica  Comments:  repeat xray  Orders:  -     X-Ray Lumbar Spine AP And Lateral; Future; Expected date: 06/04/2024  -     X-Ray Sacrum And Coccyx; Future; Expected date: 06/04/2024    4. Chronic pain syndrome  Assessment & Plan:  Continue following with pain management

## 2024-06-06 ENCOUNTER — PATIENT MESSAGE (OUTPATIENT)
Dept: FAMILY MEDICINE | Facility: CLINIC | Age: 69
End: 2024-06-06
Payer: MEDICARE

## 2024-06-07 DIAGNOSIS — E29.1 HYPOGONADISM IN MALE: ICD-10-CM

## 2024-06-07 NOTE — TELEPHONE ENCOUNTER
No care due was identified.  [facility] Embedded Care Due Messages. Reference number: [id number].   6/07/2024 12:31:45 PM CDT

## 2024-06-10 ENCOUNTER — PATIENT MESSAGE (OUTPATIENT)
Dept: FAMILY MEDICINE | Facility: CLINIC | Age: 69
End: 2024-06-10
Payer: MEDICARE

## 2024-06-10 RX ORDER — TESTOSTERONE CYPIONATE 200 MG/ML
INJECTION, SOLUTION INTRAMUSCULAR
Qty: 12 ML | Refills: 0 | Status: SHIPPED | OUTPATIENT
Start: 2024-06-10

## 2024-06-18 ENCOUNTER — OFFICE VISIT (OUTPATIENT)
Dept: PODIATRY | Facility: CLINIC | Age: 69
End: 2024-06-18
Payer: MEDICARE

## 2024-06-18 VITALS
HEART RATE: 105 BPM | HEIGHT: 68 IN | RESPIRATION RATE: 18 BRPM | BODY MASS INDEX: 30.46 KG/M2 | SYSTOLIC BLOOD PRESSURE: 165 MMHG | DIASTOLIC BLOOD PRESSURE: 87 MMHG | WEIGHT: 201 LBS

## 2024-06-18 DIAGNOSIS — L84 FOOT CALLUS: ICD-10-CM

## 2024-06-18 DIAGNOSIS — G57.81 SURAL NEURITIS, RIGHT: ICD-10-CM

## 2024-06-18 DIAGNOSIS — S93.691A RUPTURE OF PLANTAR FASCIA OF RIGHT FOOT, INITIAL ENCOUNTER: Primary | ICD-10-CM

## 2024-06-18 DIAGNOSIS — M21.961 ACQUIRED DEFORMITY OF RIGHT FOOT: ICD-10-CM

## 2024-06-18 DIAGNOSIS — E11.9 TYPE 2 DIABETES MELLITUS WITHOUT COMPLICATION, WITH LONG-TERM CURRENT USE OF INSULIN: ICD-10-CM

## 2024-06-18 DIAGNOSIS — S90.32XD CONTUSION OF LEFT HEEL, SUBSEQUENT ENCOUNTER: ICD-10-CM

## 2024-06-18 DIAGNOSIS — Z79.4 TYPE 2 DIABETES MELLITUS WITHOUT COMPLICATION, WITH LONG-TERM CURRENT USE OF INSULIN: ICD-10-CM

## 2024-06-18 PROCEDURE — 99999PBSHW PR PBB SHADOW TECHNICAL ONLY FILED TO HB: Mod: PBBFAC,,,

## 2024-06-18 PROCEDURE — 99213 OFFICE O/P EST LOW 20 MIN: CPT | Mod: S$PBB,,, | Performed by: PODIATRIST

## 2024-06-18 PROCEDURE — 99213 OFFICE O/P EST LOW 20 MIN: CPT | Mod: PBBFAC,25 | Performed by: PODIATRIST

## 2024-06-18 PROCEDURE — 96372 THER/PROPH/DIAG INJ SC/IM: CPT | Mod: PBBFAC,59

## 2024-06-18 PROCEDURE — 99999 PR PBB SHADOW E&M-EST. PATIENT-LVL III: CPT | Mod: PBBFAC,,, | Performed by: PODIATRIST

## 2024-06-18 RX ORDER — TIZANIDINE 4 MG/1
4 TABLET ORAL EVERY 12 HOURS
COMMUNITY
Start: 2024-06-05

## 2024-06-18 RX ORDER — TRIAMCINOLONE ACETONIDE 40 MG/ML
80 INJECTION, SUSPENSION INTRA-ARTICULAR; INTRAMUSCULAR
Status: COMPLETED | OUTPATIENT
Start: 2024-06-18 | End: 2024-06-18

## 2024-06-18 RX ORDER — TAPENTADOL HYDROCHLORIDE 150 MG/1
1 TABLET, FILM COATED, EXTENDED RELEASE ORAL EVERY 12 HOURS
COMMUNITY
Start: 2024-05-15

## 2024-06-18 RX ORDER — TAPENTADOL HYDROCHLORIDE 100 MG/1
1 TABLET, FILM COATED, EXTENDED RELEASE ORAL EVERY 12 HOURS PRN
COMMUNITY
Start: 2024-06-15

## 2024-06-18 RX ORDER — KETOROLAC TROMETHAMINE 10 MG/1
TABLET, FILM COATED ORAL
COMMUNITY
Start: 2024-06-05

## 2024-06-18 RX ORDER — KETOROLAC TROMETHAMINE 30 MG/ML
60 INJECTION, SOLUTION INTRAMUSCULAR; INTRAVENOUS
Status: COMPLETED | OUTPATIENT
Start: 2024-06-18 | End: 2024-06-18

## 2024-06-18 RX ORDER — LIDOCAINE HYDROCHLORIDE 10 MG/ML
1 INJECTION INFILTRATION; PERINEURAL
Status: COMPLETED | OUTPATIENT
Start: 2024-06-18 | End: 2024-06-18

## 2024-06-18 RX ADMIN — LIDOCAINE HYDROCHLORIDE 1 ML: 10 INJECTION, SOLUTION INFILTRATION; PERINEURAL at 12:06

## 2024-06-18 RX ADMIN — KETOROLAC TROMETHAMINE 60 MG: 30 INJECTION, SOLUTION INTRAMUSCULAR at 12:06

## 2024-06-18 RX ADMIN — TRIAMCINOLONE ACETONIDE 80 MG: 40 INJECTION, SUSPENSION INTRA-ARTICULAR; INTRAMUSCULAR at 12:06

## 2024-06-19 NOTE — PROGRESS NOTES
Subjective:       Patient ID: Mathew Rodrigues is a 69 y.o. male.    Chief Complaint: Foot Injury, Heel Pain, Diabetes Mellitus, and Callouses  Patient presents with his wife with complaint of heel pain right following elevate her accident about 2-1/2 weeks ago on 6/1/2024 Cable broke on their home elevator, dropped about 10 ft.  Fractured tailbone, injured left hip in the right foot and ankle.  He relates problems with the right foot and ankle since an injury where he severely both broke in torn ligaments on the inside which required extensive surgical repair including repair of ligament in tendons.  Following this surgery he developed a clubfoot and nerve pain.  He favors the outside of this foot develops a callus under the 5th met head.  With recent injury nerve pain on the outside of the foot in the bottom of his right heel has been moderate pain constantly in more severe at times.  X-rays were taken initially and then again recently by the PCP since he was not improving which ruled out a fracture patient relates this is not improving.  Pain level 6/10  Wife showed me a photo of bottom and inside of the right foot shortly after the injury with significant bruising in the arch and forefoot to the entire inside of the right foot, arch and ankle area    Past Medical History:   Diagnosis Date    Arthritis     COPD (chronic obstructive pulmonary disease)     Diabetes mellitus, type 2     DVT (deep vein thrombosis) in pregnancy     Hyperlipidemia     Hypertension     Kidney stones     LVH (left ventricular hypertrophy) due to hypertensive disease, with heart failure     Neuropathy     Personal history of colonic polyps 03/24/2000    colonoscopy report in media    PTSD (post-traumatic stress disorder)     Sleep apnea, unspecified     Torn rotator cuff      Past Surgical History:   Procedure Laterality Date    achilles repair Left 2003    ADENOIDECTOMY      ANKLE FRACTURE SURGERY Right 1987    CATARACT EXTRACTION Left  2004    CATARACT EXTRACTION Left 2005    2nd    COLONOSCOPY N/A 05/09/2017    results in media    COLONOSCOPY N/A 01/13/2023    Procedure: COLONOSCOPY;  Surgeon: Kenney Keller MD;  Location: [facility];  Service: General;  Laterality: N/A;    COLONOSCOPY W/ POLYPECTOMY N/A 03/24/2000    results in media    ELBOW SURGERY Right 07/24/2020    EYE SURGERY  2005    finger joint replacement Right 01/22/2020    middle finger    hair follicle  1998    HAND SURGERY Right 04/25/2017    HAND SURGERY Right 05/12/2017    2nd surgery    NASAL SINUS SURGERY  08/2015    ROTATOR CUFF REPAIR Right 09/25/2015    ROTATOR CUFF REPAIR Left 04/08/2016    skin grafts Right 07/2016    shin from stingray wound    TONSILLECTOMY      VASECTOMY       Family History   Problem Relation Name Age of Onset    Hypertension Mother Sierra     Arthritis Mother Sierra     Hypertension Father Misael     Diabetes Father Misael     Arthritis Father Misael     Hearing loss Father Misael     Cancer Brother Mickey Douglas     COPD Brother Mickey Douglas      Social History     Socioeconomic History    Marital status:    Tobacco Use    Smoking status: Former     Current packs/day: 1.50     Average packs/day: 1.5 packs/day for 30.0 years (45.0 ttl pk-yrs)     Types: Cigarettes    Smokeless tobacco: Never   Substance and Sexual Activity    Alcohol use: Yes     Comment: occasionally    Drug use: Never    Sexual activity: Yes     Partners: Female     Birth control/protection: None     Social Determinants of Health     Financial Resource Strain: Low Risk  (12/18/2023)    Overall Financial Resource Strain (CARDIA)     Difficulty of Paying Living Expenses: Not hard at all   Food Insecurity: No Food Insecurity (12/18/2023)    Hunger Vital Sign     Worried About Running Out of Food in the Last Year: Never true     Ran Out of Food in the Last Year: Never true   Transportation Needs: No Transportation Needs (12/18/2023)    PRAPARE - Transportation     Lack of  "Transportation (Medical): No     Lack of Transportation (Non-Medical): No   Physical Activity: Insufficiently Active (12/18/2023)    Exercise Vital Sign     Days of Exercise per Week: 1 day     Minutes of Exercise per Session: 20 min   Stress: No Stress Concern Present (12/18/2023)    [employer] - Occupational Stress Questionnaire     Feeling of Stress : Not at all   Housing Stability: Low Risk  (12/18/2023)    Housing Stability Vital Sign     Unable to Pay for Housing in the Last Year: No     Number of Places Lived in the Last Year: 1     Unstable Housing in the Last Year: No       Current Outpatient Medications   Medication Sig Dispense Refill    loratadine (CLARITIN ORAL)       NUCYNTA  mg Tb12 Take 1 tablet by mouth every 12 (twelve) hours as needed.      NUCYNTA  mg Tb12 Take 1 tablet by mouth every 12 (twelve) hours.      tiZANidine (ZANAFLEX) 4 MG tablet Take 4 mg by mouth every 12 (twelve) hours.      albuterol (PROVENTIL/VENTOLIN HFA) 90 mcg/actuation inhaler albuterol sulfate HFA 90 mcg/actuation aerosol inhaler 18 g 11    amLODIPine (NORVASC) 5 MG tablet TAKE 1 TABLET BY MOUTH EVERY DAY 90 tablet 3    apixaban (ELIQUIS) 5 mg Tab Take 5 mg by mouth 2 (two) times daily.      atorvastatin (LIPITOR) 10 MG tablet Take 1 tablet (10 mg total) by mouth every evening. 90 tablet 3    BD SHAHZAD 2ND GEN PEN NEEDLE 32 gauge x 5/32" Ndle USE ONCE DAILY WITH TRESIBA      busPIRone (BUSPAR) 5 MG Tab Take 1 tablet (5 mg total) by mouth every evening. 90 tablet 3    cyanocobalamin 1,000 mcg/mL injection Inject 1 mL (1,000 mcg total) into the muscle every 14 (fourteen) days. 6 mL 3    diclofenac sodium (VOLTAREN ARTHRITIS PAIN) 1 % Gel Apply 2 g topically 3 (three) times daily as needed (pain). 100 g 0    fenofibrate 160 MG Tab Take 1 tablet (160 mg total) by mouth nightly. 90 tablet 1    fluticasone-umeclidin-vilanter (TRELEGY ELLIPTA) 200-62.5-25 mcg inhaler Inhale 1 puff into the " "lungs once daily. 180 each 1    glipiZIDE (GLUCOTROL) 5 MG tablet Take 1 tablet (5 mg total) by mouth 2 (two) times daily before meals. 180 tablet 1    guanFACINE (TENEX) 2 MG tablet TAKE 1 TABLET(2 MG) BY MOUTH EVERY MORNING 90 tablet 0    ketorolac (TORADOL) 10 mg tablet Take by mouth.      lisinopriL-hydrochlorothiazide (PRINZIDE,ZESTORETIC) 20-12.5 mg per tablet Take 2 tablets by mouth once daily. 180 tablet 3    metFORMIN (GLUCOPHAGE) 1000 MG tablet Take 1 tablet (1,000 mg total) by mouth 2 (two) times daily with meals. 180 tablet 1    metoprolol tartrate (LOPRESSOR) 25 MG tablet Take 1 tablet (25 mg total) by mouth 2 (two) times daily. 180 tablet 3    omeprazole (PRILOSEC) 40 MG capsule Take 1 capsule (40 mg total) by mouth 2 (two) times daily before meals. 60 capsule 11    OZEMPIC 0.25 mg or 0.5 mg (2 mg/3 mL) pen injector INJECT 0.5 MG UNDER THE SKIN EVERY 7 DAYS 9 mL 0    sildenafiL (VIAGRA) 100 MG tablet Take 1 tablet (100 mg total) by mouth daily as needed for Erectile Dysfunction. 30 tablet 5    tamsulosin (FLOMAX) 0.4 mg Cap Take 1 capsule (0.4 mg total) by mouth nightly. 90 capsule 3    tapentadoL (NUCYNTA ER) 200 mg Tb12 Take 1 tablet by mouth 2 (two) times a day. 60 tablet 0    testosterone cypionate (DEPOTESTOTERONE CYPIONATE) 200 mg/mL injection ADMINISTER 0.75 ML(150 MG) IN THE MUSCLE EVERY 7 DAYS 12 mL 0    traZODone (DESYREL) 50 MG tablet Take 1-3 tablets ( mg total) by mouth every evening. 270 tablet 3    UNABLE TO FIND 4 mg. medication name: Clortabs 4mg Qam       No current facility-administered medications for this visit.     Review of patient's allergies indicates:   Allergen Reactions    Ibuprofen Itching       Review of Systems   Musculoskeletal:  Positive for gait problem.        Walker   All other systems reviewed and are negative.      Objective:      Vitals:    06/18/24 1136   BP: (!) 165/87   Pulse: 105   Resp: 18   Weight: 91.2 kg (201 lb)   Height: 5' 8" (1.727 m) "     Physical Exam  Vitals and nursing note reviewed. Exam conducted with a chaperone present.   Constitutional:       Appearance: Normal appearance.   Cardiovascular:      Pulses:           Dorsalis pedis pulses are 2+ on the right side and 2+ on the left side.        Posterior tibial pulses are 2+ on the right side and 2+ on the left side.   Musculoskeletal:         General: Tenderness and deformity present.      Right foot: Decreased range of motion. Deformity (Clubfoot with limited range of motion right) and prominent metatarsal heads (Prominent 5th met head right) present.      Comments: Severe pain at the plantar fascial insertion calcaneus right with edema, no erythema, calor or remaining ecchymosis present at this time   Feet:      Right foot:      Skin integrity: Callus (Very tender hyperkeratotic lesion, chronic sub 5th met head right without discoloration) present.      Left foot:      Skin integrity: Skin integrity normal.   Skin:     Capillary Refill: Capillary refill takes less than 2 seconds.   Neurological:      General: No focal deficit present.      Mental Status: He is alert.      Sensory: Sensory deficit: Pain positive sural neuritis right especially lateral aspect of the 5th met head.   Psychiatric:         Thought Content: Thought content normal.                           EXAMINATION:  XR FOOT COMPLETE 3 VIEW RIGHT; XR ANKLE COMPLETE 3 VIEW RIGHT     CLINICAL HISTORY:  . Unspecified fall, initial encounter     TECHNIQUE:  AP, lateral, and oblique views of the right ankle and foot were performed.     COMPARISON:  06/01/2024.     FINDINGS:  No significant soft tissue swelling.  No acute fracture.  Tibiotalar lay shin intact with mild degenerative osteoarthrosis.     Tarsal bones intact.  Mild to moderate degenerative osteoarthrosis of the midfoot most predominant within the talonavicular joint.  There is partial versus complete ankylosis of the subtalar facet joints.  Normal tarsometatarsal  alignment.  Mild hammertoe formation with mild degenerative osteoarthrosis involving the IP joints of the digits.     Small focus of soft tissue calcification involving the proximal plantar aponeurosis.     Impression:     1. No acute fracture or dislocation.  2. Mild to moderate degenerative osteoarthrosis of the midfoot.  3. Mild hammertoe formation.        Electronically signed by:Maynor Martinez  Date:                                            06/04/2024     Assessment:       1. Rupture of plantar fascia of right foot, initial encounter    2. Contusion of left heel, subsequent encounter    3. Sural neuritis, right    4. Acquired deformity of right foot    5. Type 2 diabetes mellitus without complication, with long-term current use of insulin    6. Foot callus - Right Foot        Plan:         80 MG TRIAMCINOLONE WITH 1 ML 1% LIDOCAINE PLAIN IM RIGHT HIP  60 MG TORADOL IM LEFT HIP      Reviewed x-rays  Explained to patient and wife with the amount of bruising, severe pain plantar fascial insertion in an impact falls most likely he partially tore or ruptured the plantar fascia  We discussed a location, anatomy this bandage tissue on the bottom of the foot which inserts at the heel and supports the arch.  Reviewed nerve pain on the inside of the heel due to swelling and inflammation  We had a lengthy discussion regarding the chronic nerve pain of the sural nerve, pressure on the chronic callus due to a clubfoot and compensating for the way he walks  Advised MRI can be done to check the plantar fascia especially if it is not improving, however conservative treatments include multiple treatments for inflammation and limiting activity to allow it to rest and heal  Reviewed appropriate shoes, thick sole for shock absorption help cushion the bottom of the heel  Reviewed arch support if tolerated  Reviewed ice/cool therapy in frequency this should be applied as long as well tolerated and beneficial  Due to pain level,  injury, multiple areas of inflammation of both the plantar fascial and nerves recommended IM steroid/cortisone and IM Toradol injection.  We discussed these injections, medications, how they are different but both very effective in reducing inflammation.  Explained these are used to help start the healing process, reduce pain swelling and inflammation but must be treated topically day and night for best results  Instructed on applying ice in the morning, heavy amount of diclofenac/Voltaren gel, allow to dry and then a over-the-counter 4% lidocaine patch  In the afternoon ice and apply Voltaren gel, reapply same lidocaine patch, good for 12 hours  In the evening ice, Voltaren gel and reapply a new lidocaine patch, this is worn all night, explained nerve responds best when it is treated all day and all night  Decrease activity and allow the area to rest and he heel  Callus debrided sub 5th met head right foot, area is well maintained  We reviewed foot structure, again appropriate shoes  Reviewed some underlying neuropathy pain and explained how this is different between the inflammation of the sural nerve on the outside of the foot and his recent injury  For neuropathy pain same above treatments especially Voltaren and lidocaine patches have been effective  We discussed other topical treatments for neuropathy pain and pain due to inflammation  Reviewed diabetic education  Check feet daily, contact office immediately with any change  Patient was in understanding and agreement with treatment plan.  I counseled the patient on their conditions, implications and medical management.  Instructed patient/family to contact the office with any changes, questions, concerns, worsening of symptoms.   Total face to face time 30 minutes, exam, assessment, treatment, discussion, additional time for review of chart prior to and following appointment and visit documentation, consultation and coordination of care.    Follow up   2  weeks  This note was created using [employer] voice recognition software that occasionally misinterpreted phrases or words.

## 2024-06-26 ENCOUNTER — OFFICE VISIT (OUTPATIENT)
Dept: CARDIOLOGY | Facility: CLINIC | Age: 69
End: 2024-06-26
Payer: MEDICARE

## 2024-06-26 VITALS
OXYGEN SATURATION: 96 % | RESPIRATION RATE: 20 BRPM | SYSTOLIC BLOOD PRESSURE: 132 MMHG | WEIGHT: 193.38 LBS | HEART RATE: 84 BPM | BODY MASS INDEX: 29.41 KG/M2 | DIASTOLIC BLOOD PRESSURE: 69 MMHG

## 2024-06-26 DIAGNOSIS — G47.33 OSA ON CPAP: ICD-10-CM

## 2024-06-26 DIAGNOSIS — U09.9 COVID-19 LONG HAULER MANIFESTING CHRONIC DYSPNEA: ICD-10-CM

## 2024-06-26 DIAGNOSIS — N18.31 CHRONIC KIDNEY DISEASE, STAGE 3A: ICD-10-CM

## 2024-06-26 DIAGNOSIS — I50.30 DIASTOLIC HEART FAILURE, UNSPECIFIED HF CHRONICITY: Primary | ICD-10-CM

## 2024-06-26 DIAGNOSIS — R06.09 DOE (DYSPNEA ON EXERTION): ICD-10-CM

## 2024-06-26 DIAGNOSIS — T75.4XXS ELECTROCUTION AND NONFATAL EFFECTS OF ELECTRIC CURRENT, SEQUELA: ICD-10-CM

## 2024-06-26 DIAGNOSIS — R06.09 COVID-19 LONG HAULER MANIFESTING CHRONIC DYSPNEA: ICD-10-CM

## 2024-06-26 DIAGNOSIS — E11.9 TYPE 2 DIABETES MELLITUS WITHOUT COMPLICATION, WITH LONG-TERM CURRENT USE OF INSULIN: ICD-10-CM

## 2024-06-26 DIAGNOSIS — I10 PRIMARY HYPERTENSION: ICD-10-CM

## 2024-06-26 DIAGNOSIS — R94.31 ABNORMAL ECG: ICD-10-CM

## 2024-06-26 DIAGNOSIS — Z79.01 ANTICOAGULANT LONG-TERM USE: ICD-10-CM

## 2024-06-26 DIAGNOSIS — Z79.4 TYPE 2 DIABETES MELLITUS WITHOUT COMPLICATION, WITH LONG-TERM CURRENT USE OF INSULIN: ICD-10-CM

## 2024-06-26 DIAGNOSIS — I82.5Y2 CHRONIC DEEP VEIN THROMBOSIS (DVT) OF PROXIMAL VEIN OF LEFT LOWER EXTREMITY: ICD-10-CM

## 2024-06-26 DIAGNOSIS — E65 ABDOMINAL OBESITY: ICD-10-CM

## 2024-06-26 DIAGNOSIS — F11.20 UNCOMPLICATED OPIOID DEPENDENCE: ICD-10-CM

## 2024-06-26 DIAGNOSIS — G89.4 CHRONIC PAIN SYNDROME: ICD-10-CM

## 2024-06-26 PROBLEM — T75.4XXA ELECTROCUTION AND NONFATAL EFFECTS OF ELECTRIC CURRENT: Status: ACTIVE | Noted: 2024-06-26

## 2024-06-26 LAB
OHS QRS DURATION: 132 MS
OHS QTC CALCULATION: 445 MS

## 2024-06-26 PROCEDURE — 99999 PR PBB SHADOW E&M-EST. PATIENT-LVL V: CPT | Mod: PBBFAC,,, | Performed by: INTERNAL MEDICINE

## 2024-06-26 PROCEDURE — 99215 OFFICE O/P EST HI 40 MIN: CPT | Mod: PBBFAC | Performed by: INTERNAL MEDICINE

## 2024-06-26 PROCEDURE — 93005 ELECTROCARDIOGRAM TRACING: CPT | Mod: PBBFAC | Performed by: INTERNAL MEDICINE

## 2024-06-26 NOTE — PROGRESS NOTES
Subjective:        Patient ID:  Mathew Rodrigues is a 69 y.o. male who presents for evaluation of No chief complaint on file.  PCP and referred by Kiley Vasquez MD   Prior cardiologist: in [place], last seen in 2021, post COVID with SOB  Pulmonary: Darci Quinteros MD   Lives with wife, Fide, here with patient, non-smoker  Retired , [employer]    Patient is a new patient to me.     Health literacy: high  Vaccinations: up-to-date, completed COVID, infection 2/2022, residual SOB.  Activities:  do yard work, limited by OTT  Nicotine: former smoker, quit 1990, 15 years up to 1.5 ppd  Alcohol: yes, max 1 beer in any 24 hours, rare  Illicit drugs: no, on Rx Nucynta bid since 2015  Cardiac symptoms:  SOB  Home BP: Yes, 135/70  Medication compliance: Yes, do not miss  Diet: Regular, some salt  Caffeine: How much do you consume a day? 3 cpd  Labs:   Sodium   Date Value Ref Range Status   06/01/2024 137 136 - 145 mmol/L Final     Potassium   Date Value Ref Range Status   06/01/2024 4.2 3.5 - 5.1 mmol/L Final     Chloride   Date Value Ref Range Status   06/01/2024 104 95 - 110 mmol/L Final     CO2   Date Value Ref Range Status   06/01/2024 20 (L) 23 - 29 mmol/L Final     Glucose   Date Value Ref Range Status   06/01/2024 147 (H) 70 - 110 mg/dL Final     BUN   Date Value Ref Range Status   06/01/2024 23 8 - 23 mg/dL Final     Creatinine   Date Value Ref Range Status   06/01/2024 1.6 (H) 0.5 - 1.4 mg/dL Final     Calcium   Date Value Ref Range Status   06/01/2024 9.6 8.7 - 10.5 mg/dL Final     Total Protein   Date Value Ref Range Status   06/01/2024 7.3 6.0 - 8.4 g/dL Final     Albumin   Date Value Ref Range Status   06/01/2024 3.7 3.5 - 5.2 g/dL Final     Total Bilirubin   Date Value Ref Range Status   06/01/2024 0.4 0.1 - 1.0 mg/dL Final     Comment:     For infants and newborns, interpretation of results should be based  on gestational age, weight and in agreement with clinical  observations.    Premature Infant recommended  reference ranges:  Up to 24 hours.............<8.0 mg/dL  Up to 48 hours............<12.0 mg/dL  3-5 days..................<15.0 mg/dL  6-29 days.................<15.0 mg/dL       Alkaline Phosphatase   Date Value Ref Range Status   06/01/2024 62 55 - 135 U/L Final     AST   Date Value Ref Range Status   06/01/2024 16 10 - 40 U/L Final     ALT   Date Value Ref Range Status   06/01/2024 17 10 - 44 U/L Final     Anion Gap   Date Value Ref Range Status   06/01/2024 13 8 - 16 mmol/L Final     eGFR   Date Value Ref Range Status   06/01/2024 46.4 (A) >60 mL/min/1.73 m^2 Final      Lab Results   Component Value Date    WBC 10.94 06/01/2024    RBC 5.46 06/01/2024    HGB 14.1 06/01/2024    HCT 45.4 06/01/2024    MCV 83 06/01/2024    MCH 25.8 (L) 06/01/2024    MCHC 31.1 (L) 06/01/2024    RDW 16.0 (H) 06/01/2024     06/01/2024    MPV 9.7 06/01/2024    GRAN 6.3 06/01/2024    GRAN 57.3 06/01/2024    LYMPH 2.4 06/01/2024    LYMPH 21.8 06/01/2024    MONO 0.7 06/01/2024    MONO 6.2 06/01/2024    EOS 1.4 (H) 06/01/2024    BASO 0.07 06/01/2024    EOSINOPHIL 12.6 (H) 06/01/2024    BASOPHIL 0.6 06/01/2024      Lab Results   Component Value Date    TSH 1.542 05/05/2023     Lab Results   Component Value Date    HGBA1C 6.5 (H) 04/24/2024      Lab Results   Component Value Date    CHOL 126 08/08/2023    CHOL 100 (L) 11/04/2022     Lab Results   Component Value Date    HDL 37 (L) 08/08/2023    HDL 35 (L) 11/04/2022     Lab Results   Component Value Date    LDLCALC 56.6 (L) 08/08/2023    LDLCALC 41.2 (L) 11/04/2022     Lab Results   Component Value Date    TRIG 162 (H) 08/08/2023    TRIG 119 11/04/2022       Lab Results   Component Value Date    CHOLHDL 29.4 08/08/2023    CHOLHDL 35.0 11/04/2022        Last Echo: 2021  No results found for this or any previous visit.    Last stress test: 2021  No results found for this or any previous visit.     Cardiovascular angiogram: none    ECG: NSR, rate 76, RBBB + LAFB, LVH    Fundoscopic exam:  "annually, no retinopathy  [facility] referred for CV evaluation. Problem with chronic OTT post COVID in 2020, also with dx of COPD and quit smoking in 1992. Also history for HTN and HLD. History of DVT, idiopathic in 8/2023, do not recall testing for hypercoagulation.     CT / CXR 6/2024: Lungs are clear.Normal cardiomediastinal silhouette.Normal pulmonary vascular distribution.No pleural effusion  Atherosclerosis.   Lung bases are clear.  Normal size heart.    Kiley Vasquez MD noted 5/21/2024 "Chronic kidney disease, stage 3a  Diastolic heart failure, unspecified HF chronicity  Assessment & Plan:  Has upcoming appt with cardiology."      Review of Systems   Constitutional: Negative for diaphoresis, fever, malaise/fatigue, night sweats and weight gain.   HENT:  Negative for hearing loss, nosebleeds and tinnitus.    Eyes:  Negative for discharge and visual disturbance.   Cardiovascular:  Positive for dyspnea on exertion. Negative for chest pain, claudication, cyanosis, irregular heartbeat, leg swelling, near-syncope, orthopnea, palpitations and paroxysmal nocturnal dyspnea.   Respiratory:  Positive for cough and wheezing. Negative for shortness of breath, sleep disturbances due to breathing and snoring.         Bailey score 7, awaken refreshed. Using CPAP 100%   Endocrine: Negative for polydipsia and polyuria.   Hematologic/Lymphatic: Does not bruise/bleed easily.   Skin:  Negative for color change, flushing, nail changes, poor wound healing and suspicious lesions.   Musculoskeletal:  Negative for arthritis, falls, gout, joint pain, joint swelling, muscle cramps, muscle weakness, myalgias and neck pain.   Gastrointestinal:  Negative for constipation, diarrhea, heartburn, hematemesis, hematochezia, melena, nausea and vomiting.   Genitourinary:  Negative for hematuria and urgency.   Neurological:  Negative for disturbances in coordination, excessive daytime sleepiness, dizziness, focal weakness, headaches, " "light-headedness, loss of balance, numbness, vertigo and weakness.   Psychiatric/Behavioral:  Negative for depression and substance abuse. The patient does not have insomnia and is not nervous/anxious.      Answers submitted by the patient for this visit:  Review of Systems Questionnaire (Submitted on 6/25/2024)  activity change: No  unexpected weight change: No  rhinorrhea: No  trouble swallowing: No  chest tightness: No  blood in stool: No  difficulty urinating: No  arthralgias: No  confusion: No  dysphoric mood: No    Objective:    Physical Exam  Constitutional:       Appearance: He is well-developed.      Comments: RA O2 sat 96%  Orthostatic VS: sitting 137/68, standing 132/69   HENT:      Head: Normocephalic.   Eyes:      Conjunctiva/sclera: Conjunctivae normal.      Pupils: Pupils are equal, round, and reactive to light.   Neck:      Thyroid: No thyromegaly.      Vascular: No JVD.   Cardiovascular:      Rate and Rhythm: Normal rate and regular rhythm.      Pulses: Intact distal pulses.           Carotid pulses are 2+ on the right side and 2+ on the left side.       Radial pulses are 2+ on the right side and 2+ on the left side.        Dorsalis pedis pulses are 2+ on the right side and 2+ on the left side.        Posterior tibial pulses are 2+ on the right side and 2+ on the left side.      Heart sounds: Heart sounds are distant. No murmur heard.     No friction rub. No gallop.   Pulmonary:      Effort: Pulmonary effort is normal.      Breath sounds: No rales.      Comments: Diminished breath sounds and prolong expiration.       Chest:      Chest wall: No tenderness.   Abdominal:      General: Bowel sounds are normal.      Palpations: Abdomen is soft.      Tenderness: There is no abdominal tenderness.      Comments: Waist 44"   Musculoskeletal:         General: Normal range of motion.      Cervical back: Normal range of motion and neck supple.   Lymphadenopathy:      Cervical: No cervical adenopathy.   Skin:    "  General: Skin is warm and dry.      Findings: No rash.   Neurological:      Mental Status: He is alert and oriented to person, place, and time.           Assessment:       1. Diastolic heart failure, unspecified HF chronicity    2. OTT (dyspnea on exertion), post COVID, 2/2020    3. COVID-19 long hauler manifesting chronic dyspnea, sputum and cough    4. Electrocution and nonfatal effects of electric current, sequela    5. Uncomplicated opioid dependence, Nucynta bid since 2015    6. Primary hypertension, dx 2015    7. CRICKET on CPAP, 100% use    8. Chronic kidney disease, stage 3a    9. Abnormal ECG    10. Chronic pain syndrome    11. Chronic deep vein thrombosis (DVT) of proximal vein of left lower extremity, 8/2023    12. Anticoagulant long-term use    13. Abdominal obesity    14. Type 2 diabetes mellitus without complication, with long-term current use of insulin         Plan:     Diagnoses and all orders for this visit:    Diastolic heart failure, unspecified HF chronicity  -     IN OFFICE EKG 12-LEAD (to Muse)  -     [facility] ([facility]) Enrollment Order  -     empagliflozin (JARDIANCE) 10 mg tablet; Take 1 tablet (10 mg total) by mouth once daily.  -     Echo; Future  -     Nuclear Stress - Cardiology Interpreted; Future    OTT (dyspnea on exertion), post COVID, 2/2020  -     IN OFFICE EKG 12-LEAD (to Muse)  -     Echo; Future  -     Nuclear Stress - Cardiology Interpreted; Future    COVID-19 long hauler manifesting chronic dyspnea, sputum and cough    Electrocution and nonfatal effects of electric current, sequela    Uncomplicated opioid dependence, Nucynta bid since 2015    Primary hypertension, dx 2015  -     [facility] ([facility]) Enrollment Order  -     empagliflozin (JARDIANCE) 10 mg tablet; Take 1 tablet (10 mg total) by mouth once daily.    CRICKET on CPAP, 100% use    Chronic kidney disease, stage 3a  -     empagliflozin (JARDIANCE) 10 mg tablet; Take 1 tablet (10 mg total) by  "mouth once daily.    Abnormal ECG  -     Echo; Future  -     Nuclear Stress - Cardiology Interpreted; Future    Chronic pain syndrome    Chronic deep vein thrombosis (DVT) of proximal vein of left lower extremity, 8/2023    Anticoagulant long-term use    Abdominal obesity  -     empagliflozin (JARDIANCE) 10 mg tablet; Take 1 tablet (10 mg total) by mouth once daily.    Type 2 diabetes mellitus without complication, with long-term current use of insulin  -     empagliflozin (JARDIANCE) 10 mg tablet; Take 1 tablet (10 mg total) by mouth once daily.  -     Echo; Future  -     Nuclear Stress - Cardiology Interpreted; Future       - All medical issues reviewed, willing to proceed with Jardiance Rx, adverse side effects discussed including possible genital fungal infection, and DKA, info given. Willing to proceed with myVBO for HTN.  - Warning signs of MI and stroke given, if symptoms last more than 5 minutes, stop immediately and call 911.  - Refer to "Chronic Pain" article in Consumer Report 6/2019 issue.   - CV status all medications reviewed, patient acknowledge good understanding.  - Recommend healthy living: avoid alcohol, healthy diet and regular exercise aiming for fitness, restorative sleep and weight control  - Need good exercise program, 4 key elements: 1. Aerobic (walking, swimming, dancing, jogging, biking, etc, 2. Muscle strengthening / resistance exercise, need to do 2-3 times weekly, 3. Stretching daily, good stretch takes a whole  total minute. 4. Balance exercise daily.   - Instruction for Mediterranean diet and heart healthy exercise given.  - Check home blood pressure, 2 days weekly, do 2 readings within 5 minutes in AM and PM, keep log for review. Target resting BP is less than 130/80.   - Weigh twice weekly, try to lose 1-2 lbs per week. Target weight loss of 5%-10%.  - Highly recommend 30-60 minutes of exercise / activities daily, can have Sunday off, with 2-3 sessions of muscle " strengthening weekly. A  would be very helpful.  - Will follow up in 3 months to check efficacy, patient's preference.  - Phone review / encourage use of [facility]       Total time spend including review of record prior to face-to-face visit is 60 minutes. Greater than 50% of the time was spent in counseling and coordination of care. The above assessment and plan have been discussed at length. Referring provider's note reviewed. Labs and procedure over the last 6 months reviewed. Problem List updated. Asked to bring in all active medications / pills bottles with next visit. Will send note to referring / PCP.

## 2024-06-26 NOTE — PATIENT INSTRUCTIONS
Recommended Mediterranean dietEating Heart-Healthy Food: Using the DASH Plan  Eating for your heart doesnt have to be hard or boring. You just need to know how to make healthier choices. The DASH eating plan has been developed to help you do just that. DASH stands for Dietary Approaches to Stop Hypertension. It is a plan that has been proven to be healthier for your heart and to lower your risk for high blood pressure. It can also help lower your risk for cancer, heart disease, osteoporosis, and diabetes.  Choosing from Each Food Group  Choose foods from each of the food groups below each day. Try to get the recommended number of servings for each food group. The serving numbers are based on a diet of 2,000 calories a day. Talk to your doctor if youre unsure about your calorie needs.  Grains   Servings: 7-8 a day  A serving is:  1 slice bread  1 ounce dry cereal  half a cup cooked rice or pasta  Best choices: Whole grains and any grains high in fiber.  Vegetables   Servings: 4-5 a day  A serving is:  1 cup raw leafy vegetable  Half a cup cooked vegetable  Three-quarter cup vegetable juice  Best choices: Fresh or frozen vegetable prepared without too much added salt or fat.    Fruits   Servings: 4-5 a day  A serving is:  Three-quarter cup fruit juice  1 medium fruit  One-quarter cup dried fruit  One-half cup fresh, frozen, or canned fruit  Best choices: A variety of fresh fruits of different colors. Whole fruits are a much better choice than fruit juices.  Low-fat or Fat Free Dairy   Servings: 2-3 a day  A serving is:  8 ounces milk  1 cup yogurt  One and a half ounces cheese  Best choices: Skim or 1% milk, low-fat or fat free yogurt or buttermilk, and low-fat cheeses.       Meat, Poultry, Fish   Servings: 2 or fewer a day  A serving is:  3 ounces cooked meat, poultry, or fish  Best choices: Lean meats and fish. Trim away visible fat. Broil, roast, or boil instead of frying. Remove skin from poultry before eating.   Nuts, Seeds, Beans   Servings: 4-5 a week  A serving is:  One third cup nuts (or one and a half ounces)  2 tablespoons sunflower seeds  Half a cup cooked beans  Best choices: Dry roasted nuts with no salt added, lentils, kidney beans, garbanzo beans, and whole aaron beans.    Fats and Oils   Servings: 2 a day  A serving is:  1 teaspoon vegetable oil  1 teaspoon soft margarine  1 tablespoon low-fat mayonnaise  1 teaspoon regular mayonnaise  2 tablespoons light salad dressing  1 tablespoon regular salad dressing  Best choices: Monounsaturated and polyunsaturated fats such as olive, canola, or safflower oil.  Sweets   Servings: 5 a week or fewer  A serving is:  1 tablespoon sugar, maple syrup, or honey  1 tablespoon jam or jelly  1 half-ounce jelly beans (about 15)  8 ounces lemonade  Best choices: Dried fruit can be a satisfying sweet. Choose low-fat sweets when possible. And watch your serving sizes!       Aerobic Exercise for a Healthy Heart  Exercise is a lot more than an energy booster and a stress reliever. It also strengthens your heart muscle, lowers your blood pressure and blood cholesterol, and burns calories.      Remember, some activity is better than none.     Choose an Aerobic Activity  Choose a nonstop activity that makes your heart and lungs work harder than they do when you rest or walk normally. This aerobic exercise can improve the way your heart and other muscles use oxygen. Make it fun by exercising with a friend and choosing an activity you enjoy. Here are some ideas:  Walking  Swimming  Bicycling  Stair climbing  Dancing  Jogging  Exercise Regularly  If you havent been exercising regularly,  get your doctors okay first. Then start slowly.  Here are some tips:  Begin exercising 3 times a week for 5-10 minutes at a time.  When you feel comfortable, add a few minutes each week.  Slowly build up to exercising 3-4 times each week for 20-40 minutes. Aim for a total of 150 or more minutes a  week.  Be sure to carry your nitroglycerin with you when you exercise.  If you get angina when youre exercising, stop what youre doing, take your nitroglycerin, and call your doctor.  © 9224-2509 Sherita Chan, [address] [place] [address], [place] [address]. All rights reserved. This information is not intended as a substitute for professional medical care. Always follow your healthcare professional's instructions.    Losing Weight (Cardiovascular)  Excess weight is a major risk factor for heart disease. Losing weight may help keep your arteries open so that your heart can get the oxygen-rich blood it needs. Weight loss can also help lower your blood pressure and reduce your risk for diabetes. All in all, losing weight makes you healthier.          Exercise with a friend. When activity is fun, you're more likely to stick with it.        Calories and Weight Loss  Calories are the fuel your body burns for energy. You get the calories you need from the food you eat. For healthy weight loss, women should eat at least 1,200 calories a day, men at least 1,500.    When you eat more calories than you need, your body stores the extra calories as fat. One pound of fat equals 3,500 calories.    To lose weight, try to burn 500 calories a day more than you eat. To do this, eat 250 calories less each day. Add activity to burn the other 250 calories. Walking 21/2 miles burns about 250 calories.    Eat a variety of healthy foods. Its the best way to make calories count.     Tips for losing weight:  Drink 8 to 10 glasses of water a day.    Dont skip meals. Instead, eat smaller portions.       Brisk Activity Is Best  Brisk activity gets your heart pumping faster. It makes your heart healthier. Its also the best way to burn calories. In fact, your body may keep burning calories for hours after you stop a brisk activity.    Begin by walking 10 minutes most days.    Add more time and speed to your walk. Build up as you feel  able.    Try to walk briskly at least 30 minutes most days. If needed, you can break this into 2 shorter sessions.     Check off the ideas below that you could try to make your day more active:    Take the stairs instead of the elevator.    Park your car farther away and walk.    Ride a bike to work or to the store.    Walk laps around the mall.

## 2024-06-27 DIAGNOSIS — E29.1 HYPOGONADISM IN MALE: ICD-10-CM

## 2024-06-27 NOTE — TELEPHONE ENCOUNTER
No care due was identified.  [facility] Embedded Care Due Messages. Reference number: [id number].   6/27/2024 5:48:34 PM CDT

## 2024-06-27 NOTE — TELEPHONE ENCOUNTER
Refill Routing Note   Medication(s) are not appropriate for processing by [facility] for the following reason(s):        Outside of protocol    [facility] action(s):  Route               Appointments  past 12m or future 3m with PCP    Date Provider   Last Visit   5/15/2024 Kiley Vasquez MD   Next Visit   8/16/2024 Kiley Vasquez MD   ED visits in past 90 days: 1        Note composed:5:50 PM 06/27/2024

## 2024-06-28 RX ORDER — TESTOSTERONE CYPIONATE 200 MG/ML
INJECTION, SOLUTION INTRAMUSCULAR
Qty: 12 ML | Refills: 0 | OUTPATIENT
Start: 2024-06-28

## 2024-07-02 ENCOUNTER — OFFICE VISIT (OUTPATIENT)
Dept: PODIATRY | Facility: CLINIC | Age: 69
End: 2024-07-02
Payer: MEDICARE

## 2024-07-02 VITALS
WEIGHT: 192 LBS | BODY MASS INDEX: 29.1 KG/M2 | HEART RATE: 95 BPM | DIASTOLIC BLOOD PRESSURE: 75 MMHG | HEIGHT: 68 IN | SYSTOLIC BLOOD PRESSURE: 121 MMHG

## 2024-07-02 DIAGNOSIS — G57.81 SURAL NEURITIS, RIGHT: ICD-10-CM

## 2024-07-02 DIAGNOSIS — S93.691D RUPTURE OF PLANTAR FASCIA OF RIGHT FOOT, SUBSEQUENT ENCOUNTER: Primary | ICD-10-CM

## 2024-07-02 DIAGNOSIS — M21.961 ACQUIRED DEFORMITY OF RIGHT FOOT: ICD-10-CM

## 2024-07-02 DIAGNOSIS — S90.32XD CONTUSION OF LEFT HEEL, SUBSEQUENT ENCOUNTER: ICD-10-CM

## 2024-07-02 PROCEDURE — 99999 PR PBB SHADOW E&M-EST. PATIENT-LVL V: CPT | Mod: PBBFAC,,, | Performed by: PODIATRIST

## 2024-07-02 PROCEDURE — 96372 THER/PROPH/DIAG INJ SC/IM: CPT | Mod: PBBFAC

## 2024-07-02 PROCEDURE — 99215 OFFICE O/P EST HI 40 MIN: CPT | Mod: PBBFAC | Performed by: PODIATRIST

## 2024-07-02 PROCEDURE — 99214 OFFICE O/P EST MOD 30 MIN: CPT | Mod: S$PBB,,, | Performed by: PODIATRIST

## 2024-07-02 PROCEDURE — 99999PBSHW PR PBB SHADOW TECHNICAL ONLY FILED TO HB: Mod: PBBFAC,,,

## 2024-07-02 RX ORDER — KETOROLAC TROMETHAMINE 10 MG/1
10 TABLET, FILM COATED ORAL EVERY 6 HOURS
Qty: 20 TABLET | Refills: 0 | Status: SHIPPED | OUTPATIENT
Start: 2024-07-02 | End: 2024-07-07

## 2024-07-02 RX ORDER — KETOROLAC TROMETHAMINE 30 MG/ML
60 INJECTION, SOLUTION INTRAMUSCULAR; INTRAVENOUS
Status: COMPLETED | OUTPATIENT
Start: 2024-07-02 | End: 2024-07-02

## 2024-07-02 RX ADMIN — KETOROLAC TROMETHAMINE 60 MG: 30 INJECTION, SOLUTION INTRAMUSCULAR at 12:07

## 2024-07-05 DIAGNOSIS — E11.9 TYPE 2 DIABETES MELLITUS WITHOUT COMPLICATION, WITH LONG-TERM CURRENT USE OF INSULIN: ICD-10-CM

## 2024-07-05 DIAGNOSIS — E11.9 TYPE 2 DIABETES MELLITUS WITHOUT COMPLICATION, WITHOUT LONG-TERM CURRENT USE OF INSULIN: ICD-10-CM

## 2024-07-05 DIAGNOSIS — Z79.4 TYPE 2 DIABETES MELLITUS WITHOUT COMPLICATION, WITH LONG-TERM CURRENT USE OF INSULIN: ICD-10-CM

## 2024-07-05 NOTE — PROGRESS NOTES
Subjective:       Patient ID: Mathew Rodrigues is a 69 y.o. male.    Chief Complaint: Follow-up and Heel Pain  Patient presents with his wife for follow-up most likely partial rupture of the plantar fascia right heel following elevator accident 6/1/2024 Cable broke on their home elevator, dropped about 10 ft.    Patient relates he is slowly improving.  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  Even treating this area on a regular basis he has having a lot pain on the outside of the right foot  Does report overall improvement, previous pain level 6/10, today 2/10      Past Medical History:   Diagnosis Date    Arthritis     COPD (chronic obstructive pulmonary disease)     Diabetes mellitus, type 2     DVT (deep vein thrombosis) in pregnancy     Hyperlipidemia     Hypertension     Kidney stones     LVH (left ventricular hypertrophy) due to hypertensive disease, with heart failure     Neuropathy     Personal history of colonic polyps 03/24/2000    colonoscopy report in media    PTSD (post-traumatic stress disorder)     Sleep apnea, unspecified     Torn rotator cuff      Past Surgical History:   Procedure Laterality Date    achilles repair Left 2003    ADENOIDECTOMY      ANKLE FRACTURE SURGERY Right 1987    CATARACT EXTRACTION Left 2004    CATARACT EXTRACTION Left 2005 2nd    COLONOSCOPY N/A 05/09/2017    results in media    COLONOSCOPY N/A 01/13/2023    Procedure: COLONOSCOPY;  Surgeon: Kenney Kelelr MD;  Location: [facility];  Service: General;  Laterality: N/A;    COLONOSCOPY W/ POLYPECTOMY N/A 03/24/2000    results in media    ELBOW SURGERY Right 07/24/2020    EYE SURGERY  2005    finger joint  replacement Right 01/22/2020    middle finger    hair follicle  1998    HAND SURGERY Right 04/25/2017    HAND SURGERY Right 05/12/2017    2nd surgery    NASAL SINUS SURGERY  08/2015    ROTATOR CUFF REPAIR Right 09/25/2015    ROTATOR CUFF REPAIR Left 04/08/2016    skin grafts Right 07/2016    shin from stingray wound    TONSILLECTOMY      VASECTOMY       Family History   Problem Relation Name Age of Onset    Hypertension Mother Sierra     Arthritis Mother Sierra     Hypertension Father Misael     Diabetes Father Misael     Arthritis Father Misael     Hearing loss Father Misael     Cancer Brother Mickey Douglas     COPD Brother Mickey Douglas      Social History     Socioeconomic History    Marital status:    Tobacco Use    Smoking status: Former     Current packs/day: 1.50     Average packs/day: 1.5 packs/day for 30.0 years (45.0 ttl pk-yrs)     Types: Cigarettes    Smokeless tobacco: Never   Substance and Sexual Activity    Alcohol use: Yes     Comment: occasionally    Drug use: Never    Sexual activity: Yes     Partners: Female     Birth control/protection: None     Social Determinants of Health     Financial Resource Strain: Low Risk  (12/18/2023)    Overall Financial Resource Strain (CARDIA)     Difficulty of Paying Living Expenses: Not hard at all   Food Insecurity: No Food Insecurity (12/18/2023)    Hunger Vital Sign     Worried About Running Out of Food in the Last Year: Never true     Ran Out of Food in the Last Year: Never true   Transportation Needs: No Transportation Needs (12/18/2023)    PRAPARE - Transportation     Lack of Transportation (Medical): No     Lack of Transportation (Non-Medical): No   Physical Activity: Insufficiently Active (12/18/2023)    Exercise Vital Sign     Days of Exercise per Week: 1 day     Minutes of Exercise per Session: 20 min   Stress: No Stress Concern Present (12/18/2023)    [place] [employer] - Occupational Stress Questionnaire     Feeling of Stress : Not at  "all   Housing Stability: Low Risk  (12/18/2023)    Housing Stability Vital Sign     Unable to Pay for Housing in the Last Year: No     Number of Places Lived in the Last Year: 1     Unstable Housing in the Last Year: No       Current Outpatient Medications   Medication Sig Dispense Refill    albuterol (PROVENTIL/VENTOLIN HFA) 90 mcg/actuation inhaler albuterol sulfate HFA 90 mcg/actuation aerosol inhaler 18 g 11    amLODIPine (NORVASC) 5 MG tablet TAKE 1 TABLET BY MOUTH EVERY DAY 90 tablet 3    apixaban (ELIQUIS) 5 mg Tab Take 5 mg by mouth 2 (two) times daily.      atorvastatin (LIPITOR) 10 MG tablet Take 1 tablet (10 mg total) by mouth every evening. 90 tablet 3    BD SHAHZAD 2ND GEN PEN NEEDLE 32 gauge x 5/32" Ndle USE ONCE DAILY WITH TRESIBA      busPIRone (BUSPAR) 5 MG Tab Take 1 tablet (5 mg total) by mouth every evening. 90 tablet 3    cyanocobalamin 1,000 mcg/mL injection Inject 1 mL (1,000 mcg total) into the muscle every 14 (fourteen) days. 6 mL 3    empagliflozin (JARDIANCE) 10 mg tablet Take 1 tablet (10 mg total) by mouth once daily. 90 tablet 3    fenofibrate 160 MG Tab Take 1 tablet (160 mg total) by mouth nightly. 90 tablet 1    fluticasone-umeclidin-vilanter (TRELEGY ELLIPTA) 200-62.5-25 mcg inhaler Inhale 1 puff into the lungs once daily. 180 each 1    glipiZIDE (GLUCOTROL) 5 MG tablet Take 1 tablet (5 mg total) by mouth 2 (two) times daily before meals. 180 tablet 1    guanFACINE (TENEX) 2 MG tablet TAKE 1 TABLET(2 MG) BY MOUTH EVERY MORNING 90 tablet 0    lisinopriL-hydrochlorothiazide (PRINZIDE,ZESTORETIC) 20-12.5 mg per tablet Take 2 tablets by mouth once daily. 180 tablet 3    loratadine (CLARITIN ORAL)       metFORMIN (GLUCOPHAGE) 1000 MG tablet Take 1 tablet (1,000 mg total) by mouth 2 (two) times daily with meals. 180 tablet 1    NUCYNTA  mg Tb12 Take 1 tablet by mouth every 12 (twelve) hours as needed.      NUCYNTA  mg Tb12 Take 1 tablet by mouth every 12 (twelve) hours.      " "omeprazole (PRILOSEC) 40 MG capsule Take 1 capsule (40 mg total) by mouth 2 (two) times daily before meals. 60 capsule 11    OZEMPIC 0.25 mg or 0.5 mg (2 mg/3 mL) pen injector INJECT 0.5 MG UNDER THE SKIN EVERY 7 DAYS 9 mL 0    sildenafiL (VIAGRA) 100 MG tablet Take 1 tablet (100 mg total) by mouth daily as needed for Erectile Dysfunction. 30 tablet 5    tamsulosin (FLOMAX) 0.4 mg Cap Take 1 capsule (0.4 mg total) by mouth nightly. 90 capsule 3    testosterone cypionate (DEPOTESTOTERONE CYPIONATE) 200 mg/mL injection ADMINISTER 0.75 ML(150 MG) IN THE MUSCLE EVERY 7 DAYS 12 mL 0    tiZANidine (ZANAFLEX) 4 MG tablet Take 4 mg by mouth every 12 (twelve) hours.      traZODone (DESYREL) 50 MG tablet Take 1-3 tablets ( mg total) by mouth every evening. 270 tablet 3    UNABLE TO FIND 4 mg. medication name: Clortabs 4mg Qam      diclofenac sodium (VOLTAREN ARTHRITIS PAIN) 1 % Gel Apply 2 g topically 3 (three) times daily as needed (pain). 100 g 0    ketorolac (TORADOL) 10 mg tablet Take 1 tablet (10 mg total) by mouth every 6 (six) hours. for 5 days 20 tablet 0     No current facility-administered medications for this visit.     Review of patient's allergies indicates:   Allergen Reactions    Ibuprofen Itching       Review of Systems   Musculoskeletal:  Positive for gait problem.        Walker   All other systems reviewed and are negative.      Objective:      Vitals:    07/02/24 1114   BP: 121/75   Pulse: 95   Weight: 87.1 kg (192 lb)   Height: 5' 8" (1.727 m)     Physical Exam  Vitals and nursing note reviewed. Exam conducted with a chaperone present.   Constitutional:       Appearance: Normal appearance.   Cardiovascular:      Pulses:           Dorsalis pedis pulses are 2+ on the right side and 2+ on the left side.        Posterior tibial pulses are 2+ on the right side and 2+ on the left side.   Musculoskeletal:         General: Tenderness and deformity present.      Right foot: Decreased range of motion. " Deformity (Clubfoot with limited range of motion right) and prominent metatarsal heads (Prominent 5th met head right) present.      Comments: Severe pain at the plantar fascial insertion calcaneus right with edema, no erythema, calor or remaining ecchymosis present at this time   Feet:      Right foot:      Skin integrity: Callus (Very tender hyperkeratotic lesion, chronic sub 5th met head right without discoloration) present.      Left foot:      Skin integrity: Skin integrity normal.   Skin:     Capillary Refill: Capillary refill takes less than 2 seconds.   Neurological:      General: No focal deficit present.      Mental Status: He is alert.      Sensory: Sensory deficit: Pain positive sural neuritis right especially lateral aspect of the 5th met head.   Psychiatric:         Thought Content: Thought content normal.       EXAMINATION:  XR FOOT COMPLETE 3 VIEW RIGHT; XR ANKLE COMPLETE 3 VIEW RIGHT  CLINICAL HISTORY:  Unspecified fall, initial encounter  TECHNIQUE:  AP, lateral, and oblique views of the right ankle and foot were performed.  COMPARISON:  06/01/2024.  FINDINGS:  No significant soft tissue swelling.  No acute fracture.  Tibiotalar lay shin intact with mild degenerative osteoarthrosis.  Tarsal bones intact.  Mild to moderate degenerative osteoarthrosis of the midfoot most predominant within the talonavicular joint.  There is partial versus complete ankylosis of the subtalar facet joints.  Normal tarsometatarsal alignment.  Mild hammertoe formation with mild degenerative osteoarthrosis involving the IP joints of the digits.  Small focus of soft tissue calcification involving the proximal plantar aponeurosis.     Impression:  1. No acute fracture or dislocation.  2. Mild to moderate degenerative osteoarthrosis of the midfoot.  3. Mild hammertoe formation.        Electronically signed by:Maynor Martinez  Date:                                            06/04/2024               Assessment:       1. Rupture  of plantar fascia of right foot, subsequent encounter    2. Contusion of left heel, subsequent encounter    3. Acquired deformity of right foot    4. Sural neuritis, right          Plan:         60 MG TORADOL IM LEFT HIP  TORADOL 10 MG Q.6 H WITH MEALS P.R.N. RIGHT FOOT PAIN/FLARE-UP    Advised patient as long as he continues to slowly improve each week there is no need for an MRI at this point.  Wear pretty confident he partially ruptured the plantar fascia, explained this takes time to heal due to location, just his normal walking for daily activities contributes to inflammation in this area  Advised patient right heel is still very swollen, he needs to continue to use all treatments to continue to treat swelling on a daily basis  Reviewed ice/cool therapy and frequency, 20 minute intervals 3 to 6 times a day  We discussed a follow-up Toradol injection intramuscularly today  Prescription for Toradol was dispensed and instructed to take in about 10-14 days if pain has not completely resolved  Feel this will be more effective since he can not take ibuprofen, I do not feel Advil or leave as strong enough for plantar fascial rupture,   However I would recommend he discontinue anti-inflammatories for about a week and see how he does, take the Toradol as needed with meals, discontinue if stomach upset  Reviewed sural neuritis he is developed due to the way he walks, compensating for proper range of motion of the foot/ankle, he puts all his pressure on the bottom and outside of the 5th met head to push off.  We discussed anatomy of nerve in this location making a very small callus/IPK much more painful than it should be  Callus debrided, IPK removed, no discoloration or skin opening at this time, skin is in good condition  This area was debrided and dispensed samples horseshoe pads, showing how to apply to either the foot or shoe to help offload pressure  We discussed use of ice on this areas well if tolerated, sometimes  nerve is sensitive to the cold  Instructed on use of over-the-counter 4% lidocaine patch to this area every 12 hours  We discussed arch support for his plantar fascial rupture, this has to be adjusted to gradually since we do not want to put more pressure under the 5th met head.  We discussed trying a full length soft arch support, it replaces the existing insoles in the tennis shoes.  Tennis shoes need to be wide  Reviewed diabetic education and potential complications  Check feet daily, contact office immediately with any change  Patient was in understanding and agreement with treatment plan.  I counseled the patient on their conditions, implications and medical management.  Instructed patient/family to contact the office with any changes, questions, concerns, worsening of symptoms.   Total face to face time 30 minutes, exam, assessment, treatment, discussion, additional time for review of chart prior to and following appointment and visit documentation, consultation and coordination of care.    Follow up if not pain-free in 4 weeks     This note was created using M*Modal voice recognition software that occasionally misinterpreted phrases or words.

## 2024-07-05 NOTE — TELEPHONE ENCOUNTER
No care due was identified.  [facility] Embedded Care Due Messages. Reference number: [id number].   7/05/2024 12:17:34 PM CDT

## 2024-07-06 RX ORDER — SEMAGLUTIDE 0.68 MG/ML
INJECTION, SOLUTION SUBCUTANEOUS
Qty: 9 ML | Refills: 1 | Status: SHIPPED | OUTPATIENT
Start: 2024-07-06 | End: 2024-07-12 | Stop reason: SDUPTHER

## 2024-07-06 NOTE — TELEPHONE ENCOUNTER
Refill Routing Note   Medication(s) are not appropriate for processing by [facility] for the following reason(s):        Required labs abnormal    [facility] action(s):  Defer  Approve      Medication Therapy Plan: EDv 6/1/24 for Fall. No change to currently pended medications during encounter      Appointments  past 12m or future 3m with PCP    Date Provider   Last Visit   5/15/2024 Kiley Vasquez MD   Next Visit   8/16/2024 Kiley Vasquez MD   ED visits in past 90 days: 1        Note composed:6:07 PM 07/06/2024

## 2024-07-08 RX ORDER — METFORMIN HYDROCHLORIDE 1000 MG/1
1000 TABLET ORAL 2 TIMES DAILY WITH MEALS
Qty: 180 TABLET | Refills: 1 | Status: SHIPPED | OUTPATIENT
Start: 2024-07-08 | End: 2024-07-12 | Stop reason: SDUPTHER

## 2024-07-10 ENCOUNTER — PATIENT MESSAGE (OUTPATIENT)
Dept: FAMILY MEDICINE | Facility: CLINIC | Age: 69
End: 2024-07-10
Payer: MEDICARE

## 2024-07-10 DIAGNOSIS — E11.9 TYPE 2 DIABETES MELLITUS WITHOUT COMPLICATION, WITHOUT LONG-TERM CURRENT USE OF INSULIN: ICD-10-CM

## 2024-07-10 RX ORDER — METFORMIN HYDROCHLORIDE 1000 MG/1
1000 TABLET ORAL 2 TIMES DAILY WITH MEALS
Qty: 180 TABLET | Refills: 1 | OUTPATIENT
Start: 2024-07-10

## 2024-07-10 NOTE — TELEPHONE ENCOUNTER
Provider Staff:  Action required for this patient    Requires labs      Please see care gap opportunities below in Care Due Message.    Thanks!  [facility]     Appointments      Date Provider   Last Visit   5/15/2024 Kiley Vasquez MD   Next Visit   8/16/2024 Kiley Vasquez MD     Refill Decision Note   Mathew Lilia  is requesting a refill authorization.    Brief Assessment and Rationale for Refill:   Quick Discontinue       Medication Therapy Plan:   E-Prescribing Status: Receipt confirmed by pharmacy (7/8/2024  7:44 AM CDT)      Comments:     Note composed:5:17 PM 07/10/2024

## 2024-07-10 NOTE — TELEPHONE ENCOUNTER
Care Due:                  Date            Visit Type   Department     Provider  --------------------------------------------------------------------------------                                MYCHART                              FOLLOWUP/OF  [facility] FAMILY  Last Visit: 06-      FICE VISIT   MEDICINE       Rita Farias  Next Visit: None Scheduled  None         None Found                                                            Last  Test          Frequency    Reason                     Performed    Due Date  --------------------------------------------------------------------------------    Lipid Panel.  12 months..  atorvastatin, fenofibrate  08- 08-    [facility] Embedded Care Due Messages. Reference number: [id number].   7/10/2024 12:23:54 PM CDT

## 2024-07-11 ENCOUNTER — OFFICE VISIT (OUTPATIENT)
Dept: PULMONOLOGY | Facility: CLINIC | Age: 69
End: 2024-07-11
Payer: MEDICARE

## 2024-07-11 VITALS
HEART RATE: 87 BPM | SYSTOLIC BLOOD PRESSURE: 104 MMHG | WEIGHT: 197.19 LBS | OXYGEN SATURATION: 96 % | HEIGHT: 68 IN | DIASTOLIC BLOOD PRESSURE: 69 MMHG | BODY MASS INDEX: 29.88 KG/M2

## 2024-07-11 DIAGNOSIS — J44.1 CHRONIC OBSTRUCTIVE PULMONARY DISEASE WITH ACUTE EXACERBATION: Primary | ICD-10-CM

## 2024-07-11 DIAGNOSIS — J82.83 EOSINOPHILIC ASTHMA: ICD-10-CM

## 2024-07-11 DIAGNOSIS — J45.50 SEVERE PERSISTENT ASTHMA WITHOUT COMPLICATION: ICD-10-CM

## 2024-07-11 DIAGNOSIS — G47.33 OBSTRUCTIVE SLEEP APNEA: ICD-10-CM

## 2024-07-11 DIAGNOSIS — J44.89 ASTHMA-COPD OVERLAP SYNDROME: ICD-10-CM

## 2024-07-11 PROCEDURE — 99215 OFFICE O/P EST HI 40 MIN: CPT | Mod: PBBFAC,PO | Performed by: INTERNAL MEDICINE

## 2024-07-11 PROCEDURE — 99203 OFFICE O/P NEW LOW 30 MIN: CPT | Mod: S$PBB,,, | Performed by: INTERNAL MEDICINE

## 2024-07-11 PROCEDURE — 99999 PR PBB SHADOW E&M-EST. PATIENT-LVL V: CPT | Mod: PBBFAC,,, | Performed by: INTERNAL MEDICINE

## 2024-07-11 RX ORDER — PREDNISONE 10 MG/1
TABLET ORAL
Qty: 30 TABLET | Refills: 1 | Status: SHIPPED | OUTPATIENT
Start: 2024-07-11

## 2024-07-11 NOTE — PROGRESS NOTES
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  Pt worked construction.  Pt smoked 16-38 ppd or so..    No h/o asthma.  Pt had covid likel illness 2020 with wk in hosp.   Pt felt ot have copd, uses trelegy and albuterol-- no great benefit.  Patient however would improve his breathing would steroids.  He is breathing with normalize.  When he got off steroids to breathing would relapse.  Very dramatic benefit and clear relapse.    Pt has had thick creamy mucous but no culture nor abx-- took abx deceember with      Pt will have freq wheezes--- not particularly nocturnal ---   Chr renal failure  H/o dvt -- on eliquis 5 bid...  Pt on teststerone  Used dose pack in past-- pt felt better and was breathing better but relapsed...      Uses cpap nightly -uses nasal mask.  Compliant.  No problem.    PFSH:  Past Medical History:   Diagnosis Date    Arthritis     COPD (chronic obstructive pulmonary disease)     Diabetes mellitus, type 2     DVT (deep vein thrombosis) in pregnancy     Hyperlipidemia     Hypertension     Kidney stones     LVH (left ventricular hypertrophy) due to hypertensive disease, with heart failure     Neuropathy     Personal history of colonic polyps 03/24/2000    colonoscopy report in media    PTSD (post-traumatic stress disorder)     Sleep apnea, unspecified     Torn rotator cuff          Past Surgical History:   Procedure Laterality Date    achilles repair Left 2003    ADENOIDECTOMY      ANKLE FRACTURE SURGERY Right 1987    CATARACT EXTRACTION Left 2004    CATARACT EXTRACTION Left 2005 2nd    COLONOSCOPY N/A 05/09/2017    results in media    COLONOSCOPY N/A 01/13/2023    Procedure: COLONOSCOPY;  Surgeon: Kenney Keller MD;  Location: [facility];  Service: General;  Laterality: N/A;     "COLONOSCOPY W/ POLYPECTOMY N/A 03/24/2000    results in media    ELBOW SURGERY Right 07/24/2020    EYE SURGERY  2005    finger joint replacement Right 01/22/2020    middle finger    hair follicle  1998    HAND SURGERY Right 04/25/2017    HAND SURGERY Right 05/12/2017    2nd surgery    NASAL SINUS SURGERY  08/2015    ROTATOR CUFF REPAIR Right 09/25/2015    ROTATOR CUFF REPAIR Left 04/08/2016    skin grafts Right 07/2016    shin from stingray wound    TONSILLECTOMY      VASECTOMY       Social History     Tobacco Use    Smoking status: Former     Current packs/day: 1.50     Average packs/day: 1.5 packs/day for 30.0 years (45.0 ttl pk-yrs)     Types: Cigarettes    Smokeless tobacco: Never   Substance Use Topics    Alcohol use: Yes     Comment: occasionally    Drug use: Never     Family History   Problem Relation Name Age of Onset    Hypertension Mother Sierra     Arthritis Mother Sierra     Hypertension Father Misael     Diabetes Father Misael     Arthritis Father Misael     Hearing loss Father Misael     Cancer Brother Mickey Douglas     COPD Brother Mickey Douglas      Review of patient's allergies indicates:   Allergen Reactions    Ibuprofen Itching          Review of Systems:  a review of eleven systems covering constitutional, Eye, HEENT, Psych, Respiratory, Cardiac, GI, , Musculoskeletal, Endocrine, Dermatologic was negative except for pertinent findings as listed ABOVE and below: pertinent positives as above, rest good        Exam:Comprehensive exam done. /69 (BP Location: Right arm, Patient Position: Sitting, BP Method: Small (Automatic))   Pulse 87   Ht 5' 8" (1.727 m)   Wt 89.4 kg (197 lb 3.2 oz)   SpO2 96% Comment: On room air at rest  BMI 29.98 kg/m²   Exam included Vitals as listed, and patient's appearance and affect and alertness and mood, oral exam for yeast and hygiene and pharynx lesions and Mallapatti (M) score, neck with inspection for jvd and masses and thyroid abnormalities and lymph nodes " (supraclavicular and infraclavicular nodes and axillary also examined and noted if abn), chest exam included symmetry and effort and fremitus and percussion and auscultation, cardiac exam included rhythm and gallops and murmur and rubs and jvd and edema, abdominal exam for mass and hepatosplenomegaly and tenderness and hernias and bowel sounds, Musculoskeletal exam with muscle tone and posture and mobility/gait and  strength, and skin for rashes and cyanosis and pallor and turgor, extremity for clubbing.  Findings were normal except for pertinent findings listed below:  M3, chest is symmetric, no distress, normal percussion, normal fremitus and good normal breath sounds  Arthritic deformed hands...         Radiographs (ct chest and cxr) reviewed: view by direct vision      Labs none available        PFT will be done and results to be reviewed       Plan:  Clinical impression is apparently straight forward and impression with management as below.    Mathew was seen today for cough and shortness of breath.    Diagnoses and all orders for this visit:    Chronic obstructive pulmonary disease with acute exacerbation  -     Ambulatory referral/consult to Pulmonology  -     Complete PFT with bronchodilator; Future  -     predniSONE (DELTASONE) 10 MG tablet; Take 2 daily for 3 days and repeat for asthma    Asthma-COPD overlap syndrome  -     Complete PFT with bronchodilator; Future  -     predniSONE (DELTASONE) 10 MG tablet; Take 2 daily for 3 days and repeat for asthma    Severe persistent asthma without complication  -     Complete PFT with bronchodilator; Future  -     predniSONE (DELTASONE) 10 MG tablet; Take 2 daily for 3 days and repeat for asthma    Eosinophilic asthma  -     Complete PFT with bronchodilator; Future  -     predniSONE (DELTASONE) 10 MG tablet; Take 2 daily for 3 days and repeat for asthma    Obstructive sleep apnea  -     CPAP/BIPAP SUPPLIES        Follow up in about 4 weeks (around 8/8/2024), or if  symptoms worsen or fail to improve.    Discussed with patient above for education the following:      Patient Instructions   Chest xray and ct abd 6/2024 viewed and good    Saline rinse for nasal passages, if stuffy may use 4 way nasal to open passages.  Use flonase daily        You relate cough and wheezes and short breath,  You are on good high dose controller with trelegy.    Albuterol not effective    Steroids will remit problems-- you used once - avoiding due to diabetes.     You have severe persistent eosinophilic asthma.  Expect you are steroid dependant.    Would do breathing test and plan to start dupixent at follow up....      May do ct chest if lingering.......            Evaluation took 39 minutes.

## 2024-07-11 NOTE — PATIENT INSTRUCTIONS
Chest xray and ct abd 6/2024 viewed and good    Saline rinse for nasal passages, if stuffy may use 4 way nasal to open passages.  Use flonase daily        You relate cough and wheezes and short breath,  You are on good high dose controller with trelegy.    Albuterol not effective    Steroids will remit problems-- you used once - avoiding due to diabetes.     You have severe persistent eosinophilic asthma.  Expect you are steroid dependant.    Would do breathing test and plan to start dupixent at follow up....      May do ct chest if lingering.......

## 2024-07-12 ENCOUNTER — PATIENT MESSAGE (OUTPATIENT)
Dept: FAMILY MEDICINE | Facility: CLINIC | Age: 69
End: 2024-07-12
Payer: MEDICARE

## 2024-07-12 DIAGNOSIS — Z79.4 TYPE 2 DIABETES MELLITUS WITHOUT COMPLICATION, WITH LONG-TERM CURRENT USE OF INSULIN: ICD-10-CM

## 2024-07-12 DIAGNOSIS — E11.9 TYPE 2 DIABETES MELLITUS WITHOUT COMPLICATION, WITHOUT LONG-TERM CURRENT USE OF INSULIN: ICD-10-CM

## 2024-07-12 DIAGNOSIS — E11.9 TYPE 2 DIABETES MELLITUS WITHOUT COMPLICATION, WITH LONG-TERM CURRENT USE OF INSULIN: ICD-10-CM

## 2024-07-12 RX ORDER — METFORMIN HYDROCHLORIDE 1000 MG/1
1000 TABLET ORAL 2 TIMES DAILY WITH MEALS
Qty: 180 TABLET | Refills: 1 | Status: SHIPPED | OUTPATIENT
Start: 2024-07-12

## 2024-07-12 RX ORDER — SEMAGLUTIDE 0.68 MG/ML
0.5 INJECTION, SOLUTION SUBCUTANEOUS
Qty: 9 ML | Refills: 1 | Status: SHIPPED | OUTPATIENT
Start: 2024-07-12

## 2024-07-15 ENCOUNTER — TELEPHONE (OUTPATIENT)
Dept: FAMILY MEDICINE | Facility: CLINIC | Age: 69
End: 2024-07-15
Payer: MEDICARE

## 2024-07-19 ENCOUNTER — HOSPITAL ENCOUNTER (OUTPATIENT)
Dept: CARDIOLOGY | Facility: HOSPITAL | Age: 69
Discharge: HOME | End: 2024-07-19
Attending: INTERNAL MEDICINE
Payer: MEDICARE

## 2024-07-19 ENCOUNTER — TELEPHONE (OUTPATIENT)
Dept: CARDIOLOGY | Facility: CLINIC | Age: 69
End: 2024-07-19
Payer: MEDICARE

## 2024-07-19 ENCOUNTER — HOSPITAL ENCOUNTER (OUTPATIENT)
Dept: RADIOLOGY | Facility: HOSPITAL | Age: 69
Discharge: HOME | End: 2024-07-19
Attending: INTERNAL MEDICINE
Payer: MEDICARE

## 2024-07-19 VITALS — HEIGHT: 68 IN | BODY MASS INDEX: 29.86 KG/M2 | WEIGHT: 197 LBS

## 2024-07-19 DIAGNOSIS — I50.30 DIASTOLIC HEART FAILURE, UNSPECIFIED HF CHRONICITY: ICD-10-CM

## 2024-07-19 DIAGNOSIS — E11.9 TYPE 2 DIABETES MELLITUS WITHOUT COMPLICATION, WITH LONG-TERM CURRENT USE OF INSULIN: ICD-10-CM

## 2024-07-19 DIAGNOSIS — R94.31 ABNORMAL ECG: ICD-10-CM

## 2024-07-19 DIAGNOSIS — R06.09 DOE (DYSPNEA ON EXERTION): ICD-10-CM

## 2024-07-19 DIAGNOSIS — Z79.4 TYPE 2 DIABETES MELLITUS WITHOUT COMPLICATION, WITH LONG-TERM CURRENT USE OF INSULIN: ICD-10-CM

## 2024-07-19 LAB
AORTIC ROOT ANNULUS: 3.29 CM
AORTIC VALVE CUSP SEPERATION: 1.88 CM
ASCENDING AORTA: 3.13 CM
AV INDEX (PROSTH): 0.7
AV MEAN GRADIENT: 5 MMHG
AV PEAK GRADIENT: 8 MMHG
AV REGURGITATION PRESSURE HALF TIME: 554.99 MS
AV VALVE AREA BY VELOCITY RATIO: 2.58 CM²
AV VALVE AREA: 2.73 CM²
AV VELOCITY RATIO: 0.67
BSA FOR ECHO PROCEDURE: 2.07 M2
CV ECHO LV RWT: 0.68 CM
CV STRESS BASE HR: 86 BPM
DIASTOLIC BLOOD PRESSURE: 72 MMHG
DOP CALC AO PEAK VEL: 1.41 M/S
DOP CALC AO VTI: 24.4 CM
DOP CALC LVOT AREA: 3.9 CM2
DOP CALC LVOT DIAMETER: 2.22 CM
DOP CALC LVOT PEAK VEL: 0.94 M/S
DOP CALC LVOT STROKE VOLUME: 66.54 CM3
DOP CALC MV VTI: 17.1 CM
DOP CALCLVOT PEAK VEL VTI: 17.2 CM
E WAVE DECELERATION TIME: 244.87 MSEC
E/A RATIO: 0.87
E/E' RATIO: 9.67 M/S
ECHO LV POSTERIOR WALL: 1.44 CM (ref 0.6–1.1)
EJECTION FRACTION- HIGH: 65 %
EJECTION FRACTION: 64 %
END DIASTOLIC INDEX-HIGH: 153 ML/M2
END DIASTOLIC INDEX-LOW: 93 ML/M2
END SYSTOLIC INDEX-HIGH: 71 ML/M2
END SYSTOLIC INDEX-LOW: 31 ML/M2
FRACTIONAL SHORTENING: 35 % (ref 28–44)
GLOBAL LONGITUIDAL STRAIN: 15 %
INTERVENTRICULAR SEPTUM: 1.41 CM (ref 0.6–1.1)
IVC DIAMETER: 1.75 CM
LA MAJOR: 4.32 CM
LA MINOR: 2.91 CM
LEFT ATRIUM AREA SYSTOLIC (APICAL 2 CHAMBER): 9.17 CM2
LEFT ATRIUM AREA SYSTOLIC (APICAL 4 CHAMBER): 15.5 CM2
LEFT ATRIUM SIZE: 4.14 CM
LEFT ATRIUM VOLUME INDEX MOD: 13.3 ML/M2
LEFT ATRIUM VOLUME MOD: 27.09 CM3
LEFT INTERNAL DIMENSION IN SYSTOLE: 2.79 CM (ref 2.1–4)
LEFT VENTRICLE DIASTOLIC VOLUME INDEX: 39.93 ML/M2
LEFT VENTRICLE DIASTOLIC VOLUME: 81.05 ML
LEFT VENTRICLE END SYSTOLIC VOLUME APICAL 2 CHAMBER: 15.93 ML
LEFT VENTRICLE END SYSTOLIC VOLUME APICAL 4 CHAMBER: 36.14 ML
LEFT VENTRICLE MASS INDEX: 116 G/M2
LEFT VENTRICLE SYSTOLIC VOLUME INDEX: 14.4 ML/M2
LEFT VENTRICLE SYSTOLIC VOLUME: 29.24 ML
LEFT VENTRICULAR INTERNAL DIMENSION IN DIASTOLE: 4.26 CM (ref 3.5–6)
LEFT VENTRICULAR MASS: 235.32 G
LV LATERAL E/E' RATIO: 9.67 M/S
LV SEPTAL E/E' RATIO: 9.67 M/S
LVED V (TEICH): 81.05 ML
LVES V (TEICH): 29.24 ML
LVOT MG: 1.72 MMHG
LVOT MV: 0.61 CM/S
MV MEAN GRADIENT: 1 MMHG
MV PEAK A VEL: 0.67 M/S
MV PEAK E VEL: 0.58 M/S
MV PEAK GRADIENT: 2 MMHG
MV VALVE AREA BY CONTINUITY EQUATION: 3.89 CM2
NUC REST DIASTOLIC VOLUME INDEX: 97
NUC REST EJECTION FRACTION: 47
NUC REST SYSTOLIC VOLUME INDEX: 52
NUC STRESS DIASTOLIC VOLUME INDEX: 107
NUC STRESS EJECTION FRACTION: 44 %
NUC STRESS SYSTOLIC VOLUME INDEX: 60
OHS CV CPX 85 PERCENT MAX PREDICTED HEART RATE MALE: 128
OHS CV CPX MAX PREDICTED HEART RATE: 151
OHS CV CPX PATIENT IS FEMALE: 0
OHS CV CPX PATIENT IS MALE: 1
OHS CV CPX PEAK DIASTOLIC BLOOD PRESSURE: 72 MMHG
OHS CV CPX PEAK HEAR RATE: 101 BPM
OHS CV CPX PEAK RATE PRESSURE PRODUCT: NORMAL
OHS CV CPX PEAK SYSTOLIC BLOOD PRESSURE: 115 MMHG
OHS CV CPX PERCENT MAX PREDICTED HEART RATE ACHIEVED: 67
OHS CV CPX RATE PRESSURE PRODUCT PRESENTING: 9890
OHS CV RV/LV RATIO: 0.85 CM
PISA AR MAX VEL: 2.47 M/S
PISA MRMAX VEL: 1.8 M/S
PISA TR MAX VEL: 1.77 M/S
PULM VEIN S/D RATIO: 1.14
PV MV: 0.72 M/S
PV PEAK D VEL: 0.36 M/S
PV PEAK GRADIENT: 4 MMHG
PV PEAK S VEL: 0.41 M/S
PV PEAK VELOCITY: 1.02 M/S
RA MAJOR: 4.39 CM
RA PRESSURE ESTIMATED: 3 MMHG
RA WIDTH: 3.08 CM
RETIRED EF AND QEF - SEE NOTES: 53 %
RIGHT VENTRICLE DIASTOLIC BASEL DIMENSION: 2.6 CM
RIGHT VENTRICLE DIASTOLIC LENGTH: 5.4 CM
RIGHT VENTRICLE DIASTOLIC MID DIMENSION: 1.8 CM
RIGHT VENTRICLE FREE WALL STRAIN: 12 %
RIGHT VENTRICLE GLOBAL SYSTOLIC STRAIN: 17 %
RIGHT VENTRICULAR END-DIASTOLIC DIMENSION: 3.63 CM
RIGHT VENTRICULAR LENGTH IN DIASTOLE (APICAL 4-CHAMBER VIEW): 5.41 CM
RV MID DIAMA: 1.77 CM
RV TB RVSP: 5 MMHG
SINUS: 3 CM
STJ: 3.13 CM
SYSTOLIC BLOOD PRESSURE: 115 MMHG
TDI LATERAL: 0.06 M/S
TDI SEPTAL: 0.06 M/S
TDI: 0.06 M/S
TR MAX PG: 13 MMHG
TRICUSPID ANNULAR PLANE SYSTOLIC EXCURSION: 1.5 CM
TRICUSPID VALVE PEAK A WAVE VELOCITY: 0.51 M/S
TV PEAK E VEL: 0.6 M/S
TV REST PULMONARY ARTERY PRESSURE: 16 MMHG
Z-SCORE OF LEFT VENTRICULAR DIMENSION IN END DIASTOLE: -3.45
Z-SCORE OF LEFT VENTRICULAR DIMENSION IN END SYSTOLE: -2.21

## 2024-07-19 PROCEDURE — 93017 CV STRESS TEST TRACING ONLY: CPT

## 2024-07-19 PROCEDURE — 78452 HT MUSCLE IMAGE SPECT MULT: CPT

## 2024-07-19 PROCEDURE — 93016 CV STRESS TEST SUPVJ ONLY: CPT | Mod: ,,, | Performed by: INTERNAL MEDICINE

## 2024-07-19 PROCEDURE — 78452 HT MUSCLE IMAGE SPECT MULT: CPT | Mod: 26,,, | Performed by: INTERNAL MEDICINE

## 2024-07-19 PROCEDURE — 93356 MYOCRD STRAIN IMG SPCKL TRCK: CPT | Mod: ,,, | Performed by: INTERNAL MEDICINE

## 2024-07-19 PROCEDURE — 63600175 PHARM REV CODE 636 W HCPCS: Performed by: INTERNAL MEDICINE

## 2024-07-19 PROCEDURE — 93356 MYOCRD STRAIN IMG SPCKL TRCK: CPT

## 2024-07-19 PROCEDURE — 93018 CV STRESS TEST I&R ONLY: CPT | Mod: ,,, | Performed by: INTERNAL MEDICINE

## 2024-07-19 PROCEDURE — 93306 TTE W/DOPPLER COMPLETE: CPT | Mod: 26,,, | Performed by: INTERNAL MEDICINE

## 2024-07-19 PROCEDURE — 93306 TTE W/DOPPLER COMPLETE: CPT

## 2024-07-19 PROCEDURE — A9500 TC99M SESTAMIBI: HCPCS | Performed by: INTERNAL MEDICINE

## 2024-07-19 RX ORDER — TETRAKIS(2-METHOXYISOBUTYLISOCYANIDE)COPPER(I) TETRAFLUOROBORATE 1 MG/ML
30 INJECTION, POWDER, LYOPHILIZED, FOR SOLUTION INTRAVENOUS
Status: COMPLETED | OUTPATIENT
Start: 2024-07-19 | End: 2024-07-19

## 2024-07-19 RX ORDER — TETRAKIS(2-METHOXYISOBUTYLISOCYANIDE)COPPER(I) TETRAFLUOROBORATE 1 MG/ML
10 INJECTION, POWDER, LYOPHILIZED, FOR SOLUTION INTRAVENOUS
Status: COMPLETED | OUTPATIENT
Start: 2024-07-19 | End: 2024-07-19

## 2024-07-19 RX ORDER — REGADENOSON 0.08 MG/ML
0.4 INJECTION, SOLUTION INTRAVENOUS
Status: COMPLETED | OUTPATIENT
Start: 2024-07-19 | End: 2024-07-19

## 2024-07-19 RX ADMIN — REGADENOSON 0.4 MG: 0.08 INJECTION, SOLUTION INTRAVENOUS at 09:07

## 2024-07-19 RX ADMIN — TETRAKIS(2-METHOXYISOBUTYLISOCYANIDE)COPPER(I) TETRAFLUOROBORATE 30 MILLICURIE: 1 INJECTION, POWDER, LYOPHILIZED, FOR SOLUTION INTRAVENOUS at 09:07

## 2024-07-19 RX ADMIN — TETRAKIS(2-METHOXYISOBUTYLISOCYANIDE)COPPER(I) TETRAFLUOROBORATE 10.7 MILLICURIE: 1 INJECTION, POWDER, LYOPHILIZED, FOR SOLUTION INTRAVENOUS at 08:07

## 2024-07-19 NOTE — TELEPHONE ENCOUNTER
----- Message from Judson Causey MD sent at 7/19/2024 12:32 PM CDT -----  Abnormal nuclear stress, will need office visit for review. Thanks,    Dr. Causey  ----- Message -----  From: Judson Causey MD  Sent: 7/19/2024  12:29 PM CDT  To: Judson Causey MD

## 2024-07-19 NOTE — TELEPHONE ENCOUNTER
Spoke with wife of patient. Made a appointment to review abnormal nuclear stress test results. Appt in [place] on 7/30/2024 at 1pm. Wife accepted appointment. kelle

## 2024-07-26 DIAGNOSIS — E78.2 MIXED HYPERLIPIDEMIA: ICD-10-CM

## 2024-07-26 DIAGNOSIS — F98.8 ATTENTION DEFICIT DISORDER, UNSPECIFIED HYPERACTIVITY PRESENCE: ICD-10-CM

## 2024-07-26 NOTE — TELEPHONE ENCOUNTER
Care Due:                  Date            Visit Type   Department     Provider  --------------------------------------------------------------------------------                                MYCHART                              FOLLOWUP/OF  [facility] FAMILY  Last Visit: 06-      FICE VISIT   MEDICINE       Rita Farias                              EP -                              PRIMARY      [facility]  Next Visit: 08-      CARE ([facility])   MEDICINE       Kiley Vasquez                                                            Last  Test          Frequency    Reason                     Performed    Due Date  --------------------------------------------------------------------------------    HBA1C.......  6 months...  glipiZIDE, metFORMIN,      04-   10-                             semaglutide..............    [employer] Embedded Care Due Messages. Reference number: [id number].   7/26/2024 11:18:17 AM CDT

## 2024-07-28 ENCOUNTER — PATIENT MESSAGE (OUTPATIENT)
Dept: FAMILY MEDICINE | Facility: CLINIC | Age: 69
End: 2024-07-28
Payer: MEDICARE

## 2024-07-28 RX ORDER — GUANFACINE 2 MG/1
TABLET ORAL
Qty: 90 TABLET | Refills: 0 | Status: SHIPPED | OUTPATIENT
Start: 2024-07-28

## 2024-07-28 RX ORDER — FENOFIBRATE 160 MG/1
TABLET ORAL
Qty: 90 TABLET | Refills: 0 | Status: SHIPPED | OUTPATIENT
Start: 2024-07-28

## 2024-07-28 NOTE — TELEPHONE ENCOUNTER
Refill Routing Note   Medication(s) are not appropriate for processing by [facility] for the following reason(s):        ED/Hospital Visit since last OV with provider  Required labs abnormal  Outside of protocol    [facility] action(s):  Defer  Route        Medication Therapy Plan: FLOS 8/8/24      Appointments  past 12m or future 3m with PCP    Date Provider   Last Visit   5/15/2024 Kiley Vasquez MD   Next Visit   8/16/2024 Kiley Vasquez MD   ED visits in past 90 days: 1        Note composed:8:29 AM 07/28/2024

## 2024-07-29 DIAGNOSIS — F98.8 ATTENTION DEFICIT DISORDER, UNSPECIFIED HYPERACTIVITY PRESENCE: ICD-10-CM

## 2024-07-29 DIAGNOSIS — E78.2 MIXED HYPERLIPIDEMIA: ICD-10-CM

## 2024-07-29 RX ORDER — GUANFACINE 2 MG/1
TABLET ORAL
Qty: 90 TABLET | Refills: 0 | OUTPATIENT
Start: 2024-07-29

## 2024-07-29 RX ORDER — FENOFIBRATE 160 MG/1
160 TABLET ORAL DAILY
Qty: 90 TABLET | Refills: 0 | OUTPATIENT
Start: 2024-07-29

## 2024-07-29 NOTE — TELEPHONE ENCOUNTER
No care due was identified.  [facility] Embedded Care Due Messages. Reference number: [id number].   7/29/2024 10:39:39 AM CDT

## 2024-07-29 NOTE — TELEPHONE ENCOUNTER
Refill Decision Note   Mathew Rodrigues  is requesting a refill authorization.  Brief Assessment and Rationale for Refill:  Quick Discontinue     Medication Therapy Plan:  Receipt confirmed by pharmacy (7/28/2024 10:34 PM CDT)      Comments:     Note composed:2:54 PM 07/29/2024

## 2024-07-30 ENCOUNTER — TELEPHONE (OUTPATIENT)
Dept: CARDIOLOGY | Facility: CLINIC | Age: 69
End: 2024-07-30
Payer: MEDICARE

## 2024-07-30 ENCOUNTER — OFFICE VISIT (OUTPATIENT)
Dept: CARDIOLOGY | Facility: CLINIC | Age: 69
End: 2024-07-30
Payer: MEDICARE

## 2024-07-30 VITALS
BODY MASS INDEX: 30.1 KG/M2 | DIASTOLIC BLOOD PRESSURE: 70 MMHG | SYSTOLIC BLOOD PRESSURE: 121 MMHG | OXYGEN SATURATION: 99 % | WEIGHT: 198.63 LBS | HEART RATE: 90 BPM | HEIGHT: 68 IN

## 2024-07-30 DIAGNOSIS — Z79.01 ANTICOAGULANT LONG-TERM USE: ICD-10-CM

## 2024-07-30 DIAGNOSIS — I82.5Y2 CHRONIC DEEP VEIN THROMBOSIS (DVT) OF PROXIMAL VEIN OF LEFT LOWER EXTREMITY: ICD-10-CM

## 2024-07-30 DIAGNOSIS — I11.0 LVH (LEFT VENTRICULAR HYPERTROPHY) DUE TO HYPERTENSIVE DISEASE, WITH HEART FAILURE: ICD-10-CM

## 2024-07-30 DIAGNOSIS — R06.09 DOE (DYSPNEA ON EXERTION): ICD-10-CM

## 2024-07-30 DIAGNOSIS — I10 PRIMARY HYPERTENSION: ICD-10-CM

## 2024-07-30 DIAGNOSIS — F11.20 UNCOMPLICATED OPIOID DEPENDENCE: ICD-10-CM

## 2024-07-30 DIAGNOSIS — R94.39 ABNORMAL NUCLEAR STRESS TEST: Primary | ICD-10-CM

## 2024-07-30 DIAGNOSIS — I50.30 DIASTOLIC HEART FAILURE, UNSPECIFIED HF CHRONICITY: ICD-10-CM

## 2024-07-30 DIAGNOSIS — R94.39 ABNORMAL STRESS TEST: ICD-10-CM

## 2024-07-30 DIAGNOSIS — N18.31 CHRONIC KIDNEY DISEASE, STAGE 3A: ICD-10-CM

## 2024-07-30 DIAGNOSIS — Z79.4 TYPE 2 DIABETES MELLITUS WITHOUT COMPLICATION, WITH LONG-TERM CURRENT USE OF INSULIN: ICD-10-CM

## 2024-07-30 DIAGNOSIS — R53.82 CHRONIC FATIGUE: ICD-10-CM

## 2024-07-30 DIAGNOSIS — E11.9 TYPE 2 DIABETES MELLITUS WITHOUT COMPLICATION, WITH LONG-TERM CURRENT USE OF INSULIN: ICD-10-CM

## 2024-07-30 DIAGNOSIS — G47.33 OSA ON CPAP: ICD-10-CM

## 2024-07-30 DIAGNOSIS — Z00.00 ENCOUNTER FOR MEDICARE ANNUAL WELLNESS EXAM: ICD-10-CM

## 2024-07-30 DIAGNOSIS — R06.02 SHORTNESS OF BREATH: ICD-10-CM

## 2024-07-30 PROCEDURE — 99214 OFFICE O/P EST MOD 30 MIN: CPT | Mod: S$PBB,,, | Performed by: INTERNAL MEDICINE

## 2024-07-30 PROCEDURE — 99999 PR PBB SHADOW E&M-EST. PATIENT-LVL V: CPT | Mod: PBBFAC,,, | Performed by: INTERNAL MEDICINE

## 2024-07-30 PROCEDURE — 99215 OFFICE O/P EST HI 40 MIN: CPT | Mod: PBBFAC,PN | Performed by: INTERNAL MEDICINE

## 2024-07-30 RX ORDER — DIPHENHYDRAMINE HCL 50 MG
50 CAPSULE ORAL
OUTPATIENT
Start: 2024-07-30

## 2024-07-30 RX ORDER — SODIUM CHLORIDE 9 MG/ML
INJECTION, SOLUTION INTRAVENOUS ONCE
OUTPATIENT
Start: 2024-07-30 | End: 2024-07-30

## 2024-07-30 NOTE — PROGRESS NOTES
Subjective:        Patient ID:  Mathew Rodrigues is a 69 y.o. male who presents for evaluation of Follow-up (Abnormal stress test )  PCP and referred by Kiley Vasquez MD   Prior cardiologist: in [place], last seen in 2021, post COVID with SOB  Pulmonary: Darci Quinteros MD   Lives with wife, Fide, here with patient, non-smoker  Retired , [employer]    Health literacy: high  Vaccinations: up-to-date, completed COVID, infection 2/2022, residual SOB.  Activities:  do yard work, limited by OTT  Nicotine: former smoker, quit 1990, 15 years up to 1.5 ppd  Alcohol: yes, max 1 beer in any 24 hours, rare  Illicit drugs: no, on Rx Nucynta bid since 2015  Cardiac symptoms:  SOB , easy fatigue  Home BP: Yes, 135/70  Medication compliance: Yes, do not miss  Diet: Regular, some salt  Caffeine: How much do you consume a day? 3 cpd  Labs:   Sodium   Date Value Ref Range Status   06/01/2024 137 136 - 145 mmol/L Final     Potassium   Date Value Ref Range Status   06/01/2024 4.2 3.5 - 5.1 mmol/L Final     Chloride   Date Value Ref Range Status   06/01/2024 104 95 - 110 mmol/L Final     CO2   Date Value Ref Range Status   06/01/2024 20 (L) 23 - 29 mmol/L Final     Glucose   Date Value Ref Range Status   06/01/2024 147 (H) 70 - 110 mg/dL Final     BUN   Date Value Ref Range Status   06/01/2024 23 8 - 23 mg/dL Final     Creatinine   Date Value Ref Range Status   06/01/2024 1.6 (H) 0.5 - 1.4 mg/dL Final     Calcium   Date Value Ref Range Status   06/01/2024 9.6 8.7 - 10.5 mg/dL Final     Total Protein   Date Value Ref Range Status   06/01/2024 7.3 6.0 - 8.4 g/dL Final     Albumin   Date Value Ref Range Status   06/01/2024 3.7 3.5 - 5.2 g/dL Final     Total Bilirubin   Date Value Ref Range Status   06/01/2024 0.4 0.1 - 1.0 mg/dL Final     Comment:     For infants and newborns, interpretation of results should be based  on gestational age, weight and in agreement with clinical  observations.    Premature Infant recommended reference  ranges:  Up to 24 hours.............<8.0 mg/dL  Up to 48 hours............<12.0 mg/dL  3-5 days..................<15.0 mg/dL  6-29 days.................<15.0 mg/dL       Alkaline Phosphatase   Date Value Ref Range Status   2024 62 55 - 135 U/L Final     AST   Date Value Ref Range Status   2024 16 10 - 40 U/L Final     ALT   Date Value Ref Range Status   2024 17 10 - 44 U/L Final     Anion Gap   Date Value Ref Range Status   2024 13 8 - 16 mmol/L Final     eGFR   Date Value Ref Range Status   2024 46.4 (A) >60 mL/min/1.73 m^2 Final      Lab Results   Component Value Date    WBC 10.94 2024    RBC 5.46 2024    HGB 14.1 2024    HCT 45.4 2024    MCV 83 2024    MCH 25.8 (L) 2024    MCHC 31.1 (L) 2024    RDW 16.0 (H) 2024     2024    MPV 9.7 2024    GRAN 6.3 2024    GRAN 57.3 2024    LYMPH 2.4 2024    LYMPH 21.8 2024    MONO 0.7 2024    MONO 6.2 2024    EOS 1.4 (H) 2024    BASO 0.07 2024    EOSINOPHIL 12.6 (H) 2024    BASOPHIL 0.6 2024      Lab Results   Component Value Date    TSH 1.542 2023     Lab Results   Component Value Date    HGBA1C 6.5 (H) 2024      Lab Results   Component Value Date    CHOL 126 2023    CHOL 100 (L) 2022     Lab Results   Component Value Date    HDL 37 (L) 2023    HDL 35 (L) 2022     Lab Results   Component Value Date    LDLCALC 56.6 (L) 2023    LDLCALC 41.2 (L) 2022     Lab Results   Component Value Date    TRIG 162 (H) 2023    TRIG 119 2022       Lab Results   Component Value Date    CHOLHDL 29.4 2023    CHOLHDL 35.0 2022      Last Echo: 2024  Last stress test: Lexiscan 2024   Cardiovascular angiogram: none    EC2024 NSR, rate 76, RBBB + LAFB, LVH    Fundoscopic exam: annually, no retinopathy    In 2024:  [facility] referred for CV evaluation. Problem with chronic OTT  "post COVID in 2020, also with dx of COPD and quit smoking in 1992. Also history for HTN and HLD. History of DVT, idiopathic in 8/2023, do not recall testing for hypercoagulation.     CT / CXR 6/2024: Lungs are clear.Normal cardiomediastinal silhouette.Normal pulmonary vascular distribution.No pleural effusion  Atherosclerosis.   Lung bases are clear.  Normal size heart.    Kiley Vasquez MD noted 5/21/2024 "Chronic kidney disease, stage 3a  Diastolic heart failure, unspecified HF chronicity  Assessment & Plan:  Has upcoming appt with cardiology."    HPI comments: in 7/2024, return for review of abnormal stress test. Concern with OTT and easy fatigue. No CP.     Lexiscan -  Abnormal myocardial perfusion scan.    There is a moderate intensity, small to medium sized, mostly fixed perfusion abnormality with some reversibilty in the basal to apical inferior wall(s) in the typical distribution of the LCX and RCA territory.    There are no other significant perfusion abnormalities.    The gated perfusion images showed an ejection fraction of 47% at rest. The gated perfusion images showed an ejection fraction of 44% post stress. Normal ejection fraction is greater than 53%.    The ECG portion of the study is negative for ischemia.    The patient reported no chest pain during the stress test.    There were no arrhythmias during stress.    Abnormal wall motion at rest ans excercise with moderate hypokinesia of the inferior and mild hypokinesia of the lateral wall.    Echo - Left Ventricle: The left ventricle is normal in size. Moderately increased wall thickness. There is moderate concentric hypertrophy. There is normal systolic function with a visually estimated ejection fraction of 60 - 65%. Ejection fraction by visual approximation is 64%. Global longitudinal strain is -15.0%. Global longitudinal strain is reduced. There is normal diastolic function.    Right Ventricle: Normal right ventricular cavity size. Systolic " function is normal. TAPSE is 1.50 cm.    Left Atrium: Left atrium is mildly dilated.    Right Atrium: Right atrium is mildly dilated.    Aortic Valve: The aortic valve is a trileaflet valve.    IVC/SVC: Normal venous pressure at 3 mmHg.    Pericardium: There is a small effusion. No indication of cardiac tamponade.    Review of Systems   Constitutional: Positive for malaise/fatigue. Negative for diaphoresis, fever, night sweats and weight gain.   HENT:  Negative for hearing loss, nosebleeds and tinnitus.    Eyes:  Negative for discharge and visual disturbance.   Cardiovascular:  Positive for dyspnea on exertion. Negative for chest pain, claudication, cyanosis, irregular heartbeat, leg swelling, near-syncope, orthopnea, palpitations and paroxysmal nocturnal dyspnea.   Respiratory:  Positive for cough and wheezing. Negative for shortness of breath, sleep disturbances due to breathing and snoring.         [place] score 7, awaken refreshed. Using CPAP 100%   Endocrine: Negative for polydipsia and polyuria.   Hematologic/Lymphatic: Does not bruise/bleed easily.   Skin:  Negative for color change, flushing, nail changes, poor wound healing and suspicious lesions.   Musculoskeletal:  Negative for arthritis, falls, gout, joint pain, joint swelling, muscle cramps, muscle weakness, myalgias and neck pain.   Gastrointestinal:  Negative for constipation, diarrhea, heartburn, hematemesis, hematochezia, melena, nausea and vomiting.   Genitourinary:  Negative for hematuria and urgency.   Neurological:  Negative for disturbances in coordination, excessive daytime sleepiness, dizziness, focal weakness, headaches, light-headedness, loss of balance, numbness, vertigo and weakness.   Psychiatric/Behavioral:  Negative for depression and substance abuse. The patient does not have insomnia and is not nervous/anxious.      Answers submitted by the patient for this visit:  Review of Systems Questionnaire (Submitted on 7/29/2024)  activity  "change: No  unexpected weight change: No  rhinorrhea: No  trouble swallowing: No  chest tightness: No  blood in stool: No  difficulty urinating: No  arthralgias: No  confusion: No  dysphoric mood: No    Objective:    Physical Exam  Constitutional:       Appearance: He is well-developed.      Comments: RA O2 sat 99%  Orthostatic VS: sitting 131/75, standing 121/70   HENT:      Head: Normocephalic.   Eyes:      Conjunctiva/sclera: Conjunctivae normal.      Pupils: Pupils are equal, round, and reactive to light.   Neck:      Thyroid: No thyromegaly.      Vascular: No JVD.   Cardiovascular:      Rate and Rhythm: Normal rate and regular rhythm.      Pulses: Intact distal pulses.           Carotid pulses are 2+ on the right side and 2+ on the left side.       Radial pulses are 2+ on the right side and 2+ on the left side.        Dorsalis pedis pulses are 2+ on the right side and 2+ on the left side.        Posterior tibial pulses are 2+ on the right side and 2+ on the left side.      Heart sounds: Heart sounds are distant. No murmur heard.     No friction rub. No gallop.   Pulmonary:      Effort: Pulmonary effort is normal.      Breath sounds: No rales.      Comments: Diminished breath sounds and prolong expiration.       Chest:      Chest wall: No tenderness.   Abdominal:      General: Bowel sounds are normal.      Palpations: Abdomen is soft.      Tenderness: There is no abdominal tenderness.      Comments: Waist 44"   Musculoskeletal:         General: Normal range of motion.      Cervical back: Normal range of motion and neck supple.   Lymphadenopathy:      Cervical: No cervical adenopathy.   Skin:     General: Skin is warm and dry.      Findings: No rash.   Neurological:      Mental Status: He is alert and oriented to person, place, and time.           Assessment:       1. Abnormal nuclear stress test    2. LVH (left ventricular hypertrophy) due to hypertensive disease, with heart failure    3. Primary hypertension, " dx 2015    4. Chronic fatigue    5. Type 2 diabetes mellitus without complication, with long-term current use of insulin    6. Chronic kidney disease, stage 3a    7. Diastolic heart failure, unspecified HF chronicity, at time of COVID 2020    8. OTT (dyspnea on exertion), post COVID, 2/2020    9. CRICKET on CPAP, 100% use    10. Uncomplicated opioid dependence, Nucynta bid since 2015    11. Chronic deep vein thrombosis (DVT) of proximal vein of left lower extremity, 8/2023    12. Anticoagulant long-term use         Plan:     Mathew was seen today for follow-up.    Diagnoses and all orders for this visit:    Abnormal nuclear stress test  -     Ambulatory referral/consult to Interventional Cardiology; Future    LVH (left ventricular hypertrophy) due to hypertensive disease, with heart failure    Primary hypertension, dx 2015    Chronic fatigue    Type 2 diabetes mellitus without complication, with long-term current use of insulin    Chronic kidney disease, stage 3a    Diastolic heart failure, unspecified HF chronicity, at time of COVID 2020  -     Ambulatory referral/consult to Interventional Cardiology; Future    OTT (dyspnea on exertion), post COVID, 2/2020  -     Ambulatory referral/consult to Interventional Cardiology; Future    CRICKET on CPAP, 100% use    Uncomplicated opioid dependence, Nucynta bid since 2015    Chronic deep vein thrombosis (DVT) of proximal vein of left lower extremity, 8/2023    Anticoagulant long-term use    - All medical issues reviewed, continue current Rx.  - Warning signs of MI and stroke given, if symptoms last more than 5 minutes, stop immediately and call 911.  - Info on cardiac cath.   - CV status all medications reviewed, patient acknowledge good understanding.  - Recommend healthy living: avoid alcohol, healthy diet and regular exercise aiming for fitness, restorative sleep and weight control  - Need good exercise program, 4 key elements: 1. Aerobic (walking, swimming, dancing, jogging,  biking, etc, 2. Muscle strengthening / resistance exercise, need to do 2-3 times weekly, 3. Stretching daily, good stretch takes a whole  total minute. 4. Balance exercise daily.   - Instruction for Mediterranean diet and heart healthy exercise given.  - Check home blood pressure, 2 days weekly, do 2 readings within 5 minutes in AM and PM, keep log for review. Target resting BP is less than 130/80.   - Weigh twice weekly, try to lose 1-2 lbs per week. Target weight loss of 5%-10%.  - Highly recommend 30-60 minutes of exercise / activities daily, can have Sunday off, with 2-3 sessions of muscle strengthening weekly. A  would be very helpful.  - Will keep prior follow up appointment on 9/11/2024, patient's preference.      Total time spend including review of record prior to face-to-face visit is 30 minutes. Greater than 50% of the time was spent in counseling and coordination of care. The above assessment and plan have been discussed at length. Referring provider's note reviewed. Labs and procedure over the last 6 months reviewed. Problem List updated. Asked to bring in all active medications / pills bottles with next visit. Will send note to referring / PCP.

## 2024-07-31 LAB
LEFT EYE DM RETINOPATHY: NEGATIVE
RIGHT EYE DM RETINOPATHY: NEGATIVE

## 2024-08-01 ENCOUNTER — PATIENT MESSAGE (OUTPATIENT)
Dept: FAMILY MEDICINE | Facility: CLINIC | Age: 69
End: 2024-08-01
Payer: MEDICARE

## 2024-08-01 DIAGNOSIS — E11.9 TYPE 2 DIABETES MELLITUS WITHOUT COMPLICATION, WITHOUT LONG-TERM CURRENT USE OF INSULIN: ICD-10-CM

## 2024-08-01 RX ORDER — INSULIN DEGLUDEC 100 U/ML
28 INJECTION, SOLUTION SUBCUTANEOUS EVERY MORNING
Qty: 10 PEN | Refills: 0 | Status: SHIPPED | OUTPATIENT
Start: 2024-08-01

## 2024-08-01 NOTE — TELEPHONE ENCOUNTER
Refill Routing Note   Medication(s) are not appropriate for processing by [facility] for the following reason(s):        ED/Hospital Visit since last OV with provider: 24 for fall  No active prescription written by provider: script  24  Required labs abnormal: Cr 1.6     [facility] action(s):  Defer               Appointments  past 12m or future 3m with PCP    Date Provider   Last Visit   5/15/2024 Kiley Vasquez MD   Next Visit   2024 Kiley Vasquez MD   ED visits in past 90 days: 1        Note composed:3:48 PM 2024

## 2024-08-01 NOTE — TELEPHONE ENCOUNTER
No care due was identified.  [facility] Embedded Care Due Messages. Reference number: [id number].   8/01/2024 3:47:33 PM CDT

## 2024-08-01 NOTE — TELEPHONE ENCOUNTER
Case moved to 08/08/2024 waiting to here okay . Maybe pre op date or blood draw day off. Patient's wife is aware of all of this .

## 2024-08-02 ENCOUNTER — HOSPITAL ENCOUNTER (OUTPATIENT)
Dept: PREADMISSION TESTING | Facility: HOSPITAL | Age: 69
Discharge: HOME | End: 2024-08-02
Attending: INTERNAL MEDICINE
Payer: MEDICARE

## 2024-08-02 ENCOUNTER — TELEPHONE (OUTPATIENT)
Dept: CARDIOLOGY | Facility: CLINIC | Age: 69
End: 2024-08-02
Payer: MEDICARE

## 2024-08-02 VITALS
OXYGEN SATURATION: 93 % | SYSTOLIC BLOOD PRESSURE: 106 MMHG | TEMPERATURE: 98 F | DIASTOLIC BLOOD PRESSURE: 64 MMHG | HEART RATE: 86 BPM | WEIGHT: 198.88 LBS | RESPIRATION RATE: 18 BRPM | BODY MASS INDEX: 30.14 KG/M2 | HEIGHT: 68 IN

## 2024-08-02 DIAGNOSIS — R07.89 OTHER CHEST PAIN: ICD-10-CM

## 2024-08-02 DIAGNOSIS — R94.39 ABNORMAL STRESS TEST: ICD-10-CM

## 2024-08-02 DIAGNOSIS — R06.02 SHORTNESS OF BREATH: ICD-10-CM

## 2024-08-02 LAB
ANION GAP SERPL CALC-SCNC: 6 MMOL/L (ref 8–16)
BASOPHILS # BLD AUTO: 0.06 K/UL (ref 0–0.2)
BASOPHILS NFR BLD: 0.5 % (ref 0–1.9)
BUN SERPL-MCNC: 39 MG/DL (ref 8–23)
CALCIUM SERPL-MCNC: 9.4 MG/DL (ref 8.7–10.5)
CHLORIDE SERPL-SCNC: 103 MMOL/L (ref 95–110)
CO2 SERPL-SCNC: 28 MMOL/L (ref 23–29)
CREAT SERPL-MCNC: 1.8 MG/DL (ref 0.5–1.4)
DIFFERENTIAL METHOD BLD: ABNORMAL
EOSINOPHIL # BLD AUTO: 0.1 K/UL (ref 0–0.5)
EOSINOPHIL NFR BLD: 0.8 % (ref 0–8)
ERYTHROCYTE [DISTWIDTH] IN BLOOD BY AUTOMATED COUNT: 18 % (ref 11.5–14.5)
EST. GFR  (NO RACE VARIABLE): 40.2 ML/MIN/1.73 M^2
GLUCOSE SERPL-MCNC: 177 MG/DL (ref 70–110)
HCT VFR BLD AUTO: 44.8 % (ref 40–54)
HGB BLD-MCNC: 13.9 G/DL (ref 14–18)
IMM GRANULOCYTES # BLD AUTO: 0.21 K/UL (ref 0–0.04)
IMM GRANULOCYTES NFR BLD AUTO: 1.9 % (ref 0–0.5)
LYMPHOCYTES # BLD AUTO: 1.5 K/UL (ref 1–4.8)
LYMPHOCYTES NFR BLD: 13.6 % (ref 18–48)
MCH RBC QN AUTO: 26 PG (ref 27–31)
MCHC RBC AUTO-ENTMCNC: 31 G/DL (ref 32–36)
MCV RBC AUTO: 84 FL (ref 82–98)
MONOCYTES # BLD AUTO: 0.8 K/UL (ref 0.3–1)
MONOCYTES NFR BLD: 7.1 % (ref 4–15)
NEUTROPHILS # BLD AUTO: 8.5 K/UL (ref 1.8–7.7)
NEUTROPHILS NFR BLD: 76.1 % (ref 38–73)
NRBC BLD-RTO: 0 /100 WBC
OHS QRS DURATION: 128 MS
OHS QTC CALCULATION: 434 MS
PLATELET # BLD AUTO: 299 K/UL (ref 150–450)
PMV BLD AUTO: 9.8 FL (ref 9.2–12.9)
POTASSIUM SERPL-SCNC: 4.9 MMOL/L (ref 3.5–5.1)
RBC # BLD AUTO: 5.35 M/UL (ref 4.6–6.2)
SODIUM SERPL-SCNC: 137 MMOL/L (ref 136–145)
WBC # BLD AUTO: 11.23 K/UL (ref 3.9–12.7)

## 2024-08-02 PROCEDURE — 80048 BASIC METABOLIC PNL TOTAL CA: CPT | Performed by: INTERNAL MEDICINE

## 2024-08-02 PROCEDURE — 36415 COLL VENOUS BLD VENIPUNCTURE: CPT | Performed by: INTERNAL MEDICINE

## 2024-08-02 PROCEDURE — 85025 COMPLETE CBC W/AUTO DIFF WBC: CPT | Performed by: INTERNAL MEDICINE

## 2024-08-02 PROCEDURE — 93010 ELECTROCARDIOGRAM REPORT: CPT | Mod: ,,, | Performed by: INTERNAL MEDICINE

## 2024-08-02 PROCEDURE — 93005 ELECTROCARDIOGRAM TRACING: CPT | Performed by: INTERNAL MEDICINE

## 2024-08-02 NOTE — PRE-PROCEDURE INSTRUCTIONS
Preadmit assessment complete. Review of pt health history and home medications.  Preop education per AVS. Pt voiced understanding      Has stopped Eliquis - Is aware to stop Metformin 24 hours prior to procedure and has not had ozempic since July 26

## 2024-08-02 NOTE — DISCHARGE INSTRUCTIONS
Your procedure is scheduled for:August 6           [facility] at:8 am     Nothing to eat or drink after midnight the night before your procedure.  Do not take any medications the morning of your procedure  Bring all your medications with you in the original pill bottles from pharmacy.  If you take blood thinners, ask your doctor if you should stop taking them.  Eliquis stop   Do not take metformin 24 hours prior to your procedure.  No Ozempic until after procedure  Adjust your insulin or other diabetes medications if needed.   Do your chlorhexidine wash the night before and morning of your procedure.  If you use a CPAP or BiPAP at home, please bring it with you the day of your procedure.  Make arrangements for someone you know to drive you home after your procedure. Taxi and Uber are not acceptable.         Any questions call the [facility] at [phone]

## 2024-08-05 RX ORDER — SODIUM CHLORIDE 9 MG/ML
INJECTION, SOLUTION INTRAVENOUS ONCE
Status: DISCONTINUED | OUTPATIENT
Start: 2024-08-05 | End: 2024-08-05

## 2024-08-06 ENCOUNTER — HOSPITAL ENCOUNTER (OUTPATIENT)
Facility: HOSPITAL | Age: 69
Discharge: HOME | End: 2024-08-06
Attending: INTERNAL MEDICINE | Admitting: INTERNAL MEDICINE
Payer: MEDICARE

## 2024-08-06 VITALS
SYSTOLIC BLOOD PRESSURE: 127 MMHG | WEIGHT: 198.88 LBS | BODY MASS INDEX: 30.14 KG/M2 | HEIGHT: 68 IN | HEART RATE: 76 BPM | RESPIRATION RATE: 12 BRPM | DIASTOLIC BLOOD PRESSURE: 76 MMHG | OXYGEN SATURATION: 96 % | TEMPERATURE: 98 F

## 2024-08-06 DIAGNOSIS — R94.39 ABNORMAL STRESS TEST: ICD-10-CM

## 2024-08-06 DIAGNOSIS — R06.02 SHORTNESS OF BREATH: ICD-10-CM

## 2024-08-06 DIAGNOSIS — I25.10 CAD (CORONARY ARTERY DISEASE): ICD-10-CM

## 2024-08-06 PROCEDURE — 25500020 PHARM REV CODE 255: Performed by: INTERNAL MEDICINE

## 2024-08-06 PROCEDURE — 25000003 PHARM REV CODE 250: Performed by: INTERNAL MEDICINE

## 2024-08-06 PROCEDURE — 63600175 PHARM REV CODE 636 W HCPCS: Performed by: INTERNAL MEDICINE

## 2024-08-06 PROCEDURE — 99152 MOD SED SAME PHYS/QHP 5/>YRS: CPT | Performed by: INTERNAL MEDICINE

## 2024-08-06 PROCEDURE — 93458 L HRT ARTERY/VENTRICLE ANGIO: CPT | Mod: 26,,, | Performed by: INTERNAL MEDICINE

## 2024-08-06 PROCEDURE — 93458 L HRT ARTERY/VENTRICLE ANGIO: CPT | Performed by: INTERNAL MEDICINE

## 2024-08-06 PROCEDURE — 25000003 PHARM REV CODE 250

## 2024-08-06 PROCEDURE — C1769 GUIDE WIRE: HCPCS | Performed by: INTERNAL MEDICINE

## 2024-08-06 PROCEDURE — C1894 INTRO/SHEATH, NON-LASER: HCPCS | Performed by: INTERNAL MEDICINE

## 2024-08-06 PROCEDURE — 99152 MOD SED SAME PHYS/QHP 5/>YRS: CPT | Mod: ,,, | Performed by: INTERNAL MEDICINE

## 2024-08-06 RX ORDER — DIPHENHYDRAMINE HCL 25 MG
50 CAPSULE ORAL
Status: DISCONTINUED | OUTPATIENT
Start: 2024-08-06 | End: 2024-08-06 | Stop reason: HOSPADM

## 2024-08-06 RX ORDER — DIPHENHYDRAMINE HCL 25 MG
CAPSULE ORAL
Status: COMPLETED
Start: 2024-08-06 | End: 2024-08-06

## 2024-08-06 RX ORDER — MIDAZOLAM HYDROCHLORIDE 1 MG/ML
INJECTION INTRAMUSCULAR; INTRAVENOUS
Status: DISCONTINUED | OUTPATIENT
Start: 2024-08-06 | End: 2024-08-06 | Stop reason: HOSPADM

## 2024-08-06 RX ORDER — LIDOCAINE HYDROCHLORIDE 10 MG/ML
INJECTION, SOLUTION INFILTRATION; PERINEURAL
Status: DISCONTINUED | OUTPATIENT
Start: 2024-08-06 | End: 2024-08-06 | Stop reason: HOSPADM

## 2024-08-06 RX ORDER — FENTANYL CITRATE 50 UG/ML
INJECTION, SOLUTION INTRAMUSCULAR; INTRAVENOUS
Status: DISCONTINUED | OUTPATIENT
Start: 2024-08-06 | End: 2024-08-06 | Stop reason: HOSPADM

## 2024-08-06 RX ORDER — ONDANSETRON 4 MG/1
8 TABLET, ORALLY DISINTEGRATING ORAL EVERY 8 HOURS PRN
Status: DISCONTINUED | OUTPATIENT
Start: 2024-08-06 | End: 2024-08-06 | Stop reason: HOSPADM

## 2024-08-06 RX ORDER — SODIUM CHLORIDE 9 MG/ML
INJECTION, SOLUTION INTRAVENOUS CONTINUOUS
Status: DISCONTINUED | OUTPATIENT
Start: 2024-08-06 | End: 2024-08-06 | Stop reason: HOSPADM

## 2024-08-06 RX ORDER — IODIXANOL 320 MG/ML
INJECTION, SOLUTION INTRAVASCULAR
Status: DISCONTINUED | OUTPATIENT
Start: 2024-08-06 | End: 2024-08-06 | Stop reason: HOSPADM

## 2024-08-06 RX ORDER — NITROGLYCERIN 5 MG/ML
INJECTION, SOLUTION INTRAVENOUS
Status: DISCONTINUED | OUTPATIENT
Start: 2024-08-06 | End: 2024-08-06 | Stop reason: HOSPADM

## 2024-08-06 RX ORDER — HEPARIN SODIUM 10000 [USP'U]/ML
INJECTION, SOLUTION INTRAVENOUS; SUBCUTANEOUS
Status: DISCONTINUED | OUTPATIENT
Start: 2024-08-06 | End: 2024-08-06 | Stop reason: HOSPADM

## 2024-08-06 RX ORDER — ACETAMINOPHEN 325 MG/1
650 TABLET ORAL EVERY 4 HOURS PRN
Status: DISCONTINUED | OUTPATIENT
Start: 2024-08-06 | End: 2024-08-06 | Stop reason: HOSPADM

## 2024-08-06 RX ADMIN — SODIUM CHLORIDE: 9 INJECTION, SOLUTION INTRAVENOUS at 12:08

## 2024-08-06 RX ADMIN — Medication 50 MG: at 08:08

## 2024-08-06 RX ADMIN — DIPHENHYDRAMINE HYDROCHLORIDE 50 MG: 25 CAPSULE ORAL at 08:08

## 2024-08-06 RX ADMIN — SODIUM CHLORIDE: 9 INJECTION, SOLUTION INTRAVENOUS at 08:08

## 2024-08-08 ENCOUNTER — LAB VISIT (OUTPATIENT)
Dept: LAB | Facility: HOSPITAL | Age: 69
End: 2024-08-08
Attending: FAMILY MEDICINE
Payer: MEDICARE

## 2024-08-08 DIAGNOSIS — E11.9 TYPE 2 DIABETES MELLITUS WITHOUT COMPLICATION, WITH LONG-TERM CURRENT USE OF INSULIN: ICD-10-CM

## 2024-08-08 DIAGNOSIS — Z12.5 SCREENING PSA (PROSTATE SPECIFIC ANTIGEN): ICD-10-CM

## 2024-08-08 DIAGNOSIS — Z79.4 TYPE 2 DIABETES MELLITUS WITHOUT COMPLICATION, WITH LONG-TERM CURRENT USE OF INSULIN: ICD-10-CM

## 2024-08-08 DIAGNOSIS — N18.31 CHRONIC KIDNEY DISEASE, STAGE 3A: ICD-10-CM

## 2024-08-08 LAB
ANION GAP SERPL CALC-SCNC: 11 MMOL/L (ref 8–16)
BUN SERPL-MCNC: 25 MG/DL (ref 8–23)
CALCIUM SERPL-MCNC: 9.8 MG/DL (ref 8.7–10.5)
CHLORIDE SERPL-SCNC: 106 MMOL/L (ref 95–110)
CHOLEST SERPL-MCNC: 144 MG/DL (ref 120–199)
CHOLEST/HDLC SERPL: 3.8 {RATIO} (ref 2–5)
CO2 SERPL-SCNC: 22 MMOL/L (ref 23–29)
COMPLEXED PSA SERPL-MCNC: 1.9 NG/ML (ref 0–4)
CREAT SERPL-MCNC: 1.8 MG/DL (ref 0.5–1.4)
EST. GFR  (NO RACE VARIABLE): 40.2 ML/MIN/1.73 M^2
ESTIMATED AVG GLUCOSE: 166 MG/DL (ref 68–131)
GLUCOSE SERPL-MCNC: 137 MG/DL (ref 70–110)
HBA1C MFR BLD: 7.4 % (ref 4–5.6)
HDLC SERPL-MCNC: 38 MG/DL (ref 40–75)
HDLC SERPL: 26.4 % (ref 20–50)
LDLC SERPL CALC-MCNC: 82.2 MG/DL (ref 63–159)
NONHDLC SERPL-MCNC: 106 MG/DL
OHS QRS DURATION: 128 MS
OHS QTC CALCULATION: 434 MS
POTASSIUM SERPL-SCNC: 4.5 MMOL/L (ref 3.5–5.1)
SODIUM SERPL-SCNC: 139 MMOL/L (ref 136–145)
TRIGL SERPL-MCNC: 119 MG/DL (ref 30–150)

## 2024-08-08 PROCEDURE — 80048 BASIC METABOLIC PNL TOTAL CA: CPT | Performed by: FAMILY MEDICINE

## 2024-08-08 PROCEDURE — 36415 COLL VENOUS BLD VENIPUNCTURE: CPT | Performed by: FAMILY MEDICINE

## 2024-08-08 PROCEDURE — 83036 HEMOGLOBIN GLYCOSYLATED A1C: CPT | Performed by: FAMILY MEDICINE

## 2024-08-08 PROCEDURE — 84153 ASSAY OF PSA TOTAL: CPT | Performed by: FAMILY MEDICINE

## 2024-08-08 PROCEDURE — 80061 LIPID PANEL: CPT | Performed by: FAMILY MEDICINE

## 2024-08-12 ENCOUNTER — OFFICE VISIT (OUTPATIENT)
Dept: PULMONOLOGY | Facility: CLINIC | Age: 69
End: 2024-08-12
Payer: MEDICARE

## 2024-08-12 ENCOUNTER — HOSPITAL ENCOUNTER (OUTPATIENT)
Dept: PULMONOLOGY | Facility: HOSPITAL | Age: 69
Discharge: HOME | End: 2024-08-12
Attending: INTERNAL MEDICINE
Payer: MEDICARE

## 2024-08-12 VITALS
OXYGEN SATURATION: 95 % | DIASTOLIC BLOOD PRESSURE: 52 MMHG | HEIGHT: 68 IN | SYSTOLIC BLOOD PRESSURE: 105 MMHG | BODY MASS INDEX: 30.21 KG/M2 | WEIGHT: 199.31 LBS | HEART RATE: 79 BPM

## 2024-08-12 DIAGNOSIS — J82.83 EOSINOPHILIC ASTHMA: ICD-10-CM

## 2024-08-12 DIAGNOSIS — J44.89 ASTHMA-COPD OVERLAP SYNDROME: ICD-10-CM

## 2024-08-12 DIAGNOSIS — J45.991 COUGH VARIANT ASTHMA: ICD-10-CM

## 2024-08-12 DIAGNOSIS — G47.33 OBSTRUCTIVE SLEEP APNEA: ICD-10-CM

## 2024-08-12 DIAGNOSIS — J44.1 CHRONIC OBSTRUCTIVE PULMONARY DISEASE WITH ACUTE EXACERBATION: ICD-10-CM

## 2024-08-12 DIAGNOSIS — J45.50 SEVERE PERSISTENT ASTHMA WITHOUT COMPLICATION: ICD-10-CM

## 2024-08-12 DIAGNOSIS — J45.50 SEVERE PERSISTENT ASTHMA WITHOUT COMPLICATION: Primary | ICD-10-CM

## 2024-08-12 LAB
DLCO SINGLE BREATH LLN: 18.77
DLCO SINGLE BREATH PRE REF: 62.1 %
DLCO SINGLE BREATH REF: 25.7
DLCOC SBVA LLN: 2.6
DLCOC SBVA REF: 3.82
DLCOC SINGLE BREATH LLN: 18.77
DLCOC SINGLE BREATH REF: 25.7
DLCOVA LLN: 2.6
DLCOVA PRE REF: 90.5 %
DLCOVA PRE: 3.46 ML/(MIN*MMHG*L) (ref 2.6–5.05)
DLCOVA REF: 3.82
ERV LLN: -16448.98
ERV PRE REF: 57.1 %
ERV REF: 1.02
FEF 25 75 CHG: 3.3 %
FEF 25 75 LLN: 1.37
FEF 25 75 POST REF: 107.2 %
FEF 25 75 PRE REF: 103.8 %
FEF 25 75 REF: 3.08
FET100 CHG: 27.3 %
FEV1 CHG: 0.8 %
FEV1 FVC CHG: -5.5 %
FEV1 FVC LLN: 63
FEV1 FVC POST REF: 106.9 %
FEV1 FVC PRE REF: 113.1 %
FEV1 FVC REF: 76
FEV1 LLN: 2.19
FEV1 POST REF: 92 %
FEV1 PRE REF: 91.3 %
FEV1 REF: 3.03
FRCPLETH LLN: 2.59
FRCPLETH PREREF: 62.3 %
FRCPLETH REF: 3.57
FVC CHG: 6.6 %
FVC LLN: 2.96
FVC POST REF: 85.7 %
FVC PRE REF: 80.4 %
FVC REF: 3.98
IVC PRE: 3.21 L (ref 2.96–5.02)
IVC SINGLE BREATH LLN: 2.96
IVC SINGLE BREATH PRE REF: 80.8 %
IVC SINGLE BREATH REF: 3.98
MVV LLN: 100
MVV PRE REF: 81.2 %
MVV REF: 117
PEF CHG: 13.6 %
PEF LLN: 5.83
PEF POST REF: 59 %
PEF PRE REF: 51.9 %
PEF REF: 8.01
POST FEF 25 75: 3.31 L/S (ref 1.37–4.79)
POST FET 100: 7.88 SEC
POST FEV1 FVC: 81.69 % (ref 63.02–88.23)
POST FEV1: 2.79 L (ref 2.19–3.81)
POST FVC: 3.41 L (ref 2.96–5.02)
POST PEF: 4.72 L/S (ref 5.83–10.2)
PRE DLCO: 15.96 ML/(MIN*MMHG) (ref 18.77–32.62)
PRE ERV: 0.58 L (ref -16448.98–16451.02)
PRE FEF 25 75: 3.2 L/S (ref 1.37–4.79)
PRE FET 100: 6.2 SEC
PRE FEV1 FVC: 86.44 % (ref 63.02–88.23)
PRE FEV1: 2.77 L (ref 2.19–3.81)
PRE FRC PL: 2.23 L (ref 2.59–4.56)
PRE FVC: 3.2 L (ref 2.96–5.02)
PRE MVV: 95.38 L/MIN (ref 99.82–135.05)
PRE PEF: 4.16 L/S (ref 5.83–10.2)
PRE RV: 1.64 L (ref 1.88–3.23)
PRE TLC: 4.84 L (ref 5.57–7.87)
RAW LLN: 3.06
RAW PRE REF: 82.5 %
RAW PRE: 2.52 CMH2O*S/L (ref 3.06–3.06)
RAW REF: 3.06
RV LLN: 1.88
RV PRE REF: 64.4 %
RV REF: 2.55
RVTLC LLN: 32
RVTLC PRE REF: 83 %
RVTLC PRE: 33.93 % (ref 31.89–49.85)
RVTLC REF: 41
TLC LLN: 5.57
TLC PRE REF: 72.1 %
TLC REF: 6.72
VA PRE: 4.61 L (ref 6.57–6.57)
VA SINGLE BREATH LLN: 6.57
VA SINGLE BREATH PRE REF: 70.2 %
VA SINGLE BREATH REF: 6.57
VC LLN: 2.96
VC PRE REF: 80.4 %
VC PRE: 3.2 L (ref 2.96–5.02)
VC REF: 3.98

## 2024-08-12 PROCEDURE — 94729 DIFFUSING CAPACITY: CPT | Mod: 26,,, | Performed by: INTERNAL MEDICINE

## 2024-08-12 PROCEDURE — 94726 PLETHYSMOGRAPHY LUNG VOLUMES: CPT | Mod: 26,,, | Performed by: INTERNAL MEDICINE

## 2024-08-12 PROCEDURE — 94060 EVALUATION OF WHEEZING: CPT

## 2024-08-12 PROCEDURE — 99215 OFFICE O/P EST HI 40 MIN: CPT | Mod: PBBFAC,PO | Performed by: INTERNAL MEDICINE

## 2024-08-12 PROCEDURE — 99999 PR PBB SHADOW E&M-EST. PATIENT-LVL V: CPT | Mod: PBBFAC,,, | Performed by: INTERNAL MEDICINE

## 2024-08-12 PROCEDURE — 94060 EVALUATION OF WHEEZING: CPT | Mod: 26,,, | Performed by: INTERNAL MEDICINE

## 2024-08-12 PROCEDURE — 94729 DIFFUSING CAPACITY: CPT

## 2024-08-12 PROCEDURE — 94726 PLETHYSMOGRAPHY LUNG VOLUMES: CPT

## 2024-08-12 PROCEDURE — 99214 OFFICE O/P EST MOD 30 MIN: CPT | Mod: 25,S$PBB,, | Performed by: INTERNAL MEDICINE

## 2024-08-12 RX ORDER — CODEINE PHOSPHATE AND GUAIFENESIN 10; 100 MG/5ML; MG/5ML
10 SOLUTION ORAL EVERY 6 HOURS PRN
Qty: 237 ML | Refills: 0 | Status: SHIPPED | OUTPATIENT
Start: 2024-08-12

## 2024-08-12 RX ORDER — PREDNISONE 10 MG/1
TABLET ORAL
Qty: 30 TABLET | Refills: 1 | Status: SHIPPED | OUTPATIENT
Start: 2024-08-12

## 2024-08-12 RX ORDER — ALBUTEROL SULFATE 2.5 MG/.5ML
SOLUTION RESPIRATORY (INHALATION)
Status: DISPENSED
Start: 2024-08-12 | End: 2024-08-12

## 2024-08-12 RX ORDER — TEZEPELUMAB-EKKO 210 MG/1.9ML
210 INJECTION, SOLUTION SUBCUTANEOUS
Qty: 1.91 ML | Refills: 11 | Status: ACTIVE | OUTPATIENT
Start: 2024-09-09

## 2024-08-12 RX ORDER — BENZONATATE 200 MG/1
200 CAPSULE ORAL 3 TIMES DAILY PRN
Qty: 90 CAPSULE | Refills: 2 | Status: SHIPPED | OUTPATIENT
Start: 2024-08-12

## 2024-08-12 NOTE — PATIENT INSTRUCTIONS
Pft had 6% bronchodilator  There is a response to inhalers and prednisone.   Wheezes and coughing and nocturnal worsening are very prominent..    Breathing test not too too bad-- no obstruction but cough is major symptom.      Woud treat severe persistent eosinophilic asthma with shots....      Will dose tezspire 210 mg sub q lot [id number] , exp 1/31/2026    Bp 105/52 today right arm sitting    Would use auto cpap 8-18

## 2024-08-12 NOTE — PROGRESS NOTES
8/12/2024    Mathewmarielle Rodrigues  New Patient Consult    Chief Complaint   Patient presents with    Follow-up    COPD       HPI:    8/12/2023 pt  having cough with yellow mucous that interfers with cough.  Ent eval and suspected cough from reflux.    Pt had Prilosec double -- no gerd    Pt took prednisone 3 days courses -- was coughing on prednisone for 2 wks.   Steroids seemed to help  Uses trelegy daily, albuterol helps but not lasting  Notes wheezes with albuterol    Eos were up to 1.5 or 1500 in June...     Pt was taken off prednisone for heart cath-- heart was good at cath.  No diarrhea.  Pt off prednisone 10 days.      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  Pt worked construction.  Pt smoked 16-38 ppd or so..    No h/o asthma.  Pt had covid likel illness 2020 with wk in hosp.   Pt felt ot have copd, uses trelegy and albuterol-- no great benefit.  Patient however would improve his breathing would steroids.  He is breathing with normalize.  When he got off steroids to breathing would relapse.  Very dramatic benefit and clear relapse.    Pt has had thick creamy mucous but no culture nor abx-- took abx deceember with      Pt will have freq wheezes--- not particularly nocturnal ---   Chr renal failure  H/o dvt -- on eliquis 5 bid...  Pt on teststerone  Used dose pack in past-- pt felt better and was breathing better but relapsed...      Uses cpap nightly -uses nasal mask.  Compliant.  No problem.  Patient Instructions   Chest xray and ct abd 6/2024 viewed and good    Saline rinse for nasal passages, if stuffy may use 4 way nasal to open passages.  Use flonase daily        You relate cough and wheezes and short breath,  You are on good high dose controller with trelegy.    Albuterol not effective    Steroids will remit problems-- you used  once - avoiding due to diabetes.     You have severe persistent eosinophilic asthma.  Expect you are steroid dependant.    Would do breathing test and plan to start dupixent at follow up....      May do ct chest if lingering.......    PFSH:  Past Medical History:   Diagnosis Date    Arthritis     COPD (chronic obstructive pulmonary disease)     Diabetes mellitus, type 2     DVT (deep venous thrombosis)     Hyperlipidemia     Hypertension     Kidney stones     LVH (left ventricular hypertrophy) due to hypertensive disease, with heart failure     Neuropathy     Personal history of colonic polyps 03/24/2000    colonoscopy report in media    PTSD (post-traumatic stress disorder)     Shortness of breath     fatigue    Sleep apnea, unspecified     Torn rotator cuff          Past Surgical History:   Procedure Laterality Date    achilles repair Left 2003    ADENOIDECTOMY      ANGIOGRAM, CORONARY, WITH LEFT HEART CATHETERIZATION N/A 8/6/2024    Procedure: Angiogram, Coronary, with Left Heart Cath;  Surgeon: Elliot Butler MD;  Location: [facility] CATH[facility]EP LAB;  Service: Cardiology;  Laterality: N/A;    ANKLE FRACTURE SURGERY Right 1987    CATARACT EXTRACTION Left 2004    CATARACT EXTRACTION Left 2005    2nd    COLONOSCOPY N/A 05/09/2017    results in media    COLONOSCOPY N/A 01/13/2023    Procedure: COLONOSCOPY;  Surgeon: Kenney Keller MD;  Location: [facility] ENDO;  Service: General;  Laterality: N/A;    COLONOSCOPY W/ POLYPECTOMY N/A 03/24/2000    results in media    ELBOW SURGERY Right 07/24/2020    EYE SURGERY  2005    finger joint replacement Right 01/22/2020    middle finger    hair follicle  1998    HAND SURGERY Right 04/25/2017    HAND SURGERY Right 05/12/2017    2nd surgery    NASAL SINUS SURGERY  08/2015    ROTATOR CUFF REPAIR Right 09/25/2015    ROTATOR CUFF REPAIR Left 04/08/2016    skin grafts Right 07/2016    shin from stingray wound    TONSILLECTOMY      VASECTOMY       Social History     Tobacco Use    Smoking  "status: Former     Current packs/day: 1.50     Average packs/day: 1.5 packs/day for 30.0 years (45.0 ttl pk-yrs)     Types: Cigarettes    Smokeless tobacco: Never   Substance Use Topics    Alcohol use: Not Currently    Drug use: Never     Family History   Problem Relation Name Age of Onset    Hypertension Mother Sierra     Arthritis Mother Sierra     Hypertension Father Misael     Diabetes Father Misael     Arthritis Father Misael     Hearing loss Father Misael     Cancer Brother Mickey Douglas     COPD Brother Mickey Douglas      Review of patient's allergies indicates:   Allergen Reactions    Ibuprofen Itching          Review of Systems:  a review of eleven systems covering constitutional, Eye, HEENT, Psych, Respiratory, Cardiac, GI, , Musculoskeletal, Endocrine, Dermatologic was negative except for pertinent findings as listed ABOVE and below: pertinent positives as above, rest good        Exam:Comprehensive exam done. BP (!) 105/52 (BP Location: Right arm, Patient Position: Sitting, BP Method: Small (Automatic))   Pulse 79   Ht 5' 8" (1.727 m)   Wt 90.4 kg (199 lb 4.7 oz)   SpO2 95% Comment: on room air at rest  BMI 30.30 kg/m²   Exam included Vitals as listed, and patient's appearance and affect and alertness and mood, oral exam for yeast and hygiene and pharynx lesions and Mallapatti (M) score, neck with inspection for jvd and masses and thyroid abnormalities and lymph nodes (supraclavicular and infraclavicular nodes and axillary also examined and noted if abn), chest exam included symmetry and effort and fremitus and percussion and auscultation, cardiac exam included rhythm and gallops and murmur and rubs and jvd and edema, abdominal exam for mass and hepatosplenomegaly and tenderness and hernias and bowel sounds, Musculoskeletal exam with muscle tone and posture and mobility/gait and  strength, and skin for rashes and cyanosis and pallor and turgor, extremity for clubbing.  Findings were normal except for " pertinent findings listed below:  M3, chest is symmetric, no distress, normal percussion, normal fremitus and good normal breath sounds  Arthritic deformed hands...         Radiographs (ct chest and cxr) reviewed: view by direct vision      Labs none available        PFT will be done and results to be reviewed       Plan:  Clinical impression is apparently straight forward and impression with management as below.    Mathew was seen today for follow-up and copd.    Diagnoses and all orders for this visit:    Severe persistent asthma without complication  -     benzonatate (TESSALON) 200 MG capsule; Take 1 capsule (200 mg total) by mouth 3 (three) times daily as needed for Cough.  -     tezepelumab-ekko (TEZSPIRE) 210 mg/1.91 mL (110 mg/mL) PnIj; Inject 210 mg into the skin every 28 days.  -     predniSONE (DELTASONE) 10 MG tablet; Take 3 daily for 3 days and repeat for asthma  -     guaiFENesin-codeine 100-10 mg/5 ml (TUSSI-ORGANIDIN NR)  mg/5 mL syrup; Take 10 mLs by mouth every 6 (six) hours as needed for Cough or Congestion.    Cough variant asthma  -     benzonatate (TESSALON) 200 MG capsule; Take 1 capsule (200 mg total) by mouth 3 (three) times daily as needed for Cough.  -     tezepelumab-ekko (TEZSPIRE) 210 mg/1.91 mL (110 mg/mL) PnIj; Inject 210 mg into the skin every 28 days.  -     guaiFENesin-codeine 100-10 mg/5 ml (TUSSI-ORGANIDIN NR)  mg/5 mL syrup; Take 10 mLs by mouth every 6 (six) hours as needed for Cough or Congestion.    Eosinophilic asthma  -     benzonatate (TESSALON) 200 MG capsule; Take 1 capsule (200 mg total) by mouth 3 (three) times daily as needed for Cough.  -     tezepelumab-ekko (TEZSPIRE) 210 mg/1.91 mL (110 mg/mL) PnIj; Inject 210 mg into the skin every 28 days.  -     predniSONE (DELTASONE) 10 MG tablet; Take 3 daily for 3 days and repeat for asthma    Chronic obstructive pulmonary disease with acute exacerbation    Asthma-COPD overlap syndrome    Obstructive sleep apnea  -      HME - OTHER  -     CPAP/BIPAP SUPPLIES          Follow up in about 3 months (around 11/12/2024), or if symptoms worsen or fail to improve.    Discussed with patient above for education the following:      Patient Instructions   Pft had 6% bronchodilator  There is a response to inhalers and prednisone.   Wheezes and coughing and nocturnal worsening are very prominent..    Breathing test not too too bad-- no obstruction but cough is major symptom.      Woud treat severe persistent eosinophilic asthma with shots....      Will dose tezspire 210 mg sub q lot [id number] , exp 1/31/2026    Bp 105/52 today right arm sitting    Would use auto cpap 8-18      Evaluation took 30 minutes

## 2024-08-13 PROBLEM — J45.50 SEVERE PERSISTENT ASTHMA WITHOUT COMPLICATION: Status: ACTIVE | Noted: 2024-08-13

## 2024-08-14 ENCOUNTER — PATIENT OUTREACH (OUTPATIENT)
Dept: ADMINISTRATIVE | Facility: HOSPITAL | Age: 69
End: 2024-08-14
Payer: MEDICARE

## 2024-08-14 NOTE — PROGRESS NOTES
Population Health Chart Review & Patient Outreach Details      Additional [facility] Health Notes:               Updates Requested / Reviewed:      Updated Care Coordination Note, Care Everywhere, , and Immunizations Reconciliation Completed or Queried: [facility] Topics Overdue:      [facility] Score: 3     Urine Screening  Eye Exam  AAA Screening    Pneumonia Vaccine  Tetanus Vaccine  RSV Vaccine                  Health Maintenance Topic(s) Outreach Outcomes & Actions Taken:    AAA Screening - Outreach Outcomes & Actions Taken  : External Records Requested & Care Team Updated if Applicable

## 2024-08-14 NOTE — LETTER
"       AUTHORIZATION FOR RELEASE OF   CONFIDENTIAL INFORMATION    Dear [facility] Medical Records,    We are seeing Mathew Rodrigues, date of birth 1955, in the clinic at [facility]. Kiley Vasquez MD is the patient's PCP. Mathew Rodrigues has an outstanding lab/procedure at the time we reviewed his chart. In order to help keep his health information updated, he has authorized us to request the following medical record(s):                             ( X ) AAA Abdominal US in 2020         Please fax records to [facility], [place], Sarah E., MD, [phone] or [phone].        Thank you  Karen Mari LPN  Clinical Care Coordinator  [facility]             Patient Name: Mathew Rodrigues  : 1955  Patient Phone #: [phone]                      A. Consent for Examination and Treatment: I hereby authorize the providers and employees of [facility] ("[facility] to provide medical treatment/services which includes, but is not limited to, performing and administering tests and diagnostic procedures that are deemed necessary, Including, but not limited to, imaging examinations, blood tests and other laboratory procedures as may be required by the hospital, clinic, or may be ordered by my physician(s) or persons working under the general and/or special instructions of my physician(s).                   1.      I understand and agree that this consent covers all authorized persons, including but not limited to physicians, residents, nurse practitioners, physicians' assistants, specialists, consultants, student nurses, and independently contracted physicians, who are called upon by the physician in charge, to carry out the diagnostic procedures and medical or surgical treatment.  2.      I hereby authorize [facility] to retain or dispose of any specimens or tissue, should there be such remaining from any test or procedure.  3.      I hereby authorize and give consent for " [facility] providers and employees to take photographs, images or videotapes of such diagnostic, surgical or treatment procedures of Patient as may be required by [facility] or as may be ordered by a physician.  I further acknowledge and agree that [facility] may use cameras or other devices for patient monitoring.  4.      I am aware that the practice of medicine is not an exact science, and I acknowledge that no guarantees have been made to me as to the outcome of any tests, procedures or treatment.                   B. Authorization for Release of Information:  I understand that my insurance company and/or their agents may need information necessary to make determinations about payment/reimbursement.  I hereby provide authorization to release to all insurance companies, their successors, assignees, other parties with whom they may have contracted, or others acting on their behalf, that are involved with payment for any hospital and/or clinic charges incurred by the patient, any information that they request and deem necessary for payment/reimbursement, and/or quality review.  I further authorize the release of my health information to physicians or other health care practitioners on staff who are involved in my health care now and in the future, and to other health care providers, entities, or institutions for the purpose of my continued care and treatment, including referrals.     C. Medicare Patient's Certification and Authorization to Release Information and Payment Request:  I certify that the information given by me in applying for payment under Title XVIII of the Social Security Act is correct.  I authorize any toth of medical or other information about me to release to the Social Security Administration, or its intermediaries or carriers, any information needed for this or a related Medicare claim.  I request that payment of authorized benefits be made on my behalf.     D. Assignment of Insurance Benefits:  I  hereby authorize any and all insurance companies, health plans, defined benefit plans, health insurers or any entity that is or may be responsible for payment of my medical expenses to pay all hospital and medical benefits now due, and to become due and payable to me under any hospital benefits, sick benefits, injury benefits or any other benefit for services rendered to me, including Major Medical Benefits, direct to [facility] and all independently contracted physicians.  I assign any and all rights that I may have against any and all insurance companies, health plans, defined benefit plans, health insurers or any entity that is or may be responsible for payment of my medical expenses, including, but not limited to any right to appeal a denial of a claim, any right to bring any action, lawsuit, administrative proceeding, or other cause of action on my behalf.  I specifically assign my right to pursue litigation against any and all insurance companies, health plans, defined benefit plans, health insurers or any entity that is or may be responsible for payment of medical expenses based upon a refusal to pay charges.              REGISTRATION  AUTHORIZATION                    E. Valuables:  It is understood and agreed that [facility] is not liable for the damage to or loss of any money, jewelry, documents, dentures, eye glasses, hearing aids, prosthetics, or other property of value.     F. Computer Equipment:  I understand and agree that should I choose to use computer equipment owned by [facility] or if I choose to access the Internet via [facility]'s network, I do so at my own risk.  [facility] is not responsible for any damage to my computer equipment or to any damages of any type that might arise from my loss of equipment or data.                   G. Acceptance of Financial Responsibility:  I agree that in consideration of the services and supplies that have been or will be furnished to the patient,  I am hereby obligated to  pay all charges made for or on the account of the patient according to the standard rates (in effect at the time the services and supplies are delivered) established by [facility], including its Patient Financial Assistance Policy to the extent it is applicable.  I understand that I am responsible for all charges, or portions thereof, not covered by insurance or other sources.  Patient refunds will be distributed only after balances at all [facility] facilities are paid.                   H. Communication Authorization:  I hereby authorize [facility] and its representatives, along with any billing service or  who may work on their behalf, to contact me on my cell phone and/or home phone using prerecorded messages, artificial voice messages, automatic telephone dialing devices or other computer assisted technology, or by electronic mail, text messaging, or by any other form of electronic communication.  This includes, but is not limited to appointment reminders, yearly physical exam reminders, preventive care reminders, patient campaigns, welcome calls, and calls about account balances on my account or any account on which I am listed as a guarantor.  I understand I have the right to opt out of these communications at any time.     I.  Relationship Between Facility and Physician:  I understand that some, but not all, providers furnishing services to the patient are not employees or agents of [facility].  The patient is under the care and supervision of his/her attending physician, and it is the responsibility of the facility and its nursing staff to carry out the instructions of such physicians.  It is the responsibility of the patient's physician/designee to obtain the patient's informed consent, when required, for medical or surgical treatment, special diagnostic or therapeutic procedures, or hospital services rendered for the patient under the special instructions of the physician/designee.     J. Notice of  Private Practices:  I acknowledge I have received a copy of [facility]'s Notice of Privacy Practices.     K. Facility Directory:  I have discussed with the organization my desire to be either included or excluded in the facility directory.  I understand that if my choice is to opt-out of being identified in the facility directory that the facility will not provide any information about me such as my condition (e.g. Fair, stable, etc.) or my location in the facility (e.g. Room number, department).     L. LINKS:  For [place] Residents:  [facility] is a LINKS ([place] Immunization Network for Kids Statewide) participating facility.  LINKS is a [facility]-sponsored confidential computer system that helps you and your doctor keep track of you and your child's immunization history.  I acknowledge that I am allowing [facility] to share this information with [facility].  For [place] Residents:  [facility] is a MIIX ([place] Immunization Information eXchange) participant.  MIUserscout is a [place] Department of Health-sponsored confidential computer system that helps you and your doctor keep track of you and your child's immunization history.  I acknowledge that I am allowing [facility] to share information with [employer].     M. Term:  This authorization is valid for this and subsequent care/treatment I receive at [facility] and will remain valid unless/until revoked in writing by me.      ACKNOWLEDGMENT OF POTENTIAL RISKS OF COVID-19 VACCINE  It is important that you, as the patient or patients legally authorized representative(s), understand and acknowledge the following, with regard to administration of the COVID-19 vaccine offered by [facility]:  The SARS-CoV-2 virus (COVID-19) has caused an unprecedented modern global pandemic that has mobilized scientists and drug manufacturers to work to create safe and effective vaccines to get the crisis under control.  No vaccine is released in the [place] without undergoing rigorous,  multi-layered testing and approval by the Food and Drug Administration.  During a public health emergency, however, vaccines can be released for patient administration by the FDA prior to completion of multi-phase clinical trials and approval. This is done by the FDAs granting of Emergency Use Authorization (EUA) when the vaccine meets reasonable thresholds for safety and effectiveness and people are in urgent need of care. Under an EUA, the FDA has found that known and potential benefits outweigh its known and potential risks.  The vaccine for which you are presenting to [facility] has been released under an EUA, which [facility] is honoring in its distribution of the vaccine to the public. While the FDAs authorization indicates its belief that usage is recommended over possible risks, there is still the possibility that unknown risks of the vaccine could exist.  By signing this document, you acknowledge and assume these risks. Further, you waive any and all claims of liability against and hold harmless any [facility] entity or provider for any harm caused to you by said possible unknown risks of the vaccine.        N. [facility]:  As used in this document, [facility] means all [facility] affiliated entities including all health centers, surgery centers, clinics, and hospitals.  It includes more specifically, the following entities: [facility], a not for profit [facility], and its subsidiaries and affiliates, including [facility], [facility], [facility], [facility], [facility]d/b/a [facility], L.L.C.d/b/a [facility] [employer] As manager of [facility], [facility], [facility], [facility], L.L.C.d/b/a [facility], [facility], [facility] and [facility] as manager of [facility].                                                                Patient/Legal Guardian Signature                                                    This signature was collected at 06/01/2024      Lily Rodriguesn/Self                                                                            Printed Name/Relationship to Patient

## 2024-08-14 NOTE — LETTER
"       AUTHORIZATION FOR RELEASE OF   CONFIDENTIAL INFORMATION    Dear [facility] Medical Records,    We are seeing Mathew Rodrigues, date of birth 1955, in the clinic at [facility]. Kiley Vasquez MD is the patient's PCP. Mathew Rodrigues has an outstanding lab/procedure at the time we reviewed his chart. In order to help keep his health information updated, he has authorized us to request the following medical record(s):                                    ( X )  AAA Abdominal US         Please fax records to [facility], [place], Sarah E., MD, [phone] or [phone].        Thank you  Karen Mari GAETANO  Clinical Care Coordinator  [facility]             Patient Name: Mathew Rodrigues  : 1955  Patient Phone #: [phone]                    A. Consent for Examination and Treatment: I hereby authorize the providers and employees of [facility] ("[facility] to provide medical treatment/services which includes, but is not limited to, performing and administering tests and diagnostic procedures that are deemed necessary, Including, but not limited to, imaging examinations, blood tests and other laboratory procedures as may be required by the hospital, clinic, or may be ordered by my physician(s) or persons working under the general and/or special instructions of my physician(s).                   1.      I understand and agree that this consent covers all authorized persons, including but not limited to physicians, residents, nurse practitioners, physicians' assistants, specialists, consultants, student nurses, and independently contracted physicians, who are called upon by the physician in charge, to carry out the diagnostic procedures and medical or surgical treatment.  2.      I hereby authorize [facility] to retain or dispose of any specimens or tissue, should there be such remaining from any test or procedure.  3.      I hereby authorize and give consent for [facility] " providers and employees to take photographs, images or videotapes of such diagnostic, surgical or treatment procedures of Patient as may be required by [facility] or as may be ordered by a physician.  I further acknowledge and agree that [facility] may use cameras or other devices for patient monitoring.  4.      I am aware that the practice of medicine is not an exact science, and I acknowledge that no guarantees have been made to me as to the outcome of any tests, procedures or treatment.                   B. Authorization for Release of Information:  I understand that my insurance company and/or their agents may need information necessary to make determinations about payment/reimbursement.  I hereby provide authorization to release to all insurance companies, their successors, assignees, other parties with whom they may have contracted, or others acting on their behalf, that are involved with payment for any hospital and/or clinic charges incurred by the patient, any information that they request and deem necessary for payment/reimbursement, and/or quality review.  I further authorize the release of my health information to physicians or other health care practitioners on staff who are involved in my health care now and in the future, and to other health care providers, entities, or institutions for the purpose of my continued care and treatment, including referrals.     C. Medicare Patient's Certification and Authorization to Release Information and Payment Request:  I certify that the information given by me in applying for payment under Title XVIII of the Social Security Act is correct.  I authorize any toth of medical or other information about me to release to the Social Security Administration, or its intermediaries or carriers, any information needed for this or a related Medicare claim.  I request that payment of authorized benefits be made on my behalf.     D. Assignment of Insurance Benefits:  I hereby  authorize any and all insurance companies, health plans, defined benefit plans, health insurers or any entity that is or may be responsible for payment of my medical expenses to pay all hospital and medical benefits now due, and to become due and payable to me under any hospital benefits, sick benefits, injury benefits or any other benefit for services rendered to me, including Major Medical Benefits, direct to [facility] and all independently contracted physicians.  I assign any and all rights that I may have against any and all insurance companies, health plans, defined benefit plans, health insurers or any entity that is or may be responsible for payment of my medical expenses, including, but not limited to any right to appeal a denial of a claim, any right to bring any action, lawsuit, administrative proceeding, or other cause of action on my behalf.  I specifically assign my right to pursue litigation against any and all insurance companies, health plans, defined benefit plans, health insurers or any entity that is or may be responsible for payment of medical expenses based upon a refusal to pay charges.              REGISTRATION  AUTHORIZATION                    E. Valuables:  It is understood and agreed that [facility] is not liable for the damage to or loss of any money, jewelry, documents, dentures, eye glasses, hearing aids, prosthetics, or other property of value.     F. Computer Equipment:  I understand and agree that should I choose to use computer equipment owned by [facility] or if I choose to access the Internet via [facility]'s network, I do so at my own risk.  [facility] is not responsible for any damage to my computer equipment or to any damages of any type that might arise from my loss of equipment or data.                   G. Acceptance of Financial Responsibility:  I agree that in consideration of the services and supplies that have been or will be furnished to the patient,  I am hereby obligated to pay all  charges made for or on the account of the patient according to the standard rates (in effect at the time the services and supplies are delivered) established by [facility], including its Patient Financial Assistance Policy to the extent it is applicable.  I understand that I am responsible for all charges, or portions thereof, not covered by insurance or other sources.  Patient refunds will be distributed only after balances at all [facility] facilities are paid.                   H. Communication Authorization:  I hereby authorize [facility] and its representatives, along with any billing service or  who may work on their behalf, to contact me on my cell phone and/or home phone using prerecorded messages, artificial voice messages, automatic telephone dialing devices or other computer assisted technology, or by electronic mail, text messaging, or by any other form of electronic communication.  This includes, but is not limited to appointment reminders, yearly physical exam reminders, preventive care reminders, patient campaigns, welcome calls, and calls about account balances on my account or any account on which I am listed as a guarantor.  I understand I have the right to opt out of these communications at any time.     I.  Relationship Between Facility and Physician:  I understand that some, but not all, providers furnishing services to the patient are not employees or agents of [facility].  The patient is under the care and supervision of his/her attending physician, and it is the responsibility of the facility and its nursing staff to carry out the instructions of such physicians.  It is the responsibility of the patient's physician/designee to obtain the patient's informed consent, when required, for medical or surgical treatment, special diagnostic or therapeutic procedures, or hospital services rendered for the patient under the special instructions of the physician/designee.     J. Notice of Private  Practices:  I acknowledge I have received a copy of [facility]'s Notice of Privacy Practices.     K. Facility Directory:  I have discussed with the organization my desire to be either included or excluded in the facility directory.  I understand that if my choice is to opt-out of being identified in the facility directory that the facility will not provide any information about me such as my condition (e.g. Fair, stable, etc.) or my location in the facility (e.g. Room number, department).     L. LINKS:  For [place] Residents:  [facility] is a LINKS ([place] Immunization Network for Kids Statewide) participating facility.  LINKS is a [facility]-sponsored confidential computer system that helps you and your doctor keep track of you and your child's immunization history.  I acknowledge that I am allowing [facility] to share this information with [facility].  For [place] Residents:  [facility] is a MIIX ([place] Immunization Information eXchange) participant.  MIAnaqua is a [place] Department of Health-sponsored confidential computer system that helps you and your doctor keep track of you and your child's immunization history.  I acknowledge that I am allowing [facility] to share information with [employer].     M. Term:  This authorization is valid for this and subsequent care/treatment I receive at [facility] and will remain valid unless/until revoked in writing by me.      ACKNOWLEDGMENT OF POTENTIAL RISKS OF COVID-19 VACCINE  It is important that you, as the patient or patients legally authorized representative(s), understand and acknowledge the following, with regard to administration of the COVID-19 vaccine offered by [facility]:  The SARS-CoV-2 virus (COVID-19) has caused an unprecedented modern global pandemic that has mobilized scientists and drug manufacturers to work to create safe and effective vaccines to get the crisis under control.  No vaccine is released in the [place] without undergoing rigorous, multi-layered  testing and approval by the Food and Drug Administration.  During a public health emergency, however, vaccines can be released for patient administration by the FDA prior to completion of multi-phase clinical trials and approval. This is done by the FDAs granting of Emergency Use Authorization (EUA) when the vaccine meets reasonable thresholds for safety and effectiveness and people are in urgent need of care. Under an EUA, the FDA has found that known and potential benefits outweigh its known and potential risks.  The vaccine for which you are presenting to [facility] has been released under an EUA, which [facility] is honoring in its distribution of the vaccine to the public. While the FDAs authorization indicates its belief that usage is recommended over possible risks, there is still the possibility that unknown risks of the vaccine could exist.  By signing this document, you acknowledge and assume these risks. Further, you waive any and all claims of liability against and hold harmless any [facility] entity or provider for any harm caused to you by said possible unknown risks of the vaccine.        N. [facility]:  As used in this document, [facility] means all [facility] affiliated entities including all health centers, surgery centers, clinics, and hospitals.  It includes more specifically, the following entities: [facility], a not for profit [facility], and its subsidiaries and affiliates, including [facility], [facility], [facility] L[facility]., [facility], [facility], [facility], L.L.C., [facility] L[facility]ANCISCO[facility], L.L.C.d/b/a [facility], L.L.C.d/b/a [facility] [employer], L.L.C. As manager of [facility], [facility],  L.L.C, [facility] [employer], L.L.C.d/b/a [facility], [facility], L[facility]C., [facility], L.L.C., and [facility] as manager of [facility].                                                                Patient/Legal Guardian Signature                                                    This signature was collected at 06/01/2024      Lily Rodriguesn/Self                                                                            Printed Name/Relationship to Patient

## 2024-08-16 ENCOUNTER — PATIENT OUTREACH (OUTPATIENT)
Dept: ADMINISTRATIVE | Facility: HOSPITAL | Age: 69
End: 2024-08-16
Payer: MEDICARE

## 2024-08-16 ENCOUNTER — OFFICE VISIT (OUTPATIENT)
Dept: FAMILY MEDICINE | Facility: CLINIC | Age: 69
End: 2024-08-16
Payer: MEDICARE

## 2024-08-16 ENCOUNTER — PATIENT MESSAGE (OUTPATIENT)
Dept: ADMINISTRATIVE | Facility: HOSPITAL | Age: 69
End: 2024-08-16
Payer: MEDICARE

## 2024-08-16 VITALS
HEIGHT: 68 IN | WEIGHT: 196.5 LBS | RESPIRATION RATE: 18 BRPM | DIASTOLIC BLOOD PRESSURE: 74 MMHG | SYSTOLIC BLOOD PRESSURE: 116 MMHG | OXYGEN SATURATION: 91 % | BODY MASS INDEX: 29.78 KG/M2 | HEART RATE: 81 BPM

## 2024-08-16 DIAGNOSIS — E11.9 TYPE 2 DIABETES MELLITUS WITHOUT COMPLICATION, WITH LONG-TERM CURRENT USE OF INSULIN: ICD-10-CM

## 2024-08-16 DIAGNOSIS — E11.9 TYPE 2 DIABETES MELLITUS WITHOUT COMPLICATION, WITHOUT LONG-TERM CURRENT USE OF INSULIN: ICD-10-CM

## 2024-08-16 DIAGNOSIS — N52.9 ERECTILE DYSFUNCTION, UNSPECIFIED ERECTILE DYSFUNCTION TYPE: ICD-10-CM

## 2024-08-16 DIAGNOSIS — N18.32 CKD STAGE 3B, GFR 30-44 ML/MIN: ICD-10-CM

## 2024-08-16 DIAGNOSIS — I10 ESSENTIAL HYPERTENSION, BENIGN: ICD-10-CM

## 2024-08-16 DIAGNOSIS — I10 PRIMARY HYPERTENSION: Primary | ICD-10-CM

## 2024-08-16 DIAGNOSIS — Z79.4 TYPE 2 DIABETES MELLITUS WITHOUT COMPLICATION, WITH LONG-TERM CURRENT USE OF INSULIN: ICD-10-CM

## 2024-08-16 DIAGNOSIS — E78.2 MIXED HYPERLIPIDEMIA: ICD-10-CM

## 2024-08-16 PROCEDURE — 99215 OFFICE O/P EST HI 40 MIN: CPT | Mod: PBBFAC,PN | Performed by: FAMILY MEDICINE

## 2024-08-16 PROCEDURE — 99999 PR PBB SHADOW E&M-EST. PATIENT-LVL V: CPT | Mod: PBBFAC,,, | Performed by: FAMILY MEDICINE

## 2024-08-16 RX ORDER — LISINOPRIL AND HYDROCHLOROTHIAZIDE 12.5; 2 MG/1; MG/1
1 TABLET ORAL DAILY
Start: 2024-08-16

## 2024-08-16 NOTE — PROGRESS NOTES
Subjective:       Patient ID: Mathew Rodrigues is a 69 y.o. male.    Chief Complaint: Follow-up (Discuss bp medication noticed bp is lower in the afternoons.  )    Presents today to follow up.     Since the last time he saw me he had a PFT with Dr. Quinteros which was okay. Dr. Quinteros is trying to figure out why he has a constant cough and constant fatigue. He is on multiple things to decrease cough. He is also treating him for possible eosinophilic component.      Had a EKG with abnormal nuclear stress. Had to go to cardiac cath. It was absolutely normal which was great.     He also had to come in and see Dr. Farias the 1st of June after an elevator cable broke and it fell 10 feet. He saw Dr. Youngblood due to rupture of the plantar fascia. He had bruising on the whole left side. Right knee and foot are painful. He has been told could be a year until he can walk better.           .  Patient Active Problem List   Diagnosis    CRICKET on CPAP, 100% use    Primary hypertension, dx 2015    Type 2 diabetes mellitus without complication, with long-term current use of insulin    Mixed hyperlipidemia    Gastroesophageal reflux disease without esophagitis    Benign prostatic hyperplasia without lower urinary tract symptoms    Chronic pain syndrome    Chronic right shoulder pain    Hypogonadism in male    Primary osteoarthritis involving multiple joints    Chronic rhinitis    Insomnia    CKD stage 3b, GFR 30-44 ml/min    Chronic obstructive lung disease    Diastolic heart failure, unspecified HF chronicity, at time of COVID 2020    OTT (dyspnea on exertion), post COVID, 2/2020    COVID-19 long hauler manifesting chronic dyspnea, sputum and cough    Electrocution and nonfatal effects of electric current, 2015    Uncomplicated opioid dependence, Nucynta bid since 2015    Abnormal ECG    Chronic deep vein thrombosis (DVT) of proximal vein of left lower extremity, 8/2023    Anticoagulant long-term use    Abdominal obesity    Abnormal nuclear  stress test    LVH (left ventricular hypertrophy) due to hypertensive disease, with heart failure    Chronic fatigue    Severe persistent asthma without complication     Mathew has a current medication list which includes the following prescription(s): albuterol, amlodipine, apixaban, atorvastatin, bd almas 2nd gen pen needle, benzonatate, buspirone, cyanocobalamin, empagliflozin, fenofibrate, fluticasone-umeclidin-vilanter, glipizide, guaifenesin-codeine 100-10 mg/5 ml, guanfacine, insulin degludec, loratadine, metformin, nucynta er, omeprazole, prednisone, ozempic, sildenafil, tamsulosin, testosterone cypionate, [START ON 9/9/2024] tezspire, trazodone, and lisinopril-hydrochlorothiazide.    Review of Systems   Constitutional:  Negative for activity change, appetite change, fatigue and fever.   Respiratory:  Negative for shortness of breath.    Gastrointestinal:  Negative for abdominal pain.   Integumentary:  Negative for rash.         Health Maintenance Due   Topic Date Due    TETANUS VACCINE  Never done    RSV Vaccine (Age 60+ and Pregnant patients) (1 - 1-dose 60+ series) Never done    Abdominal Aortic Aneurysm Screening  Never done    Pneumococcal Vaccines (Age 65+) (2 of 2 - PCV) 08/24/2021    COVID-19 Vaccine (6 - 2023-24 season) 01/11/2024    Eye Exam  03/14/2024    Diabetes Urine Screening  08/08/2024      Health Maintenance reviewed and discussed- will order diabetes urine screening.   Objective:      Physical Exam  Vitals and nursing note reviewed.   Constitutional:       General: He is not in acute distress.     Appearance: He is not ill-appearing.   Cardiovascular:      Rate and Rhythm: Normal rate and regular rhythm.      Heart sounds: No murmur heard.  Pulmonary:      Effort: Pulmonary effort is normal.      Breath sounds: Normal breath sounds. No wheezing.   Skin:     General: Skin is warm and dry.      Findings: No rash.   Neurological:      Mental Status: He is alert.   Psychiatric:         Mood and  Affect: Mood normal.         Behavior: Behavior normal.         Assessment:       1. Primary hypertension, dx 2015    2. Mixed hyperlipidemia    3. Type 2 diabetes mellitus without complication, with long-term current use of insulin    4. CKD stage 3b, GFR 30-44 ml/min    5. Essential hypertension, benign        Plan:       1. Primary hypertension, dx 2015  Assessment & Plan:  Stable. Well controlled. Continue current medications.        2. Mixed hyperlipidemia  Assessment & Plan:  Stable. Well controlled. Continue current medications.         3. Type 2 diabetes mellitus without complication, with long-term current use of insulin  Assessment & Plan:  Lab Results   Component Value Date    HGBA1C 7.4 (H) 08/08/2024     Diabetes Medications               empagliflozin (JARDIANCE) 10 mg tablet Take 1 tablet (10 mg total) by mouth once daily.    glipiZIDE (GLUCOTROL) 5 MG tablet Take 1 tablet (5 mg total) by mouth 2 (two) times daily before meals.    insulin degludec (TRESIBA FLEXTOUCH U-100) 100 unit/mL (3 mL) insulin pen Inject 28 Units into the skin every morning.    metFORMIN (GLUCOPHAGE) 1000 MG tablet Take 1 tablet (1,000 mg total) by mouth 2 (two) times daily with meals.    semaglutide (OZEMPIC) 0.25 mg or 0.5 mg (2 mg/3 mL) pen injector Inject 0.5 mg into the skin every 7 days.                  4. CKD stage 3b, GFR 30-44 ml/min  Assessment & Plan:  Has just had a reduction of GFR  KFRE 5-Year: 2.2% at 8/2/2024  4:03 PM  Calculated from:  Serum Creatinine: 1.8 mg/dL at 8/2/2024  4:03 PM  Urine Albumin Creatinine Ratio: 21.6 ug/mg at 8/8/2023  7:16 AM  Age: 69 years  Sex: Male at 8/2/2024  4:03 PM  Has CKD-3 to CKD-5: Yes  Continue jardiance  Continue to monitor        5. Essential hypertension, benign  -     lisinopriL-hydrochlorothiazide (PRINZIDE,ZESTORETIC) 20-12.5 mg per tablet; Take 1 tablet by mouth once daily.

## 2024-08-16 NOTE — TELEPHONE ENCOUNTER
No care due was identified.  [facility] Embedded Care Due Messages. Reference number: [id number].   8/16/2024 4:20:46 PM CDT

## 2024-08-16 NOTE — PROGRESS NOTES
Population Health Chart Review & Patient Outreach Details      Additional [facility] Health Notes:               Updates Requested / Reviewed:      Updated Care Coordination Note, Care Everywhere, Care Team Updated, and Immunizations Reconciliation Completed or Queried: [facility] Topics Overdue:      [facility] Score: 3     Urine Screening  Eye Exam  AAA Screening    Pneumonia Vaccine  Tetanus Vaccine  RSV Vaccine                  Health Maintenance Topic(s) Outreach Outcomes & Actions Taken:    AAA Screening - Outreach Outcomes & Actions Taken  : External Records Requested & Care Team Updated if Applicable

## 2024-08-16 NOTE — ASSESSMENT & PLAN NOTE
Has just had a reduction of GFR  KFRE 5-Year: 2.2% at 8/2/2024  4:03 PM  Calculated from:  Serum Creatinine: 1.8 mg/dL at 8/2/2024  4:03 PM  Urine Albumin Creatinine Ratio: 21.6 ug/mg at 8/8/2023  7:16 AM  Age: 69 years  Sex: Male at 8/2/2024  4:03 PM  Has CKD-3 to CKD-5: Yes  Continue jardiance  Continue to monitor    
Lab Results   Component Value Date    HGBA1C 7.4 (H) 08/08/2024     Diabetes Medications               empagliflozin (JARDIANCE) 10 mg tablet Take 1 tablet (10 mg total) by mouth once daily.    glipiZIDE (GLUCOTROL) 5 MG tablet Take 1 tablet (5 mg total) by mouth 2 (two) times daily before meals.    insulin degludec (TRESIBA FLEXTOUCH U-100) 100 unit/mL (3 mL) insulin pen Inject 28 Units into the skin every morning.    metFORMIN (GLUCOPHAGE) 1000 MG tablet Take 1 tablet (1,000 mg total) by mouth 2 (two) times daily with meals.    semaglutide (OZEMPIC) 0.25 mg or 0.5 mg (2 mg/3 mL) pen injector Inject 0.5 mg into the skin every 7 days.              
Stable. Well controlled. Continue current medications.    
Stable. Well controlled. Continue current medications.     
Home PT

## 2024-08-19 RX ORDER — GLIPIZIDE 5 MG/1
TABLET ORAL
Qty: 180 TABLET | Refills: 1 | Status: SHIPPED | OUTPATIENT
Start: 2024-08-19

## 2024-08-19 NOTE — TELEPHONE ENCOUNTER
Refill Routing Note   Medication(s) are not appropriate for processing by [facility] for the following reason(s):        Required labs abnormal    [facility] action(s):  Defer             Appointments  past 12m or future 3m with PCP    Date Provider   Last Visit   8/16/2024 Kiley Vasquez MD   Next Visit   11/15/2024 Kiley Vasquez MD   ED visits in past 90 days: 1        Note composed:10:05 PM 08/18/2024

## 2024-08-24 ENCOUNTER — OFFICE VISIT (OUTPATIENT)
Dept: URGENT CARE | Facility: CLINIC | Age: 69
End: 2024-08-24
Payer: MEDICARE

## 2024-08-24 VITALS
DIASTOLIC BLOOD PRESSURE: 76 MMHG | HEIGHT: 68 IN | BODY MASS INDEX: 28.95 KG/M2 | SYSTOLIC BLOOD PRESSURE: 123 MMHG | TEMPERATURE: 98 F | WEIGHT: 191 LBS | RESPIRATION RATE: 19 BRPM | OXYGEN SATURATION: 94 % | HEART RATE: 80 BPM

## 2024-08-24 DIAGNOSIS — R05.9 COUGH, UNSPECIFIED TYPE: ICD-10-CM

## 2024-08-24 DIAGNOSIS — U07.1 COVID-19 VIRUS DETECTED: ICD-10-CM

## 2024-08-24 DIAGNOSIS — U07.1 COVID-19: Primary | ICD-10-CM

## 2024-08-24 LAB
CTP QC/QA: YES
SARS-COV-2 AG RESP QL IA.RAPID: POSITIVE

## 2024-08-24 PROCEDURE — 87811 SARS-COV-2 COVID19 W/OPTIC: CPT | Mod: QW,S$GLB,,

## 2024-08-24 PROCEDURE — 99214 OFFICE O/P EST MOD 30 MIN: CPT | Mod: S$GLB,,,

## 2024-08-24 RX ORDER — FLUTICASONE PROPIONATE 50 MCG
1 SPRAY, SUSPENSION (ML) NASAL DAILY
Qty: 9.9 ML | Refills: 0 | Status: SHIPPED | OUTPATIENT
Start: 2024-08-24

## 2024-08-24 RX ORDER — PROMETHAZINE HYDROCHLORIDE AND DEXTROMETHORPHAN HYDROBROMIDE 6.25; 15 MG/5ML; MG/5ML
5 SYRUP ORAL EVERY 6 HOURS PRN
Qty: 120 ML | Refills: 0 | Status: SHIPPED | OUTPATIENT
Start: 2024-08-24 | End: 2024-08-24

## 2024-08-24 RX ORDER — FLUTICASONE PROPIONATE 50 MCG
1 SPRAY, SUSPENSION (ML) NASAL DAILY
Qty: 9.9 ML | Refills: 0 | Status: SHIPPED | OUTPATIENT
Start: 2024-08-24 | End: 2024-08-24

## 2024-08-24 RX ORDER — PROMETHAZINE HYDROCHLORIDE AND DEXTROMETHORPHAN HYDROBROMIDE 6.25; 15 MG/5ML; MG/5ML
5 SYRUP ORAL EVERY 6 HOURS PRN
Qty: 120 ML | Refills: 0 | Status: SHIPPED | OUTPATIENT
Start: 2024-08-24

## 2024-08-24 NOTE — PROGRESS NOTES
"Subjective:      Patient ID: Mathew Rodrigues is a 69 y.o. male.    Vitals:  height is 5' 8" (1.727 m) and weight is 86.6 kg (191 lb). His oral temperature is 98.2 °F (36.8 °C). His blood pressure is 123/76 and his pulse is 80. His respiration is 19 and oxygen saturation is 94% (abnormal).     Chief Complaint: Cough    This is a 69 y.o. male who presents today with a chief complaint of a cough that began yesterday morning. Patient is also sob, wheezing and has nasal congestion and a sore throat. He has taken Delsym and Dayquil OTC that has not helped symtpoms. Pt states that he does have chronic SOB and is currently being seen by a pulmonologist for this. Pt states that his sob is his usual sob. Pt states that it is not worse than normal pt states that his main concern is congestion with cough. Pt states that he does have inhaler at home    Cough  This is a new problem. The current episode started in the past 7 days. The problem has been gradually worsening. The cough is Productive of sputum. Associated symptoms include postnasal drip, a sore throat, shortness of breath and wheezing. The symptoms are aggravated by lying down and exercise. He has tried OTC cough suppressant for the symptoms. The treatment provided no relief. His past medical history is significant for asthma and COPD.       Constitution: Negative.   HENT:  Positive for congestion, postnasal drip and sore throat.    Neck: neck negative.   Cardiovascular: Negative.    Eyes: Negative.    Respiratory:  Positive for cough, shortness of breath and wheezing.    Endocrine: negative.   Genitourinary: Negative.    Musculoskeletal: Negative.    Skin: Negative.    Allergic/Immunologic: Negative.    Neurological: Negative.    Hematologic/Lymphatic: Negative.    Psychiatric/Behavioral: Negative.        Objective:     Physical Exam   Constitutional: He is oriented to person, place, and time.   HENT:   Head: Normocephalic and atraumatic.   Nose: Congestion present. "   Mouth/Throat: Posterior oropharyngeal erythema present.   Eyes: Conjunctivae are normal. Pupils are equal, round, and reactive to light. Extraocular movement intact   Neck: Neck supple.   Cardiovascular: Normal rate, regular rhythm, normal heart sounds and normal pulses.   Pulmonary/Chest: Effort normal. He has decreased breath sounds in the right lower field and the left lower field. He has wheezes.   Abdominal: Normal appearance.   Musculoskeletal: Normal range of motion.         General: Normal range of motion.   Neurological: no focal deficit. He is alert, oriented to person, place, and time and at baseline.   Skin: Skin is warm.   Psychiatric: His behavior is normal. Mood, judgment and thought content normal.       Assessment:     1. COVID-19    2. Cough, unspecified type      Results for orders placed or performed in visit on 08/24/24   SARS Coronavirus 2 Antigen, POCT Manual Read   Result Value Ref Range    SARS Coronavirus 2 Antigen Positive (A) Negative     Acceptable Yes       EXAMINATION:  XR CHEST PA AND LATERAL     CLINICAL HISTORY:  Cough, unspecified     TECHNIQUE:  PA and lateral views of the chest were performed.     COMPARISON:  Chest radiograph 06/01/2024 and 05/01/2023     FINDINGS:  The lungs are clear.  No focal consolidation, pleural effusion or pneumothorax.     Normal cardiomediastinal contour.     No acute or aggressive osseous abnormality.     Impression:     No acute cardiopulmonary abnormality.  Plan:       COVID-19  -     molnupiravir 200 mg capsule (EUA); Take 4 capsules (800 mg total) by mouth every 12 (twelve) hours.  Dispense: 30 capsule; Refill: 0  -     fluticasone propionate (FLONASE) 50 mcg/actuation nasal spray; 1 spray (50 mcg total) by Each Nostril route once daily.  Dispense: 9.9 mL; Refill: 0  -     promethazine-dextromethorphan (PROMETHAZINE-DM) 6.25-15 mg/5 mL Syrp; Take 5 mLs by mouth every 6 (six) hours as needed (cough).  Dispense: 120 mL; Refill:  0    Cough, unspecified type  -     SARS Coronavirus 2 Antigen, POCT Manual Read  -     XR CHEST PA AND LATERAL; Future; Expected date: 08/24/2024  -     molnupiravir 200 mg capsule (EUA); Take 4 capsules (800 mg total) by mouth every 12 (twelve) hours.  Dispense: 30 capsule; Refill: 0  -     fluticasone propionate (FLONASE) 50 mcg/actuation nasal spray; 1 spray (50 mcg total) by Each Nostril route once daily.  Dispense: 9.9 mL; Refill: 0  -     promethazine-dextromethorphan (PROMETHAZINE-DM) 6.25-15 mg/5 mL Syrp; Take 5 mLs by mouth every 6 (six) hours as needed (cough).  Dispense: 120 mL; Refill: 0

## 2024-08-24 NOTE — PATIENT INSTRUCTIONS
Educated pt to monitor oxygen saturations and when to go to the er. Educated pt for any new, worsening or continuing symptoms to go to the emergency room    Please return here or go to the Emergency Department for any concerns or worsening of condition.  Please drink plenty of fluids.  Please get plenty of rest.  If you were prescribed antibiotics, please take them to completion.  If you were given wait & see antibiotics, please wait 5-7 days before taking them, and only take them if your symptoms have worsened or not improved.  If you do begin taking the antibiotics, please take them to completion.  If you were given a steroid shot in the clinic and have also been given a prescription for a steroid such as Prednisone or a Medrol Dose Pack, please begin taking them tomorrow.  If you do not have Hypertension or any history of palpitations, it is ok to take over the counter Sudafed or Mucinex D or Allegra-D or Claritin-D or Zyrtec-D.  If you do take one of the above, it is ok to combine that with plain over the counter Mucinex or Allegra or Claritin or Zyrtec.  If for example you are taking Zyrtec -D, you can combine that with Mucinex, but not Mucinex-D.  If you are taking Mucinex-D, you can combine that with plain Allegra or Claritin or Zyrtec.   If you do have Hypertension or palpitations, it is safe to take Coricidin HBP for relief of sinus symptoms.  If not allergic, please take over the counter Tylenol (Acetaminophen) and/or Motrin (Ibuprofen) as directed for control of pain and/or fever.  Please follow up with your primary care doctor or specialist as needed.    If you  smoke, please stop smoking.

## 2024-08-25 ENCOUNTER — PATIENT MESSAGE (OUTPATIENT)
Dept: FAMILY MEDICINE | Facility: CLINIC | Age: 69
End: 2024-08-25
Payer: MEDICARE

## 2024-08-30 ENCOUNTER — PATIENT MESSAGE (OUTPATIENT)
Dept: FAMILY MEDICINE | Facility: CLINIC | Age: 69
End: 2024-08-30

## 2024-08-30 ENCOUNTER — E-VISIT (OUTPATIENT)
Dept: FAMILY MEDICINE | Facility: CLINIC | Age: 69
End: 2024-08-30
Payer: MEDICARE

## 2024-08-30 ENCOUNTER — PATIENT MESSAGE (OUTPATIENT)
Dept: PULMONOLOGY | Facility: CLINIC | Age: 69
End: 2024-08-30
Payer: MEDICARE

## 2024-08-30 DIAGNOSIS — J45.991 COUGH VARIANT ASTHMA: ICD-10-CM

## 2024-08-30 DIAGNOSIS — J45.50 SEVERE PERSISTENT ASTHMA WITHOUT COMPLICATION: ICD-10-CM

## 2024-08-30 RX ORDER — CODEINE PHOSPHATE AND GUAIFENESIN 10; 100 MG/5ML; MG/5ML
10 SOLUTION ORAL EVERY 6 HOURS PRN
Qty: 237 ML | Refills: 0 | Status: SHIPPED | OUTPATIENT
Start: 2024-08-30

## 2024-08-30 NOTE — PROGRESS NOTES
Patient ID: Mathew Rodrigues is a 69 y.o. male.    Chief Complaint: General Illness (Entered automatically based on patient selection in [employer].)    The patient initiated a request through [employer] on 8/30/2024 for evaluation and management with a chief complaint of General Illness (Entered automatically based on patient selection in [employer].)     I evaluated the questionnaire submission on 8/30/2024.    [facility]-Cough And Cold    8/30/2024  9:44 AM CDT - Filed by Patient   What do you need help with? Covid 19   Do you agree to participate in an E-Visit? Yes   If you have any of the following symptoms, go to your local emergency room or call 911: I acknowledge   What is the main issue you would like addressed today? Covid- cough & fever won't go away. Can't get any rest. need better cough medicine like Dr. Quinteros gave me. It's been a week. I have to start feeling better!   Do you think you might have COVID or the Flu? Yes COVID   Have you tested positive for COVID or Flu? Yes COVID   What symptoms do you have? Cough;  Fatigue;  Fever;  Headache;  Nasal congestion   When did your symptoms first appear? 8/23/2024   List what you have done or taken to help your symptoms. came to urgent care. Taking dayquil, tylenol, tessalon perals, promethazine cough syrup and all my regualar inhalers , plus nebulizer tx.   Provide any additional information you feel is important. Coughing so bad it hurts and I can't get any rest and I still have a low grade fever.  Can't get rid of these. Can't get any rest at night. Promethazine cough syrup doesn't do anything. Dr. Quinteros gave me some codeine-guaifeisin that worked. I asked him for some more but he's out & they said to ask you. This has been a week- I've got to have some relief! O2 is still low, pulse and breathing are up, B/P (that was low and you decreased the Lisinopril) is still 118/70.   Please attach any relevant images or files    Are you able to take your vital  signs? Yes   Systolic Blood Pressure: 118   Diastolic Blood Pressure: 70   Weight: 188   Height: 67   Pulse: 85   Temperature: 99.4   Respiration rate: 22   Pulse Oxygen: 93         Encounter Diagnoses   Name Primary?    Severe persistent asthma without complication     Cough variant asthma         No orders of the defined types were placed in this encounter.     Medications Ordered This Encounter   Medications    guaiFENesin-codeine 100-10 mg/5 ml (TUSSI-ORGANIDIN NR)  mg/5 mL syrup     Sig: Take 10 mLs by mouth every 6 (six) hours as needed for Cough or Congestion.     Dispense:  237 mL     Refill:  0     Order Specific Question:   I have reviewed the Prescription Drug Monitoring Program (PDMP) database for this patient prior to prescribing the above opioid medication     Answer:   Yes        No follow-ups on file.      E-Visit Time Tracking:    Day 1 Time (in minutes): 5    Total Time (in minutes): 5

## 2024-09-02 DIAGNOSIS — E11.9 TYPE 2 DIABETES MELLITUS WITHOUT COMPLICATION, WITHOUT LONG-TERM CURRENT USE OF INSULIN: ICD-10-CM

## 2024-09-02 NOTE — TELEPHONE ENCOUNTER
No care due was identified.  [facility] Embedded Care Due Messages. Reference number: [id number].   9/02/2024 12:59:38 PM CDT  
Detail Level: Generalized

## 2024-09-03 RX ORDER — INSULIN DEGLUDEC 100 U/ML
32 INJECTION, SOLUTION SUBCUTANEOUS EVERY MORNING
Qty: 5 PEN | Refills: 3 | Status: SHIPPED | OUTPATIENT
Start: 2024-09-03

## 2024-09-06 ENCOUNTER — PATIENT MESSAGE (OUTPATIENT)
Dept: PULMONOLOGY | Facility: CLINIC | Age: 69
End: 2024-09-06
Payer: MEDICARE

## 2024-09-06 ENCOUNTER — PATIENT MESSAGE (OUTPATIENT)
Dept: FAMILY MEDICINE | Facility: CLINIC | Age: 69
End: 2024-09-06
Payer: MEDICARE

## 2024-09-06 DIAGNOSIS — I82.812 THROMBOSIS OF LEFT SAPHENOUS VEIN: Primary | ICD-10-CM

## 2024-09-09 DIAGNOSIS — J45.50 SEVERE PERSISTENT ASTHMA WITHOUT COMPLICATION: ICD-10-CM

## 2024-09-09 DIAGNOSIS — J82.83 EOSINOPHILIC ASTHMA: ICD-10-CM

## 2024-09-09 DIAGNOSIS — J45.991 COUGH VARIANT ASTHMA: ICD-10-CM

## 2024-09-09 DIAGNOSIS — R05.9 COUGH, UNSPECIFIED TYPE: ICD-10-CM

## 2024-09-09 DIAGNOSIS — U07.1 COVID-19: ICD-10-CM

## 2024-09-09 RX ORDER — PROMETHAZINE HYDROCHLORIDE AND DEXTROMETHORPHAN HYDROBROMIDE 6.25; 15 MG/5ML; MG/5ML
5 SYRUP ORAL EVERY 6 HOURS PRN
Qty: 120 ML | Refills: 0 | Status: SHIPPED | OUTPATIENT
Start: 2024-09-09

## 2024-09-09 RX ORDER — PREDNISONE 10 MG/1
TABLET ORAL
Qty: 30 TABLET | Refills: 1 | Status: SHIPPED | OUTPATIENT
Start: 2024-09-09

## 2024-09-09 RX ORDER — BENZONATATE 200 MG/1
200 CAPSULE ORAL 3 TIMES DAILY PRN
Qty: 90 CAPSULE | Refills: 2 | Status: SHIPPED | OUTPATIENT
Start: 2024-09-09

## 2024-09-10 ENCOUNTER — PATIENT MESSAGE (OUTPATIENT)
Dept: CARDIOLOGY | Facility: CLINIC | Age: 69
End: 2024-09-10
Payer: MEDICARE

## 2024-09-13 ENCOUNTER — HOSPITAL ENCOUNTER (OUTPATIENT)
Dept: RADIOLOGY | Facility: HOSPITAL | Age: 69
Discharge: HOME | End: 2024-09-13
Attending: FAMILY MEDICINE
Payer: MEDICARE

## 2024-09-13 DIAGNOSIS — I82.812 THROMBOSIS OF LEFT SAPHENOUS VEIN: ICD-10-CM

## 2024-09-13 PROCEDURE — 93971 EXTREMITY STUDY: CPT | Mod: TC,LT

## 2024-09-13 PROCEDURE — 93971 EXTREMITY STUDY: CPT | Mod: 26,LT,, | Performed by: RADIOLOGY

## 2024-09-23 ENCOUNTER — OFFICE VISIT (OUTPATIENT)
Dept: CARDIOLOGY | Facility: CLINIC | Age: 69
End: 2024-09-23
Payer: MEDICARE

## 2024-09-23 ENCOUNTER — TELEPHONE (OUTPATIENT)
Dept: CARDIOLOGY | Facility: CLINIC | Age: 69
End: 2024-09-23

## 2024-09-23 ENCOUNTER — LAB VISIT (OUTPATIENT)
Dept: LAB | Facility: HOSPITAL | Age: 69
End: 2024-09-23
Attending: INTERNAL MEDICINE
Payer: MEDICARE

## 2024-09-23 ENCOUNTER — PATIENT MESSAGE (OUTPATIENT)
Dept: CARDIOLOGY | Facility: CLINIC | Age: 69
End: 2024-09-23

## 2024-09-23 VITALS
SYSTOLIC BLOOD PRESSURE: 144 MMHG | BODY MASS INDEX: 29.33 KG/M2 | HEART RATE: 92 BPM | HEIGHT: 68 IN | WEIGHT: 193.5 LBS | OXYGEN SATURATION: 96 % | DIASTOLIC BLOOD PRESSURE: 78 MMHG

## 2024-09-23 DIAGNOSIS — U09.9 COVID-19 LONG HAULER MANIFESTING CHRONIC DYSPNEA: ICD-10-CM

## 2024-09-23 DIAGNOSIS — R79.89 LOW TESTOSTERONE IN MALE: ICD-10-CM

## 2024-09-23 DIAGNOSIS — E11.9 TYPE 2 DIABETES MELLITUS WITHOUT COMPLICATION, WITH LONG-TERM CURRENT USE OF INSULIN: ICD-10-CM

## 2024-09-23 DIAGNOSIS — G47.33 OSA ON CPAP: ICD-10-CM

## 2024-09-23 DIAGNOSIS — D64.9 ANEMIA, UNSPECIFIED TYPE: ICD-10-CM

## 2024-09-23 DIAGNOSIS — R06.09 DOE (DYSPNEA ON EXERTION): ICD-10-CM

## 2024-09-23 DIAGNOSIS — R94.31 ABNORMAL ECG: ICD-10-CM

## 2024-09-23 DIAGNOSIS — R05.3 CHRONIC COUGH: ICD-10-CM

## 2024-09-23 DIAGNOSIS — R94.39 ABNORMAL NUCLEAR STRESS TEST: Primary | ICD-10-CM

## 2024-09-23 DIAGNOSIS — R06.09 COVID-19 LONG HAULER MANIFESTING CHRONIC DYSPNEA: ICD-10-CM

## 2024-09-23 DIAGNOSIS — I11.0 LVH (LEFT VENTRICULAR HYPERTROPHY) DUE TO HYPERTENSIVE DISEASE, WITH HEART FAILURE: ICD-10-CM

## 2024-09-23 DIAGNOSIS — G47.19 EXCESSIVE DAYTIME SLEEPINESS: ICD-10-CM

## 2024-09-23 DIAGNOSIS — I10 PRIMARY HYPERTENSION: ICD-10-CM

## 2024-09-23 DIAGNOSIS — I10 WHITE COAT SYNDROME WITH DIAGNOSIS OF HYPERTENSION: ICD-10-CM

## 2024-09-23 DIAGNOSIS — Z79.4 TYPE 2 DIABETES MELLITUS WITHOUT COMPLICATION, WITH LONG-TERM CURRENT USE OF INSULIN: ICD-10-CM

## 2024-09-23 DIAGNOSIS — N18.32 CKD STAGE 3B, GFR 30-44 ML/MIN: ICD-10-CM

## 2024-09-23 LAB
FERRITIN SERPL-MCNC: 18 NG/ML (ref 20–300)
IRON SERPL-MCNC: 31 UG/DL (ref 45–160)
SATURATED IRON: 7 % (ref 20–50)
TOTAL IRON BINDING CAPACITY: 468 UG/DL (ref 250–450)
TRANSFERRIN SERPL-MCNC: 316 MG/DL (ref 200–375)

## 2024-09-23 PROCEDURE — 84403 ASSAY OF TOTAL TESTOSTERONE: CPT | Performed by: INTERNAL MEDICINE

## 2024-09-23 PROCEDURE — 82728 ASSAY OF FERRITIN: CPT | Performed by: INTERNAL MEDICINE

## 2024-09-23 PROCEDURE — 82040 ASSAY OF SERUM ALBUMIN: CPT | Performed by: INTERNAL MEDICINE

## 2024-09-23 PROCEDURE — 93005 ELECTROCARDIOGRAM TRACING: CPT | Mod: PBBFAC | Performed by: INTERNAL MEDICINE

## 2024-09-23 PROCEDURE — 93010 ELECTROCARDIOGRAM REPORT: CPT | Mod: S$PBB,,, | Performed by: INTERNAL MEDICINE

## 2024-09-23 PROCEDURE — 84466 ASSAY OF TRANSFERRIN: CPT | Performed by: INTERNAL MEDICINE

## 2024-09-23 PROCEDURE — 99213 OFFICE O/P EST LOW 20 MIN: CPT | Mod: PBBFAC | Performed by: INTERNAL MEDICINE

## 2024-09-23 PROCEDURE — 99215 OFFICE O/P EST HI 40 MIN: CPT | Mod: S$PBB,25,, | Performed by: INTERNAL MEDICINE

## 2024-09-23 PROCEDURE — 99999 PR PBB SHADOW E&M-EST. PATIENT-LVL III: CPT | Mod: PBBFAC,,, | Performed by: INTERNAL MEDICINE

## 2024-09-23 PROCEDURE — 84270 ASSAY OF SEX HORMONE GLOBUL: CPT | Performed by: INTERNAL MEDICINE

## 2024-09-23 PROCEDURE — 36415 COLL VENOUS BLD VENIPUNCTURE: CPT | Performed by: INTERNAL MEDICINE

## 2024-09-23 NOTE — TELEPHONE ENCOUNTER
----- Message from Judson Causey MD sent at 9/23/2024  3:14 PM CDT -----  Do have iron deficiency, suggest iron supplement 2 times daily on empty stomach first, if stomach upset then can take with meals. Ferrous Sulfate 324 mg twice daily, can do eRx if needed. Taking it with Vitamin C supplement will help absorption of the iron. Also recommend GI evaluation. Please review with patient. Thanks,    Dr. Causye  ----- Message -----  From: [facility] Interface  Sent: 9/23/2024  11:09 AM CDT  To: Judson Causey MD

## 2024-09-23 NOTE — PROGRESS NOTES
Subjective:        Patient ID:  Mathew Rodrigues is a 69 y.o. male who presents for evaluation of Follow-up (3 months )  PCP and referred by Kiley Vasquez MD   Prior cardiologist: in [place], last seen in 2021, post COVID with SOB  Pulmonary: Darci Quinteros MD, last seen 8/12/2024.   Lives with wife, Fide, here with patient, non-smoker  Retired , [employer]    Health literacy: high  Vaccinations: up-to-date, completed COVID, infection 8/2024, residual SOB.  Activities:  do yard work, limited by OTT  Nicotine: former smoker, quit 1990, 15 years up to 1.5 ppd  Alcohol: yes, max 1 beer in any 24 hours, rare  Illicit drugs: no, off Rx Nucynta bid since 7/20214  Cardiac symptoms:  SOB , easy fatigue  Home BP: Yes, low, reduced lisinopril, on [place]  Medication compliance: Yes, do not miss  Diet: Regular, some salt  Caffeine: How much do you consume a day? 3 cpd  Labs:   Sodium   Date Value Ref Range Status   08/08/2024 139 136 - 145 mmol/L Final     Potassium   Date Value Ref Range Status   08/08/2024 4.5 3.5 - 5.1 mmol/L Final     Chloride   Date Value Ref Range Status   08/08/2024 106 95 - 110 mmol/L Final     CO2   Date Value Ref Range Status   08/08/2024 22 (L) 23 - 29 mmol/L Final     Glucose   Date Value Ref Range Status   08/08/2024 137 (H) 70 - 110 mg/dL Final     BUN   Date Value Ref Range Status   08/08/2024 25 (H) 8 - 23 mg/dL Final     Creatinine   Date Value Ref Range Status   08/08/2024 1.8 (H) 0.5 - 1.4 mg/dL Final     Calcium   Date Value Ref Range Status   08/08/2024 9.8 8.7 - 10.5 mg/dL Final     Total Protein   Date Value Ref Range Status   06/01/2024 7.3 6.0 - 8.4 g/dL Final     Albumin   Date Value Ref Range Status   06/01/2024 3.7 3.5 - 5.2 g/dL Final     Total Bilirubin   Date Value Ref Range Status   06/01/2024 0.4 0.1 - 1.0 mg/dL Final     Comment:     For infants and newborns, interpretation of results should be based  on gestational age, weight and in agreement with  "clinical  observations.    Premature Infant recommended reference ranges:  Up to 24 hours.............<8.0 mg/dL  Up to 48 hours............<12.0 mg/dL  3-5 days..................<15.0 mg/dL  6-29 days.................<15.0 mg/dL       Alkaline Phosphatase   Date Value Ref Range Status   06/01/2024 62 55 - 135 U/L Final     AST   Date Value Ref Range Status   06/01/2024 16 10 - 40 U/L Final     ALT   Date Value Ref Range Status   06/01/2024 17 10 - 44 U/L Final     Anion Gap   Date Value Ref Range Status   08/08/2024 11 8 - 16 mmol/L Final     eGFR   Date Value Ref Range Status   08/08/2024 40.2 (A) >60 mL/min/1.73 m^2 Final      Lab Results   Component Value Date    WBC 11.23 08/02/2024    RBC 5.35 08/02/2024    HGB 13.9 (L) 08/02/2024    HCT 44.8 08/02/2024    MCV 84 08/02/2024    MCH 26.0 (L) 08/02/2024    MCHC 31.0 (L) 08/02/2024    RDW 18.0 (H) 08/02/2024     08/02/2024    MPV 9.8 08/02/2024    GRAN 8.5 (H) 08/02/2024    GRAN 76.1 (H) 08/02/2024    LYMPH 1.5 08/02/2024    LYMPH 13.6 (L) 08/02/2024    MONO 0.8 08/02/2024    MONO 7.1 08/02/2024    EOS 0.1 08/02/2024    BASO 0.06 08/02/2024    EOSINOPHIL 0.8 08/02/2024    BASOPHIL 0.5 08/02/2024      Lab Results   Component Value Date    TSH 1.542 05/05/2023     Lab Results   Component Value Date    HGBA1C 7.4 (H) 08/08/2024      Lab Results   Component Value Date    CHOL 144 08/08/2024    CHOL 126 08/08/2023    CHOL 100 (L) 11/04/2022     Lab Results   Component Value Date    HDL 38 (L) 08/08/2024    HDL 37 (L) 08/08/2023    HDL 35 (L) 11/04/2022     Lab Results   Component Value Date    LDLCALC 82.2 08/08/2024    LDLCALC 56.6 (L) 08/08/2023    LDLCALC 41.2 (L) 11/04/2022     Lab Results   Component Value Date    TRIG 119 08/08/2024    TRIG 162 (H) 08/08/2023    TRIG 119 11/04/2022       Lab Results   Component Value Date    CHOLHDL 26.4 08/08/2024    CHOLHDL 29.4 08/08/2023    CHOLHDL 35.0 11/04/2022      No results found for: "UIBC", "IRON", "TRANS", " ""TRANSFERRIN", "TIBC", "LABIRON", "FESATURATED"      Last Echo: 7/2024  Last stress test: Lexiscan 7/2024   Cardiovascular angiogram: 8/2024, NOCAD    ECG: NSR, rate 88, RBBB + LAFB, LVH    Fundoscopic exam: annually, no retinopathy    In 6/2024:  [facility] referred for CV evaluation. Problem with chronic OTT post COVID in 2020, also with dx of COPD and quit smoking in 1992. Also history for HTN and HLD. History of DVT, idiopathic in 8/2023, do not recall testing for hypercoagulation.     CT / CXR 6/2024: Lungs are clear.Normal cardiomediastinal silhouette.Normal pulmonary vascular distribution.No pleural effusion  Atherosclerosis.   Lung bases are clear.  Normal size heart.    Kiley Vasquez MD noted 5/21/2024 "Chronic kidney disease, stage 3a  Diastolic heart failure, unspecified HF chronicity  Assessment & Plan:  Has upcoming appt with cardiology."    In 7/2024, return for review of abnormal stress test. Concern with OTT and easy fatigue. No CP.     Lexiscan -  Abnormal myocardial perfusion scan.    There is a moderate intensity, small to medium sized, mostly fixed perfusion abnormality with some reversibilty in the basal to apical inferior wall(s) in the typical distribution of the LCX and RCA territory.    There are no other significant perfusion abnormalities.    The gated perfusion images showed an ejection fraction of 47% at rest. The gated perfusion images showed an ejection fraction of 44% post stress. Normal ejection fraction is greater than 53%.    The ECG portion of the study is negative for ischemia.    The patient reported no chest pain during the stress test.    There were no arrhythmias during stress.    Abnormal wall motion at rest ans excercise with moderate hypokinesia of the inferior and mild hypokinesia of the lateral wall.    Echo - Left Ventricle: The left ventricle is normal in size. Moderately increased wall thickness. There is moderate concentric hypertrophy. There is normal systolic function " with a visually estimated ejection fraction of 60 - 65%. Ejection fraction by visual approximation is 64%. Global longitudinal strain is -15.0%. Global longitudinal strain is reduced. There is normal diastolic function.    Right Ventricle: Normal right ventricular cavity size. Systolic function is normal. TAPSE is 1.50 cm.    Left Atrium: Left atrium is mildly dilated.    Right Atrium: Right atrium is mildly dilated.    Aortic Valve: The aortic valve is a trileaflet valve.    IVC/SVC: Normal venous pressure at 3 mmHg.    Pericardium: There is a small effusion. No indication of cardiac tamponade.    HPI comments: in 9/2024, return post LHC, Now with dx of significant pulmonary disease post COVID. Problem with low BP requiring reduction of lisinopril. Not well controlled T2DM on insulin, metformin, glipiZIDE (GLUCOTROL), Ozempic and Jardiance. Complicated by need of steroid Rx. US showed improved DVT and now off Eliquis.    Darci Quinteros MD noted 8/12/2024 - Severe persistent asthma without complication  -     benzonatate (TESSALON) 200 MG capsule; Take 1 capsule (200 mg total) by mouth 3 (three) times daily as needed for Cough.  -     tezepelumab-ekko (TEZSPIRE) 210 mg/1.91 mL (110 mg/mL) PnIj; Inject 210 mg into the skin every 28 days.  -     predniSONE (DELTASONE) 10 MG tablet; Take 3 daily for 3 days and repeat for asthma  -     guaiFENesin-codeine 100-10 mg/5 ml (TUSSI-ORGANIDIN NR)  mg/5 mL syrup; Take 10 mLs by mouth every 6 (six) hours as needed for Cough or Congestion.     Cough variant asthma  -     benzonatate (TESSALON) 200 MG capsule; Take 1 capsule (200 mg total) by mouth 3 (three) times daily as needed for Cough.  -     tezepelumab-ekko (TEZSPIRE) 210 mg/1.91 mL (110 mg/mL) PnIj; Inject 210 mg into the skin every 28 days.  -     guaiFENesin-codeine 100-10 mg/5 ml (TUSSI-ORGANIDIN NR)  mg/5 mL syrup; Take 10 mLs by mouth every 6 (six) hours as needed for Cough or Congestion.     Eosinophilic  asthma  -     benzonatate (TESSALON) 200 MG capsule; Take 1 capsule (200 mg total) by mouth 3 (three) times daily as needed for Cough.  -     tezepelumab-ekko (TEZSPIRE) 210 mg/1.91 mL (110 mg/mL) PnIj; Inject 210 mg into the skin every 28 days.  -     predniSONE (DELTASONE) 10 MG tablet; Take 3 daily for 3 days and repeat for asthma     Chronic obstructive pulmonary disease with acute exacerbation     Asthma-COPD overlap syndrome     Obstructive sleep apnea  -     HME - OTHER  -     CPAP/BIPAP SUPPLIES    [facility] 8/2024 -  The pre-procedure left ventricular end diastolic pressure was 9.    Non obstructive coronaries with mild luminal irregularities.    US LE veins 9/2024 - There is nonocclusive clot involving the greater saphenous vein in the mid to distal thigh. The clot burden has decreased with respect to the previous study, and improved flow is noted on color Doppler.    No evidence of deep venous thrombosis in the left lower extremity. Improving chronic superficial thrombophlebitis in the greater saphenous vein.    Review of Systems   Constitutional: Positive for malaise/fatigue and weight loss (down 5 lbs from 7/2024). Negative for chills, decreased appetite, diaphoresis, fever, night sweats and weight gain.   HENT:  Positive for congestion. Negative for hearing loss, hoarse voice, nosebleeds, sore throat and tinnitus.    Eyes:  Negative for discharge, double vision, pain and visual disturbance.   Cardiovascular:  Positive for dyspnea on exertion. Negative for chest pain, claudication, cyanosis, irregular heartbeat, leg swelling, near-syncope, orthopnea, palpitations and paroxysmal nocturnal dyspnea.   Respiratory:  Positive for cough, sputum production and wheezing. Negative for shortness of breath, sleep disturbances due to breathing and snoring.         [place] score 7, awaken refreshed. Using CPAP but trouble by lots of coughing.   Endocrine: Negative for cold intolerance, heat intolerance, polydipsia and  polyuria.   Hematologic/Lymphatic: Negative for bleeding problem. Does not bruise/bleed easily.   Skin:  Negative for color change, flushing, nail changes, poor wound healing and suspicious lesions.   Musculoskeletal:  Negative for arthritis, falls, gout, joint pain, joint swelling, muscle cramps, muscle weakness, myalgias and neck pain.   Gastrointestinal:  Negative for change in bowel habit, constipation, diarrhea, dysphagia, heartburn, hematemesis, hematochezia, jaundice, melena, nausea and vomiting.   Genitourinary:  Positive for frequency. Negative for bladder incontinence, hematuria, hesitancy and urgency.   Neurological:  Negative for disturbances in coordination, excessive daytime sleepiness, dizziness, focal weakness, headaches, light-headedness, loss of balance, numbness, paresthesias, seizures, vertigo and weakness.   Psychiatric/Behavioral:  Negative for depression, memory loss and substance abuse. The patient does not have insomnia and is not nervous/anxious.      Answers submitted by the patient for this visit:  Review of Symptoms (Submitted on 9/21/2024)  Sweats?: No  chest tightness: No  Difficulty breathing when lying down?: No  syncope: No      Objective:    Physical Exam  Constitutional:       Appearance: He is well-developed.      Comments: RA O2 sat 96%  Orthostatic VS: sitting 164/78, standing 144/78   HENT:      Head: Normocephalic.   Eyes:      Conjunctiva/sclera: Conjunctivae normal.      Pupils: Pupils are equal, round, and reactive to light.   Neck:      Thyroid: No thyromegaly.      Vascular: No JVD.   Cardiovascular:      Rate and Rhythm: Normal rate and regular rhythm.      Pulses: Intact distal pulses.           Carotid pulses are 2+ on the right side and 2+ on the left side.       Radial pulses are 2+ on the right side and 2+ on the left side.        Dorsalis pedis pulses are 2+ on the right side and 2+ on the left side.        Posterior tibial pulses are 2+ on the right side and 2+  "on the left side.      Heart sounds: Heart sounds are distant. No murmur heard.     No friction rub. No gallop.   Pulmonary:      Effort: Pulmonary effort is normal.      Breath sounds: No rales.      Comments: Diminished breath sounds and prolong expiration.       Chest:      Chest wall: No tenderness.   Abdominal:      General: Bowel sounds are normal.      Palpations: Abdomen is soft.      Tenderness: There is no abdominal tenderness.      Comments: Waist 44"   Musculoskeletal:         General: Normal range of motion.      Cervical back: Normal range of motion and neck supple.   Lymphadenopathy:      Cervical: No cervical adenopathy.   Skin:     General: Skin is warm and dry.      Findings: No rash.   Neurological:      Mental Status: He is alert and oriented to person, place, and time.           Assessment:       1. Abnormal nuclear stress test, NOCAD    2. Abnormal ECG    3. LVH (left ventricular hypertrophy) due to hypertensive disease, with heart failure    4. Primary hypertension, dx 2015    5. CRICKET on CPAP, 100% use    6. Type 2 diabetes mellitus without complication, with long-term current use of insulin    7. COVID-19 long hauler manifesting chronic dyspnea, sputum and cough    8. OTT (dyspnea on exertion), post COVID, 2/2020    9. CKD stage 3b, GFR 30-44 ml/min    10. Anemia, unspecified type    11. Chronic cough    12. Excessive daytime sleepiness    13. White coat syndrome with diagnosis of hypertension    14. Low testosterone in male         Plan:     Mathew was seen today for follow-up.    Diagnoses and all orders for this visit:    Abnormal nuclear stress test, NOCAD  -     IN OFFICE EKG 12-LEAD (to Muse)    Abnormal ECG    LVH (left ventricular hypertrophy) due to hypertensive disease, with heart failure    Primary hypertension, dx 2015    CRICKET on CPAP, 100% use    Type 2 diabetes mellitus without complication, with long-term current use of insulin    COVID-19 long hauler manifesting chronic dyspnea, " sputum and cough    OTT (dyspnea on exertion), post COVID, 2/2020    CKD stage 3b, GFR 30-44 ml/min    Anemia, unspecified type  -     Iron and TIBC; Future  -     Ferritin; Future    Chronic cough    Excessive daytime sleepiness    White coat syndrome with diagnosis of hypertension    Low testosterone in male  -     Testosterone Panel; Future    - All medical issues reviewed, continue current Rx.  - Warning signs of MI and stroke given, if symptoms last more than 5 minutes, stop immediately and call 911.   - CV status all medications reviewed, patient acknowledge good understanding.  - Recommend healthy living: avoid alcohol, healthy diet and regular exercise aiming for fitness, restorative sleep and weight control  - Need good exercise program, 4 key elements: 1. Aerobic (walking, swimming, dancing, jogging, biking, etc, 2. Muscle strengthening / resistance exercise, need to do 2-3 times weekly, 3. Stretching daily, good stretch takes a whole  total minute. 4. Balance exercise daily.   - Instruction for Mediterranean diet and heart healthy exercise given.  - Check home blood pressure, 2 days weekly, do 2 readings within 5 minutes in AM and PM, keep log for review. Target resting BP is less than 130/80.   - Weigh twice weekly, try to lose 1-2 lbs per week. Target weight loss of 5%-10%.  - Highly recommend 30-60 minutes of exercise / activities daily, can have Sunday off, with 2-3 sessions of muscle strengthening weekly. A  would be very helpful.  - Recommend at least annual cardiovascular evaluation in view of patient's significant risk factors. Patient's preference.      Total time spend including review of record prior to face-to-face visit is 40 minutes. Greater than 50% of the time was spent in counseling and coordination of care. The above assessment and plan have been discussed at length. Referring provider's note reviewed. Labs and procedure over the last 6 months reviewed. Problem List  updated. Asked to bring in all active medications / pills bottles with next visit. Will send note to referring / PCP.

## 2024-09-24 ENCOUNTER — PATIENT MESSAGE (OUTPATIENT)
Dept: PULMONOLOGY | Facility: CLINIC | Age: 69
End: 2024-09-24
Payer: MEDICARE

## 2024-09-24 ENCOUNTER — PATIENT MESSAGE (OUTPATIENT)
Dept: FAMILY MEDICINE | Facility: CLINIC | Age: 69
End: 2024-09-24
Payer: MEDICARE

## 2024-09-24 DIAGNOSIS — I10 ESSENTIAL HYPERTENSION, BENIGN: ICD-10-CM

## 2024-09-24 LAB
OHS QRS DURATION: 144 MS
OHS QTC CALCULATION: 467 MS

## 2024-09-24 RX ORDER — AMLODIPINE BESYLATE 5 MG/1
5 TABLET ORAL DAILY
Qty: 90 TABLET | Refills: 0 | Status: SHIPPED | OUTPATIENT
Start: 2024-09-24

## 2024-09-24 NOTE — TELEPHONE ENCOUNTER
Refill Routing Note   Medication(s) are not appropriate for processing by [facility] for the following reason(s):        Responsible provider unclear  Required vitals abnormal    [facility] action(s):  Defer        Medication Therapy Plan: Last ordered: 7/31/23 by Jennyfer Mcmahon MD. Recent OV but no curent order by PCP      Appointments  past 12m or future 3m with PCP    Date Provider   Last Visit   8/30/2024 Kiley Vasquez MD   Next Visit   11/15/2024 Kiley Vasquez MD   ED visits in past 90 days: 0        Note composed:6:27 PM 09/24/2024

## 2024-09-24 NOTE — TELEPHONE ENCOUNTER
No care due was identified.  [facility] Embedded Care Due Messages. Reference number: [id number].   9/24/2024 11:08:13 AM CDT

## 2024-09-24 NOTE — TELEPHONE ENCOUNTER
Requested Prescriptions     Pending Prescriptions Disp Refills    amLODIPine (NORVASC) 5 MG tablet 90 tablet 3     Sig: Take 1 tablet (5 mg total) by mouth once daily.     Last office visit: 08/16/2024               Provider: Christina  Next office visit: 11/15/2024               Provider: Christina  Last labs: 08/08/2024    Meron DOWNING 09/24/2024 11:05 AM

## 2024-09-24 NOTE — PROGRESS NOTES
Subjective:       Patient ID: Mathew Rodrigues is a 69 y.o. male Body mass index is 28.86 kg/m².    Chief Complaint: Anemia    This patient is new to me.  Referring Provider: Aaareferral Self for anemia.     No blood in stool. No hematemesis     9/23/24 Iron Studies  Iron 31  Transferrin 316  TIBC 468  Ferritin 18    6/2024 CT A/P  FINDINGS:  Lung bases are clear.  Normal size heart.  Unremarkable gallbladder.     Subcentimeter hypodense lesion inferior right hepatic lobe possibly a cyst but too small to adequately characterize.  Four fluid density lesions along the right renal cortex and several additional subcentimeter hypodense lesions along each kidney cortex which are too small to adequately characterize.  Remaining solid abdominal organs are unremarkable.     There is no enteric contrast which limits bowel assessment.  Stomach is mildly distended.  Remaining bowel loops are normal in caliber.  There are a few scattered colonic diverticula.  Moderate degree of stool in the right colon.     Atherosclerosis.  No focal abdominal fluid collection.  Prostate 5 cm diameter.     There is advanced degenerative change throughout the lumbar spine worst at L1-2 and L2-3 levels.  There is between mild-to-moderate multilevel spinal degenerative change elsewhere.  No acute osseous finding.     Impression:     No acute findings in the abdomen.  Simple right renal cysts.  Prostate enlargement warranting correlation with PSA.  Colonic diverticulosis.     1/2023 Colonoscopy with Dr Keller  Findings:       The perianal and digital rectal examinations were normal. Pertinent        negatives include no anal lesion or abnormality.        Multiple small-mouthed diverticula were found in the sigmoid colon.        The rectum, descending colon, splenic flexure, transverse colon,        hepatic flexure, ascending colon and cecum appeared normal.         Review of Systems   Constitutional:  Negative for activity change, appetite change,  fatigue, fever and unexpected weight change.   HENT:  Negative for sore throat and trouble swallowing.    Respiratory:  Negative for cough and shortness of breath.    Cardiovascular:  Negative for chest pain.   Gastrointestinal:  Positive for constipation (d/t tylenol #3). Negative for abdominal distention, abdominal pain, anal bleeding, blood in stool, diarrhea, nausea, rectal pain and vomiting.       No LMP for male patient.  Past Medical History:   Diagnosis Date    Arthritis     COPD (chronic obstructive pulmonary disease)     Diabetes mellitus, type 2     DVT (deep venous thrombosis)     Hyperlipidemia     Hypertension     Kidney stones     LVH (left ventricular hypertrophy) due to hypertensive disease, with heart failure     Neuropathy     Personal history of colonic polyps 03/24/2000    colonoscopy report in media    PTSD (post-traumatic stress disorder)     Shortness of breath     fatigue    Sleep apnea, unspecified     Torn rotator cuff      Past Surgical History:   Procedure Laterality Date    achilles repair Left 2003    ADENOIDECTOMY      ANGIOGRAM, CORONARY, WITH LEFT HEART CATHETERIZATION N/A 8/6/2024    Procedure: Angiogram, Coronary, with Left Heart Cath;  Surgeon: Elliot Butler MD;  Location: [facility] CATH/EP LAB;  Service: Cardiology;  Laterality: N/A;    ANKLE FRACTURE SURGERY Right 1987    CATARACT EXTRACTION Left 2004    CATARACT EXTRACTION Left 2005    2nd    COLONOSCOPY N/A 05/09/2017    results in media    COLONOSCOPY N/A 01/13/2023    Procedure: COLONOSCOPY;  Surgeon: Kenney Keller MD;  Location: [facility] ENDO;  Service: General;  Laterality: N/A;    COLONOSCOPY W/ POLYPECTOMY N/A 03/24/2000    results in media    ELBOW SURGERY Right 07/24/2020    EYE SURGERY  2005    finger joint replacement Right 01/22/2020    middle finger    hair follicle  1998    HAND SURGERY Right 04/25/2017    HAND SURGERY Right 05/12/2017    2nd surgery    NASAL SINUS SURGERY  08/2015    ROTATOR CUFF REPAIR Right  09/25/2015    ROTATOR CUFF REPAIR Left 04/08/2016    skin grafts Right 07/2016    shin from stingray wound    TONSILLECTOMY      VASECTOMY       Family History   Problem Relation Name Age of Onset    Hypertension Mother Sierra     Arthritis Mother Sierra     Hypertension Father Misael     Diabetes Father Misael     Arthritis Father Misael     Hearing loss Father Misael     Cancer Brother Mickey Douglas     COPD Brother Mickey Douglas      Social History     Tobacco Use    Smoking status: Former     Current packs/day: 1.50     Average packs/day: 1.5 packs/day for 30.0 years (45.0 ttl pk-yrs)     Types: Cigarettes    Smokeless tobacco: Never   Substance Use Topics    Alcohol use: Not Currently    Drug use: Never     Wt Readings from Last 10 Encounters:   10/03/24 86.1 kg (189 lb 13.1 oz)   09/25/24 87.4 kg (192 lb 9.2 oz)   09/23/24 87.8 kg (193 lb 8 oz)   08/24/24 86.6 kg (191 lb)   08/16/24 89.1 kg (196 lb 8 oz)   08/12/24 90.4 kg (199 lb 4.7 oz)   08/06/24 90.2 kg (198 lb 13.7 oz)   08/02/24 90.2 kg (198 lb 13.7 oz)   07/30/24 90.1 kg (198 lb 9.6 oz)   07/19/24 89.4 kg (197 lb)     Lab Results   Component Value Date    WBC 11.23 08/02/2024    HGB 13.9 (L) 08/02/2024    HCT 44.8 08/02/2024    MCV 84 08/02/2024     08/02/2024     CMP  Sodium   Date Value Ref Range Status   08/08/2024 139 136 - 145 mmol/L Final     Potassium   Date Value Ref Range Status   08/08/2024 4.5 3.5 - 5.1 mmol/L Final     Chloride   Date Value Ref Range Status   08/08/2024 106 95 - 110 mmol/L Final     CO2   Date Value Ref Range Status   08/08/2024 22 (L) 23 - 29 mmol/L Final     Glucose   Date Value Ref Range Status   08/08/2024 137 (H) 70 - 110 mg/dL Final     BUN   Date Value Ref Range Status   08/08/2024 25 (H) 8 - 23 mg/dL Final     Creatinine   Date Value Ref Range Status   08/08/2024 1.8 (H) 0.5 - 1.4 mg/dL Final     Calcium   Date Value Ref Range Status   08/08/2024 9.8 8.7 - 10.5 mg/dL Final     Total Protein   Date Value Ref Range Status  "  06/01/2024 7.3 6.0 - 8.4 g/dL Final     Albumin   Date Value Ref Range Status   09/23/2024 4.1 3.6 - 5.1 g/dL Final     Comment:     Test Performed at:  [facility]  [id number] [place] [address]  [id number]     KANE Palm MD, PhD, PAMELA       Total Bilirubin   Date Value Ref Range Status   06/01/2024 0.4 0.1 - 1.0 mg/dL Final     Comment:     For infants and newborns, interpretation of results should be based  on gestational age, weight and in agreement with clinical  observations.    Premature Infant recommended reference ranges:  Up to 24 hours.............<8.0 mg/dL  Up to 48 hours............<12.0 mg/dL  3-5 days..................<15.0 mg/dL  6-29 days.................<15.0 mg/dL       Alkaline Phosphatase   Date Value Ref Range Status   06/01/2024 62 55 - 135 U/L Final     AST   Date Value Ref Range Status   06/01/2024 16 10 - 40 U/L Final     ALT   Date Value Ref Range Status   06/01/2024 17 10 - 44 U/L Final     Anion Gap   Date Value Ref Range Status   08/08/2024 11 8 - 16 mmol/L Final     No results found for: "AMYLASE"  No results found for: "LIPASE"  No results found for: "LIPASERES"  Lab Results   Component Value Date    TSH 1.542 05/05/2023       Reviewed prior medical records including radiology report of CT A/P 6/2024 & endoscopy history (see surgical history).    Objective:      Physical Exam  Vitals and nursing note reviewed.   Constitutional:       General: He is not in acute distress.     Appearance: He is not ill-appearing.   HENT:      Head: Normocephalic and atraumatic.      Mouth/Throat:      Mouth: Mucous membranes are moist.      Pharynx: Oropharynx is clear.   Eyes:      Conjunctiva/sclera: Conjunctivae normal.   Cardiovascular:      Rate and Rhythm: Normal rate.      Pulses: Normal pulses.   Pulmonary:      Effort: Pulmonary effort is normal. No respiratory distress.   Abdominal:      General: Abdomen is flat. Bowel sounds are normal. There is no " distension.      Palpations: Abdomen is soft.      Tenderness: There is no abdominal tenderness.   Skin:     General: Skin is warm and dry.      Capillary Refill: Capillary refill takes 2 to 3 seconds.   Neurological:      Mental Status: He is alert and oriented to person, place, and time.         Assessment:       1. Iron deficiency anemia, unspecified iron deficiency anemia type        Plan:       Iron deficiency anemia, unspecified iron deficiency anemia type  - discussed with patient the different ways that anemia occurs: blood loss (such as from the gi tract), the body is not making enough, or the body is breaking down the rbcs too quickly; recommend EGD and colonoscopy to further evaluate gi tract for possible blood loss and pending results of endoscopies, possible UGI with Small Bowel Follow Through/video capsule study  -follow-up with PCP and/or hematology for continued evaluation and management   - schedule EGD, discussed procedure with patient, including risks and benefits, patient verbalized understanding  - schedule Colonoscopy, discussed procedure with the patient, including risks and benefits, patient verbalized understanding      Follow up if symptoms worsen or fail to improve after endoscopies.      If no improvement in symptoms or symptoms worsen, call/follow-up at clinic or go to ER.       LAUREEN Caraballo, FNP-C    Encounter includes face to face time and non-face to face time preparing to see the patient (eg, review of tests), obtaining and/or reviewing separately obtained history, documenting clinical information in the electronic or other health record, independently interpreting results (not separately reported) and communicating results to the patient/family/caregiver, or care coordination (not separately reported).     A dictation software program was used for this note. Please expect some simple typographical errors in this note.

## 2024-09-25 ENCOUNTER — OFFICE VISIT (OUTPATIENT)
Dept: PULMONOLOGY | Facility: CLINIC | Age: 69
End: 2024-09-25
Payer: MEDICARE

## 2024-09-25 ENCOUNTER — HOSPITAL ENCOUNTER (OUTPATIENT)
Dept: RADIOLOGY | Facility: HOSPITAL | Age: 69
Discharge: HOME | End: 2024-09-25
Attending: INTERNAL MEDICINE
Payer: MEDICARE

## 2024-09-25 VITALS
BODY MASS INDEX: 29.18 KG/M2 | WEIGHT: 192.56 LBS | DIASTOLIC BLOOD PRESSURE: 77 MMHG | OXYGEN SATURATION: 92 % | HEART RATE: 78 BPM | HEIGHT: 68 IN | SYSTOLIC BLOOD PRESSURE: 134 MMHG

## 2024-09-25 DIAGNOSIS — U09.9 COVID-19 LONG HAULER MANIFESTING CHRONIC COUGH: Primary | ICD-10-CM

## 2024-09-25 DIAGNOSIS — R05.3 CHRONIC COUGH: ICD-10-CM

## 2024-09-25 DIAGNOSIS — R05.3 COVID-19 LONG HAULER MANIFESTING CHRONIC COUGH: ICD-10-CM

## 2024-09-25 DIAGNOSIS — R05.3 COVID-19 LONG HAULER MANIFESTING CHRONIC COUGH: Primary | ICD-10-CM

## 2024-09-25 DIAGNOSIS — J45.51 SEVERE PERSISTENT ASTHMA WITH ACUTE EXACERBATION: ICD-10-CM

## 2024-09-25 DIAGNOSIS — U09.9 COVID-19 LONG HAULER MANIFESTING CHRONIC COUGH: ICD-10-CM

## 2024-09-25 PROCEDURE — 71046 X-RAY EXAM CHEST 2 VIEWS: CPT | Mod: TC

## 2024-09-25 PROCEDURE — 99999 PR PBB SHADOW E&M-EST. PATIENT-LVL V: CPT | Mod: PBBFAC,,, | Performed by: INTERNAL MEDICINE

## 2024-09-25 PROCEDURE — 99214 OFFICE O/P EST MOD 30 MIN: CPT | Mod: S$PBB,,, | Performed by: INTERNAL MEDICINE

## 2024-09-25 PROCEDURE — 71046 X-RAY EXAM CHEST 2 VIEWS: CPT | Mod: 26,,, | Performed by: RADIOLOGY

## 2024-09-25 PROCEDURE — 99215 OFFICE O/P EST HI 40 MIN: CPT | Mod: PBBFAC,25,PO | Performed by: INTERNAL MEDICINE

## 2024-09-25 RX ORDER — PREDNISONE 20 MG/1
TABLET ORAL
Qty: 30 TABLET | Refills: 1 | Status: SHIPPED | OUTPATIENT
Start: 2024-09-25

## 2024-09-25 RX ORDER — ACETAMINOPHEN AND CODEINE PHOSPHATE 300; 30 MG/1; MG/1
1 TABLET ORAL EVERY 6 HOURS PRN
Qty: 100 TABLET | Refills: 0 | Status: SHIPPED | OUTPATIENT
Start: 2024-09-25

## 2024-09-25 RX ORDER — LEVOFLOXACIN 500 MG/1
500 TABLET, FILM COATED ORAL DAILY
Qty: 7 TABLET | Refills: 1 | Status: SHIPPED | OUTPATIENT
Start: 2024-09-25

## 2024-09-25 NOTE — PROGRESS NOTES
9/25/2024    Mathewmarielle Rodrigues  New Patient Consult    Chief Complaint   Patient presents with    Follow-up    Asthma    Shortness of Breath       HPI:    September 25, 2024-pt had covid   8/24  Pt having wheezes and cough violent. Neb ppt cough, sneezes with cough.  Pt started on tezspire but developed covid 2 wks later....  Pt uses nebs, prednisone    Took prednisone twice -- on 30 mg last wk with no great benefit.  Pt having cough and rattle, worse nightly  Cough very violent -- had cough emesis -- no gerd symptoms on daily omeprazole..         From sticky note 2 weeks ago-Prednisone, Tessalon, and cough medicine are all renewed.  He should use the prednisone only as needed if he can get by.    8/12/2024 pt  having cough with yellow mucous that interfers with cough.  Ent eval and suspected cough from reflux.    Pt had Prilosec double -- no gerd    Pt took prednisone 3 days courses -- was coughing on prednisone for 2 wks.   Steroids seemed to help  Uses trelegy daily, albuterol helps but not lasting  Notes wheezes with albuterol    Eos were up to 1.5 or 1500 in June...     Pt was taken off prednisone for heart cath-- heart was good at cath.  No diarrhea.  Pt off prednisone 10 days.      Patient has a partial response to prednisone and albuterol but has not been able to be controlled  Patient Instructions   Pft had 6% bronchodilator  There is a response to inhalers and prednisone.   Wheezes and coughing and nocturnal worsening are very prominent..    Breathing test not too too bad-- no obstruction but cough is major symptom.      Woud treat severe persistent eosinophilic asthma with shots....      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  Pt worked construction.  Pt smoked 16-38 ppd or so..    No h/o asthma.   Pt had covid likel illness 2020 with wk in hosp.   Pt felt ot have copd, uses trelegy and albuterol-- no great benefit.  Patient however would improve his breathing would steroids.  He is breathing with normalize.  When he got off steroids to breathing would relapse.  Very dramatic benefit and clear relapse.    Pt has had thick creamy mucous but no culture nor abx-- took abx deceember with      Pt will have freq wheezes--- not particularly nocturnal ---   Chr renal failure  H/o dvt -- on eliquis 5 bid...  Pt on teststerone  Used dose pack in past-- pt felt better and was breathing better but relapsed...      Uses cpap nightly -uses nasal mask.  Compliant.  No problem.  Patient Instructions   Chest xray and ct abd 6/2024 viewed and good    Saline rinse for nasal passages, if stuffy may use 4 way nasal to open passages.  Use flonase daily        You relate cough and wheezes and short breath,  You are on good high dose controller with trelegy.    Albuterol not effective    Steroids will remit problems-- you used once - avoiding due to diabetes.     You have severe persistent eosinophilic asthma.  Expect you are steroid dependant.    Would do breathing test and plan to start dupixent at follow up....      May do ct chest if lingering.......    PFSH:  Past Medical History:   Diagnosis Date    Arthritis     COPD (chronic obstructive pulmonary disease)     Diabetes mellitus, type 2     DVT (deep venous thrombosis)     Hyperlipidemia     Hypertension     Kidney stones     LVH (left ventricular hypertrophy) due to hypertensive disease, with heart failure     Neuropathy     Personal history of colonic polyps 03/24/2000    colonoscopy report in media    PTSD (post-traumatic stress disorder)     Shortness of breath     fatigue    Sleep apnea, unspecified     Torn rotator cuff          Past Surgical History:   Procedure Laterality Date    achilles repair Left 2003    ADENOIDECTOMY      ANGIOGRAM, CORONARY, WITH LEFT HEART  CATHETERIZATION N/A 8/6/2024    Procedure: Angiogram, Coronary, with Left Heart Cath;  Surgeon: Elliot Butler MD;  Location: [facility] CATH/EP LAB;  Service: Cardiology;  Laterality: N/A;    ANKLE FRACTURE SURGERY Right 1987    CATARACT EXTRACTION Left 2004    CATARACT EXTRACTION Left 2005    2nd    COLONOSCOPY N/A 05/09/2017    results in media    COLONOSCOPY N/A 01/13/2023    Procedure: COLONOSCOPY;  Surgeon: Kenney Keller MD;  Location: [facility] ENDO;  Service: General;  Laterality: N/A;    COLONOSCOPY W/ POLYPECTOMY N/A 03/24/2000    results in media    ELBOW SURGERY Right 07/24/2020    EYE SURGERY  2005    finger joint replacement Right 01/22/2020    middle finger    hair follicle  1998    HAND SURGERY Right 04/25/2017    HAND SURGERY Right 05/12/2017    2nd surgery    NASAL SINUS SURGERY  08/2015    ROTATOR CUFF REPAIR Right 09/25/2015    ROTATOR CUFF REPAIR Left 04/08/2016    skin grafts Right 07/2016    shin from stingray wound    TONSILLECTOMY      VASECTOMY       Social History     Tobacco Use    Smoking status: Former     Current packs/day: 1.50     Average packs/day: 1.5 packs/day for 30.0 years (45.0 ttl pk-yrs)     Types: Cigarettes    Smokeless tobacco: Never   Substance Use Topics    Alcohol use: Not Currently    Drug use: Never     Family History   Problem Relation Name Age of Onset    Hypertension Mother Sierra     Arthritis Mother Sierra     Hypertension Father Misael     Diabetes Father Misael     Arthritis Father Misael     Hearing loss Father Misael     Cancer Brother Mickey Douglas     COPD Brother Mickey Douglas      Review of patient's allergies indicates:   Allergen Reactions    Ibuprofen Itching          Review of Systems:  a review of eleven systems covering constitutional, Eye, HEENT, Psych, Respiratory, Cardiac, GI, , Musculoskeletal, Endocrine, Dermatologic was negative except for pertinent findings as listed ABOVE and below: pertinent positives as above, rest good        Exam:Comprehensive exam  "done. /77 (BP Location: Left arm, Patient Position: Sitting, BP Method: Small (Automatic))   Pulse 78   Ht 5' 8" (1.727 m)   Wt 87.4 kg (192 lb 9.2 oz)   SpO2 (!) 92% Comment: on room air at rest  BMI 29.28 kg/m²   Exam included Vitals as listed, and patient's appearance and affect and alertness and mood, oral exam for yeast and hygiene and pharynx lesions and Mallapatti (M) score, neck with inspection for jvd and masses and thyroid abnormalities and lymph nodes (supraclavicular and infraclavicular nodes and axillary also examined and noted if abn), chest exam included symmetry and effort and fremitus and percussion and auscultation, cardiac exam included rhythm and gallops and murmur and rubs and jvd and edema, abdominal exam for mass and hepatosplenomegaly and tenderness and hernias and bowel sounds, Musculoskeletal exam with muscle tone and posture and mobility/gait and  strength, and skin for rashes and cyanosis and pallor and turgor, extremity for clubbing.  Findings were normal except for pertinent findings listed below:  M3, chest is symmetric, no distress, normal percussion, normal fremitus and good normal breath sounds  Arthritic deformed hands...         Radiographs (ct chest and cxr) reviewed: view by direct vision      Labs none available        PFT will be done and results to be reviewed       Plan:  Clinical impression is apparently straight forward and impression with management as below.    Mathew was seen today for follow-up, asthma and shortness of breath.    Diagnoses and all orders for this visit:    COVID-19 long hauler manifesting chronic cough  -     X-Ray Chest PA And Lateral; Future  -     Culture, Respiratory with Gram Stain; Future  -     levoFLOXacin (LEVAQUIN) 500 MG tablet; Take 1 tablet (500 mg total) by mouth once daily.  -     predniSONE (DELTASONE) 20 MG tablet; 3 for 5 days then 2 for 5 days then one for 5 days and repeat for breathing problems  -     " acetaminophen-codeine 300-30mg (TYLENOL #3) 300-30 mg Tab; Take 1 tablet by mouth every 6 (six) hours as needed (pain, coughing).    Severe persistent asthma with acute exacerbation  -     X-Ray Chest PA And Lateral; Future  -     Culture, Respiratory with Gram Stain; Future  -     levoFLOXacin (LEVAQUIN) 500 MG tablet; Take 1 tablet (500 mg total) by mouth once daily.  -     predniSONE (DELTASONE) 20 MG tablet; 3 for 5 days then 2 for 5 days then one for 5 days and repeat for breathing problems  -     acetaminophen-codeine 300-30mg (TYLENOL #3) 300-30 mg Tab; Take 1 tablet by mouth every 6 (six) hours as needed (pain, coughing).    Chronic cough  -     X-Ray Chest PA And Lateral; Future  -     Culture, Respiratory with Gram Stain; Future  -     levoFLOXacin (LEVAQUIN) 500 MG tablet; Take 1 tablet (500 mg total) by mouth once daily.  -     predniSONE (DELTASONE) 20 MG tablet; 3 for 5 days then 2 for 5 days then one for 5 days and repeat for breathing problems  -     acetaminophen-codeine 300-30mg (TYLENOL #3) 300-30 mg Tab; Take 1 tablet by mouth every 6 (six) hours as needed (pain, coughing).            Follow up in about 2 weeks (around 10/9/2024), or if symptoms worsen or fail to improve.    Discussed with patient above for education the following:      Patient Instructions   Would use tylenol 3 for pains -- should suppress cough-- will give tylenol 3 -- has 3 times dose codeine compared to cough codeine syrup-- use for pain or cough.     If uses bedtime -- need to use cpap.    Check sputum culture  Dose Levaquin    Check chest xray    Use nebulizer, trelegy    Covid worsened underlying asthma.  Asthma may not remit til covid completely resolved.........    Increase prednisone to 60 mg daily for 2-5 days, then 40 mg daily for 2-5 days, then 20 mg daily for 2-5 days -- cut dose if good result or sugar over 200.    Would see in 2 weeks if not clearing-- would see in 2 months others         Evaluation took 30  minutes

## 2024-09-25 NOTE — PATIENT INSTRUCTIONS
Would use tylenol 3 for pains -- should suppress cough-- will give tylenol 3 -- has 3 times dose codeine compared to cough codeine syrup-- use for pain or cough.     If uses bedtime -- need to use cpap.    Check sputum culture  Dose Levaquin    Check chest xray    Use nebulizer, trelegy    Covid worsened underlying asthma.  Asthma may not remit til covid completely resolved.........    Increase prednisone to 60 mg daily for 2-5 days, then 40 mg daily for 2-5 days, then 20 mg daily for 2-5 days -- cut dose if good result or sugar over 200.    Would see in 2 weeks if not clearing-- would see in 2 months others

## 2024-09-26 ENCOUNTER — LAB VISIT (OUTPATIENT)
Dept: LAB | Facility: HOSPITAL | Age: 69
End: 2024-09-26
Attending: FAMILY MEDICINE
Payer: MEDICARE

## 2024-09-26 DIAGNOSIS — U09.9 COVID-19 LONG HAULER MANIFESTING CHRONIC COUGH: ICD-10-CM

## 2024-09-26 DIAGNOSIS — R05.3 CHRONIC COUGH: ICD-10-CM

## 2024-09-26 DIAGNOSIS — R05.3 COVID-19 LONG HAULER MANIFESTING CHRONIC COUGH: ICD-10-CM

## 2024-09-26 DIAGNOSIS — J45.51 SEVERE PERSISTENT ASTHMA WITH ACUTE EXACERBATION: ICD-10-CM

## 2024-09-26 PROCEDURE — 87205 SMEAR GRAM STAIN: CPT | Performed by: INTERNAL MEDICINE

## 2024-09-26 PROCEDURE — 87070 CULTURE OTHR SPECIMN AEROBIC: CPT | Performed by: INTERNAL MEDICINE

## 2024-09-28 LAB
BACTERIA SPEC AEROBE CULT: NORMAL
BACTERIA SPEC AEROBE CULT: NORMAL
GRAM STN SPEC: NORMAL

## 2024-10-03 ENCOUNTER — OFFICE VISIT (OUTPATIENT)
Dept: GASTROENTEROLOGY | Facility: CLINIC | Age: 69
End: 2024-10-03
Payer: MEDICARE

## 2024-10-03 VITALS
HEIGHT: 68 IN | DIASTOLIC BLOOD PRESSURE: 71 MMHG | SYSTOLIC BLOOD PRESSURE: 119 MMHG | BODY MASS INDEX: 28.77 KG/M2 | HEART RATE: 79 BPM | WEIGHT: 189.81 LBS

## 2024-10-03 DIAGNOSIS — D50.9 IRON DEFICIENCY ANEMIA, UNSPECIFIED IRON DEFICIENCY ANEMIA TYPE: Primary | ICD-10-CM

## 2024-10-03 PROCEDURE — 99214 OFFICE O/P EST MOD 30 MIN: CPT | Mod: PBBFAC,PN

## 2024-10-03 PROCEDURE — 99999 PR PBB SHADOW E&M-EST. PATIENT-LVL IV: CPT | Mod: PBBFAC,,,

## 2024-10-03 RX ORDER — IBUPROFEN 100 MG/5ML
1000 SUSPENSION, ORAL (FINAL DOSE FORM) ORAL DAILY
COMMUNITY

## 2024-10-03 RX ORDER — FERROUS SULFATE 325(65) MG
325 TABLET, DELAYED RELEASE (ENTERIC COATED) ORAL
COMMUNITY

## 2024-10-03 NOTE — PATIENT INSTRUCTIONS
- discussed with patient the different ways that anemia occurs: blood loss (such as from the gi tract), the body is not making enough, or the body is breaking down the rbcs too quickly; recommend EGD and colonoscopy to further evaluate gi tract for possible blood loss and pending results of endoscopies, possible UGI with Small Bowel Follow Through/video capsule study  -follow-up with PCP and/or hematology for continued evaluation and management

## 2024-10-07 ENCOUNTER — PATIENT MESSAGE (OUTPATIENT)
Dept: FAMILY MEDICINE | Facility: CLINIC | Age: 69
End: 2024-10-07
Payer: MEDICARE

## 2024-10-07 DIAGNOSIS — E29.1 HYPOGONADISM IN MALE: ICD-10-CM

## 2024-10-07 RX ORDER — TESTOSTERONE CYPIONATE 200 MG/ML
INJECTION, SOLUTION INTRAMUSCULAR
Qty: 12 ML | Refills: 0 | Status: SHIPPED | OUTPATIENT
Start: 2024-10-07

## 2024-10-07 RX ORDER — TESTOSTERONE CYPIONATE 200 MG/ML
INJECTION, SOLUTION INTRAMUSCULAR
Qty: 12 ML | Refills: 0 | Status: SHIPPED | OUTPATIENT
Start: 2024-10-07 | End: 2024-10-07

## 2024-10-07 NOTE — TELEPHONE ENCOUNTER
No care due was identified.  [facility] Embedded Care Due Messages. Reference number: [id number].   10/07/2024 3:00:44 PM CDT

## 2024-10-07 NOTE — TELEPHONE ENCOUNTER
Refill Routing Note   Medication(s) are not appropriate for processing by [facility] for the following reason(s):        Outside of protocol    [facility] action(s):  Route      Second request for medication since 10/4/24.         Appointments  past 12m or future 3m with PCP    Date Provider   Last Visit   8/30/2024 Kiley Vasquez MD   Next Visit   11/15/2024 Kiley Vasquez MD   ED visits in past 90 days: 0        Note composed:3:37 PM 10/07/2024

## 2024-10-08 ENCOUNTER — OFFICE VISIT (OUTPATIENT)
Dept: PULMONOLOGY | Facility: CLINIC | Age: 69
End: 2024-10-08
Payer: MEDICARE

## 2024-10-08 ENCOUNTER — TELEPHONE (OUTPATIENT)
Dept: PULMONOLOGY | Facility: CLINIC | Age: 69
End: 2024-10-08

## 2024-10-08 VITALS
HEART RATE: 83 BPM | BODY MASS INDEX: 29.32 KG/M2 | HEIGHT: 68 IN | DIASTOLIC BLOOD PRESSURE: 63 MMHG | SYSTOLIC BLOOD PRESSURE: 109 MMHG | WEIGHT: 193.44 LBS | OXYGEN SATURATION: 94 %

## 2024-10-08 DIAGNOSIS — R05.3 COVID-19 LONG HAULER MANIFESTING CHRONIC COUGH: ICD-10-CM

## 2024-10-08 DIAGNOSIS — J45.51 SEVERE PERSISTENT ASTHMA WITH ACUTE EXACERBATION: ICD-10-CM

## 2024-10-08 DIAGNOSIS — G47.33 OSA ON CPAP: ICD-10-CM

## 2024-10-08 DIAGNOSIS — U09.9 COVID-19 LONG HAULER MANIFESTING CHRONIC COUGH: ICD-10-CM

## 2024-10-08 DIAGNOSIS — J44.9 CHRONIC OBSTRUCTIVE PULMONARY DISEASE, UNSPECIFIED COPD TYPE: ICD-10-CM

## 2024-10-08 DIAGNOSIS — R05.3 CHRONIC COUGH: ICD-10-CM

## 2024-10-08 DIAGNOSIS — J45.50 SEVERE PERSISTENT ASTHMA WITHOUT COMPLICATION: Primary | ICD-10-CM

## 2024-10-08 PROCEDURE — 99213 OFFICE O/P EST LOW 20 MIN: CPT | Mod: PBBFAC,PO | Performed by: INTERNAL MEDICINE

## 2024-10-08 PROCEDURE — 99999 PR PBB SHADOW E&M-EST. PATIENT-LVL III: CPT | Mod: PBBFAC,,, | Performed by: INTERNAL MEDICINE

## 2024-10-08 PROCEDURE — 99213 OFFICE O/P EST LOW 20 MIN: CPT | Mod: S$PBB,,, | Performed by: INTERNAL MEDICINE

## 2024-10-08 RX ORDER — ACETAMINOPHEN AND CODEINE PHOSPHATE 300; 30 MG/1; MG/1
1 TABLET ORAL EVERY 6 HOURS PRN
Qty: 100 TABLET | Refills: 0 | Status: SHIPPED | OUTPATIENT
Start: 2024-10-25

## 2024-10-08 RX ORDER — ALBUTEROL SULFATE 90 UG/1
2 INHALANT RESPIRATORY (INHALATION) EVERY 4 HOURS PRN
Qty: 36 G | Refills: 11 | Status: SHIPPED | OUTPATIENT
Start: 2024-10-08

## 2024-10-08 RX ORDER — PREDNISONE 20 MG/1
TABLET ORAL
Qty: 30 TABLET | Refills: 1 | Status: SHIPPED | OUTPATIENT
Start: 2024-10-08

## 2024-10-08 NOTE — PROGRESS NOTES
10/8/2024    Mathewmarielle Rodrigues  New Patient Consult    Chief Complaint   Patient presents with    Follow-up    COPD       HPI:    10/8/2024  pt over last 2 wks and doing well, got 2nd tezspire yesterday,  prednisone tapering, uses t3 bedtime , uses cpap    Sugar <120 in am.        September 25, 2024-pt had covid   8/24  Pt having wheezes and cough violent. Neb ppt cough, sneezes with cough.  Pt started on tezspire but developed covid 2 wks later....  Pt uses nebs, prednisone    Took prednisone twice -- on 30 mg last wk with no great benefit.  Pt having cough and rattle, worse nightly  Cough very violent -- had cough emesis -- no gerd symptoms on daily omeprazole..         From sticky note 2 weeks ago-Prednisone, Tessalon, and cough medicine are all renewed.  He should use the prednisone only as needed if he can get by.  Patient Instructions   Would use tylenol 3 for pains -- should suppress cough-- will give tylenol 3 -- has 3 times dose codeine compared to cough codeine syrup-- use for pain or cough.     If uses bedtime -- need to use cpap.    Check sputum culture  Dose Levaquin    Check chest xray    Use nebulizer, trelegy    Covid worsened underlying asthma.  Asthma may not remit til covid completely resolved.........    Increase prednisone to 60 mg daily for 2-5 days, then 40 mg daily for 2-5 days, then 20 mg daily for 2-5 days -- cut dose if good result or sugar over 200.    Would see in 2 weeks if not clearing-- would see in 2 months others  8/12/2024 pt  having cough with yellow mucous that interfers with cough.  Ent eval and suspected cough from reflux.    Pt had Prilosec double -- no gerd    Pt took prednisone 3 days courses -- was coughing on prednisone for 2 wks.   Steroids seemed to help  Uses trelegy daily, albuterol helps but not lasting  Notes wheezes with albuterol    Eos were up to 1.5 or 1500 in June...     Pt was taken off prednisone for heart cath-- heart was good at cath.  No diarrhea.  Pt off  prednisone 10 days.      Patient has a partial response to prednisone and albuterol but has not been able to be controlled  Patient Instructions   Pft had 6% bronchodilator  There is a response to inhalers and prednisone.   Wheezes and coughing and nocturnal worsening are very prominent..    Breathing test not too too bad-- no obstruction but cough is major symptom.      Woud treat severe persistent eosinophilic asthma with shots....      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  Pt worked construction.  Pt smoked 16-38 ppd or so..    No h/o asthma.  Pt had covid likel illness 2020 with wk in hosp.   Pt felt ot have copd, uses trelegy and albuterol-- no great benefit.  Patient however would improve his breathing would steroids.  He is breathing with normalize.  When he got off steroids to breathing would relapse.  Very dramatic benefit and clear relapse.    Pt has had thick creamy mucous but no culture nor abx-- took abx deceember with      Pt will have freq wheezes--- not particularly nocturnal ---   Chr renal failure  H/o dvt -- on eliquis 5 bid...  Pt on teststerone  Used dose pack in past-- pt felt better and was breathing better but relapsed...      Uses cpap nightly -uses nasal mask.  Compliant.  No problem.  Patient Instructions   Chest xray and ct abd 6/2024 viewed and good    Saline rinse for nasal passages, if stuffy may use 4 way nasal to open passages.  Use flonase daily        You relate cough and wheezes and short breath,  You are on good high dose controller with trelegy.    Albuterol not effective    Steroids will remit problems-- you used once - avoiding due to diabetes.     You have severe persistent eosinophilic asthma.  Expect you are steroid dependant.    Would do breathing test and plan  to start dupixent at follow up....      May do ct chest if lingering.......    PFSH:  Past Medical History:   Diagnosis Date    Arthritis     COPD (chronic obstructive pulmonary disease)     Diabetes mellitus, type 2     DVT (deep venous thrombosis)     Hyperlipidemia     Hypertension     Kidney stones     LVH (left ventricular hypertrophy) due to hypertensive disease, with heart failure     Neuropathy     Personal history of colonic polyps 03/24/2000    colonoscopy report in media    PTSD (post-traumatic stress disorder)     Shortness of breath     fatigue    Sleep apnea, unspecified     Torn rotator cuff          Past Surgical History:   Procedure Laterality Date    achilles repair Left 2003    ADENOIDECTOMY      ANGIOGRAM, CORONARY, WITH LEFT HEART CATHETERIZATION N/A 8/6/2024    Procedure: Angiogram, Coronary, with Left Heart Cath;  Surgeon: Elliot Butler MD;  Location: [facility] CATH/EP LAB;  Service: Cardiology;  Laterality: N/A;    ANKLE FRACTURE SURGERY Right 1987    CATARACT EXTRACTION Left 2004    CATARACT EXTRACTION Left 2005    2nd    COLONOSCOPY N/A 05/09/2017    results in media    COLONOSCOPY N/A 01/13/2023    Procedure: COLONOSCOPY;  Surgeon: Kenney Keller MD;  Location: [facility] ENDO;  Service: General;  Laterality: N/A;    COLONOSCOPY W/ POLYPECTOMY N/A 03/24/2000    results in media    ELBOW SURGERY Right 07/24/2020    EYE SURGERY  2005    finger joint replacement Right 01/22/2020    middle finger    hair follicle  1998    HAND SURGERY Right 04/25/2017    HAND SURGERY Right 05/12/2017    2nd surgery    NASAL SINUS SURGERY  08/2015    ROTATOR CUFF REPAIR Right 09/25/2015    ROTATOR CUFF REPAIR Left 04/08/2016    skin grafts Right 07/2016    shin from stingray wound    TONSILLECTOMY      VASECTOMY       Social History     Tobacco Use    Smoking status: Former     Current packs/day: 1.50     Average packs/day: 1.5 packs/day for 30.0 years (45.0 ttl pk-yrs)     Types: Cigarettes    Smokeless  "tobacco: Never   Substance Use Topics    Alcohol use: Not Currently    Drug use: Never     Family History   Problem Relation Name Age of Onset    Hypertension Mother Sierra     Arthritis Mother Sierra     Hypertension Father Misael     Diabetes Father Misael     Arthritis Father Misael     Hearing loss Father Misael     Cancer Brother Mickey Douglas     COPD Brother Mickey Douglas      Review of patient's allergies indicates:   Allergen Reactions    Ibuprofen Itching          Review of Systems:  a review of eleven systems covering constitutional, Eye, HEENT, Psych, Respiratory, Cardiac, GI, , Musculoskeletal, Endocrine, Dermatologic was negative except for pertinent findings as listed ABOVE and below: pertinent positives as above, rest good        Exam:Comprehensive exam done. /63 (BP Location: Right arm, Patient Position: Sitting)   Pulse 83   Ht 5' 8" (1.727 m)   Wt 87.8 kg (193 lb 7.3 oz)   SpO2 (!) 94% Comment: on room air at rest  BMI 29.41 kg/m²   Exam included Vitals as listed, and patient's appearance and affect and alertness and mood, oral exam for yeast and hygiene and pharynx lesions and Mallapatti (M) score, neck with inspection for jvd and masses and thyroid abnormalities and lymph nodes (supraclavicular and infraclavicular nodes and axillary also examined and noted if abn), chest exam included symmetry and effort and fremitus and percussion and auscultation, cardiac exam included rhythm and gallops and murmur and rubs and jvd and edema, abdominal exam for mass and hepatosplenomegaly and tenderness and hernias and bowel sounds, Musculoskeletal exam with muscle tone and posture and mobility/gait and  strength, and skin for rashes and cyanosis and pallor and turgor, extremity for clubbing.  Findings were normal except for pertinent findings listed below:  M3, chest is symmetric, no distress, normal percussion, normal fremitus and good normal breath sounds  Arthritic deformed hands...   "       Radiographs (ct chest and cxr) reviewed: view by direct vision      Labs none available        PFT will be done and results to be reviewed       Plan:  Clinical impression is apparently straight forward and impression with management as below.    Mathew was seen today for follow-up and copd.    Diagnoses and all orders for this visit:    Severe persistent asthma without complication    COVID-19 long hauler manifesting chronic cough  -     acetaminophen-codeine 300-30mg (TYLENOL #3) 300-30 mg Tab; Take 1 tablet by mouth every 6 (six) hours as needed (pain, coughing).  -     predniSONE (DELTASONE) 20 MG tablet; 3 for 5 days then 2 for 5 days then one for 5 days and repeat for breathing problems    Severe persistent asthma with acute exacerbation  -     acetaminophen-codeine 300-30mg (TYLENOL #3) 300-30 mg Tab; Take 1 tablet by mouth every 6 (six) hours as needed (pain, coughing).  -     predniSONE (DELTASONE) 20 MG tablet; 3 for 5 days then 2 for 5 days then one for 5 days and repeat for breathing problems    Chronic cough  -     acetaminophen-codeine 300-30mg (TYLENOL #3) 300-30 mg Tab; Take 1 tablet by mouth every 6 (six) hours as needed (pain, coughing).  -     predniSONE (DELTASONE) 20 MG tablet; 3 for 5 days then 2 for 5 days then one for 5 days and repeat for breathing problems    CRICKET on CPAP, 100% use              Follow up in about 6 weeks (around 11/19/2024), or if symptoms worsen or fail to improve.    Discussed with patient above for education the following:      Patient Instructions   Stop prednisone this week -- ok to resume if needed      Continue trelegy   Continue tezpsire.      Chest xrays and ct abd viewed    Use tyleonol 3  for cough/pain..... will renew for later October-- should not need long term .....

## 2024-10-08 NOTE — TELEPHONE ENCOUNTER
Patient is requesting that you send the prescription for the albuterol inhaler since you are increasing his dosing.  He is concerned that insurance won't allow him to fill too soon since the rx from pcp is not dosed as often.

## 2024-10-11 ENCOUNTER — TELEPHONE (OUTPATIENT)
Dept: FAMILY MEDICINE | Facility: CLINIC | Age: 69
End: 2024-10-11
Payer: MEDICARE

## 2024-10-11 DIAGNOSIS — Z79.4 TYPE 2 DIABETES MELLITUS WITHOUT COMPLICATION, WITH LONG-TERM CURRENT USE OF INSULIN: ICD-10-CM

## 2024-10-11 DIAGNOSIS — E11.9 TYPE 2 DIABETES MELLITUS WITHOUT COMPLICATION, WITH LONG-TERM CURRENT USE OF INSULIN: ICD-10-CM

## 2024-10-15 ENCOUNTER — TELEPHONE (OUTPATIENT)
Dept: FAMILY MEDICINE | Facility: CLINIC | Age: 69
End: 2024-10-15
Payer: MEDICARE

## 2024-10-21 ENCOUNTER — PATIENT MESSAGE (OUTPATIENT)
Dept: ADMINISTRATIVE | Facility: HOSPITAL | Age: 69
End: 2024-10-21
Payer: MEDICARE

## 2024-10-22 ENCOUNTER — PATIENT OUTREACH (OUTPATIENT)
Dept: ADMINISTRATIVE | Facility: HOSPITAL | Age: 69
End: 2024-10-22
Payer: MEDICARE

## 2024-10-22 NOTE — LETTER
AUTHORIZATION FOR RELEASE OF   CONFIDENTIAL INFORMATION    Dear Dr Stephens,    We are seeing Mathewmarielle Rodrigues, date of birth 1955, in the clinic at [facility]. Kiley Vasquez MD is the patient's PCP. Mathew Rodrigues has an outstanding lab/procedure at the time we reviewed his chart. In order to help keep his health information updated, he has authorized us to request the following medical record(s):        (  )  MAMMOGRAM                                      (  )  COLONOSCOPY      (  )  PAP SMEAR                                          (  )  OUTSIDE LAB RESULTS     (  )  DEXA SCAN                                          ( X )  EYE EXAM            (  )  FOOT EXAM                                          (  )  ENTIRE RECORD     (  )  OUTSIDE IMMUNIZATIONS                 (  )  _______________         Please fax records to [facility], [place], Sarah E., MD, [phone]    Thanks so much and have a great day!    Marsha Vazquez LPN [address] [address]  P- [phone]  F [phone]         Patient Name: Mathew Rodrigues  : 1955  Patient Phone #: [phone]

## 2024-10-22 NOTE — PROGRESS NOTES
Population Health Chart Review & Patient Outreach Details      Additional Pop Health Notes:               Updates Requested / Reviewed:      Updated Care Coordination Note         Health Maintenance Topics Overdue:      VBHM Score: 3     Urine Screening  Eye Exam  AAA Screening    Influenza Vaccine  Pneumonia Vaccine  Tetanus Vaccine  RSV Vaccine                  Health Maintenance Topic(s) Outreach Outcomes & Actions Taken:    Eye Exam - Outreach Outcomes & Actions Taken  : Portal message sent out regarding overdue Diabetic eye exam

## 2024-10-23 ENCOUNTER — PATIENT MESSAGE (OUTPATIENT)
Dept: ADMINISTRATIVE | Facility: OTHER | Age: 69
End: 2024-10-23
Payer: MEDICARE

## 2024-10-25 ENCOUNTER — PATIENT MESSAGE (OUTPATIENT)
Dept: FAMILY MEDICINE | Facility: CLINIC | Age: 69
End: 2024-10-25
Payer: MEDICARE

## 2024-10-25 DIAGNOSIS — R05.3 CHRONIC COUGH: Primary | ICD-10-CM

## 2024-10-25 DIAGNOSIS — F41.9 ANXIETY: ICD-10-CM

## 2024-10-25 DIAGNOSIS — E78.2 MIXED HYPERLIPIDEMIA: ICD-10-CM

## 2024-10-25 DIAGNOSIS — F98.8 ATTENTION DEFICIT DISORDER: ICD-10-CM

## 2024-10-25 RX ORDER — GUANFACINE 2 MG/1
TABLET ORAL
Qty: 90 TABLET | Refills: 0 | Status: SHIPPED | OUTPATIENT
Start: 2024-10-25

## 2024-10-25 RX ORDER — FENOFIBRATE 160 MG/1
TABLET ORAL
Qty: 90 TABLET | Refills: 2 | Status: SHIPPED | OUTPATIENT
Start: 2024-10-25

## 2024-10-25 NOTE — TELEPHONE ENCOUNTER
No care due was identified.  [facility] Embedded Care Due Messages. Reference number: [id number].   10/25/2024 9:31:53 AM CDT

## 2024-10-25 NOTE — TELEPHONE ENCOUNTER
Refill Routing Note   Medication(s) are not appropriate for processing by [facility] for the following reason(s):        Required labs abnormal  Outside of protocol    [facility] action(s):  Defer  Route      Medication Therapy Plan: TENEX(OP)      Appointments  past 12m or future 3m with PCP    Date Provider   Last Visit   8/30/2024 Kiley Vasquez MD   Next Visit   11/15/2024 Kiley Vasquez MD   ED visits in past 90 days: 0        Note composed:10:20 AM 10/25/2024

## 2024-10-28 RX ORDER — OMEPRAZOLE 40 MG/1
40 CAPSULE, DELAYED RELEASE ORAL
Qty: 60 CAPSULE | Refills: 11 | Status: SHIPPED | OUTPATIENT
Start: 2024-10-28

## 2024-10-28 RX ORDER — OMEPRAZOLE 40 MG/1
40 CAPSULE, DELAYED RELEASE ORAL
Qty: 60 CAPSULE | Refills: 11 | OUTPATIENT
Start: 2024-10-28

## 2024-10-28 RX ORDER — GUANFACINE 2 MG/1
TABLET ORAL
Qty: 90 TABLET | Refills: 0 | OUTPATIENT
Start: 2024-10-28

## 2024-10-28 RX ORDER — FENOFIBRATE 160 MG/1
160 TABLET ORAL DAILY
Qty: 90 TABLET | Refills: 2 | OUTPATIENT
Start: 2024-10-28

## 2024-10-30 ENCOUNTER — PATIENT MESSAGE (OUTPATIENT)
Dept: FAMILY MEDICINE | Facility: CLINIC | Age: 69
End: 2024-10-30
Payer: MEDICARE

## 2024-10-30 DIAGNOSIS — D50.9 IRON DEFICIENCY ANEMIA, UNSPECIFIED IRON DEFICIENCY ANEMIA TYPE: Primary | ICD-10-CM

## 2024-10-31 ENCOUNTER — PATIENT MESSAGE (OUTPATIENT)
Dept: FAMILY MEDICINE | Facility: CLINIC | Age: 69
End: 2024-10-31
Payer: MEDICARE

## 2024-11-01 ENCOUNTER — HOSPITAL ENCOUNTER (EMERGENCY)
Facility: HOSPITAL | Age: 69
Discharge: ANOTHER HOSPITAL | End: 2024-11-01
Attending: EMERGENCY MEDICINE
Payer: MEDICARE

## 2024-11-01 ENCOUNTER — HOSPITAL ENCOUNTER (OUTPATIENT)
Facility: HOSPITAL | Age: 69
Discharge: HOME | End: 2024-11-01
Attending: INTERNAL MEDICINE | Admitting: HOSPITALIST
Payer: MEDICARE

## 2024-11-01 VITALS
BODY MASS INDEX: 29.97 KG/M2 | RESPIRATION RATE: 17 BRPM | DIASTOLIC BLOOD PRESSURE: 74 MMHG | OXYGEN SATURATION: 94 % | SYSTOLIC BLOOD PRESSURE: 166 MMHG | HEIGHT: 68 IN | WEIGHT: 197.75 LBS | HEART RATE: 87 BPM

## 2024-11-01 VITALS
BODY MASS INDEX: 29.55 KG/M2 | RESPIRATION RATE: 16 BRPM | OXYGEN SATURATION: 96 % | TEMPERATURE: 98 F | HEART RATE: 78 BPM | WEIGHT: 195 LBS | SYSTOLIC BLOOD PRESSURE: 137 MMHG | DIASTOLIC BLOOD PRESSURE: 81 MMHG | HEIGHT: 68 IN

## 2024-11-01 DIAGNOSIS — K61.0 PERIANAL ABSCESS: Primary | ICD-10-CM

## 2024-11-01 DIAGNOSIS — K61.1 PERI-RECTAL ABSCESS: ICD-10-CM

## 2024-11-01 PROBLEM — N18.30 CKD (CHRONIC KIDNEY DISEASE), STAGE III: Status: ACTIVE | Noted: 2024-11-01

## 2024-11-01 LAB
ALBUMIN SERPL BCP-MCNC: 3.6 G/DL (ref 3.5–5.2)
ALP SERPL-CCNC: 40 U/L (ref 40–150)
ALT SERPL W/O P-5'-P-CCNC: 16 U/L (ref 10–44)
ANION GAP SERPL CALC-SCNC: 10 MMOL/L (ref 8–16)
AST SERPL-CCNC: 10 U/L (ref 10–40)
BASOPHILS # BLD AUTO: 0.07 K/UL (ref 0–0.2)
BASOPHILS NFR BLD: 0.8 % (ref 0–1.9)
BILIRUB SERPL-MCNC: 0.4 MG/DL (ref 0.1–1)
BUN SERPL-MCNC: 20 MG/DL (ref 8–23)
CALCIUM SERPL-MCNC: 9.2 MG/DL (ref 8.7–10.5)
CHLORIDE SERPL-SCNC: 105 MMOL/L (ref 95–110)
CO2 SERPL-SCNC: 24 MMOL/L (ref 23–29)
CREAT SERPL-MCNC: 1.6 MG/DL (ref 0.5–1.4)
DIFFERENTIAL METHOD BLD: ABNORMAL
EOSINOPHIL # BLD AUTO: 0.1 K/UL (ref 0–0.5)
EOSINOPHIL NFR BLD: 1.2 % (ref 0–8)
ERYTHROCYTE [DISTWIDTH] IN BLOOD BY AUTOMATED COUNT: 21.5 % (ref 11.5–14.5)
EST. GFR  (NO RACE VARIABLE): 46.4 ML/MIN/1.73 M^2
GLUCOSE SERPL-MCNC: 156 MG/DL (ref 70–110)
HCT VFR BLD AUTO: 45.6 % (ref 40–54)
HGB BLD-MCNC: 14.1 G/DL (ref 14–18)
IMM GRANULOCYTES # BLD AUTO: 0.21 K/UL (ref 0–0.04)
IMM GRANULOCYTES NFR BLD AUTO: 2.3 % (ref 0–0.5)
LACTATE SERPL-SCNC: 1.5 MMOL/L (ref 0.5–2.2)
LYMPHOCYTES # BLD AUTO: 2 K/UL (ref 1–4.8)
LYMPHOCYTES NFR BLD: 22.3 % (ref 18–48)
MCH RBC QN AUTO: 26.3 PG (ref 27–31)
MCHC RBC AUTO-ENTMCNC: 30.9 G/DL (ref 32–36)
MCV RBC AUTO: 85 FL (ref 82–98)
MONOCYTES # BLD AUTO: 0.8 K/UL (ref 0.3–1)
MONOCYTES NFR BLD: 8.5 % (ref 4–15)
NEUTROPHILS # BLD AUTO: 5.8 K/UL (ref 1.8–7.7)
NEUTROPHILS NFR BLD: 64.9 % (ref 38–73)
NRBC BLD-RTO: 0 /100 WBC
PLATELET # BLD AUTO: 291 K/UL (ref 150–450)
PMV BLD AUTO: 9.5 FL (ref 9.2–12.9)
POTASSIUM SERPL-SCNC: 4.3 MMOL/L (ref 3.5–5.1)
PROT SERPL-MCNC: 6.5 G/DL (ref 6–8.4)
RBC # BLD AUTO: 5.37 M/UL (ref 4.6–6.2)
SODIUM SERPL-SCNC: 139 MMOL/L (ref 136–145)
WBC # BLD AUTO: 9.01 K/UL (ref 3.9–12.7)

## 2024-11-01 PROCEDURE — 63600175 PHARM REV CODE 636 W HCPCS: Performed by: EMERGENCY MEDICINE

## 2024-11-01 PROCEDURE — 94761 N-INVAS EAR/PLS OXIMETRY MLT: CPT

## 2024-11-01 PROCEDURE — 96365 THER/PROPH/DIAG IV INF INIT: CPT

## 2024-11-01 PROCEDURE — 99223 1ST HOSP IP/OBS HIGH 75: CPT | Mod: 25,,, | Performed by: SURGERY

## 2024-11-01 PROCEDURE — G0378 HOSPITAL OBSERVATION PER HR: HCPCS

## 2024-11-01 PROCEDURE — 25000003 PHARM REV CODE 250: Performed by: HOSPITALIST

## 2024-11-01 PROCEDURE — 80053 COMPREHEN METABOLIC PANEL: CPT | Performed by: EMERGENCY MEDICINE

## 2024-11-01 PROCEDURE — 83605 ASSAY OF LACTIC ACID: CPT | Performed by: EMERGENCY MEDICINE

## 2024-11-01 PROCEDURE — 25000003 PHARM REV CODE 250: Performed by: EMERGENCY MEDICINE

## 2024-11-01 PROCEDURE — 46050 I&D PERIANAL ABSCESS SUPFC: CPT | Mod: ,,, | Performed by: SURGERY

## 2024-11-01 PROCEDURE — 63600175 PHARM REV CODE 636 W HCPCS: Performed by: SURGERY

## 2024-11-01 PROCEDURE — 99285 EMERGENCY DEPT VISIT HI MDM: CPT | Mod: 25

## 2024-11-01 PROCEDURE — G0379 DIRECT REFER HOSPITAL OBSERV: HCPCS

## 2024-11-01 PROCEDURE — 96367 TX/PROPH/DG ADDL SEQ IV INF: CPT

## 2024-11-01 PROCEDURE — 96375 TX/PRO/DX INJ NEW DRUG ADDON: CPT

## 2024-11-01 PROCEDURE — 96376 TX/PRO/DX INJ SAME DRUG ADON: CPT

## 2024-11-01 PROCEDURE — 25500020 PHARM REV CODE 255: Performed by: EMERGENCY MEDICINE

## 2024-11-01 PROCEDURE — 96366 THER/PROPH/DIAG IV INF ADDON: CPT

## 2024-11-01 PROCEDURE — 85025 COMPLETE CBC W/AUTO DIFF WBC: CPT | Performed by: EMERGENCY MEDICINE

## 2024-11-01 PROCEDURE — 72193 CT PELVIS W/DYE: CPT | Mod: TC

## 2024-11-01 RX ORDER — LANOLIN ALCOHOL/MO/W.PET/CERES
800 CREAM (GRAM) TOPICAL
Status: DISCONTINUED | OUTPATIENT
Start: 2024-11-01 | End: 2024-11-01 | Stop reason: HOSPADM

## 2024-11-01 RX ORDER — INSULIN ASPART 100 [IU]/ML
0-10 INJECTION, SOLUTION INTRAVENOUS; SUBCUTANEOUS
Status: DISCONTINUED | OUTPATIENT
Start: 2024-11-01 | End: 2024-11-01 | Stop reason: HOSPADM

## 2024-11-01 RX ORDER — SODIUM CHLORIDE, SODIUM LACTATE, POTASSIUM CHLORIDE, CALCIUM CHLORIDE 600; 310; 30; 20 MG/100ML; MG/100ML; MG/100ML; MG/100ML
1000 INJECTION, SOLUTION INTRAVENOUS
Status: COMPLETED | OUTPATIENT
Start: 2024-11-01 | End: 2024-11-01

## 2024-11-01 RX ORDER — AMOXICILLIN AND CLAVULANATE POTASSIUM 875; 125 MG/1; MG/1
1 TABLET, FILM COATED ORAL 2 TIMES DAILY
Qty: 14 TABLET | Refills: 0 | Status: SHIPPED | OUTPATIENT
Start: 2024-11-01 | End: 2024-11-08

## 2024-11-01 RX ORDER — SODIUM CHLORIDE 0.9 % (FLUSH) 0.9 %
10 SYRINGE (ML) INJECTION EVERY 12 HOURS PRN
Status: DISCONTINUED | OUTPATIENT
Start: 2024-11-01 | End: 2024-11-01 | Stop reason: HOSPADM

## 2024-11-01 RX ORDER — MORPHINE SULFATE 2 MG/ML
4 INJECTION, SOLUTION INTRAMUSCULAR; INTRAVENOUS
Status: COMPLETED | OUTPATIENT
Start: 2024-11-01 | End: 2024-11-01

## 2024-11-01 RX ORDER — TALC
9 POWDER (GRAM) TOPICAL NIGHTLY PRN
Status: DISCONTINUED | OUTPATIENT
Start: 2024-11-01 | End: 2024-11-01 | Stop reason: HOSPADM

## 2024-11-01 RX ORDER — ONDANSETRON HYDROCHLORIDE 2 MG/ML
4 INJECTION, SOLUTION INTRAVENOUS
Status: COMPLETED | OUTPATIENT
Start: 2024-11-01 | End: 2024-11-01

## 2024-11-01 RX ORDER — HYDROMORPHONE HYDROCHLORIDE 1 MG/ML
0.5 INJECTION, SOLUTION INTRAMUSCULAR; INTRAVENOUS; SUBCUTANEOUS
Status: COMPLETED | OUTPATIENT
Start: 2024-11-01 | End: 2024-11-01

## 2024-11-01 RX ORDER — HYDROCODONE BITARTRATE AND ACETAMINOPHEN 5; 325 MG/1; MG/1
1 TABLET ORAL EVERY 6 HOURS PRN
Qty: 15 TABLET | Refills: 0 | Status: SHIPPED | OUTPATIENT
Start: 2024-11-01 | End: 2024-11-06

## 2024-11-01 RX ORDER — NALOXONE HCL 0.4 MG/ML
0.02 VIAL (ML) INJECTION
Status: DISCONTINUED | OUTPATIENT
Start: 2024-11-01 | End: 2024-11-01 | Stop reason: HOSPADM

## 2024-11-01 RX ORDER — IBUPROFEN 200 MG
16 TABLET ORAL
Status: DISCONTINUED | OUTPATIENT
Start: 2024-11-01 | End: 2024-11-01 | Stop reason: HOSPADM

## 2024-11-01 RX ORDER — ACETAMINOPHEN 500 MG
1000 TABLET ORAL EVERY 6 HOURS PRN
Status: DISCONTINUED | OUTPATIENT
Start: 2024-11-01 | End: 2024-11-01 | Stop reason: HOSPADM

## 2024-11-01 RX ORDER — GLUCAGON 1 MG
1 KIT INJECTION
Status: DISCONTINUED | OUTPATIENT
Start: 2024-11-01 | End: 2024-11-01 | Stop reason: HOSPADM

## 2024-11-01 RX ORDER — HYDROMORPHONE HYDROCHLORIDE 1 MG/ML
1 INJECTION, SOLUTION INTRAMUSCULAR; INTRAVENOUS; SUBCUTANEOUS
Status: COMPLETED | OUTPATIENT
Start: 2024-11-01 | End: 2024-11-01

## 2024-11-01 RX ORDER — AMOXICILLIN AND CLAVULANATE POTASSIUM 875; 125 MG/1; MG/1
1 TABLET, FILM COATED ORAL EVERY 12 HOURS
Status: DISCONTINUED | OUTPATIENT
Start: 2024-11-01 | End: 2024-11-01 | Stop reason: HOSPADM

## 2024-11-01 RX ORDER — HYDROCODONE BITARTRATE AND ACETAMINOPHEN 7.5; 325 MG/1; MG/1
1 TABLET ORAL EVERY 4 HOURS PRN
Status: DISCONTINUED | OUTPATIENT
Start: 2024-11-01 | End: 2024-11-01 | Stop reason: HOSPADM

## 2024-11-01 RX ORDER — LIDOCAINE HYDROCHLORIDE 10 MG/ML
10 INJECTION, SOLUTION EPIDURAL; INFILTRATION; INTRACAUDAL; PERINEURAL ONCE
Status: COMPLETED | OUTPATIENT
Start: 2024-11-01 | End: 2024-11-01

## 2024-11-01 RX ORDER — IBUPROFEN 200 MG
24 TABLET ORAL
Status: DISCONTINUED | OUTPATIENT
Start: 2024-11-01 | End: 2024-11-01 | Stop reason: HOSPADM

## 2024-11-01 RX ORDER — AMOXICILLIN 250 MG
1 CAPSULE ORAL 2 TIMES DAILY
Status: DISCONTINUED | OUTPATIENT
Start: 2024-11-01 | End: 2024-11-01

## 2024-11-01 RX ADMIN — ONDANSETRON 4 MG: 2 INJECTION INTRAMUSCULAR; INTRAVENOUS at 07:11

## 2024-11-01 RX ADMIN — SODIUM CHLORIDE, POTASSIUM CHLORIDE, SODIUM LACTATE AND CALCIUM CHLORIDE 1000 ML: 600; 310; 30; 20 INJECTION, SOLUTION INTRAVENOUS at 10:11

## 2024-11-01 RX ADMIN — DEXTROSE MONOHYDRATE 1000 MG: 50 INJECTION, SOLUTION INTRAVENOUS at 10:11

## 2024-11-01 RX ADMIN — HYDROCODONE BITARTRATE AND ACETAMINOPHEN 1 TABLET: 7.5; 325 TABLET ORAL at 06:11

## 2024-11-01 RX ADMIN — AMOXICILLIN AND CLAVULANATE POTASSIUM 1 TABLET: 875; 125 TABLET, FILM COATED ORAL at 05:11

## 2024-11-01 RX ADMIN — MORPHINE SULFATE 4 MG: 2 INJECTION, SOLUTION INTRAMUSCULAR; INTRAVENOUS at 07:11

## 2024-11-01 RX ADMIN — IOHEXOL 100 ML: 350 INJECTION, SOLUTION INTRAVENOUS at 08:11

## 2024-11-01 RX ADMIN — PIPERACILLIN SODIUM AND TAZOBACTAM SODIUM 3.38 G: 3; .375 INJECTION, POWDER, LYOPHILIZED, FOR SOLUTION INTRAVENOUS at 09:11

## 2024-11-01 RX ADMIN — HYDROMORPHONE HYDROCHLORIDE 0.5 MG: 1 INJECTION, SOLUTION INTRAMUSCULAR; INTRAVENOUS; SUBCUTANEOUS at 12:11

## 2024-11-01 RX ADMIN — LIDOCAINE HYDROCHLORIDE 80 MG: 10 INJECTION, SOLUTION EPIDURAL; INFILTRATION; INTRACAUDAL; PERINEURAL at 04:11

## 2024-11-01 RX ADMIN — HYDROMORPHONE HYDROCHLORIDE 1 MG: 1 INJECTION, SOLUTION INTRAMUSCULAR; INTRAVENOUS; SUBCUTANEOUS at 09:11

## 2024-11-01 RX ADMIN — HYDROMORPHONE HYDROCHLORIDE 0.5 MG: 1 INJECTION, SOLUTION INTRAMUSCULAR; INTRAVENOUS; SUBCUTANEOUS at 01:11

## 2024-11-01 NOTE — ASSESSMENT & PLAN NOTE
Creatine stable for now. BMP reviewed- noted Estimated Creatinine Clearance: 47.4 mL/min (A) (based on SCr of 1.6 mg/dL (H)). according to latest data. Based on current GFR, CKD stage is stage 3 - GFR 30-59.  Monitor UOP and serial BMP and adjust therapy as needed. Renally dose meds. Avoid nephrotoxic medications and procedures.

## 2024-11-01 NOTE — HPI
Pleasant 70 yo M who was transferred to this facility from another for evaluation of perianal abscess.  Pt notes he has had swelling and tenderness in the perianal areaf for the last 2-3 days.  He notes he was having increasing pain and tenderness. He presented to the local ER early this AM for evaluation.  He was noted to have fluctuant area in the post perianal area.  Ct scan confirmed perianal abscess.  D/w ER MD recommending bedside drainage.  They were uncomfortable with this and he is transferred to this facility for mgmt.  Recommended ER transfer as I do not anticipate pt will need admission.  Unfortunately pt was admitted to floor.  He has PMhx significant for DM, HTN and sleep apnea.  No significant PShx.  He has cscope scheduled for next few weeks.  En route to this facility he does note spontaneous drainage of abscess with relief of the pain.

## 2024-11-01 NOTE — HPI
This is a 69-year-old  male with a history of hypertension, hyperlipidemia, diabetes on insulin, CKD stage 3,  presenting here for rectal pain for past 3 days associated with fever and chills, no nausea vomiting diarrhea.  No chest pain or SOB.  No hematochezia no hematemesis.  Patient was presented to outside facility and a CT showed perirectal abscess.  Patient was transferred to our facility for further evaluation by General surgery.  Patient had bedside I and D by Dr. Hodge and deemed stable for discharge per general surgeon.  Patient has no leukocytosis or fever.  Lab work shows creatinine 1.6 this is his baseline.

## 2024-11-01 NOTE — PROVIDER TRANSFER
Outside Transfer Acceptance Note / Regional Referral Center    Referring facility: [facility]   Referring provider: ABHINAV LUNDBERG  Accepting facility: [facility]  Accepting provider: AILYN SANTOS  Reason for transfer:  HLOC  Transfer diagnosis: tejinder rectal abscess  Transfer specialty requested: General Surgery  Transfer specialty notified: Yes  Transfer level: NUMBER 1-5: 2  Isolation status: No active isolations   Admission class or status: OP- Observation    Narrative     69-y/o m PMH DM2, CRICKET (on CPAP) DHF, DVT (not on AC) and perirectal abscess presented to  with low-grade fever, pain and swelling in the perirectal area starting a few days ago. On presentation T 97.5°, HR 79, /67. Exam pos for perirectal swelling and tenderness. WBC 9.0, Hb 14.1 Na 139, K 4.3, CO2 24, Cr 1.6 (BL) Glu 156. CT abd showed 2.6 cm perianal abscess. Patient given vancomycin, Zosyn, Dilaudid. Transfer requested for GS which is not available at the referring facility at this time. [facility] (Dr Hodge) has agreed to see the patient in consultation Transfer diagnosis abscess.    Instructions      [facility]  Admit to Hospital Medicine  Upon patient arrival to floor, please contact Hospital Medicine on call.

## 2024-11-01 NOTE — SUBJECTIVE & OBJECTIVE
"Current Facility-Administered Medications on File Prior to Encounter   Medication    [COMPLETED] HYDROmorphone injection 0.5 mg    [COMPLETED] HYDROmorphone injection 0.5 mg    [COMPLETED] HYDROmorphone injection 1 mg    [COMPLETED] iohexoL (OMNIPAQUE 350) injection 100 mL    [COMPLETED] lactated ringers infusion    [COMPLETED] morphine injection 4 mg    [COMPLETED] ondansetron injection 4 mg    [COMPLETED] vancomycin (VANCOCIN) 1,000 mg in D5W 250 mL IVPB (admixture device)    [DISCONTINUED] piperacillin-tazobactam (ZOSYN) 3.375 g in D5W 100 mL IVPB (MB+)     Current Outpatient Medications on File Prior to Encounter   Medication Sig    acetaminophen-codeine 300-30mg (TYLENOL #3) 300-30 mg Tab Take 1 tablet by mouth every 6 (six) hours as needed (pain, coughing).    albuterol (PROVENTIL/VENTOLIN HFA) 90 mcg/actuation inhaler Inhale 2 puffs into the lungs every 4 (four) hours as needed for Wheezing. albuterol sulfate HFA 90 mcg/actuation aerosol inhaler    amLODIPine (NORVASC) 5 MG tablet Take 1 tablet (5 mg total) by mouth once daily.    ascorbic acid, vitamin C, (VITAMIN C) 1000 MG tablet Take 1,000 mg by mouth once daily.    atorvastatin (LIPITOR) 10 MG tablet Take 1 tablet (10 mg total) by mouth every evening.    BD SHAHZAD 2ND GEN PEN NEEDLE 32 gauge x 5/32" Ndle USE ONCE DAILY WITH TRESIBA    benzonatate (TESSALON) 200 MG capsule Take 1 capsule (200 mg total) by mouth 3 (three) times daily as needed for Cough.    busPIRone (BUSPAR) 5 MG Tab Take 1 tablet (5 mg total) by mouth every evening.    cyanocobalamin 1,000 mcg/mL injection Inject 1 mL (1,000 mcg total) into the muscle every 14 (fourteen) days.    empagliflozin (JARDIANCE) 10 mg tablet Take 1 tablet (10 mg total) by mouth once daily.    fenofibrate 160 MG Tab TAKE 1 TABLET(160 MG) BY MOUTH EVERY NIGHT    ferrous sulfate 325 (65 FE) MG EC tablet Take 325 mg by mouth 3 (three) times daily with meals.    fluticasone propionate (FLONASE) 50 mcg/actuation nasal " spray 1 spray (50 mcg total) by Each Nostril route once daily.    fluticasone-umeclidin-vilanter (TRELEGY ELLIPTA) 200-62.5-25 mcg inhaler Inhale 1 puff into the lungs once daily.    glipiZIDE (GLUCOTROL) 5 MG tablet TAKE 1 TABLET(5 MG) BY MOUTH TWICE DAILY BEFORE MEALS    guaiFENesin-codeine 100-10 mg/5 ml (TUSSI-ORGANIDIN NR)  mg/5 mL syrup Take 10 mLs by mouth every 6 (six) hours as needed for Cough or Congestion.    guanFACINE (TENEX) 2 MG tablet TAKE 1 TABLET(2 MG) BY MOUTH EVERY MORNING    insulin degludec (TRESIBA FLEXTOUCH U-100) 100 unit/mL (3 mL) insulin pen Inject 32 Units into the skin every morning.    levoFLOXacin (LEVAQUIN) 500 MG tablet Take 1 tablet (500 mg total) by mouth once daily.    lisinopriL-hydrochlorothiazide (PRINZIDE,ZESTORETIC) 20-12.5 mg per tablet Take 1 tablet by mouth once daily.    loratadine (CLARITIN ORAL) Take 1 tablet by mouth Daily.    melatonin 10 mg Tab Take 2 tablets by mouth every evening.    metFORMIN (GLUCOPHAGE) 1000 MG tablet Take 1 tablet (1,000 mg total) by mouth 2 (two) times daily with meals.    omeprazole (PRILOSEC) 40 MG capsule TAKE 1 CAPSULE(40 MG) BY MOUTH TWICE DAILY BEFORE MEALS    phenylephrine HCL 1% (MILES-SYNEPHRINE, PHENYLEPHRINE,) 1 % Spry 1 spray by Each Nostril route every 6 (six) hours as needed (congestion).    predniSONE (DELTASONE) 10 MG tablet Take 3 daily for 3 days and repeat for asthma    predniSONE (DELTASONE) 20 MG tablet 3 for 5 days then 2 for 5 days then one for 5 days and repeat for breathing problems    promethazine-dextromethorphan (PROMETHAZINE-DM) 6.25-15 mg/5 mL Syrp Take 5 mLs by mouth every 6 (six) hours as needed (cough).    semaglutide (OZEMPIC) 0.25 mg or 0.5 mg (2 mg/3 mL) pen injector Inject 0.5 mg into the skin every 7 days. (Patient taking differently: Inject 0.5 mg into the skin every 7 days. Fridays)    sildenafiL (VIAGRA) 100 MG tablet Take 1 tablet (100 mg total) by mouth daily as needed for Erectile Dysfunction.     tamsulosin (FLOMAX) 0.4 mg Cap Take 1 capsule (0.4 mg total) by mouth nightly.    testosterone cypionate (DEPOTESTOTERONE CYPIONATE) 200 mg/mL injection ADMINISTER 0.75 ML(150 MG) IN THE MUSCLE EVERY 7 DAYS Strength: 200 mg/mL    tezepelumab-ekko (TEZSPIRE) 210 mg/1.91 mL (110 mg/mL) PnIj Inject 210 mg into the skin every 28 days.    traZODone (DESYREL) 50 MG tablet Take 1-3 tablets ( mg total) by mouth every evening.       Review of patient's allergies indicates:   Allergen Reactions    Ibuprofen Itching       Past Medical History:   Diagnosis Date    Arthritis     COPD (chronic obstructive pulmonary disease)     Diabetes mellitus, type 2     DVT (deep venous thrombosis)     Hyperlipidemia     Hypertension     Kidney stones     LVH (left ventricular hypertrophy) due to hypertensive disease, with heart failure     Neuropathy     Personal history of colonic polyps 03/24/2000    colonoscopy report in media    PTSD (post-traumatic stress disorder)     Shortness of breath     fatigue    Sleep apnea, unspecified     Torn rotator cuff      Past Surgical History:   Procedure Laterality Date    achilles repair Left 2003    ADENOIDECTOMY      ANGIOGRAM, CORONARY, WITH LEFT HEART CATHETERIZATION N/A 8/6/2024    Procedure: Angiogram, Coronary, with Left Heart Cath;  Surgeon: Elliot Butler MD;  Location: [facility] CATH/EP LAB;  Service: Cardiology;  Laterality: N/A;    ANKLE FRACTURE SURGERY Right 1987    CATARACT EXTRACTION Left 2004    CATARACT EXTRACTION Left 2005    2nd    COLONOSCOPY N/A 05/09/2017    results in media    COLONOSCOPY N/A 01/13/2023    Procedure: COLONOSCOPY;  Surgeon: Kenney Keller MD;  Location: [facility] ENDO;  Service: General;  Laterality: N/A;    COLONOSCOPY W/ POLYPECTOMY N/A 03/24/2000    results in media    ELBOW SURGERY Right 07/24/2020    EYE SURGERY  2005    finger joint replacement Right 01/22/2020    middle finger    hair follicle  1998    HAND SURGERY Right 04/25/2017    HAND SURGERY  Right 05/12/2017    2nd surgery    NASAL SINUS SURGERY  08/2015    ROTATOR CUFF REPAIR Right 09/25/2015    ROTATOR CUFF REPAIR Left 04/08/2016    skin grafts Right 07/2016    shin from stingray wound    TONSILLECTOMY      VASECTOMY       Family History       Problem Relation (Age of Onset)    Arthritis Mother, Father    COPD Brother    Cancer Brother    Diabetes Father    Hearing loss Father    Hypertension Mother, Father          Tobacco Use    Smoking status: Former     Current packs/day: 1.50     Average packs/day: 1.5 packs/day for 30.0 years (45.0 ttl pk-yrs)     Types: Cigarettes    Smokeless tobacco: Never   Substance and Sexual Activity    Alcohol use: Not Currently    Drug use: Never    Sexual activity: Not on file     Review of Systems   Constitutional:  Negative for activity change and appetite change.   Gastrointestinal:  Positive for rectal pain. Negative for abdominal distention and abdominal pain.   Skin:  Negative for color change.     Objective:     Vital Signs (Most Recent):    Vital Signs (24h Range):  Temp:  [97.7 °F (36.5 °C)] 97.7 °F (36.5 °C)  Pulse:  [66-79] 78  Resp:  [16-20] 16  SpO2:  [92 %-98 %] 96 %  BP: (100-141)/(53-81) 137/81        There is no height or weight on file to calculate BMI.     Physical Exam  Vitals reviewed.   Cardiovascular:      Rate and Rhythm: Normal rate.   Pulmonary:      Effort: Pulmonary effort is normal.   Abdominal:      General: Abdomen is flat. There is no distension.      Tenderness: There is no abdominal tenderness.   Genitourinary:     Comments: Periranl abscess in post midline    Neurological:      Mental Status: He is alert.            I have reviewed all pertinent lab results within the past 24 hours.  CBC:   Recent Labs   Lab 11/01/24  0713   WBC 9.01   RBC 5.37   HGB 14.1   HCT 45.6      MCV 85   MCH 26.3*   MCHC 30.9*     BMP  Lab Results   Component Value Date     11/01/2024    K 4.3 11/01/2024     11/01/2024    CO2 24 11/01/2024     BUN 20 11/01/2024    CREATININE 1.6 (H) 11/01/2024    CALCIUM 9.2 11/01/2024    ANIONGAP 10 11/01/2024    EGFRNORACEVR 46.4 (A) 11/01/2024         Significant Diagnostics:  CT reviewed perianal abscess

## 2024-11-01 NOTE — H&P
[facility] Medicine  History & Physical    Patient Name: Mathew Rodrigues  MRN: [id number]  Patient Class: OP- Observation  Admission Date: 11/1/2024  Attending Physician: Rachel Hurtado MD  Primary Care Provider: Kiley Vasquez MD         Patient information was obtained from patient and ER records.     Subjective:     Principal Problem:<principal problem not specified>    Chief Complaint:   Chief Complaint   Patient presents with    Rectal Pain    Fever        HPI: This is a 69-year-old  male with a history of hypertension, hyperlipidemia, diabetes on insulin, CKD stage 3,  presenting here for rectal pain for past 3 days associated with fever and chills, no nausea vomiting diarrhea.  No chest pain or SOB.  No hematochezia no hematemesis.  Patient was presented to outside facility and a CT showed perirectal abscess.  Patient was transferred to our facility for further evaluation by General surgery.  Patient had bedside I and D by Dr. Hodge and deemed stable for discharge per general surgeon.  Patient has no leukocytosis or fever.  Lab work shows creatinine 1.6 this is his baseline.     Past Medical History:   Diagnosis Date    Arthritis     COPD (chronic obstructive pulmonary disease)     Diabetes mellitus, type 2     DVT (deep venous thrombosis)     Hyperlipidemia     Hypertension     Kidney stones     LVH (left ventricular hypertrophy) due to hypertensive disease, with heart failure     Neuropathy     Personal history of colonic polyps 03/24/2000    colonoscopy report in media    PTSD (post-traumatic stress disorder)     Shortness of breath     fatigue    Sleep apnea, unspecified     Torn rotator cuff        Past Surgical History:   Procedure Laterality Date    achilles repair Left 2003    ADENOIDECTOMY      ANGIOGRAM, CORONARY, WITH LEFT HEART CATHETERIZATION N/A 8/6/2024    Procedure: Angiogram, Coronary, with Left Heart Cath;  Surgeon: Elliot Butler MD;  Location: [facility]  CATH/EP LAB;  Service: Cardiology;  Laterality: N/A;    ANKLE FRACTURE SURGERY Right 1987    CATARACT EXTRACTION Left 2004    CATARACT EXTRACTION Left 2005    2nd    COLONOSCOPY N/A 05/09/2017    results in media    COLONOSCOPY N/A 01/13/2023    Procedure: COLONOSCOPY;  Surgeon: Kenney Keller MD;  Location: [facility];  Service: General;  Laterality: N/A;    COLONOSCOPY W/ POLYPECTOMY N/A 03/24/2000    results in media    ELBOW SURGERY Right 07/24/2020    EYE SURGERY  2005    finger joint replacement Right 01/22/2020    middle finger    hair follicle  1998    HAND SURGERY Right 04/25/2017    HAND SURGERY Right 05/12/2017    2nd surgery    NASAL SINUS SURGERY  08/2015    ROTATOR CUFF REPAIR Right 09/25/2015    ROTATOR CUFF REPAIR Left 04/08/2016    skin grafts Right 07/2016    shin from stingray wound    TONSILLECTOMY      VASECTOMY         Review of patient's allergies indicates:   Allergen Reactions    Ibuprofen Itching       Current Facility-Administered Medications on File Prior to Encounter   Medication    [COMPLETED] HYDROmorphone injection 0.5 mg    [COMPLETED] HYDROmorphone injection 0.5 mg    [COMPLETED] HYDROmorphone injection 1 mg    [COMPLETED] iohexoL (OMNIPAQUE 350) injection 100 mL    [COMPLETED] lactated ringers infusion    [COMPLETED] morphine injection 4 mg    [COMPLETED] ondansetron injection 4 mg    [COMPLETED] vancomycin (VANCOCIN) 1,000 mg in D5W 250 mL IVPB (admixture device)    [DISCONTINUED] piperacillin-tazobactam (ZOSYN) 3.375 g in D5W 100 mL IVPB (MB+)     Current Outpatient Medications on File Prior to Encounter   Medication Sig    acetaminophen-codeine 300-30mg (TYLENOL #3) 300-30 mg Tab Take 1 tablet by mouth every 6 (six) hours as needed (pain, coughing).    albuterol (PROVENTIL/VENTOLIN HFA) 90 mcg/actuation inhaler Inhale 2 puffs into the lungs every 4 (four) hours as needed for Wheezing. albuterol sulfate HFA 90 mcg/actuation aerosol inhaler    amLODIPine (NORVASC) 5 MG  "tablet Take 1 tablet (5 mg total) by mouth once daily.    ascorbic acid, vitamin C, (VITAMIN C) 1000 MG tablet Take 1,000 mg by mouth once daily.    atorvastatin (LIPITOR) 10 MG tablet Take 1 tablet (10 mg total) by mouth every evening.    BD SHAHZAD 2ND GEN PEN NEEDLE 32 gauge x 5/32" Ndle USE ONCE DAILY WITH TRESIBA    busPIRone (BUSPAR) 5 MG Tab Take 1 tablet (5 mg total) by mouth every evening.    empagliflozin (JARDIANCE) 10 mg tablet Take 1 tablet (10 mg total) by mouth once daily.    fenofibrate 160 MG Tab TAKE 1 TABLET(160 MG) BY MOUTH EVERY NIGHT    ferrous sulfate 325 (65 FE) MG EC tablet Take 325 mg by mouth 3 (three) times daily with meals.    fluticasone propionate (FLONASE) 50 mcg/actuation nasal spray 1 spray (50 mcg total) by Each Nostril route once daily.    guanFACINE (TENEX) 2 MG tablet TAKE 1 TABLET(2 MG) BY MOUTH EVERY MORNING    insulin degludec (TRESIBA FLEXTOUCH U-100) 100 unit/mL (3 mL) insulin pen Inject 32 Units into the skin every morning.    lisinopriL-hydrochlorothiazide (PRINZIDE,ZESTORETIC) 20-12.5 mg per tablet Take 1 tablet by mouth once daily.    loratadine (CLARITIN ORAL) Take 1 tablet by mouth Daily.    melatonin 10 mg Tab Take 2 tablets by mouth every evening.    metFORMIN (GLUCOPHAGE) 1000 MG tablet Take 1 tablet (1,000 mg total) by mouth 2 (two) times daily with meals.    omeprazole (PRILOSEC) 40 MG capsule TAKE 1 CAPSULE(40 MG) BY MOUTH TWICE DAILY BEFORE MEALS    traZODone (DESYREL) 50 MG tablet Take 1-3 tablets ( mg total) by mouth every evening.    benzonatate (TESSALON) 200 MG capsule Take 1 capsule (200 mg total) by mouth 3 (three) times daily as needed for Cough.    cyanocobalamin 1,000 mcg/mL injection Inject 1 mL (1,000 mcg total) into the muscle every 14 (fourteen) days.    fluticasone-umeclidin-vilanter (TRELEGY ELLIPTA) 200-62.5-25 mcg inhaler Inhale 1 puff into the lungs once daily.    glipiZIDE (GLUCOTROL) 5 MG tablet TAKE 1 TABLET(5 MG) BY MOUTH TWICE DAILY " BEFORE MEALS    guaiFENesin-codeine 100-10 mg/5 ml (TUSSI-ORGANIDIN NR)  mg/5 mL syrup Take 10 mLs by mouth every 6 (six) hours as needed for Cough or Congestion.    phenylephrine HCL 1% (MILES-SYNEPHRINE, PHENYLEPHRINE,) 1 % Spry 1 spray by Each Nostril route every 6 (six) hours as needed (congestion).    promethazine-dextromethorphan (PROMETHAZINE-DM) 6.25-15 mg/5 mL Syrp Take 5 mLs by mouth every 6 (six) hours as needed (cough).    semaglutide (OZEMPIC) 0.25 mg or 0.5 mg (2 mg/3 mL) pen injector Inject 0.5 mg into the skin every 7 days.    sildenafiL (VIAGRA) 100 MG tablet Take 1 tablet (100 mg total) by mouth daily as needed for Erectile Dysfunction.    tamsulosin (FLOMAX) 0.4 mg Cap Take 1 capsule (0.4 mg total) by mouth nightly.    testosterone cypionate (DEPOTESTOTERONE CYPIONATE) 200 mg/mL injection ADMINISTER 0.75 ML(150 MG) IN THE MUSCLE EVERY 7 DAYS Strength: 200 mg/mL    tezepelumab-ekko (TEZSPIRE) 210 mg/1.91 mL (110 mg/mL) PnIj Inject 210 mg into the skin every 28 days.    [DISCONTINUED] levoFLOXacin (LEVAQUIN) 500 MG tablet Take 1 tablet (500 mg total) by mouth once daily.    [DISCONTINUED] predniSONE (DELTASONE) 10 MG tablet Take 3 daily for 3 days and repeat for asthma    [DISCONTINUED] predniSONE (DELTASONE) 20 MG tablet 3 for 5 days then 2 for 5 days then one for 5 days and repeat for breathing problems     Family History       Problem Relation (Age of Onset)    Arthritis Mother, Father    COPD Brother    Cancer Brother    Diabetes Father    Hearing loss Father    Hypertension Mother, Father          Tobacco Use    Smoking status: Former     Current packs/day: 1.50     Average packs/day: 1.5 packs/day for 30.0 years (45.0 ttl pk-yrs)     Types: Cigarettes    Smokeless tobacco: Never   Substance and Sexual Activity    Alcohol use: Not Currently    Drug use: Never    Sexual activity: Not on file     Review of Systems   Constitutional:  Positive for fever.   Gastrointestinal:  Positive for rectal  pain.     Objective:     Vital Signs (Most Recent):  Resp: 20 (11/01/24 1528) Vital Signs (24h Range):  Temp:  [97.7 °F (36.5 °C)] 97.7 °F (36.5 °C)  Pulse:  [66-79] 78  Resp:  [16-20] 20  SpO2:  [92 %-98 %] 96 %  BP: (100-141)/(53-81) 137/81     Weight: 89.7 kg (197 lb 12 oz)  Body mass index is 30.07 kg/m².     Physical Exam  Vitals and nursing note reviewed.   Neck:      Vascular: No JVD.   Cardiovascular:      Rate and Rhythm: Normal rate and regular rhythm.      Heart sounds: Normal heart sounds.   Pulmonary:      Effort: Pulmonary effort is normal.      Breath sounds: Normal breath sounds.   Abdominal:      General: Bowel sounds are normal. There is no distension.      Palpations: Abdomen is soft.      Tenderness: There is no abdominal tenderness.      Comments: Perirectal status post I&D, packed   Musculoskeletal:         General: No deformity.   Lymphadenopathy:      Cervical: No cervical adenopathy.   Skin:     Coloration: Skin is not pale.      Findings: No rash.                Significant Labs: All pertinent labs within the past 24 hours have been reviewed.  CBC:   Recent Labs   Lab 11/01/24  0713   WBC 9.01   HGB 14.1   HCT 45.6        CMP:   Recent Labs   Lab 11/01/24  0713      K 4.3      CO2 24   *   BUN 20   CREATININE 1.6*   CALCIUM 9.2   PROT 6.5   ALBUMIN 3.6   BILITOT 0.4   ALKPHOS 40   AST 10   ALT 16   ANIONGAP 10       Significant Imaging: I have reviewed all pertinent imaging results/findings within the past 24 hours.  I have reviewed and interpreted all pertinent imaging results/findings within the past 24 hours.    Scheduled Meds:   senna-docusate 8.6-50 mg  1 tablet Oral BID     Continuous Infusions:  PRN Meds:.  Current Facility-Administered Medications:     acetaminophen, 1,000 mg, Oral, Q6H PRN    dextrose 50%, 12.5 g, Intravenous, PRN    dextrose 50%, 25 g, Intravenous, PRN    glucagon (human recombinant), 1 mg, Intramuscular, PRN    glucose, 16 g, Oral, PRN     glucose, 24 g, Oral, PRN    insulin aspart U-100, 0-10 Units, Subcutaneous, QID (AC + HS) PRN    magnesium oxide, 800 mg, Oral, PRN    magnesium oxide, 800 mg, Oral, PRN    melatonin, 9 mg, Oral, Nightly PRN    naloxone, 0.02 mg, Intravenous, PRN    potassium bicarbonate, 35 mEq, Oral, PRN    potassium bicarbonate, 50 mEq, Oral, PRN    potassium bicarbonate, 60 mEq, Oral, PRN    sodium chloride 0.9%, 10 mL, Intravenous, Q12H PRN  CT Pelvis With IV Contrast NO Oral Contrast    Result Date: 11/1/2024  EXAMINATION: CT PELVIS WITH IV CONTRAST CLINICAL HISTORY: Anal/rectal abscess; TECHNIQUE: Following IV administration of 100 cc Omnipaque 350 nonionic contrast material, CT of the pelvis was performed.  No oral contrast was administered COMPARISON: 06/01/2024 FINDINGS: There is a rim enhancing perianal fluid collection consistent with an abscess measuring 1.9 x 2.2 x 3.6 cm.  This is located in the midline and extends cephalad from the anal canal.  There is no discrete intrapelvic fluid collection.  No pelvic free fluid. The prostate is enlarged but unchanged.  No prostatic abscess is present.  The urinary bladder is unremarkable. There are distal colonic diverticula.  No evidence of acute diverticulitis. There are small cysts at the lower pole of the left kidney.  There is extensive calcific plaque in the aorta and its branches.     3.6 cm perianal abscess. This report was flagged in Epic as abnormal. Electronically signed by: Becki Kitchen Date:    11/01/2024 Time:    08:40  - pulls last radiology orders    Assessment/Plan:     CKD (chronic kidney disease), stage III  Creatine stable for now. BMP reviewed- noted Estimated Creatinine Clearance: 47.4 mL/min (A) (based on SCr of 1.6 mg/dL (H)). according to latest data. Based on current GFR, CKD stage is stage 3 - GFR 30-59.  Monitor UOP and serial BMP and adjust therapy as needed. Renally dose meds. Avoid nephrotoxic medications and procedures.    Perianal  abscess    Status post bedside I and D by general surgeon, no culture was sent.   Okay for discharge as per general surgeon.   We will discharge with p.o. Augmentin for 7 days- spoke with Dr. Hodge.       Type 2 diabetes mellitus, with long-term current use of insulin    Continue home medication, sliding scale      VTE Risk Mitigation (From admission, onward)           Ordered     IP VTE HIGH RISK PATIENT  Once         11/01/24 1618     Place sequential compression device  Until discontinued         11/01/24 1618                       On 11/01/2024, patient should be placed in hospital observation services under my care.             Rachel Hurtado MD  Department of Hospital Medicine  [facility]

## 2024-11-01 NOTE — ASSESSMENT & PLAN NOTE
Status post bedside I and D by general surgeon, no culture was sent.   Okay for discharge as per general surgeon.   We will discharge with p.o. Augmentin for 7 days- spoke with Dr. Hodge.

## 2024-11-01 NOTE — ED PROVIDER NOTES
Encounter Date: 11/1/2024       History     Chief Complaint   Patient presents with    Abscess     C/o possibly 3 abscesses above his anus, reports started off as 1 but grew into 3 last night. C/o pain and low grade fever at home. Took 500mg x 2 around 0520 this morning     69-year-old male here from home for evaluation and treatment an abscess of the gluteal crease.  Symptoms were 1st noticed about 3 days ago.  Yesterday evening the patient noticed that the tender, swollen area had become enlarged and more painful.  He had a low-grade fever last night as well.  Patient states he had an abscess here, although slightly higher, a few years ago.  It resolved with antibiotics and did not need I and D at that time.  Medical history of COPD, diabetes type 2, DVT, hyperlipidemia, hypertension, kidney stones, left ventricular hypertrophy.  Currently not on any blood thinner medications.      Review of patient's allergies indicates:   Allergen Reactions    Ibuprofen Itching     Past Medical History:   Diagnosis Date    Arthritis     COPD (chronic obstructive pulmonary disease)     Diabetes mellitus, type 2     DVT (deep venous thrombosis)     Hyperlipidemia     Hypertension     Kidney stones     LVH (left ventricular hypertrophy) due to hypertensive disease, with heart failure     Neuropathy     Personal history of colonic polyps 03/24/2000    colonoscopy report in media    PTSD (post-traumatic stress disorder)     Shortness of breath     fatigue    Sleep apnea, unspecified     Torn rotator cuff      Past Surgical History:   Procedure Laterality Date    achilles repair Left 2003    ADENOIDECTOMY      ANGIOGRAM, CORONARY, WITH LEFT HEART CATHETERIZATION N/A 8/6/2024    Procedure: Angiogram, Coronary, with Left Heart Cath;  Surgeon: Elliot Butler MD;  Location: [facility] CATH/EP LAB;  Service: Cardiology;  Laterality: N/A;    ANKLE FRACTURE SURGERY Right 1987    CATARACT EXTRACTION Left 2004    CATARACT EXTRACTION Left 2005     2nd    COLONOSCOPY N/A 05/09/2017    results in media    COLONOSCOPY N/A 01/13/2023    Procedure: COLONOSCOPY;  Surgeon: Kenney Keller MD;  Location: [facility];  Service: General;  Laterality: N/A;    COLONOSCOPY W/ POLYPECTOMY N/A 03/24/2000    results in media    ELBOW SURGERY Right 07/24/2020    EYE SURGERY  2005    finger joint replacement Right 01/22/2020    middle finger    hair follicle  1998    HAND SURGERY Right 04/25/2017    HAND SURGERY Right 05/12/2017    2nd surgery    NASAL SINUS SURGERY  08/2015    ROTATOR CUFF REPAIR Right 09/25/2015    ROTATOR CUFF REPAIR Left 04/08/2016    skin grafts Right 07/2016    shin from stingray wound    TONSILLECTOMY      VASECTOMY       Family History   Problem Relation Name Age of Onset    Hypertension Mother Sierra     Arthritis Mother Sierra     Hypertension Father Misael     Diabetes Father Misael     Arthritis Father Misael     Hearing loss Father Misael     Cancer Brother Mickey Douglas     COPD Brother Mickey Douglas      Social History     Tobacco Use    Smoking status: Former     Current packs/day: 1.50     Average packs/day: 1.5 packs/day for 30.0 years (45.0 ttl pk-yrs)     Types: Cigarettes    Smokeless tobacco: Never   Substance Use Topics    Alcohol use: Not Currently    Drug use: Never     Review of Systems   Constitutional:  Positive for chills and fever.   HENT: Negative.     Respiratory: Negative.     Cardiovascular: Negative.    Gastrointestinal:  Positive for rectal pain. Negative for abdominal pain, anal bleeding, blood in stool, constipation, diarrhea, nausea and vomiting.   Endocrine: Negative.    Genitourinary: Negative.    Musculoskeletal: Negative.    Skin: Negative.    Neurological: Negative.    Psychiatric/Behavioral: Negative.         Physical Exam     Initial Vitals [11/01/24 0633]   BP Pulse Resp Temp SpO2   138/67 79 18 97.7 °F (36.5 °C) 96 %      MAP       --         Physical Exam    Nursing note and vitals reviewed.  Constitutional: He appears  well-developed and well-nourished. No distress.   HENT:   Head: Normocephalic and atraumatic.   Nose: Nose normal. Mouth/Throat: No oropharyngeal exudate.   Eyes: Conjunctivae and EOM are normal. Pupils are equal, round, and reactive to light. No scleral icterus.   Neck: Neck supple. No JVD present.   Normal range of motion.  Cardiovascular:  Normal rate, regular rhythm, normal heart sounds and intact distal pulses.           No murmur heard.  Pulmonary/Chest: Breath sounds normal. No stridor. No respiratory distress. He has no wheezes.   Abdominal: Abdomen is soft. He exhibits no distension. There is no abdominal tenderness.   Genitourinary:    Genitourinary Comments: Rectal exam reveals no obvious fissure or laceration, significant pain with digital rectal exam.  Pocket of fluctuance felt adjacent to the anus and rectum at the 12 o'clock position.  Swelling and induration extends caudad from the anus.     Musculoskeletal:         General: No tenderness or edema. Normal range of motion.      Cervical back: Normal range of motion and neck supple.     Neurological: He is alert and oriented to person, place, and time. He has normal strength. No cranial nerve deficit. GCS score is 15. GCS eye subscore is 4. GCS verbal subscore is 5. GCS motor subscore is 6.   Skin: Skin is warm and dry. Capillary refill takes less than 2 seconds. Abscess (Perianal region) noted. No rash noted. No erythema.   Psychiatric: He has a normal mood and affect. His behavior is normal.         ED Course   Procedures  Labs Reviewed   CBC W/ AUTO DIFFERENTIAL - Abnormal       Result Value    WBC 9.01      RBC 5.37      Hemoglobin 14.1      Hematocrit 45.6      MCV 85      MCH 26.3 (*)     MCHC 30.9 (*)     RDW 21.5 (*)     Platelets 291      MPV 9.5      Immature Granulocytes 2.3 (*)     Gran # (ANC) 5.8      Immature Grans (Abs) 0.21 (*)     Lymph # 2.0      Mono # 0.8      Eos # 0.1      Baso # 0.07      nRBC 0      Gran % 64.9      Lymph %  22.3      Mono % 8.5      Eosinophil % 1.2      Basophil % 0.8      Differential Method Automated     COMPREHENSIVE METABOLIC PANEL - Abnormal    Sodium 139      Potassium 4.3      Chloride 105      CO2 24      Glucose 156 (*)     BUN 20      Creatinine 1.6 (*)     Calcium 9.2      Total Protein 6.5      Albumin 3.6      Total Bilirubin 0.4      Alkaline Phosphatase 40      AST 10      ALT 16      eGFR 46.4 (*)     Anion Gap 10     LACTIC ACID, PLASMA    Lactate (Lactic Acid) 1.5            Imaging Results               CT Pelvis With IV Contrast NO Oral Contrast (Final result)  Result time 11/01/24 08:40:07      Final result by Becki Kitchen MD (11/01/24 08:40:07)                   Impression:      3.6 cm perianal abscess.    This report was flagged in Epic as abnormal.      Electronically signed by: eBcki Kitchen  Date:    11/01/2024  Time:    08:40               Narrative:    EXAMINATION:  CT PELVIS WITH IV CONTRAST    CLINICAL HISTORY:  Anal/rectal abscess;    TECHNIQUE:  Following IV administration of 100 cc Omnipaque 350 nonionic contrast material, CT of the pelvis was performed.  No oral contrast was administered    COMPARISON:  06/01/2024    FINDINGS:  There is a rim enhancing perianal fluid collection consistent with an abscess measuring 1.9 x 2.2 x 3.6 cm.  This is located in the midline and extends cephalad from the anal canal.  There is no discrete intrapelvic fluid collection.  No pelvic free fluid.    The prostate is enlarged but unchanged.  No prostatic abscess is present.  The urinary bladder is unremarkable.    There are distal colonic diverticula.  No evidence of acute diverticulitis.    There are small cysts at the lower pole of the left kidney.  There is extensive calcific plaque in the aorta and its branches.                                    X-Rays:   Independently Interpreted Readings:   Other Readings:  CT of the pelvic region, with contrast, shows a rim enhancing perianal fluid  collection consistent with an abscess measuring 1.9 x 2.2 x 3.6 cm this is located in the midline and extends cephalad from the anal canal.    Medications   vancomycin (VANCOCIN) 1,000 mg in D5W 250 mL IVPB (admixture device) (has no administration in time range)   piperacillin-tazobactam (ZOSYN) 3.375 g in D5W 100 mL IVPB (MB+) (3.375 g Intravenous New Bag 11/1/24 0957)   morphine injection 4 mg (4 mg Intravenous Given 11/1/24 0715)   ondansetron injection 4 mg (4 mg Intravenous Given 11/1/24 0719)   iohexoL (OMNIPAQUE 350) injection 100 mL (100 mLs Intravenous Given 11/1/24 0811)   HYDROmorphone injection 1 mg (1 mg Intravenous Given 11/1/24 0922)     Medical Decision Making  Differential includes perianal abscess, pilonidal cyst with abscess, neoplasm, etc.    Patient's white blood cell count is normal.  He had some fever at home, but temperature is normal here.  There is a palpable mass abutting  the anus which is fluctuant and tender to palpation.  CT of the pelvis shows an abscess measuring 1.9 x 2.2 x 3.6 cm, located in the midline, extending cephalad from the anal canal.  I believe that because of his proximity to the anus and rectum, this abscess needs to be drained by a surgeon.  Unfortunately, no surgical services are available at this facility at present.  A request for transfer to an appropriate facility has been initiated.    Spoke to Dr. Hodge at [facility] who agreed to accept the patient.       Amount and/or Complexity of Data Reviewed  Labs: ordered.  Radiology: ordered.    Risk  Prescription drug management.                                      Clinical Impression:  Final diagnoses:  [K61.0] Perianal abscess (Primary)          ED Disposition Condition    Transfer to Another Facility Álvaro Gaming MD  11/01/24 9815

## 2024-11-01 NOTE — ASSESSMENT & PLAN NOTE
Pt with perianal abscess.  D/w pt and his wife.  Have reviewed natural progresssion of abscess.  It has already begun to drain.  I have recommended I&D to allow for more thorough drainage.  I have d/w them possibility of fistula development at approximately 15-25 %. Pt willing to proceed with bedside draiange.      Informed consent was obtained.  Pt then placed in prone position.  Perianal  area prepped without event.  I then infiltrated the surrounding area with 8 cc's of 1% lidocaine with epi.  I then incised the area with an 11 blade draining a moderate amount of purulent fluid.  Area is thoroughly irrigated.  Packing placed   Pt tolerated the procedure well    D/w pt.  From my perspective it is okay for him to be discharged.  No further surgical intervention needed. No surgical indication for admission.    Would d/c with oral abx (augmentin for 7 days).  Recommend f/u with me in 1-2 weeks and will monitor improvement.      Surgery to sign off

## 2024-11-01 NOTE — SUBJECTIVE & OBJECTIVE
Past Medical History:   Diagnosis Date    Arthritis     COPD (chronic obstructive pulmonary disease)     Diabetes mellitus, type 2     DVT (deep venous thrombosis)     Hyperlipidemia     Hypertension     Kidney stones     LVH (left ventricular hypertrophy) due to hypertensive disease, with heart failure     Neuropathy     Personal history of colonic polyps 03/24/2000    colonoscopy report in media    PTSD (post-traumatic stress disorder)     Shortness of breath     fatigue    Sleep apnea, unspecified     Torn rotator cuff        Past Surgical History:   Procedure Laterality Date    achilles repair Left 2003    ADENOIDECTOMY      ANGIOGRAM, CORONARY, WITH LEFT HEART CATHETERIZATION N/A 8/6/2024    Procedure: Angiogram, Coronary, with Left Heart Cath;  Surgeon: Elliot Butler MD;  Location: [facility] CATH/EP LAB;  Service: Cardiology;  Laterality: N/A;    ANKLE FRACTURE SURGERY Right 1987    CATARACT EXTRACTION Left 2004    CATARACT EXTRACTION Left 2005    2nd    COLONOSCOPY N/A 05/09/2017    results in media    COLONOSCOPY N/A 01/13/2023    Procedure: COLONOSCOPY;  Surgeon: Kenney Keller MD;  Location: [facility] ENDO;  Service: General;  Laterality: N/A;    COLONOSCOPY W/ POLYPECTOMY N/A 03/24/2000    results in media    ELBOW SURGERY Right 07/24/2020    EYE SURGERY  2005    finger joint replacement Right 01/22/2020    middle finger    hair follicle  1998    HAND SURGERY Right 04/25/2017    HAND SURGERY Right 05/12/2017    2nd surgery    NASAL SINUS SURGERY  08/2015    ROTATOR CUFF REPAIR Right 09/25/2015    ROTATOR CUFF REPAIR Left 04/08/2016    skin grafts Right 07/2016    shin from stingray wound    TONSILLECTOMY      VASECTOMY         Review of patient's allergies indicates:   Allergen Reactions    Ibuprofen Itching       Current Facility-Administered Medications on File Prior to Encounter   Medication    [COMPLETED] HYDROmorphone injection 0.5 mg    [COMPLETED] HYDROmorphone injection 0.5 mg    [COMPLETED]  "HYDROmorphone injection 1 mg    [COMPLETED] iohexoL (OMNIPAQUE 350) injection 100 mL    [COMPLETED] lactated ringers infusion    [COMPLETED] morphine injection 4 mg    [COMPLETED] ondansetron injection 4 mg    [COMPLETED] vancomycin (VANCOCIN) 1,000 mg in D5W 250 mL IVPB (admixture device)    [DISCONTINUED] piperacillin-tazobactam (ZOSYN) 3.375 g in D5W 100 mL IVPB (MB+)     Current Outpatient Medications on File Prior to Encounter   Medication Sig    acetaminophen-codeine 300-30mg (TYLENOL #3) 300-30 mg Tab Take 1 tablet by mouth every 6 (six) hours as needed (pain, coughing).    albuterol (PROVENTIL/VENTOLIN HFA) 90 mcg/actuation inhaler Inhale 2 puffs into the lungs every 4 (four) hours as needed for Wheezing. albuterol sulfate HFA 90 mcg/actuation aerosol inhaler    amLODIPine (NORVASC) 5 MG tablet Take 1 tablet (5 mg total) by mouth once daily.    ascorbic acid, vitamin C, (VITAMIN C) 1000 MG tablet Take 1,000 mg by mouth once daily.    atorvastatin (LIPITOR) 10 MG tablet Take 1 tablet (10 mg total) by mouth every evening.    BD SHAHZAD 2ND GEN PEN NEEDLE 32 gauge x 5/32" Ndle USE ONCE DAILY WITH TRESIBA    busPIRone (BUSPAR) 5 MG Tab Take 1 tablet (5 mg total) by mouth every evening.    empagliflozin (JARDIANCE) 10 mg tablet Take 1 tablet (10 mg total) by mouth once daily.    fenofibrate 160 MG Tab TAKE 1 TABLET(160 MG) BY MOUTH EVERY NIGHT    ferrous sulfate 325 (65 FE) MG EC tablet Take 325 mg by mouth 3 (three) times daily with meals.    fluticasone propionate (FLONASE) 50 mcg/actuation nasal spray 1 spray (50 mcg total) by Each Nostril route once daily.    guanFACINE (TENEX) 2 MG tablet TAKE 1 TABLET(2 MG) BY MOUTH EVERY MORNING    insulin degludec (TRESIBA FLEXTOUCH U-100) 100 unit/mL (3 mL) insulin pen Inject 32 Units into the skin every morning.    lisinopriL-hydrochlorothiazide (PRINZIDE,ZESTORETIC) 20-12.5 mg per tablet Take 1 tablet by mouth once daily.    loratadine (CLARITIN ORAL) Take 1 tablet by " mouth Daily.    melatonin 10 mg Tab Take 2 tablets by mouth every evening.    metFORMIN (GLUCOPHAGE) 1000 MG tablet Take 1 tablet (1,000 mg total) by mouth 2 (two) times daily with meals.    omeprazole (PRILOSEC) 40 MG capsule TAKE 1 CAPSULE(40 MG) BY MOUTH TWICE DAILY BEFORE MEALS    traZODone (DESYREL) 50 MG tablet Take 1-3 tablets ( mg total) by mouth every evening.    benzonatate (TESSALON) 200 MG capsule Take 1 capsule (200 mg total) by mouth 3 (three) times daily as needed for Cough.    cyanocobalamin 1,000 mcg/mL injection Inject 1 mL (1,000 mcg total) into the muscle every 14 (fourteen) days.    fluticasone-umeclidin-vilanter (TRELEGY ELLIPTA) 200-62.5-25 mcg inhaler Inhale 1 puff into the lungs once daily.    glipiZIDE (GLUCOTROL) 5 MG tablet TAKE 1 TABLET(5 MG) BY MOUTH TWICE DAILY BEFORE MEALS    guaiFENesin-codeine 100-10 mg/5 ml (TUSSI-ORGANIDIN NR)  mg/5 mL syrup Take 10 mLs by mouth every 6 (six) hours as needed for Cough or Congestion.    phenylephrine HCL 1% (MILES-SYNEPHRINE, PHENYLEPHRINE,) 1 % Spry 1 spray by Each Nostril route every 6 (six) hours as needed (congestion).    promethazine-dextromethorphan (PROMETHAZINE-DM) 6.25-15 mg/5 mL Syrp Take 5 mLs by mouth every 6 (six) hours as needed (cough).    semaglutide (OZEMPIC) 0.25 mg or 0.5 mg (2 mg/3 mL) pen injector Inject 0.5 mg into the skin every 7 days.    sildenafiL (VIAGRA) 100 MG tablet Take 1 tablet (100 mg total) by mouth daily as needed for Erectile Dysfunction.    tamsulosin (FLOMAX) 0.4 mg Cap Take 1 capsule (0.4 mg total) by mouth nightly.    testosterone cypionate (DEPOTESTOTERONE CYPIONATE) 200 mg/mL injection ADMINISTER 0.75 ML(150 MG) IN THE MUSCLE EVERY 7 DAYS Strength: 200 mg/mL    tezepelumab-ekko (TEZSPIRE) 210 mg/1.91 mL (110 mg/mL) PnIj Inject 210 mg into the skin every 28 days.    [DISCONTINUED] levoFLOXacin (LEVAQUIN) 500 MG tablet Take 1 tablet (500 mg total) by mouth once daily.    [DISCONTINUED] predniSONE  (DELTASONE) 10 MG tablet Take 3 daily for 3 days and repeat for asthma    [DISCONTINUED] predniSONE (DELTASONE) 20 MG tablet 3 for 5 days then 2 for 5 days then one for 5 days and repeat for breathing problems     Family History       Problem Relation (Age of Onset)    Arthritis Mother, Father    COPD Brother    Cancer Brother    Diabetes Father    Hearing loss Father    Hypertension Mother, Father          Tobacco Use    Smoking status: Former     Current packs/day: 1.50     Average packs/day: 1.5 packs/day for 30.0 years (45.0 ttl pk-yrs)     Types: Cigarettes    Smokeless tobacco: Never   Substance and Sexual Activity    Alcohol use: Not Currently    Drug use: Never    Sexual activity: Not on file     Review of Systems   Constitutional:  Positive for fever.   Gastrointestinal:  Positive for rectal pain.     Objective:     Vital Signs (Most Recent):  Resp: 20 (11/01/24 1528) Vital Signs (24h Range):  Temp:  [97.7 °F (36.5 °C)] 97.7 °F (36.5 °C)  Pulse:  [66-79] 78  Resp:  [16-20] 20  SpO2:  [92 %-98 %] 96 %  BP: (100-141)/(53-81) 137/81     Weight: 89.7 kg (197 lb 12 oz)  Body mass index is 30.07 kg/m².     Physical Exam  Vitals and nursing note reviewed.   Neck:      Vascular: No JVD.   Cardiovascular:      Rate and Rhythm: Normal rate and regular rhythm.      Heart sounds: Normal heart sounds.   Pulmonary:      Effort: Pulmonary effort is normal.      Breath sounds: Normal breath sounds.   Abdominal:      General: Bowel sounds are normal. There is no distension.      Palpations: Abdomen is soft.      Tenderness: There is no abdominal tenderness.      Comments: Perirectal status post I&D, packed   Musculoskeletal:         General: No deformity.   Lymphadenopathy:      Cervical: No cervical adenopathy.   Skin:     Coloration: Skin is not pale.      Findings: No rash.                Significant Labs: All pertinent labs within the past 24 hours have been reviewed.  CBC:   Recent Labs   Lab 11/01/24  0713   WBC 9.01    HGB 14.1   HCT 45.6        CMP:   Recent Labs   Lab 11/01/24  0713      K 4.3      CO2 24   *   BUN 20   CREATININE 1.6*   CALCIUM 9.2   PROT 6.5   ALBUMIN 3.6   BILITOT 0.4   ALKPHOS 40   AST 10   ALT 16   ANIONGAP 10       Significant Imaging: I have reviewed all pertinent imaging results/findings within the past 24 hours.  I have reviewed and interpreted all pertinent imaging results/findings within the past 24 hours.    Scheduled Meds:   senna-docusate 8.6-50 mg  1 tablet Oral BID     Continuous Infusions:  PRN Meds:.  Current Facility-Administered Medications:     acetaminophen, 1,000 mg, Oral, Q6H PRN    dextrose 50%, 12.5 g, Intravenous, PRN    dextrose 50%, 25 g, Intravenous, PRN    glucagon (human recombinant), 1 mg, Intramuscular, PRN    glucose, 16 g, Oral, PRN    glucose, 24 g, Oral, PRN    insulin aspart U-100, 0-10 Units, Subcutaneous, QID (AC + HS) PRN    magnesium oxide, 800 mg, Oral, PRN    magnesium oxide, 800 mg, Oral, PRN    melatonin, 9 mg, Oral, Nightly PRN    naloxone, 0.02 mg, Intravenous, PRN    potassium bicarbonate, 35 mEq, Oral, PRN    potassium bicarbonate, 50 mEq, Oral, PRN    potassium bicarbonate, 60 mEq, Oral, PRN    sodium chloride 0.9%, 10 mL, Intravenous, Q12H PRN  CT Pelvis With IV Contrast NO Oral Contrast    Result Date: 11/1/2024  EXAMINATION: CT PELVIS WITH IV CONTRAST CLINICAL HISTORY: Anal/rectal abscess; TECHNIQUE: Following IV administration of 100 cc Omnipaque 350 nonionic contrast material, CT of the pelvis was performed.  No oral contrast was administered COMPARISON: 06/01/2024 FINDINGS: There is a rim enhancing perianal fluid collection consistent with an abscess measuring 1.9 x 2.2 x 3.6 cm.  This is located in the midline and extends cephalad from the anal canal.  There is no discrete intrapelvic fluid collection.  No pelvic free fluid. The prostate is enlarged but unchanged.  No prostatic abscess is present.  The urinary bladder is  unremarkable. There are distal colonic diverticula.  No evidence of acute diverticulitis. There are small cysts at the lower pole of the left kidney.  There is extensive calcific plaque in the aorta and its branches.     3.6 cm perianal abscess. This report was flagged in Epic as abnormal. Electronically signed by: Becki Kitchen Date:    11/01/2024 Time:    08:40  - pulls last radiology orders

## 2024-11-01 NOTE — PLAN OF CARE
Patient cleared for discharge from case management standpoint.     11/01/24 1821   Final Note   Assessment Type Final Discharge Note   Anticipated Discharge Disposition Home   Post-Acute Status   Discharge Delays None known at this time

## 2024-11-01 NOTE — PLAN OF CARE
Attempted assessment. Pt having bedside procedure with Dr Hodge. CM to follow up tomorrow 11/2 11/01/24 2344   Discharge Assessment   Assessment Type Discharge Planning Assessment

## 2024-11-01 NOTE — CONSULTS
[facility]  General Surgery  Consult Note    Patient Name: Mathew Rodrigues  MRN: [id number]  Code Status: Prior  Admission Date: 11/1/2024  Hospital Length of Stay: 0 days  Attending Physician: Jolly Betancur MD  Primary Care Provider: Kiley Vasquez MD    Patient information was obtained from patient and ER records.     Consults  Subjective:     Principal Problem: <principal problem not specified>    History of Present Illness: Pleasant 70 yo M who was transferred to this facility from another for evaluation of perianal abscess.  Pt notes he has had swelling and tenderness in the perianal areaf for the last 2-3 days.  He notes he was having increasing pain and tenderness. He presented to the local ER early this AM for evaluation.  He was noted to have fluctuant area in the post perianal area.  Ct scan confirmed perianal abscess.  D/w ER MD recommending bedside drainage.  They were uncomfortable with this and he is transferred to this facility for mgmt.  Recommended ER transfer as I do not anticipate pt will need admission.  Unfortunately pt was admitted to floor.  He has PMhx significant for DM, HTN and sleep apnea.  No significant PShx.  He has cscope scheduled for next few weeks.  En route to this facility he does note spontaneous drainage of abscess with relief of the pain.       Current Facility-Administered Medications on File Prior to Encounter   Medication    [COMPLETED] HYDROmorphone injection 0.5 mg    [COMPLETED] HYDROmorphone injection 0.5 mg    [COMPLETED] HYDROmorphone injection 1 mg    [COMPLETED] iohexoL (OMNIPAQUE 350) injection 100 mL    [COMPLETED] lactated ringers infusion    [COMPLETED] morphine injection 4 mg    [COMPLETED] ondansetron injection 4 mg    [COMPLETED] vancomycin (VANCOCIN) 1,000 mg in D5W 250 mL IVPB (admixture device)    [DISCONTINUED] piperacillin-tazobactam (ZOSYN) 3.375 g in D5W 100 mL IVPB (MB+)     Current Outpatient Medications on File Prior to  "Encounter   Medication Sig    acetaminophen-codeine 300-30mg (TYLENOL #3) 300-30 mg Tab Take 1 tablet by mouth every 6 (six) hours as needed (pain, coughing).    albuterol (PROVENTIL/VENTOLIN HFA) 90 mcg/actuation inhaler Inhale 2 puffs into the lungs every 4 (four) hours as needed for Wheezing. albuterol sulfate HFA 90 mcg/actuation aerosol inhaler    amLODIPine (NORVASC) 5 MG tablet Take 1 tablet (5 mg total) by mouth once daily.    ascorbic acid, vitamin C, (VITAMIN C) 1000 MG tablet Take 1,000 mg by mouth once daily.    atorvastatin (LIPITOR) 10 MG tablet Take 1 tablet (10 mg total) by mouth every evening.    BD SHAHZAD 2ND GEN PEN NEEDLE 32 gauge x 5/32" Ndle USE ONCE DAILY WITH TRESIBA    benzonatate (TESSALON) 200 MG capsule Take 1 capsule (200 mg total) by mouth 3 (three) times daily as needed for Cough.    busPIRone (BUSPAR) 5 MG Tab Take 1 tablet (5 mg total) by mouth every evening.    cyanocobalamin 1,000 mcg/mL injection Inject 1 mL (1,000 mcg total) into the muscle every 14 (fourteen) days.    empagliflozin (JARDIANCE) 10 mg tablet Take 1 tablet (10 mg total) by mouth once daily.    fenofibrate 160 MG Tab TAKE 1 TABLET(160 MG) BY MOUTH EVERY NIGHT    ferrous sulfate 325 (65 FE) MG EC tablet Take 325 mg by mouth 3 (three) times daily with meals.    fluticasone propionate (FLONASE) 50 mcg/actuation nasal spray 1 spray (50 mcg total) by Each Nostril route once daily.    fluticasone-umeclidin-vilanter (TRELEGY ELLIPTA) 200-62.5-25 mcg inhaler Inhale 1 puff into the lungs once daily.    glipiZIDE (GLUCOTROL) 5 MG tablet TAKE 1 TABLET(5 MG) BY MOUTH TWICE DAILY BEFORE MEALS    guaiFENesin-codeine 100-10 mg/5 ml (TUSSI-ORGANIDIN NR)  mg/5 mL syrup Take 10 mLs by mouth every 6 (six) hours as needed for Cough or Congestion.    guanFACINE (TENEX) 2 MG tablet TAKE 1 TABLET(2 MG) BY MOUTH EVERY MORNING    insulin degludec (TRESIBA FLEXTOUCH U-100) 100 unit/mL (3 mL) insulin pen Inject 32 Units into the skin " every morning.    levoFLOXacin (LEVAQUIN) 500 MG tablet Take 1 tablet (500 mg total) by mouth once daily.    lisinopriL-hydrochlorothiazide (PRINZIDE,ZESTORETIC) 20-12.5 mg per tablet Take 1 tablet by mouth once daily.    loratadine (CLARITIN ORAL) Take 1 tablet by mouth Daily.    melatonin 10 mg Tab Take 2 tablets by mouth every evening.    metFORMIN (GLUCOPHAGE) 1000 MG tablet Take 1 tablet (1,000 mg total) by mouth 2 (two) times daily with meals.    omeprazole (PRILOSEC) 40 MG capsule TAKE 1 CAPSULE(40 MG) BY MOUTH TWICE DAILY BEFORE MEALS    phenylephrine HCL 1% (MILES-SYNEPHRINE, PHENYLEPHRINE,) 1 % Spry 1 spray by Each Nostril route every 6 (six) hours as needed (congestion).    predniSONE (DELTASONE) 10 MG tablet Take 3 daily for 3 days and repeat for asthma    predniSONE (DELTASONE) 20 MG tablet 3 for 5 days then 2 for 5 days then one for 5 days and repeat for breathing problems    promethazine-dextromethorphan (PROMETHAZINE-DM) 6.25-15 mg/5 mL Syrp Take 5 mLs by mouth every 6 (six) hours as needed (cough).    semaglutide (OZEMPIC) 0.25 mg or 0.5 mg (2 mg/3 mL) pen injector Inject 0.5 mg into the skin every 7 days. (Patient taking differently: Inject 0.5 mg into the skin every 7 days. Fridays)    sildenafiL (VIAGRA) 100 MG tablet Take 1 tablet (100 mg total) by mouth daily as needed for Erectile Dysfunction.    tamsulosin (FLOMAX) 0.4 mg Cap Take 1 capsule (0.4 mg total) by mouth nightly.    testosterone cypionate (DEPOTESTOTERONE CYPIONATE) 200 mg/mL injection ADMINISTER 0.75 ML(150 MG) IN THE MUSCLE EVERY 7 DAYS Strength: 200 mg/mL    tezepelumab-ekko (TEZSPIRE) 210 mg/1.91 mL (110 mg/mL) PnIj Inject 210 mg into the skin every 28 days.    traZODone (DESYREL) 50 MG tablet Take 1-3 tablets ( mg total) by mouth every evening.       Review of patient's allergies indicates:   Allergen Reactions    Ibuprofen Itching       Past Medical History:   Diagnosis Date    Arthritis     COPD (chronic obstructive  pulmonary disease)     Diabetes mellitus, type 2     DVT (deep venous thrombosis)     Hyperlipidemia     Hypertension     Kidney stones     LVH (left ventricular hypertrophy) due to hypertensive disease, with heart failure     Neuropathy     Personal history of colonic polyps 03/24/2000    colonoscopy report in media    PTSD (post-traumatic stress disorder)     Shortness of breath     fatigue    Sleep apnea, unspecified     Torn rotator cuff      Past Surgical History:   Procedure Laterality Date    achilles repair Left 2003    ADENOIDECTOMY      ANGIOGRAM, CORONARY, WITH LEFT HEART CATHETERIZATION N/A 8/6/2024    Procedure: Angiogram, Coronary, with Left Heart Cath;  Surgeon: Elliot Butler MD;  Location: [facility] CATH/EP LAB;  Service: Cardiology;  Laterality: N/A;    ANKLE FRACTURE SURGERY Right 1987    CATARACT EXTRACTION Left 2004    CATARACT EXTRACTION Left 2005    2nd    COLONOSCOPY N/A 05/09/2017    results in media    COLONOSCOPY N/A 01/13/2023    Procedure: COLONOSCOPY;  Surgeon: Kenney Keller MD;  Location: [facility] ENDO;  Service: General;  Laterality: N/A;    COLONOSCOPY W/ POLYPECTOMY N/A 03/24/2000    results in media    ELBOW SURGERY Right 07/24/2020    EYE SURGERY  2005    finger joint replacement Right 01/22/2020    middle finger    hair follicle  1998    HAND SURGERY Right 04/25/2017    HAND SURGERY Right 05/12/2017    2nd surgery    NASAL SINUS SURGERY  08/2015    ROTATOR CUFF REPAIR Right 09/25/2015    ROTATOR CUFF REPAIR Left 04/08/2016    skin grafts Right 07/2016    shin from stingray wound    TONSILLECTOMY      VASECTOMY       Family History       Problem Relation (Age of Onset)    Arthritis Mother, Father    COPD Brother    Cancer Brother    Diabetes Father    Hearing loss Father    Hypertension Mother, Father          Tobacco Use    Smoking status: Former     Current packs/day: 1.50     Average packs/day: 1.5 packs/day for 30.0 years (45.0 ttl pk-yrs)     Types: Cigarettes    Smokeless  tobacco: Never   Substance and Sexual Activity    Alcohol use: Not Currently    Drug use: Never    Sexual activity: Not on file     Review of Systems   Constitutional:  Negative for activity change and appetite change.   Gastrointestinal:  Positive for rectal pain. Negative for abdominal distention and abdominal pain.   Skin:  Negative for color change.     Objective:     Vital Signs (Most Recent):    Vital Signs (24h Range):  Temp:  [97.7 °F (36.5 °C)] 97.7 °F (36.5 °C)  Pulse:  [66-79] 78  Resp:  [16-20] 16  SpO2:  [92 %-98 %] 96 %  BP: (100-141)/(53-81) 137/81        There is no height or weight on file to calculate BMI.     Physical Exam  Vitals reviewed.   Cardiovascular:      Rate and Rhythm: Normal rate.   Pulmonary:      Effort: Pulmonary effort is normal.   Abdominal:      General: Abdomen is flat. There is no distension.      Tenderness: There is no abdominal tenderness.   Genitourinary:     Comments: Periranl abscess in post midline    Neurological:      Mental Status: He is alert.            I have reviewed all pertinent lab results within the past 24 hours.  CBC:   Recent Labs   Lab 11/01/24  0713   WBC 9.01   RBC 5.37   HGB 14.1   HCT 45.6      MCV 85   MCH 26.3*   MCHC 30.9*     BMP  Lab Results   Component Value Date     11/01/2024    K 4.3 11/01/2024     11/01/2024    CO2 24 11/01/2024    BUN 20 11/01/2024    CREATININE 1.6 (H) 11/01/2024    CALCIUM 9.2 11/01/2024    ANIONGAP 10 11/01/2024    EGFRNORACEVR 46.4 (A) 11/01/2024         Significant Diagnostics:  CT reviewed perianal abscess      Assessment/Plan:     Perianal abscess  Pt with perianal abscess.  D/w pt and his wife.  Have reviewed natural progresssion of abscess.  It has already begun to drain.  I have recommended I&D to allow for more thorough drainage.  I have d/w them possibility of fistula development at approximately 15-25 %. Pt willing to proceed with bedside draiange.      Informed consent was obtained.  Pt then  placed in prone position.  Perianal  area prepped without event.  I then infiltrated the surrounding area with 8 cc's of 1% lidocaine with epi.  I then incised the area with an 11 blade draining a moderate amount of purulent fluid.  Area is thoroughly irrigated.  Packing placed   Pt tolerated the procedure well    D/w pt.  From my perspective it is okay for him to be discharged.  No further surgical intervention needed. No surgical indication for admission.    Would d/c with oral abx (augmentin for 7 days).  Recommend f/u with me in 1-2 weeks and will monitor improvement.      Surgery to sign off      Would d/c when medically cleared.   Ok to eat    VTE Risk Mitigation (From admission, onward)      None            Thank you for your consult. I will sign off. Please contact us if you have any additional questions.    Ignacio Hodge MD  General Surgery  [facility]

## 2024-11-04 ENCOUNTER — TELEPHONE (OUTPATIENT)
Dept: SURGERY | Facility: CLINIC | Age: 69
End: 2024-11-04
Payer: MEDICARE

## 2024-11-04 ENCOUNTER — HOSPITAL ENCOUNTER (OUTPATIENT)
Dept: RADIOLOGY | Facility: HOSPITAL | Age: 69
Discharge: HOME | End: 2024-11-04
Attending: ORTHOPAEDIC SURGERY
Payer: MEDICARE

## 2024-11-04 ENCOUNTER — OFFICE VISIT (OUTPATIENT)
Dept: ORTHOPEDICS | Facility: CLINIC | Age: 69
End: 2024-11-04
Payer: OTHER GOVERNMENT

## 2024-11-04 ENCOUNTER — PATIENT OUTREACH (OUTPATIENT)
Dept: ADMINISTRATIVE | Facility: CLINIC | Age: 69
End: 2024-11-04
Payer: MEDICARE

## 2024-11-04 VITALS — WEIGHT: 197.75 LBS | BODY MASS INDEX: 29.97 KG/M2 | HEIGHT: 68 IN

## 2024-11-04 DIAGNOSIS — M25.519 SHOULDER PAIN, UNSPECIFIED CHRONICITY, UNSPECIFIED LATERALITY: ICD-10-CM

## 2024-11-04 DIAGNOSIS — M12.811 ROTATOR CUFF ARTHROPATHY OF BOTH SHOULDERS: ICD-10-CM

## 2024-11-04 DIAGNOSIS — M25.512 BILATERAL SHOULDER PAIN, UNSPECIFIED CHRONICITY: Primary | ICD-10-CM

## 2024-11-04 DIAGNOSIS — M12.812 ROTATOR CUFF ARTHROPATHY OF BOTH SHOULDERS: ICD-10-CM

## 2024-11-04 DIAGNOSIS — M25.519 SHOULDER PAIN, UNSPECIFIED CHRONICITY, UNSPECIFIED LATERALITY: Primary | ICD-10-CM

## 2024-11-04 DIAGNOSIS — M25.511 BILATERAL SHOULDER PAIN, UNSPECIFIED CHRONICITY: Primary | ICD-10-CM

## 2024-11-04 PROCEDURE — 73030 X-RAY EXAM OF SHOULDER: CPT | Mod: TC,50,PO

## 2024-11-04 PROCEDURE — 99214 OFFICE O/P EST MOD 30 MIN: CPT | Mod: S$GLB,,, | Performed by: ORTHOPAEDIC SURGERY

## 2024-11-04 PROCEDURE — 73030 X-RAY EXAM OF SHOULDER: CPT | Mod: 26,50,, | Performed by: RADIOLOGY

## 2024-11-04 PROCEDURE — 99999 PR PBB SHADOW E&M-EST. PATIENT-LVL II: CPT | Mod: PBBFAC,,, | Performed by: ORTHOPAEDIC SURGERY

## 2024-11-04 NOTE — PROGRESS NOTES
C3 nurse spoke with Mathew Rodrigues (spouse, Fide) for a TCC post hospital discharge follow up call. The patient has a scheduled HOSFU appointment with Kiley Vasquez MD on 11/15/2024 @ 5190.
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
## 2024-11-04 NOTE — TELEPHONE ENCOUNTER
I called patient and spoke to his wife to schedule him to come in on Wednesday, 11/13/24 @ 9:45am in [place].  Sharad

## 2024-11-04 NOTE — TELEPHONE ENCOUNTER
----- Message from Anita sent at 11/4/2024 10:53 AM CST -----  Regarding: hosp fu  Contact: patient  Type:  Sooner Appointment Request    Caller is requesting a sooner appointment.  Caller declined first available appointment listed below.  Caller will not accept being placed on the waitlist and is requesting a message be sent to doctor.    Name of Caller:  patient   When is the first available appointment?    Symptoms:  er hosp fu needed dx: rectal absese   Would the patient rather a call back or a response via [facility]?   Best Call Back Number:  [phone]    Additional Information:  contact pt to be seen thanks

## 2024-11-08 ENCOUNTER — LAB VISIT (OUTPATIENT)
Dept: LAB | Facility: HOSPITAL | Age: 69
End: 2024-11-08
Attending: FAMILY MEDICINE
Payer: MEDICARE

## 2024-11-08 DIAGNOSIS — Z79.4 TYPE 2 DIABETES MELLITUS WITHOUT COMPLICATION, WITH LONG-TERM CURRENT USE OF INSULIN: ICD-10-CM

## 2024-11-08 DIAGNOSIS — N18.32 CKD STAGE 3B, GFR 30-44 ML/MIN: ICD-10-CM

## 2024-11-08 DIAGNOSIS — N52.9 ERECTILE DYSFUNCTION, UNSPECIFIED ERECTILE DYSFUNCTION TYPE: ICD-10-CM

## 2024-11-08 DIAGNOSIS — D50.9 IRON DEFICIENCY ANEMIA, UNSPECIFIED IRON DEFICIENCY ANEMIA TYPE: ICD-10-CM

## 2024-11-08 DIAGNOSIS — E11.9 TYPE 2 DIABETES MELLITUS WITHOUT COMPLICATION, WITH LONG-TERM CURRENT USE OF INSULIN: ICD-10-CM

## 2024-11-08 LAB
ANION GAP SERPL CALC-SCNC: 13 MMOL/L (ref 8–16)
BASOPHILS # BLD AUTO: 0.11 K/UL (ref 0–0.2)
BASOPHILS NFR BLD: 1.5 % (ref 0–1.9)
BUN SERPL-MCNC: 17 MG/DL (ref 8–23)
CALCIUM SERPL-MCNC: 9.3 MG/DL (ref 8.7–10.5)
CHLORIDE SERPL-SCNC: 108 MMOL/L (ref 95–110)
CO2 SERPL-SCNC: 21 MMOL/L (ref 23–29)
CREAT SERPL-MCNC: 1.5 MG/DL (ref 0.5–1.4)
DIFFERENTIAL METHOD BLD: ABNORMAL
EOSINOPHIL # BLD AUTO: 0.1 K/UL (ref 0–0.5)
EOSINOPHIL NFR BLD: 1.1 % (ref 0–8)
ERYTHROCYTE [DISTWIDTH] IN BLOOD BY AUTOMATED COUNT: 21.3 % (ref 11.5–14.5)
EST. GFR  (NO RACE VARIABLE): 50.1 ML/MIN/1.73 M^2
ESTIMATED AVG GLUCOSE: 151 MG/DL (ref 68–131)
FERRITIN SERPL-MCNC: 45 NG/ML (ref 20–300)
GLUCOSE SERPL-MCNC: 80 MG/DL (ref 70–110)
HBA1C MFR BLD: 6.9 % (ref 4–5.6)
HCT VFR BLD AUTO: 49.8 % (ref 40–54)
HGB BLD-MCNC: 15.1 G/DL (ref 14–18)
IMM GRANULOCYTES # BLD AUTO: 0.16 K/UL (ref 0–0.04)
IMM GRANULOCYTES NFR BLD AUTO: 2.1 % (ref 0–0.5)
IRON SERPL-MCNC: 139 UG/DL (ref 45–160)
LYMPHOCYTES # BLD AUTO: 2.4 K/UL (ref 1–4.8)
LYMPHOCYTES NFR BLD: 31.3 % (ref 18–48)
MCH RBC QN AUTO: 26 PG (ref 27–31)
MCHC RBC AUTO-ENTMCNC: 30.3 G/DL (ref 32–36)
MCV RBC AUTO: 86 FL (ref 82–98)
MONOCYTES # BLD AUTO: 0.7 K/UL (ref 0.3–1)
MONOCYTES NFR BLD: 8.7 % (ref 4–15)
NEUTROPHILS # BLD AUTO: 4.2 K/UL (ref 1.8–7.7)
NEUTROPHILS NFR BLD: 55.3 % (ref 38–73)
NRBC BLD-RTO: 0 /100 WBC
PLATELET # BLD AUTO: 312 K/UL (ref 150–450)
PMV BLD AUTO: 9.2 FL (ref 9.2–12.9)
POTASSIUM SERPL-SCNC: 4.2 MMOL/L (ref 3.5–5.1)
RBC # BLD AUTO: 5.81 M/UL (ref 4.6–6.2)
SATURATED IRON: 33 % (ref 20–50)
SODIUM SERPL-SCNC: 142 MMOL/L (ref 136–145)
TESTOST SERPL-MCNC: 746 NG/DL (ref 304–1227)
TOTAL IRON BINDING CAPACITY: 425 UG/DL (ref 250–450)
TRANSFERRIN SERPL-MCNC: 287 MG/DL (ref 200–375)
WBC # BLD AUTO: 7.56 K/UL (ref 3.9–12.7)

## 2024-11-08 PROCEDURE — 80048 BASIC METABOLIC PNL TOTAL CA: CPT | Performed by: FAMILY MEDICINE

## 2024-11-08 PROCEDURE — 84466 ASSAY OF TRANSFERRIN: CPT | Performed by: FAMILY MEDICINE

## 2024-11-08 PROCEDURE — 36415 COLL VENOUS BLD VENIPUNCTURE: CPT | Performed by: FAMILY MEDICINE

## 2024-11-08 PROCEDURE — 82728 ASSAY OF FERRITIN: CPT | Performed by: FAMILY MEDICINE

## 2024-11-08 PROCEDURE — 84403 ASSAY OF TOTAL TESTOSTERONE: CPT | Performed by: FAMILY MEDICINE

## 2024-11-08 PROCEDURE — 85025 COMPLETE CBC W/AUTO DIFF WBC: CPT | Performed by: FAMILY MEDICINE

## 2024-11-08 PROCEDURE — 83036 HEMOGLOBIN GLYCOSYLATED A1C: CPT | Performed by: FAMILY MEDICINE

## 2024-11-13 ENCOUNTER — PATIENT MESSAGE (OUTPATIENT)
Dept: PULMONOLOGY | Facility: CLINIC | Age: 69
End: 2024-11-13

## 2024-11-13 ENCOUNTER — OFFICE VISIT (OUTPATIENT)
Dept: PULMONOLOGY | Facility: CLINIC | Age: 69
End: 2024-11-13
Payer: MEDICARE

## 2024-11-13 ENCOUNTER — OFFICE VISIT (OUTPATIENT)
Dept: SURGERY | Facility: CLINIC | Age: 69
End: 2024-11-13
Payer: MEDICARE

## 2024-11-13 VITALS
HEART RATE: 76 BPM | HEIGHT: 68 IN | OXYGEN SATURATION: 92 % | DIASTOLIC BLOOD PRESSURE: 72 MMHG | SYSTOLIC BLOOD PRESSURE: 133 MMHG | BODY MASS INDEX: 30.21 KG/M2 | WEIGHT: 199.31 LBS

## 2024-11-13 VITALS
HEIGHT: 68 IN | TEMPERATURE: 98 F | SYSTOLIC BLOOD PRESSURE: 131 MMHG | WEIGHT: 196.88 LBS | HEART RATE: 65 BPM | DIASTOLIC BLOOD PRESSURE: 70 MMHG | BODY MASS INDEX: 29.84 KG/M2

## 2024-11-13 DIAGNOSIS — J41.1 BRONCHITIS, CHRONIC, MUCOPURULENT: ICD-10-CM

## 2024-11-13 DIAGNOSIS — K61.0 PERIANAL ABSCESS: Primary | ICD-10-CM

## 2024-11-13 DIAGNOSIS — J45.51 SEVERE PERSISTENT ASTHMA WITH ACUTE EXACERBATION: ICD-10-CM

## 2024-11-13 DIAGNOSIS — G47.33 OBSTRUCTIVE SLEEP APNEA ON CPAP: ICD-10-CM

## 2024-11-13 DIAGNOSIS — R05.3 CHRONIC COUGH: ICD-10-CM

## 2024-11-13 DIAGNOSIS — R07.89 OTHER CHEST PAIN: Primary | ICD-10-CM

## 2024-11-13 DIAGNOSIS — U09.9 COVID-19 LONG HAULER MANIFESTING CHRONIC COUGH: ICD-10-CM

## 2024-11-13 DIAGNOSIS — J44.1 CHRONIC OBSTRUCTIVE PULMONARY DISEASE WITH ACUTE EXACERBATION: ICD-10-CM

## 2024-11-13 DIAGNOSIS — R05.3 COVID-19 LONG HAULER MANIFESTING CHRONIC COUGH: ICD-10-CM

## 2024-11-13 PROCEDURE — 99999 PR PBB SHADOW E&M-EST. PATIENT-LVL III: CPT | Mod: PBBFAC,,, | Performed by: INTERNAL MEDICINE

## 2024-11-13 PROCEDURE — 99213 OFFICE O/P EST LOW 20 MIN: CPT | Mod: S$PBB,,, | Performed by: SURGERY

## 2024-11-13 PROCEDURE — 99999 PR PBB SHADOW E&M-EST. PATIENT-LVL V: CPT | Mod: PBBFAC,,, | Performed by: SURGERY

## 2024-11-13 PROCEDURE — 99214 OFFICE O/P EST MOD 30 MIN: CPT | Mod: S$PBB,,, | Performed by: INTERNAL MEDICINE

## 2024-11-13 PROCEDURE — 99213 OFFICE O/P EST LOW 20 MIN: CPT | Mod: PBBFAC,27,PO | Performed by: INTERNAL MEDICINE

## 2024-11-13 PROCEDURE — 99215 OFFICE O/P EST HI 40 MIN: CPT | Mod: PBBFAC,PO | Performed by: SURGERY

## 2024-11-13 RX ORDER — ACETAMINOPHEN AND CODEINE PHOSPHATE 300; 30 MG/1; MG/1
1 TABLET ORAL EVERY 6 HOURS PRN
Qty: 30 TABLET | Refills: 0 | Status: SHIPPED | OUTPATIENT
Start: 2025-01-08

## 2024-11-13 RX ORDER — ACETAMINOPHEN AND CODEINE PHOSPHATE 300; 30 MG/1; MG/1
1 TABLET ORAL EVERY 6 HOURS PRN
Qty: 30 TABLET | Refills: 0 | Status: SHIPPED | OUTPATIENT
Start: 2024-12-12

## 2024-11-13 RX ORDER — ACETAMINOPHEN AND CODEINE PHOSPHATE 300; 30 MG/1; MG/1
1 TABLET ORAL EVERY 4 HOURS PRN
Qty: 30 TABLET | Refills: 0 | Status: SHIPPED | OUTPATIENT
Start: 2024-11-13

## 2024-11-13 RX ORDER — FLUTICASONE FUROATE, UMECLIDINIUM BROMIDE AND VILANTEROL TRIFENATATE 200; 62.5; 25 UG/1; UG/1; UG/1
POWDER RESPIRATORY (INHALATION)
Qty: 180 EACH | Refills: 3 | Status: SHIPPED | OUTPATIENT
Start: 2024-11-13

## 2024-11-13 RX ORDER — ALBUTEROL SULFATE 90 UG/1
2 INHALANT RESPIRATORY (INHALATION) EVERY 4 HOURS PRN
Qty: 36 G | Refills: 11 | Status: SHIPPED | OUTPATIENT
Start: 2024-11-13

## 2024-11-13 NOTE — PROGRESS NOTES
11/13/2024    Mathewmarielle Rodrigues  New Patient Consult    Chief Complaint   Patient presents with    Follow-up    COPD       HPI:    November 13, 2024- pt will still have violent coughing at bedtime -- pt will still have occ wheezes.   Pt breathing good with occ wheezes.  Tezspire ongoing.    Patient is still brings up yellow mucus.  Prednisone stopped and doing much better but significant  Pt sleeps better on trazodone but will have excess sleepiness in morning.      The patient has severe sleep apnea.  He has done well with CPAP therapy.  Use nasal pillows.  He wants to try a different mask.  We discuss nasal cradle.  CPAP therapy as ordered.  10/8/2024  pt over last 2 wks and doing well, got 2nd tezspire yesterday,  prednisone tapering, uses t3 bedtime , uses cpap    Sugar <120 in am.  Patient Instructions   Stop prednisone this week -- ok to resume if needed      Continue trelegy   Continue tezpsire.      Chest xrays and ct abd viewed    Use tyleonol 3  for cough/pain..... will renew for later October-- should not need long term .....      September 25, 2024-pt had covid   8/24  Pt having wheezes and cough violent. Neb ppt cough, sneezes with cough.  Pt started on tezspire but developed covid 2 wks later....  Pt uses nebs, prednisone    Took prednisone twice -- on 30 mg last wk with no great benefit.  Pt having cough and rattle, worse nightly  Cough very violent -- had cough emesis -- no gerd symptoms on daily omeprazole..         From sticky note 2 weeks ago-Prednisone, Tessalon, and cough medicine are all renewed.  He should use the prednisone only as needed if he can get by.  Patient Instructions   Would use tylenol 3 for pains -- should suppress cough-- will give tylenol 3 -- has 3 times dose codeine compared to cough codeine syrup-- use for pain or cough.     If uses bedtime -- need to use cpap.    Check sputum culture  Dose Levaquin    Check chest xray    Use nebulizer, trelegy    Covid worsened underlying  asthma.  Asthma may not remit til covid completely resolved.........    Increase prednisone to 60 mg daily for 2-5 days, then 40 mg daily for 2-5 days, then 20 mg daily for 2-5 days -- cut dose if good result or sugar over 200.    Would see in 2 weeks if not clearing-- would see in 2 months others  8/12/2024 pt  having cough with yellow mucous that interfers with cough.  Ent eval and suspected cough from reflux.    Pt had Prilosec double -- no gerd    Pt took prednisone 3 days courses -- was coughing on prednisone for 2 wks.   Steroids seemed to help  Uses trelegy daily, albuterol helps but not lasting  Notes wheezes with albuterol    Eos were up to 1.5 or 1500 in June...     Pt was taken off prednisone for heart cath-- heart was good at cath.  No diarrhea.  Pt off prednisone 10 days.      Patient has a partial response to prednisone and albuterol but has not been able to be controlled  Patient Instructions   Pft had 6% bronchodilator  There is a response to inhalers and prednisone.   Wheezes and coughing and nocturnal worsening are very prominent..    Breathing test not too too bad-- no obstruction but cough is major symptom.      Woud treat severe persistent eosinophilic asthma with shots....      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  Pt worked construction.  Pt smoked 16-38 ppd or so..    No h/o asthma.  Pt had covid likel illness 2020 with wk in hosp.   Pt felt ot have copd, uses trelegy and albuterol-- no great benefit.  Patient however would improve his breathing would steroids.  He is breathing with normalize.  When he got off steroids to breathing would relapse.  Very dramatic benefit and clear relapse.    Pt has had thick creamy mucous but no culture nor abx-- took abx deceember with      Pt will  have freq wheezes--- not particularly nocturnal ---   Chr renal failure  H/o dvt -- on eliquis 5 bid...  Pt on teststerone  Used dose pack in past-- pt felt better and was breathing better but relapsed...      Uses cpap nightly -uses nasal mask.  Compliant.  No problem.  Patient Instructions   Chest xray and ct abd 6/2024 viewed and good    Saline rinse for nasal passages, if stuffy may use 4 way nasal to open passages.  Use flonase daily        You relate cough and wheezes and short breath,  You are on good high dose controller with trelegy.    Albuterol not effective    Steroids will remit problems-- you used once - avoiding due to diabetes.     You have severe persistent eosinophilic asthma.  Expect you are steroid dependant.    Would do breathing test and plan to start dupixent at follow up....      May do ct chest if lingering.......    PFSH:  Past Medical History:   Diagnosis Date    Arthritis     COPD (chronic obstructive pulmonary disease)     Diabetes mellitus, type 2     DVT (deep venous thrombosis)     Hyperlipidemia     Hypertension     Kidney stones     LVH (left ventricular hypertrophy) due to hypertensive disease, with heart failure     Neuropathy     Personal history of colonic polyps 03/24/2000    colonoscopy report in media    PTSD (post-traumatic stress disorder)     Shortness of breath     fatigue    Sleep apnea, unspecified     Torn rotator cuff          Past Surgical History:   Procedure Laterality Date    achilles repair Left 2003    ADENOIDECTOMY      ANGIOGRAM, CORONARY, WITH LEFT HEART CATHETERIZATION N/A 8/6/2024    Procedure: Angiogram, Coronary, with Left Heart Cath;  Surgeon: Elliot Butler MD;  Location: [facility] CATH/EP LAB;  Service: Cardiology;  Laterality: N/A;    ANKLE FRACTURE SURGERY Right 1987    CATARACT EXTRACTION Left 2004    CATARACT EXTRACTION Left 2005    2nd    COLONOSCOPY N/A 05/09/2017    results in media    COLONOSCOPY N/A 01/13/2023    Procedure: COLONOSCOPY;  Surgeon:  "Kenney Keller MD;  Location: [facility];  Service: General;  Laterality: N/A;    COLONOSCOPY W/ POLYPECTOMY N/A 03/24/2000    results in media    ELBOW SURGERY Right 07/24/2020    EYE SURGERY  2005    finger joint replacement Right 01/22/2020    middle finger    hair follicle  1998    HAND SURGERY Right 04/25/2017    HAND SURGERY Right 05/12/2017    2nd surgery    NASAL SINUS SURGERY  08/2015    ROTATOR CUFF REPAIR Right 09/25/2015    ROTATOR CUFF REPAIR Left 04/08/2016    skin grafts Right 07/2016    shin from stingray wound    TONSILLECTOMY      VASECTOMY       Social History     Tobacco Use    Smoking status: Former     Current packs/day: 1.50     Average packs/day: 1.5 packs/day for 30.0 years (45.0 ttl pk-yrs)     Types: Cigarettes    Smokeless tobacco: Never   Substance Use Topics    Alcohol use: Not Currently    Drug use: Never     Family History   Problem Relation Name Age of Onset    Hypertension Mother Sierra     Arthritis Mother Sierra     Hypertension Father Misael     Diabetes Father Misael     Arthritis Father Misael     Hearing loss Father Misael     Cancer Brother Mickey Douglas     COPD Brother Mickey Douglas      Review of patient's allergies indicates:   Allergen Reactions    Ibuprofen Itching          Review of Systems:  a review of eleven systems covering constitutional, Eye, HEENT, Psych, Respiratory, Cardiac, GI, , Musculoskeletal, Endocrine, Dermatologic was negative except for pertinent findings as listed ABOVE and below: pertinent positives as above, rest good        Exam:Comprehensive exam done. /72 (BP Location: Right arm, Patient Position: Sitting)   Pulse 76   Ht 5' 8" (1.727 m)   Wt 90.4 kg (199 lb 4.7 oz)   SpO2 (!) 92% Comment: on room air at rest  BMI 30.30 kg/m²   Exam included Vitals as listed, and patient's appearance and affect and alertness and mood, oral exam for yeast and hygiene and pharynx lesions and Mallapatti (M) score, neck with inspection for jvd and masses and " thyroid abnormalities and lymph nodes (supraclavicular and infraclavicular nodes and axillary also examined and noted if abn), chest exam included symmetry and effort and fremitus and percussion and auscultation, cardiac exam included rhythm and gallops and murmur and rubs and jvd and edema, abdominal exam for mass and hepatosplenomegaly and tenderness and hernias and bowel sounds, Musculoskeletal exam with muscle tone and posture and mobility/gait and  strength, and skin for rashes and cyanosis and pallor and turgor, extremity for clubbing.  Findings were normal except for pertinent findings listed below:  M3, chest is symmetric, no distress, normal percussion, normal fremitus and good normal breath sounds  Arthritic deformed hands...         Radiographs (ct chest and cxr) reviewed: view by direct vision      Labs none available        PFT will be done and results to be reviewed       Plan:  Clinical impression is apparently straight forward and impression with management as below.    Mathew was seen today for follow-up and copd.    Diagnoses and all orders for this visit:    Other chest pain  -     acetaminophen-codeine 300-30mg (TYLENOL #3) 300-30 mg Tab; Take 1 tablet by mouth every 4 (four) hours as needed (chest pain).  -     acetaminophen-codeine 300-30mg (TYLENOL #3) 300-30 mg Tab; Take 1 tablet by mouth every 6 (six) hours as needed (pain, coughing).  -     acetaminophen-codeine 300-30mg (TYLENOL #3) 300-30 mg Tab; Take 1 tablet by mouth every 6 (six) hours as needed (cough).    Severe persistent asthma with acute exacerbation  -     albuterol (PROVENTIL/VENTOLIN HFA) 90 mcg/actuation inhaler; Inhale 2 puffs into the lungs every 4 (four) hours as needed for Shortness of Breath or Wheezing (cough). 2 puffs every 4 hours as needed for cough, wheeze, or shortness of breath    COVID-19 long hauler manifesting chronic cough    Chronic cough  -     acetaminophen-codeine 300-30mg (TYLENOL #3) 300-30 mg Tab; Take  1 tablet by mouth every 4 (four) hours as needed (chest pain).  -     acetaminophen-codeine 300-30mg (TYLENOL #3) 300-30 mg Tab; Take 1 tablet by mouth every 6 (six) hours as needed (pain, coughing).  -     acetaminophen-codeine 300-30mg (TYLENOL #3) 300-30 mg Tab; Take 1 tablet by mouth every 6 (six) hours as needed (cough).    Obstructive sleep apnea on CPAP  -     CPAP/BIPAP SUPPLIES    Bronchitis, chronic, mucopurulent  -     AFB Culture & Smear; Standing                Follow up in about 3 months (around 2/13/2025).    Discussed with patient above for education the following:      Patient Instructions   Still green mucous -- check afb cultures    Will place order airfit n30i mask as we discuss--- complinace report in September was <1.5 from 61 bedtime...    May use prednisone    Would use codeine for chest pains and coughing for next 3 months    Will plan to stop tylenol 3 and consider other medicines if needed for sleeping    Expect tezspire should be more effective    May use albuterol up top 2-4 puffs up to every 4 hrs         Evaluation took 30 minutes

## 2024-11-13 NOTE — TELEPHONE ENCOUNTER
No care due was identified.  Health [facility] Embedded Care Due Messages. Reference number: [id number].   11/13/2024 2:08:38 PM CST

## 2024-11-13 NOTE — TELEPHONE ENCOUNTER
Refill Routing Note   Medication(s) are not appropriate for processing by [facility] for the following reason(s):        Drug-disease interaction    [facility] action(s):  Defer        Medication Therapy Plan: ED documentation reviewed. No changes to therapy note    Pharmacist review requested: Yes   Extended chart review required: Yes     Appointments  past 12m or future 3m with PCP    Date Provider   Last Visit   8/30/2024 Kiley Vasquez MD   Next Visit   11/15/2024 Kiley Vasquez MD   ED visits in past 90 days: 1        Note composed:3:50 PM 11/13/2024

## 2024-11-13 NOTE — TELEPHONE ENCOUNTER
Refill Decision Note   Mathew Rodrigues  is requesting a refill authorization.  Brief Assessment and Rationale for Refill:  Approve     Medication Therapy Plan:  ED documentation reviewed. No changes to therapy note      Pharmacist review requested: Yes   Extended chart review required: Yes   Comments:     Note composed:3:57 PM 11/13/2024

## 2024-11-13 NOTE — PATIENT INSTRUCTIONS
Still green mucous -- check afb cultures    Will place order airfit n30i mask as we discuss--- complinace report in September was <1.5 from 61 bedtime...    May use prednisone    Would use codeine for chest pains and coughing for next 3 months    Will plan to stop tylenol 3 and consider other medicines if needed for sleeping    Expect tezspire should be more effective    May use albuterol up top 2-4 puffs up to every 4 hrs

## 2024-11-13 NOTE — PROGRESS NOTES
Patient returns to the office 1-1/2 weeks status post I&D of right posterior perianal abscess.  He was feeling much better.  Notes his wound was almost fully healed.  He has no complaints.    AFVSS  AAOx3  Soft/nd/nt  Rectal: External exam does demonstrate granulating wound in the right posterior region.  No significant tracking was appreciated.    Assessment plan: History of perianal abscess     Discussion with the patient.  Wound seems to be healing.  There is a potential he would develop a fistula.  Should the wound not fully heal in the next 4-6 weeks have recommended returning to clinic.  Otherwise he can follow up with me on a p.r.n. basis

## 2024-11-14 ENCOUNTER — LAB VISIT (OUTPATIENT)
Dept: LAB | Facility: HOSPITAL | Age: 69
End: 2024-11-14
Attending: INTERNAL MEDICINE
Payer: MEDICARE

## 2024-11-14 DIAGNOSIS — J41.1 BRONCHITIS, CHRONIC, MUCOPURULENT: ICD-10-CM

## 2024-11-14 PROCEDURE — 87116 MYCOBACTERIA CULTURE: CPT | Performed by: INTERNAL MEDICINE

## 2024-11-14 PROCEDURE — 87015 SPECIMEN INFECT AGNT CONCNTJ: CPT | Performed by: INTERNAL MEDICINE

## 2024-11-14 PROCEDURE — 87206 SMEAR FLUORESCENT/ACID STAI: CPT | Performed by: INTERNAL MEDICINE

## 2024-11-15 ENCOUNTER — PATIENT MESSAGE (OUTPATIENT)
Dept: ADMINISTRATIVE | Facility: OTHER | Age: 69
End: 2024-11-15
Payer: MEDICARE

## 2024-11-15 ENCOUNTER — PATIENT OUTREACH (OUTPATIENT)
Dept: ADMINISTRATIVE | Facility: HOSPITAL | Age: 69
End: 2024-11-15
Payer: MEDICARE

## 2024-11-15 ENCOUNTER — OFFICE VISIT (OUTPATIENT)
Dept: FAMILY MEDICINE | Facility: CLINIC | Age: 69
End: 2024-11-15
Payer: MEDICARE

## 2024-11-15 ENCOUNTER — LAB VISIT (OUTPATIENT)
Dept: LAB | Facility: HOSPITAL | Age: 69
End: 2024-11-15
Attending: INTERNAL MEDICINE
Payer: MEDICARE

## 2024-11-15 ENCOUNTER — PATIENT MESSAGE (OUTPATIENT)
Dept: FAMILY MEDICINE | Facility: CLINIC | Age: 69
End: 2024-11-15

## 2024-11-15 VITALS
HEART RATE: 77 BPM | SYSTOLIC BLOOD PRESSURE: 130 MMHG | OXYGEN SATURATION: 95 % | BODY MASS INDEX: 29.84 KG/M2 | DIASTOLIC BLOOD PRESSURE: 78 MMHG | WEIGHT: 196.88 LBS | HEIGHT: 68 IN

## 2024-11-15 DIAGNOSIS — F41.9 ANXIETY: ICD-10-CM

## 2024-11-15 DIAGNOSIS — Z79.4 TYPE 2 DIABETES MELLITUS WITHOUT COMPLICATION, WITH LONG-TERM CURRENT USE OF INSULIN: ICD-10-CM

## 2024-11-15 DIAGNOSIS — J45.50 SEVERE PERSISTENT ASTHMA WITHOUT COMPLICATION: Primary | ICD-10-CM

## 2024-11-15 DIAGNOSIS — E53.8 VITAMIN B12 DEFICIENCY: ICD-10-CM

## 2024-11-15 DIAGNOSIS — E78.2 MIXED HYPERLIPIDEMIA: ICD-10-CM

## 2024-11-15 DIAGNOSIS — J41.1 BRONCHITIS, CHRONIC, MUCOPURULENT: ICD-10-CM

## 2024-11-15 DIAGNOSIS — N18.32 CKD STAGE 3B, GFR 30-44 ML/MIN: ICD-10-CM

## 2024-11-15 DIAGNOSIS — I10 ESSENTIAL HYPERTENSION, BENIGN: ICD-10-CM

## 2024-11-15 DIAGNOSIS — I10 PRIMARY HYPERTENSION: ICD-10-CM

## 2024-11-15 DIAGNOSIS — E11.9 TYPE 2 DIABETES MELLITUS WITHOUT COMPLICATION, WITH LONG-TERM CURRENT USE OF INSULIN: ICD-10-CM

## 2024-11-15 DIAGNOSIS — N40.0 BENIGN PROSTATIC HYPERPLASIA WITHOUT LOWER URINARY TRACT SYMPTOMS: ICD-10-CM

## 2024-11-15 DIAGNOSIS — E11.9 TYPE 2 DIABETES MELLITUS WITHOUT COMPLICATION, WITHOUT LONG-TERM CURRENT USE OF INSULIN: ICD-10-CM

## 2024-11-15 LAB
ACID FAST MOD KINY STN SPEC: NORMAL
ALBUMIN/CREAT UR: 13.3 UG/MG (ref 0–30)
CREAT UR-MCNC: 60 MG/DL (ref 23–375)
MICROALBUMIN UR DL<=1MG/L-MCNC: 8 UG/ML
MYCOBACTERIUM SPEC QL CULT: NORMAL

## 2024-11-15 PROCEDURE — 82570 ASSAY OF URINE CREATININE: CPT | Performed by: FAMILY MEDICINE

## 2024-11-15 PROCEDURE — 87015 SPECIMEN INFECT AGNT CONCNTJ: CPT | Performed by: INTERNAL MEDICINE

## 2024-11-15 PROCEDURE — 99999 PR PBB SHADOW E&M-EST. PATIENT-LVL V: CPT | Mod: PBBFAC,,, | Performed by: FAMILY MEDICINE

## 2024-11-15 PROCEDURE — 99215 OFFICE O/P EST HI 40 MIN: CPT | Mod: PBBFAC,PN | Performed by: FAMILY MEDICINE

## 2024-11-15 PROCEDURE — 87116 MYCOBACTERIA CULTURE: CPT | Performed by: INTERNAL MEDICINE

## 2024-11-15 PROCEDURE — 87206 SMEAR FLUORESCENT/ACID STAI: CPT | Performed by: INTERNAL MEDICINE

## 2024-11-15 RX ORDER — BUSPIRONE HYDROCHLORIDE 5 MG/1
5 TABLET ORAL NIGHTLY
Qty: 90 TABLET | Refills: 3 | Status: SHIPPED | OUTPATIENT
Start: 2024-11-15 | End: 2025-11-15

## 2024-11-15 RX ORDER — GUANFACINE 2 MG/1
TABLET ORAL
Qty: 90 TABLET | Refills: 0 | Status: SHIPPED | OUTPATIENT
Start: 2024-11-15

## 2024-11-15 RX ORDER — CYANOCOBALAMIN 1000 UG/ML
1000 INJECTION, SOLUTION INTRAMUSCULAR; SUBCUTANEOUS
Qty: 6 ML | Refills: 3 | Status: SHIPPED | OUTPATIENT
Start: 2024-11-15

## 2024-11-15 RX ORDER — GLIPIZIDE 5 MG/1
5 TABLET ORAL 2 TIMES DAILY WITH MEALS
Qty: 180 TABLET | Refills: 1 | Status: SHIPPED | OUTPATIENT
Start: 2024-11-15

## 2024-11-15 RX ORDER — AMLODIPINE BESYLATE 5 MG/1
5 TABLET ORAL DAILY
Qty: 90 TABLET | Refills: 0 | Status: SHIPPED | OUTPATIENT
Start: 2024-11-15

## 2024-11-15 RX ORDER — METFORMIN HYDROCHLORIDE 1000 MG/1
1000 TABLET ORAL 2 TIMES DAILY WITH MEALS
Qty: 180 TABLET | Refills: 1 | Status: SHIPPED | OUTPATIENT
Start: 2024-11-15

## 2024-11-15 RX ORDER — TAMSULOSIN HYDROCHLORIDE 0.4 MG/1
0.4 CAPSULE ORAL NIGHTLY
Qty: 90 CAPSULE | Refills: 3 | Status: SHIPPED | OUTPATIENT
Start: 2024-11-15 | End: 2025-11-15

## 2024-11-15 RX ORDER — OMEPRAZOLE 40 MG/1
40 CAPSULE, DELAYED RELEASE ORAL
Qty: 60 CAPSULE | Refills: 11 | Status: SHIPPED | OUTPATIENT
Start: 2024-11-15

## 2024-11-15 NOTE — LETTER
"                                **SECOND REQUEST**      AUTHORIZATION FOR RELEASE OF   CONFIDENTIAL INFORMATION    Dear Dr Stephens,    We are seeing Mathew PRIETO Lilia, date of birth 1955, in the clinic at [facility]. Kiley Vasquez MD is the patient's PCP. Mathew PRIETO Lilia has an outstanding lab/procedure at the time we reviewed his chart. In order to help keep his health information updated, he has authorized us to request the following medical record(s):        (  )  MAMMOGRAM                                      (  )  COLONOSCOPY      (  )  PAP SMEAR                                          (  )  OUTSIDE LAB RESULTS     (  )  DEXA SCAN                                          ( X )  EYE EXAM            (  )  FOOT EXAM                                          (  )  ENTIRE RECORD     (  )  OUTSIDE IMMUNIZATIONS                 (  )  _______________         Please fax records to [facility], [place], Sarah E., MD at [phone]    Thanks so much and have a great day!    Marsha Vazquez LPN [address] [address]   [phone]   [phone]           Patient Name: Mathew Rodrigues  : 1955  Patient Phone #: [phone]          A. Consent for Examination and Treatment: I hereby authorize the providers and employees of [facility] to provide medical treatment/services which includes, but is not limited to, performing and administering tests and diagnostic procedures that are deemed necessary, Including, but not limited to, imaging examinations, blood tests and other laboratory procedures as may be required by the hospital, clinic, or may be ordered by my physician(s) or persons working under the general and/or special instructions of my physician(s).                   1.      I understand and agree that this consent covers all authorized persons, including but not limited to physicians, residents, nurse practitioners, physicians' " assistants, specialists, consultants, student nurses, and independently contracted physicians, who are called upon by the physician in charge, to carry out the diagnostic procedures and medical or surgical treatment.  2.      I hereby authorize [facility] to retain or dispose of any specimens or tissue, should there be such remaining from any test or procedure.  3.      I hereby authorize and give consent for [facility] providers and employees to take photographs, images or videotapes of such diagnostic, surgical or treatment procedures of Patient as may be required by [facility] or as may be ordered by a physician.  I further acknowledge and agree that [facility] may use cameras or other devices for patient monitoring.  4.      I am aware that the practice of medicine is not an exact science, and I acknowledge that no guarantees have been made to me as to the outcome of any tests, procedures or treatment.                   B. Authorization for Release of Information:  I understand that my insurance company and/or their agents may need information necessary to make determinations about payment/reimbursement.  I hereby provide authorization to release to all insurance companies, their successors, assignees, other parties with whom they may have contracted, or others acting on their behalf, that are involved with payment for any hospital and/or clinic charges incurred by the patient, any information that they request and deem necessary for payment/reimbursement, and/or quality review.  I further authorize the release of my health information to physicians or other health care practitioners on staff who are involved in my health care now and in the future, and to other health care providers, entities, or institutions for the purpose of my continued care and treatment, including referrals.     C. Medicare Patient's Certification and Authorization to Release Information and Payment Request:  I certify that the information given by  me in applying for payment under Title XVIII of the Social Security Act is correct.  I authorize any toth of medical or other information about me to release to the Social Security Administration, or its intermediaries or carriers, any information needed for this or a related Medicare claim.  I request that payment of authorized benefits be made on my behalf.     D. Assignment of Insurance Benefits:  I hereby authorize any and all insurance companies, health plans, defined benefit plans, health insurers or any entity that is or may be responsible for payment of my medical expenses to pay all hospital and medical benefits now due, and to become due and payable to me under any hospital benefits, sick benefits, injury benefits or any other benefit for services rendered to me, including Major Medical Benefits, direct to [facility] and all independently contracted physicians.  I assign any and all rights that I may have against any and all insurance companies, health plans, defined benefit plans, health insurers or any entity that is or may be responsible for payment of my medical expenses, including, but not limited to any right to appeal a denial of a claim, any right to bring any action, lawsuit, administrative proceeding, or other cause of action on my behalf.  I specifically assign my right to pursue litigation against any and all insurance companies, health plans, defined benefit plans, health insurers or any entity that is or may be responsible for payment of medical expenses based upon a refusal to pay charges.              REGISTRATION  AUTHORIZATION                    E. Valuables:  It is understood and agreed that [facility] is not liable for the damage to or loss of any money, jewelry, documents, dentures, eye glasses, hearing aids, prosthetics, or other property of value.     F. Computer Equipment:  I understand and agree that should I choose to use computer equipment owned by [facility] or if I choose to access  the Internet via [facility]'s network, I do so at my own risk.  [facility] is not responsible for any damage to my computer equipment or to any damages of any type that might arise from my loss of equipment or data.                   G. Acceptance of Financial Responsibility:  I agree that in consideration of the services and supplies that have been or will be furnished to the patient,  I am hereby obligated to pay all charges made for or on the account of the patient according to the standard rates (in effect at the time the services and supplies are delivered) established by [facility], including its Patient Financial Assistance Policy to the extent it is applicable.  I understand that I am responsible for all charges, or portions thereof, not covered by insurance or other sources.  Patient refunds will be distributed only after balances at all [facility] facilities are paid.                   H. Communication Authorization:  I hereby authorize [facility] and its representatives, along with any billing service or  who may work on their behalf, to contact me on my cell phone and/or home phone using prerecorded messages, artificial voice messages, automatic telephone dialing devices or other computer assisted technology, or by electronic mail, text messaging, or by any other form of electronic communication.  This includes, but is not limited to appointment reminders, yearly physical exam reminders, preventive care reminders, patient campaigns, welcome calls, and calls about account balances on my account or any account on which I am listed as a guarantor.  I understand I have the right to opt out of these communications at any time.     I.  Relationship Between Facility and Physician:  I understand that some, but not all, providers furnishing services to the patient are not employees or agents of [facility].  The patient is under the care and supervision of his/her attending physician, and it is the responsibility of  the facility and its nursing staff to carry out the instructions of such physicians.  It is the responsibility of the patient's physician/designee to obtain the patient's informed consent, when required, for medical or surgical treatment, special diagnostic or therapeutic procedures, or hospital services rendered for the patient under the special instructions of the physician/designee.     J. Notice of Private Practices:  I acknowledge I have received a copy of [facility]'s Notice of Privacy Practices.     K. Facility Directory:  I have discussed with the organization my desire to be either included or excluded in the facility directory.  I understand that if my choice is to opt-out of being identified in the facility directory that the facility will not provide any information about me such as my condition (e.g. Fair, stable, etc.) or my location in the facility (e.g. Room number, department).     L. LINKS:  For [place] Residents:  [facility] is a LINKS ([place] [facility]) participating facility.  LINKS is a [facility]-sponsored confidential computer system that helps you and your doctor keep track of you and your child's immunization history.  I acknowledge that I am allowing [facility] to share this information with [facility].  For [place] Residents:  [facility] is a MIIX ([place] Immunization Information eXchange) participant.  MIIX is a [facility]-sponsored confidential computer system that helps you and your doctor keep track of you and your child's immunization history.  I acknowledge that I am allowing [facility] to share information with [facility].     M. Term:  This authorization is valid for this and subsequent care/treatment I receive at [facility] and will remain valid unless/until revoked in writing by me.      ACKNOWLEDGMENT OF POTENTIAL RISKS OF COVID-19 VACCINE  It is important that you, as the patient or patients legally authorized representative(s), understand and  acknowledge the following, with regard to administration of the COVID-19 vaccine offered by [facility]:  The SARS-CoV-2 virus (COVID-19) has caused an unprecedented modern global pandemic that has mobilized scientists and drug manufacturers to work to create safe and effective vaccines to get the crisis under control.  No vaccine is released in the [place] without undergoing rigorous, multi-layered testing and approval by the Food and Drug Administration.  During a public health emergency, however, vaccines can be released for patient administration by the FDA prior to completion of multi-phase clinical trials and approval. This is done by the FDAs granting of Emergency Use Authorization (EUA) when the vaccine meets reasonable thresholds for safety and effectiveness and people are in urgent need of care. Under an EUA, the FDA has found that known and potential benefits outweigh its known and potential risks.  The vaccine for which you are presenting to [facility] has been released under an EUA, which [facility] is honoring in its distribution of the vaccine to the public. While the FDAs authorization indicates its belief that usage is recommended over possible risks, there is still the possibility that unknown risks of the vaccine could exist.  By signing this document, you acknowledge and assume these risks. Further, you waive any and all claims of liability against and hold harmless any [facility] entity or provider for any harm caused to you by said possible unknown risks of the vaccine.        N. [facility]:  As used in this document, [facility] means all [facility] affiliated entities including all health centers, surgery centers, clinics, and hospitals.  It includes more specifically, the following entities: [facility], a not for profit [place] [employer], and its subsidiaries and affiliates, including [facility], [facility], [facility],  [facility], [facility], [facility], [facility], [facility], [facility]., [facility], [facility]., [facility], L.[facility].d/b/a [facility], [facility], L.L.C.d/b/a [facility], [employer], L.[facility] As manager of [facility], [facility], L[facility], [facility], L.L.C.d/b/a [facility], [facility], [facility], [facility], L[facility]C., and [facility] as manager of [facility].                                                                Patient/Legal Guardian Signature                                                    This signature was collected at 11/01/2024      Lilia Mathew/Self                                                                            Printed Name/Relationship to Patient                                                [facility] complies with applicable Federal civil rights laws and does not discriminate on the basis of race, color, national origin, age, disability, or sex.          ATENCIÓN: si jacquiela abdulkadir, tiene a dolan disposición servicios gratuitos de asistencia lingüística. Juve barbosa [phone].         [facility] Ý: N?u b?n nói Ti?ng Vi?t, có các d?ch v? h? tr? ngôn ng? mi?n phí dành cho b?n. G?i s? [phone].              REGISTRATION  AUTHORIZATION

## 2024-11-15 NOTE — LETTER
"       AUTHORIZATION FOR RELEASE OF   CONFIDENTIAL INFORMATION    Dear Dr Stephens,    We are seeing Mathew Rodrigues, date of birth 1955, in the clinic at [facility]. Kiley Vasquez MD is the patient's PCP. Mathew Rodrigues has an outstanding lab/procedure at the time we reviewed his chart. In order to help keep his health information updated, he has authorized us to request the following medical record(s):                                               ( X )  EYE EXAM               Diabetic EYE EXAM           Please include the actual eye exam report along with the significant findings:    ________ No Diabetic Retinopathy  ________ Minimal Background Diabetic Retinopathy  ________ Moderate to Severe Background Diabetic Retinopathy  ________ Clinically Significant Macular Edema  ________ Proliferative Diabetic Retinopathy            Please fax records to [facility], [place], Sarah E., MD, [phone] OR [phone].        Thank you  Karen Mari LPN  Clinical Care Coordinator  [facility]            Patient Name: Mathew Rodrigues  : 1955  Patient Phone #: [phone]                A. Consent for Examination and Treatment: I hereby authorize the providers and employees of [facility] ("[facility] to provide medical treatment/services which includes, but is not limited to, performing and administering tests and diagnostic procedures that are deemed necessary, Including, but not limited to, imaging examinations, blood tests and other laboratory procedures as may be required by the hospital, clinic, or may be ordered by my physician(s) or persons working under the general and/or special instructions of my physician(s).                   1.      I understand and agree that this consent covers all authorized persons, including but not limited to physicians, residents, nurse practitioners, physicians' assistants, specialists, consultants, student nurses, and independently " contracted physicians, who are called upon by the physician in charge, to carry out the diagnostic procedures and medical or surgical treatment.  2.      I hereby authorize [facility] to retain or dispose of any specimens or tissue, should there be such remaining from any test or procedure.  3.      I hereby authorize and give consent for [facility] providers and employees to take photographs, images or videotapes of such diagnostic, surgical or treatment procedures of Patient as may be required by [facility] or as may be ordered by a physician.  I further acknowledge and agree that [facility] may use cameras or other devices for patient monitoring.  4.      I am aware that the practice of medicine is not an exact science, and I acknowledge that no guarantees have been made to me as to the outcome of any tests, procedures or treatment.                   B. Authorization for Release of Information:  I understand that my insurance company and/or their agents may need information necessary to make determinations about payment/reimbursement.  I hereby provide authorization to release to all insurance companies, their successors, assignees, other parties with whom they may have contracted, or others acting on their behalf, that are involved with payment for any hospital and/or clinic charges incurred by the patient, any information that they request and deem necessary for payment/reimbursement, and/or quality review.  I further authorize the release of my health information to physicians or other health care practitioners on staff who are involved in my health care now and in the future, and to other health care providers, entities, or institutions for the purpose of my continued care and treatment, including referrals.     C. Medicare Patient's Certification and Authorization to Release Information and Payment Request:  I certify that the information given by me in applying for payment under Title XVIII of the Social Security Act  is correct.  I authorize any toth of medical or other information about me to release to the Social Security Administration, or its intermediaries or carriers, any information needed for this or a related Medicare claim.  I request that payment of authorized benefits be made on my behalf.     D. Assignment of Insurance Benefits:  I hereby authorize any and all insurance companies, health plans, defined benefit plans, health insurers or any entity that is or may be responsible for payment of my medical expenses to pay all hospital and medical benefits now due, and to become due and payable to me under any hospital benefits, sick benefits, injury benefits or any other benefit for services rendered to me, including Major Medical Benefits, direct to [facility] and all independently contracted physicians.  I assign any and all rights that I may have against any and all insurance companies, health plans, defined benefit plans, health insurers or any entity that is or may be responsible for payment of my medical expenses, including, but not limited to any right to appeal a denial of a claim, any right to bring any action, lawsuit, administrative proceeding, or other cause of action on my behalf.  I specifically assign my right to pursue litigation against any and all insurance companies, health plans, defined benefit plans, health insurers or any entity that is or may be responsible for payment of medical expenses based upon a refusal to pay charges.              REGISTRATION  AUTHORIZATION                    E. Valuables:  It is understood and agreed that [facility] is not liable for the damage to or loss of any money, jewelry, documents, dentures, eye glasses, hearing aids, prosthetics, or other property of value.     F. Computer Equipment:  I understand and agree that should I choose to use computer equipment owned by [facility] or if I choose to access the Internet via [facility]'s network, I do so at my own risk.  [facility] is  not responsible for any damage to my computer equipment or to any damages of any type that might arise from my loss of equipment or data.                   G. Acceptance of Financial Responsibility:  I agree that in consideration of the services and supplies that have been or will be furnished to the patient,  I am hereby obligated to pay all charges made for or on the account of the patient according to the standard rates (in effect at the time the services and supplies are delivered) established by [facility], including its Patient Financial Assistance Policy to the extent it is applicable.  I understand that I am responsible for all charges, or portions thereof, not covered by insurance or other sources.  Patient refunds will be distributed only after balances at all [facility] facilities are paid.                   H. Communication Authorization:  I hereby authorize [facility] and its representatives, along with any billing service or  who may work on their behalf, to contact me on my cell phone and/or home phone using prerecorded messages, artificial voice messages, automatic telephone dialing devices or other computer assisted technology, or by electronic mail, text messaging, or by any other form of electronic communication.  This includes, but is not limited to appointment reminders, yearly physical exam reminders, preventive care reminders, patient campaigns, welcome calls, and calls about account balances on my account or any account on which I am listed as a guarantor.  I understand I have the right to opt out of these communications at any time.     I.  Relationship Between Facility and Physician:  I understand that some, but not all, providers furnishing services to the patient are not employees or agents of [facility].  The patient is under the care and supervision of his/her attending physician, and it is the responsibility of the facility and its nursing staff to carry out the instructions of  such physicians.  It is the responsibility of the patient's physician/designee to obtain the patient's informed consent, when required, for medical or surgical treatment, special diagnostic or therapeutic procedures, or hospital services rendered for the patient under the special instructions of the physician/designee.     J. Notice of Private Practices:  I acknowledge I have received a copy of [facility]'s Notice of Privacy Practices.     K. Facility Directory:  I have discussed with the organization my desire to be either included or excluded in the facility directory.  I understand that if my choice is to opt-out of being identified in the facility directory that the facility will not provide any information about me such as my condition (e.g. Fair, stable, etc.) or my location in the facility (e.g. Room number, department).     L. LINKS:  For [place] Residents:  [facility] is a LINKS ([place] [facility]) participating facility.  LINKS is a [facility]-sponsored confidential computer system that helps you and your doctor keep track of you and your child's immunization history.  I acknowledge that I am allowing [facility] to share this information with [facility].  For [place] Residents:  [facility] is a MIIX ([place] Immunization Information eXchange) participant.  MIIX is a [place] Department of Health-sponsored confidential computer system that helps you and your doctor keep track of you and your child's immunization history.  I acknowledge that I am allowing [facility] to share information with [facility].     M. Term:  This authorization is valid for this and subsequent care/treatment I receive at [facility] and will remain valid unless/until revoked in writing by me.      ACKNOWLEDGMENT OF POTENTIAL RISKS OF COVID-19 VACCINE  It is important that you, as the patient or patients legally authorized representative(s), understand and acknowledge the following, with regard to administration of the  COVID-19 vaccine offered by [facility]:  The SARS-CoV-2 virus (COVID-19) has caused an unprecedented modern global pandemic that has mobilized scientists and drug manufacturers to work to create safe and effective vaccines to get the crisis under control.  No vaccine is released in the [place] without undergoing rigorous, multi-layered testing and approval by the Food and Drug Administration.  During a public health emergency, however, vaccines can be released for patient administration by the FDA prior to completion of multi-phase clinical trials and approval. This is done by the FDAs granting of Emergency Use Authorization (EUA) when the vaccine meets reasonable thresholds for safety and effectiveness and people are in urgent need of care. Under an EUA, the FDA has found that known and potential benefits outweigh its known and potential risks.  The vaccine for which you are presenting to [facility] has been released under an EUA, which [facility] is honoring in its distribution of the vaccine to the public. While the FDAs authorization indicates its belief that usage is recommended over possible risks, there is still the possibility that unknown risks of the vaccine could exist.  By signing this document, you acknowledge and assume these risks. Further, you waive any and all claims of liability against and hold harmless any [facility] entity or provider for any harm caused to you by said possible unknown risks of the vaccine.        N. [facility]:  As used in this document, [facility] means all [facility] affiliated entities including all health centers, surgery centers, clinics, and hospitals.  It includes more specifically, the following entities: [facility], a not for profit [facility], and its subsidiaries and affiliates, including [facility], [facility]  [facility], [facility], [facility]., [facility], [facility]d/b/a [facility], L.L.C.d/b/a [facility], [employer], [employer] As manager of [facility], [facility], [facility], L.L.C.d/b/a [facility], [facility] and [facility] as manager of [facility].                                                                Patient/Legal Guardian Signature                                                    This signature was collected at 11/01/2024      Lily Rodriguesn/Self                                                                            Printed Name/Relationship to Patient

## 2024-11-15 NOTE — PROGRESS NOTES
Population Health Chart Review & Patient Outreach Details      Additional [facility] Health Notes:               Updates Requested / Reviewed:      Updated Care Coordination Note, Care Everywhere, , Care Team Updated, and Immunizations Reconciliation Completed or Queried: [facility] Topics Overdue:      [facility] Score: 3     Urine Screening  Eye Exam  AAA Screening    Pneumonia Vaccine  Tetanus Vaccine  RSV Vaccine                  Health Maintenance Topic(s) Outreach Outcomes & Actions Taken:    Eye Exam - Outreach Outcomes & Actions Taken  : External Records Requested & Care Team Updated if Applicable

## 2024-11-15 NOTE — PROGRESS NOTES
Population Health Chart Review & Patient Outreach Details      Additional [facility] Notes:               Updates Requested / Reviewed:      Updated Care Coordination Note         Health Maintenance Topics Overdue:      VB Score: 4     Urine Screening  Eye Exam  Foot Exam  AAA Screening    Pneumonia Vaccine  Tetanus Vaccine  RSV Vaccine                  Health Maintenance Topic(s) Outreach Outcomes & Actions Taken:    Eye Exam - Outreach Outcomes & Actions Taken  : External Records Requested & Care Team Updated if Applicable

## 2024-11-15 NOTE — PROGRESS NOTES
Subjective:       Patient ID: Mathew Rodrigues is a 69 y.o. male.    Chief Complaint: Follow-up (No concerns)    Presents today to follow up.     Since the last time he saw me he was improving with Dr. Quinteros but then he got Covid and things got worse. He is overall improving and now is on tylenol with codeine at night for his cough. He is no longer on the prednisone.     Then he had a rectal abscess. Had is incised and drained at the bedside- he has had follow up and he is trying to avoid a fistula.     He is getting sputum cultures with Dr. Quinteros with the chronic cough.     He was going to talk about not sleeping, but Dr. Quinteros wants him settled with the codeine, but he wants him to work with just his trazodone for now. He has just started that and feels that he did sleep a little better last night.         .  Patient Active Problem List   Diagnosis    CRICKET on CPAP, 100% use    Primary hypertension, dx 2015    Type 2 diabetes mellitus, with long-term current use of insulin    Mixed hyperlipidemia    Gastroesophageal reflux disease without esophagitis    Benign prostatic hyperplasia without lower urinary tract symptoms    Chronic pain syndrome    Chronic right shoulder pain    Hypogonadism in male    Primary osteoarthritis involving multiple joints    Chronic rhinitis    Insomnia    CKD stage 3b, GFR 30-44 ml/min    Chronic obstructive lung disease    Diastolic heart failure, unspecified HF chronicity, at time of COVID 2020    OTT (dyspnea on exertion), post COVID, 2/2020    COVID-19 long cameron manifesting chronic dyspnea, sputum and cough    Electrocution and nonfatal effects of electric current, 2015    Uncomplicated opioid dependence, Nucynta bid since 2015    Abnormal ECG    Chronic deep vein thrombosis (DVT) of proximal vein of left lower extremity, 8/2023    Anticoagulant long-term use    Abdominal obesity    Abnormal nuclear stress test, NOCAD    LVH (left ventricular hypertrophy) due to hypertensive disease, with  heart failure    Chronic fatigue    Severe persistent asthma without complication    Anemia    Chronic cough    Excessive daytime sleepiness    White coat syndrome with diagnosis of hypertension    Perianal abscess    CKD (chronic kidney disease), stage III    Severe persistent asthma with acute exacerbation    Other chest pain    Obstructive sleep apnea on CPAP    Bronchitis, chronic, mucopurulent     Mathew has a current medication list which includes the following prescription(s): acetaminophen-codeine 300-30mg, [START ON 1/8/2025] acetaminophen-codeine 300-30mg, [START ON 12/12/2024] acetaminophen-codeine 300-30mg, albuterol, albuterol, ascorbic acid (vitamin c), atorvastatin, bd almas 2nd gen pen needle, empagliflozin, fenofibrate, ferrous sulfate, fluticasone propionate, insulin degludec, lisinopril-hydrochlorothiazide, loratadine, melatonin, ozempic, sildenafil, testosterone cypionate, tezspire, trazodone, trelegy ellipta, amlodipine, benzonatate, buspirone, cyanocobalamin, glipizide, guaifenesin-codeine 100-10 mg/5 ml, guanfacine, metformin, omeprazole, leo-synephrine (phenylephrine), and tamsulosin.    Review of Systems   Constitutional:  Negative for activity change, appetite change, fatigue and fever.   Respiratory:  Negative for shortness of breath.    Gastrointestinal:  Negative for abdominal pain.   Integumentary:  Negative for rash.         Health Maintenance Due   Topic Date Due    TETANUS VACCINE  Never done    RSV Vaccine (Age 60+ and Pregnant patients) (1 - Risk 60-74 years 1-dose series) Never done    Abdominal Aortic Aneurysm Screening  Never done    Pneumococcal Vaccines (Age 65+) (2 of 2 - PCV) 08/24/2021    Eye Exam  03/14/2024    Diabetes Urine Screening  08/08/2024    COVID-19 Vaccine (6 - 2024-25 season) 09/01/2024    Foot Exam  11/10/2024      Health Maintenance reviewed and discussed- Nothing additional desired today. .   Objective:      Physical Exam  Vitals and nursing note reviewed.    Constitutional:       General: He is not in acute distress.     Appearance: He is not ill-appearing.   Cardiovascular:      Rate and Rhythm: Normal rate and regular rhythm.      Heart sounds: No murmur heard.  Pulmonary:      Effort: Pulmonary effort is normal.      Breath sounds: Normal breath sounds. No wheezing.      Comments: Frequent cough  Skin:     General: Skin is warm and dry.      Findings: No rash.   Neurological:      Mental Status: He is alert.   Psychiatric:         Mood and Affect: Mood normal.         Behavior: Behavior normal.       Protective Sensation (w/ 10 gram monofilament):  Right: Intact  Left: Intact    Visual Inspection:  Normal -  Bilateral    Pedal Pulses:   Right: Present  Left: Present    Posterior Tibialis Pulses:   Right:Present  Left: Present      Assessment:       1. Severe persistent asthma without complication    2. Primary hypertension, dx 2015    3. Mixed hyperlipidemia    4. CKD stage 3b, GFR 30-44 ml/min    5. Type 2 diabetes mellitus without complication, with long-term current use of insulin    6. Essential hypertension, benign    7. Anxiety    8. Vitamin B12 deficiency    9. Type 2 diabetes mellitus without complication, without long-term current use of insulin    10. Benign prostatic hyperplasia without lower urinary tract symptoms        Plan:       1. Severe persistent asthma without complication    2. Primary hypertension, dx 2015    3. Mixed hyperlipidemia  Assessment & Plan:  Stable. Well controlled. Continue current medications.         4. CKD stage 3b, GFR 30-44 ml/min  Assessment & Plan:  Stable. Well controlled. Continue current medications.           5. Type 2 diabetes mellitus without complication, with long-term current use of insulin  Assessment & Plan:  Stable. Well controlled. Continue current medications.       6. Essential hypertension, benign  -     amLODIPine (NORVASC) 5 MG tablet; Take 1 tablet (5 mg total) by mouth once daily.  Dispense: 90 tablet;  Refill: 0    7. Anxiety  -     busPIRone (BUSPAR) 5 MG Tab; Take 1 tablet (5 mg total) by mouth every evening.  Dispense: 90 tablet; Refill: 3    8. Vitamin B12 deficiency  -     cyanocobalamin 1,000 mcg/mL injection; Inject 1 mL (1,000 mcg total) into the muscle every 14 (fourteen) days.  Dispense: 6 mL; Refill: 3    9. Type 2 diabetes mellitus without complication, without long-term current use of insulin  -     glipiZIDE (GLUCOTROL) 5 MG tablet; Take 1 tablet (5 mg total) by mouth 2 (two) times daily with meals.  Dispense: 180 tablet; Refill: 1  -     metFORMIN (GLUCOPHAGE) 1000 MG tablet; Take 1 tablet (1,000 mg total) by mouth 2 (two) times daily with meals.  Dispense: 180 tablet; Refill: 1  -     Microalbumin/Creatinine Ratio, Urine; Future; Expected date: 11/15/2024    10. Benign prostatic hyperplasia without lower urinary tract symptoms  -     tamsulosin (FLOMAX) 0.4 mg Cap; Take 1 capsule (0.4 mg total) by mouth nightly.  Dispense: 90 capsule; Refill: 3    Other orders  -     guanFACINE (TENEX) 2 MG tablet; TAKE 1 TABLET(2 MG) BY MOUTH EVERY MORNING  Dispense: 90 tablet; Refill: 0  -     omeprazole (PRILOSEC) 40 MG capsule; Take 1 capsule (40 mg total) by mouth 2 (two) times daily before meals.  Dispense: 60 capsule; Refill: 11

## 2024-11-16 ENCOUNTER — LAB VISIT (OUTPATIENT)
Dept: LAB | Facility: HOSPITAL | Age: 69
End: 2024-11-16
Attending: INTERNAL MEDICINE
Payer: MEDICARE

## 2024-11-16 DIAGNOSIS — J41.1 BRONCHITIS, CHRONIC, MUCOPURULENT: ICD-10-CM

## 2024-11-16 PROCEDURE — 87206 SMEAR FLUORESCENT/ACID STAI: CPT | Performed by: INTERNAL MEDICINE

## 2024-11-16 PROCEDURE — 87116 MYCOBACTERIA CULTURE: CPT | Performed by: INTERNAL MEDICINE

## 2024-11-16 PROCEDURE — 87015 SPECIMEN INFECT AGNT CONCNTJ: CPT | Performed by: INTERNAL MEDICINE

## 2024-11-18 LAB
ACID FAST MOD KINY STN SPEC: NORMAL
ACID FAST MOD KINY STN SPEC: NORMAL
MYCOBACTERIUM SPEC QL CULT: NORMAL
MYCOBACTERIUM SPEC QL CULT: NORMAL

## 2024-11-19 ENCOUNTER — ANESTHESIA (OUTPATIENT)
Dept: ENDOSCOPY | Facility: HOSPITAL | Age: 69
End: 2024-11-19
Payer: MEDICARE

## 2024-11-19 ENCOUNTER — HOSPITAL ENCOUNTER (OUTPATIENT)
Facility: HOSPITAL | Age: 69
Discharge: HOME | End: 2024-11-19
Attending: INTERNAL MEDICINE | Admitting: INTERNAL MEDICINE
Payer: MEDICARE

## 2024-11-19 ENCOUNTER — ANESTHESIA EVENT (OUTPATIENT)
Dept: ENDOSCOPY | Facility: HOSPITAL | Age: 69
End: 2024-11-19
Payer: MEDICARE

## 2024-11-19 DIAGNOSIS — K63.5 POLYP OF COLON, UNSPECIFIED PART OF COLON, UNSPECIFIED TYPE: Primary | ICD-10-CM

## 2024-11-19 DIAGNOSIS — K29.70 GASTRITIS, PRESENCE OF BLEEDING UNSPECIFIED, UNSPECIFIED CHRONICITY, UNSPECIFIED GASTRITIS TYPE: ICD-10-CM

## 2024-11-19 DIAGNOSIS — K64.8 INTERNAL HEMORRHOIDS: ICD-10-CM

## 2024-11-19 DIAGNOSIS — K57.90 DIVERTICULOSIS: ICD-10-CM

## 2024-11-19 DIAGNOSIS — D50.9 IDA (IRON DEFICIENCY ANEMIA): ICD-10-CM

## 2024-11-19 PROCEDURE — 27201089 HC SNARE, DISP (ANY): Performed by: INTERNAL MEDICINE

## 2024-11-19 PROCEDURE — 45385 COLONOSCOPY W/LESION REMOVAL: CPT | Mod: 22,,, | Performed by: INTERNAL MEDICINE

## 2024-11-19 PROCEDURE — 43239 EGD BIOPSY SINGLE/MULTIPLE: CPT | Mod: ,,, | Performed by: INTERNAL MEDICINE

## 2024-11-19 PROCEDURE — 63600175 PHARM REV CODE 636 W HCPCS: Performed by: NURSE ANESTHETIST, CERTIFIED REGISTERED

## 2024-11-19 PROCEDURE — 37000009 HC ANESTHESIA EA ADD 15 MINS: Performed by: INTERNAL MEDICINE

## 2024-11-19 PROCEDURE — 27201012 HC FORCEPS, HOT/COLD, DISP: Performed by: INTERNAL MEDICINE

## 2024-11-19 PROCEDURE — 45380 COLONOSCOPY AND BIOPSY: CPT | Mod: 59 | Performed by: INTERNAL MEDICINE

## 2024-11-19 PROCEDURE — 45380 COLONOSCOPY AND BIOPSY: CPT | Mod: 22,59,, | Performed by: INTERNAL MEDICINE

## 2024-11-19 PROCEDURE — 43239 EGD BIOPSY SINGLE/MULTIPLE: CPT | Performed by: INTERNAL MEDICINE

## 2024-11-19 PROCEDURE — 88305 TISSUE EXAM BY PATHOLOGIST: CPT | Mod: TC,59 | Performed by: PATHOLOGY

## 2024-11-19 PROCEDURE — 45385 COLONOSCOPY W/LESION REMOVAL: CPT | Performed by: INTERNAL MEDICINE

## 2024-11-19 PROCEDURE — 37000008 HC ANESTHESIA 1ST 15 MINUTES: Performed by: INTERNAL MEDICINE

## 2024-11-19 RX ORDER — LIDOCAINE HYDROCHLORIDE 20 MG/ML
INJECTION INTRAVENOUS
Status: DISCONTINUED | OUTPATIENT
Start: 2024-11-19 | End: 2024-11-19

## 2024-11-19 RX ORDER — PROPOFOL 10 MG/ML
VIAL (ML) INTRAVENOUS
Status: DISCONTINUED | OUTPATIENT
Start: 2024-11-19 | End: 2024-11-19

## 2024-11-19 RX ADMIN — PROPOFOL 50 MG: 10 INJECTION, EMULSION INTRAVENOUS at 10:11

## 2024-11-19 RX ADMIN — LIDOCAINE HYDROCHLORIDE 100 MG: 20 INJECTION, SOLUTION INTRAVENOUS at 09:11

## 2024-11-19 RX ADMIN — PROPOFOL 50 MG: 10 INJECTION, EMULSION INTRAVENOUS at 09:11

## 2024-11-19 RX ADMIN — PROPOFOL 100 MG: 10 INJECTION, EMULSION INTRAVENOUS at 09:11

## 2024-11-19 NOTE — TRANSFER OF CARE
"Anesthesia Transfer of Care Note    Patient: Mathew Rodrigues    Procedure(s) Performed: Procedure(s) (LRB):  EGD (ESOPHAGOGASTRODUODENOSCOPY) (pt diabetic) (N/A)  COLONOSCOPY (N/A)    Patient location: GI    Anesthesia Type: general    Transport from OR: Transported from OR on room air with adequate spontaneous ventilation    Post pain: adequate analgesia    Post assessment: no apparent anesthetic complications and tolerated procedure well    Post vital signs: stable    Level of consciousness: awake, alert and oriented    Nausea/Vomiting: no nausea/vomiting    Complications: none    Transfer of care protocol was followed    Last vitals: Visit Vitals  /76 (BP Location: Left arm, Patient Position: Lying)   Pulse 74   Temp 37.2 °C (99 °F) (Skin)   Resp 18   Ht 5' 8" (1.727 m)   Wt 85.7 kg (189 lb)   SpO2 (!) 94%   BMI 28.74 kg/m²     "

## 2024-11-19 NOTE — ANESTHESIA POSTPROCEDURE EVALUATION
Anesthesia Post Evaluation    Patient: Mathew Rodrigues    Procedure(s) Performed: Procedure(s) (LRB):  EGD (ESOPHAGOGASTRODUODENOSCOPY) (pt diabetic) (N/A)  COLONOSCOPY (N/A)    Final Anesthesia Type: general      Patient location during evaluation: PACU  Patient participation: Yes- Able to Participate  Level of consciousness: awake and alert and oriented  Post-procedure vital signs: reviewed and stable  Pain management: adequate  Airway patency: patent    PONV status at discharge: No PONV  Anesthetic complications: no      Cardiovascular status: blood pressure returned to baseline and stable  Respiratory status: unassisted and spontaneous ventilation  Hydration status: euvolemic  Follow-up not needed.              Vitals Value Taken Time   /68 11/19/24 1115   Temp 36.7 °C (98 °F) 11/19/24 1115   Pulse 68 11/19/24 1115   Resp 18 11/19/24 1115   SpO2 99 % 11/19/24 1115         Event Time   Out of Recovery 11:29:50         Pain/Jesus Manuel Score: No data recorded

## 2024-11-19 NOTE — ANESTHESIA PREPROCEDURE EVALUATION
11/19/2024  Mathew IDA Rodrigues is a 69 y.o., male.      Pre-op Assessment    I have reviewed the Patient Summary Reports.     I have reviewed the Nursing Notes. I have reviewed the NPO Status.   I have reviewed the Medications.     Review of Systems  Anesthesia Hx:  No problems with previous Anesthesia                Social:  Former Smoker       Cardiovascular:     Hypertension, well controlled           hyperlipidemia OTT   LVH                             Pulmonary:   COPD, moderate Asthma mild Shortness of breath  Sleep Apnea DVT                 Renal/:  Chronic Renal Disease (3b), CKD                Hepatic/GI:     GERD                Musculoskeletal:  Arthritis          Spine Disorders:  Chronic Pain           Neurological:        Chronic Pain Syndrome   Peripheral Neuropathy                          Endocrine:  Diabetes, well controlled, type 2           Psych:  Psychiatric History (PTSD)                Physical Exam  General: Well nourished, Cooperative, Alert and Oriented    Airway:  Mallampati: II   Mouth Opening: Normal  TM Distance: Normal  Neck ROM: Normal ROM    Anesthesia Plan  Type of Anesthesia, risks & benefits discussed:    Anesthesia Type: Gen ETT, Gen Supraglottic Airway, Gen Natural Airway, MAC  Intra-op Monitoring Plan: Standard ASA Monitors  Post Op Pain Control Plan: multimodal analgesia  Induction:  IV  Airway Plan: Direct, Video and Fiberoptic, Post-Induction  Informed Consent: Informed consent signed with the Patient and all parties understand the risks and agree with anesthesia plan.  All questions answered.   ASA Score: 3    Ready For Surgery From Anesthesia Perspective.   .

## 2024-11-19 NOTE — PROVATION PATIENT INSTRUCTIONS
Discharge Summary/Instructions after an Endoscopic Procedure  Patient Name: Mathew Rodrigues  Patient MRN: [id number]  Patient YOB: 1955 Tuesday, November 19, 2024  Kameron Walters MD  Dear patient,  As a result of recent federal legislation (The Federal Cures Act), you may   receive lab or pathology results from your procedure in your [facility]   account before your physician is able to contact you. Your physician or   their representative will relay the results to you with their   recommendations at their soonest availability.  Thank you,  RESTRICTIONS:  During your procedure today, you received medications for sedation.  These   medications may affect your judgment, balance and coordination.  Therefore,   for 24 hours, you have the following restrictions:   - DO NOT drive a car, operate machinery, make legal/financial decisions,   sign important papers or drink alcohol.    ACTIVITY:  Today: no heavy lifting, straining or running due to procedural   sedation/anesthesia.  The following day: return to full activity including work.  DIET:  Eat and drink normally unless instructed otherwise.     TREATMENT FOR COMMON SIDE EFFECTS:  - Mild abdominal pain, nausea, belching, bloating or excessive gas:  rest,   eat lightly and use a heating pad.  - Sore Throat: treat with throat lozenges and/or gargle with warm salt   water.  - Because air was used during the procedure, expelling large amounts of air   from your rectum or belching is normal.  - If a bowel prep was taken, you may not have a bowel movement for 1-3 days.    This is normal.  SYMPTOMS TO WATCH FOR AND REPORT TO YOUR PHYSICIAN:  1. Abdominal pain or bloating, other than gas cramps.  2. Chest pain.  3. Back pain.  4. Signs of infection such as: chills or fever occurring within 24 hours   after the procedure.  5. Rectal bleeding, which would show as bright red, maroon, or black stools.   (A tablespoon of blood from the rectum is not serious, especially  if   hemorrhoids are present.)  6. Vomiting.  7. Weakness or dizziness.  GO DIRECTLY TO THE NEAREST EMERGENCY ROOM IF YOU HAVE ANY OF THE FOLLOWING:      Difficulty breathing              Chills and/or fever over 101 F   Persistent vomiting and/or vomiting blood   Severe abdominal pain   Severe chest pain   Black, tarry stools   Bleeding- more than one tablespoon   Any other symptom or condition that you feel may need urgent attention  Your doctor recommends these additional instructions:  If any biopsies were taken, your doctors clinic will contact you in 1 to 2   weeks with any results.  - Patient has a contact number available for emergencies.  The signs and   symptoms of potential delayed complications were discussed with the   patient.  Return to normal activities tomorrow.  Written discharge   instructions were provided to the patient.   - High fiber diet.   - Continue present medications.   - Await pathology results.   - Repeat colonoscopy in 3 years for surveillance.   - Discharge patient to home (ambulatory).   - To visualize the small bowel, perform video capsule endoscopy at   appointment to be scheduled.   - Return to GI office after studies are complete.  For questions, problems or results please call your physician - Kameron Walters MD at Work:  [phone].  [facility], EMERGENCY ROOM PHONE NUMBER: [phone]  IF A COMPLICATION OR EMERGENCY SITUATION ARISES AND YOU ARE UNABLE TO REACH   YOUR PHYSICIAN - GO DIRECTLY TO THE EMERGENCY ROOM.  Kameron Walters MD  11/19/2024 10:25:32 AM  This report has been verified and signed electronically.  Dear patient,  As a result of recent federal legislation (The Federal Cures Act), you may   receive lab or pathology results from your procedure in your [facility]   account before your physician is able to contact you. Your physician or   their representative will relay the results to you with their   recommendations at their soonest  availability.  Thank you,  PROVATION

## 2024-11-19 NOTE — PLAN OF CARE
Addended by: Chad Shipman on: 12/20/2019 01:37 PM     Modules accepted: Orders Py ambulated in room with steady gait, denies pain or nausea.  Capsule instrcutions given by Minna FORD, pt verbalized understanding.  States that he is ready for discharge.

## 2024-11-19 NOTE — PROVATION PATIENT INSTRUCTIONS
Discharge Summary/Instructions after an Endoscopic Procedure  Patient Name: Mathew Rodrigues  Patient MRN: [id number]  Patient YOB: 1955 Tuesday, November 19, 2024  Kameron Walters MD  Dear patient,  As a result of recent federal legislation (The Federal Cures Act), you may   receive lab or pathology results from your procedure in your [facility]   account before your physician is able to contact you. Your physician or   their representative will relay the results to you with their   recommendations at their soonest availability.  Thank you,  RESTRICTIONS:  During your procedure today, you received medications for sedation.  These   medications may affect your judgment, balance and coordination.  Therefore,   for 24 hours, you have the following restrictions:   - DO NOT drive a car, operate machinery, make legal/financial decisions,   sign important papers or drink alcohol.    ACTIVITY:  Today: no heavy lifting, straining or running due to procedural   sedation/anesthesia.  The following day: return to full activity including work.  DIET:  Eat and drink normally unless instructed otherwise.     TREATMENT FOR COMMON SIDE EFFECTS:  - Mild abdominal pain, nausea, belching, bloating or excessive gas:  rest,   eat lightly and use a heating pad.  - Sore Throat: treat with throat lozenges and/or gargle with warm salt   water.  - Because air was used during the procedure, expelling large amounts of air   from your rectum or belching is normal.  - If a bowel prep was taken, you may not have a bowel movement for 1-3 days.    This is normal.  SYMPTOMS TO WATCH FOR AND REPORT TO YOUR PHYSICIAN:  1. Abdominal pain or bloating, other than gas cramps.  2. Chest pain.  3. Back pain.  4. Signs of infection such as: chills or fever occurring within 24 hours   after the procedure.  5. Rectal bleeding, which would show as bright red, maroon, or black stools.   (A tablespoon of blood from the rectum is not serious, especially  if   hemorrhoids are present.)  6. Vomiting.  7. Weakness or dizziness.  GO DIRECTLY TO THE NEAREST EMERGENCY ROOM IF YOU HAVE ANY OF THE FOLLOWING:      Difficulty breathing              Chills and/or fever over 101 F   Persistent vomiting and/or vomiting blood   Severe abdominal pain   Severe chest pain   Black, tarry stools   Bleeding- more than one tablespoon   Any other symptom or condition that you feel may need urgent attention  Your doctor recommends these additional instructions:  If any biopsies were taken, your doctors clinic will contact you in 1 to 2   weeks with any results.  - Patient has a contact number available for emergencies.  The signs and   symptoms of potential delayed complications were discussed with the   patient.  Return to normal activities tomorrow.  Written discharge   instructions were provided to the patient.   - Resume previous diet.   - Continue present medications.   - No aspirin, ibuprofen, naproxen, or other non-steroidal anti-inflammatory   drugs.   - Await pathology results.   - Discharge patient to home (ambulatory).   - Follow an antireflux regimen.   - Return to GI office PRN.  For questions, problems or results please call your physician - Kameron Walters MD at Work:  [phone].  [facility], EMERGENCY ROOM PHONE NUMBER: [phone]  IF A COMPLICATION OR EMERGENCY SITUATION ARISES AND YOU ARE UNABLE TO REACH   YOUR PHYSICIAN - GO DIRECTLY TO THE EMERGENCY ROOM.  Kameron Walters MD  11/19/2024 9:59:19 AM  This report has been verified and signed electronically.  Dear patient,  As a result of recent federal legislation (The Federal Cures Act), you may   receive lab or pathology results from your procedure in your [facility]   account before your physician is able to contact you. Your physician or   their representative will relay the results to you with their   recommendations at their soonest availability.  Thank you,  PROVATION

## 2024-11-19 NOTE — H&P (VIEW-ONLY)
CC: LOREN    69 year old male with above. States that symptoms are absent, no alleviating/exacerbating factors. No family history of colorectal CA. No personal history of polyps. No bleeding or weight loss.     ROS:  No headache, no fever/chills, no chest pain/SOB, no nausea/vomiting/diarrhea/constipation/GI bleeding/abdominal pain, no dysuria/hematuria.    VSSAF   Exam:   Alert and oriented x 3; no apparent distress   PERRLA, sclera anicteric  CV: Regular rate/rhythm, normal PMI   Lungs: Clear bilaterally with no wheeze/rales   Abdomen: Soft, NT/ND, normal bowel sounds   Ext: No cyanosis, clubbing     Impression:   As above    Plan:   Proceed with endoscopy. Further recs to follow.

## 2024-11-20 VITALS
RESPIRATION RATE: 18 BRPM | HEART RATE: 68 BPM | BODY MASS INDEX: 28.64 KG/M2 | SYSTOLIC BLOOD PRESSURE: 110 MMHG | WEIGHT: 189 LBS | HEIGHT: 68 IN | OXYGEN SATURATION: 99 % | TEMPERATURE: 98 F | DIASTOLIC BLOOD PRESSURE: 68 MMHG

## 2024-11-21 ENCOUNTER — PATIENT OUTREACH (OUTPATIENT)
Dept: ADMINISTRATIVE | Facility: HOSPITAL | Age: 69
End: 2024-11-21
Payer: MEDICARE

## 2024-11-21 NOTE — PROGRESS NOTES
Please notify patient that biopsies reviewed and showed no bacteria.  Continue current meds and follow up as previously planned.  Please advise patient that polyp pathology was reviewed and is benign and is the adenomatous type which is precancerous/risk factor for cancer.  Repeat colonoscopy recommended in 3 years.  Place reminder in system for repeat colonoscopy.

## 2024-11-25 ENCOUNTER — PATIENT MESSAGE (OUTPATIENT)
Dept: PULMONOLOGY | Facility: CLINIC | Age: 69
End: 2024-11-25
Payer: MEDICARE

## 2024-12-10 ENCOUNTER — HOSPITAL ENCOUNTER (OUTPATIENT)
Facility: HOSPITAL | Age: 69
Discharge: HOME | End: 2024-12-10
Attending: INTERNAL MEDICINE | Admitting: INTERNAL MEDICINE
Payer: MEDICARE

## 2024-12-10 DIAGNOSIS — D50.9 IDA (IRON DEFICIENCY ANEMIA): ICD-10-CM

## 2024-12-10 PROCEDURE — 25000003 PHARM REV CODE 250: Performed by: INTERNAL MEDICINE

## 2024-12-10 PROCEDURE — 91110 GI TRC IMG INTRAL ESOPH-ILE: CPT | Mod: TC | Performed by: INTERNAL MEDICINE

## 2024-12-10 RX ORDER — DEXTROMETHORPHAN/PSEUDOEPHED 2.5-7.5/.8
40 DROPS ORAL ONCE
Status: COMPLETED | OUTPATIENT
Start: 2024-12-10 | End: 2024-12-10

## 2024-12-10 RX ADMIN — SIMETHICONE 40 MG: 20 SUSPENSION/ DROPS ORAL at 07:12

## 2024-12-10 NOTE — DISCHARGE INSTRUCTIONS
Capsule Discharge Instructions     You have just swallowed a capsule endoscope.  This contains information about what to expect over the next 8 hours.  Please call our office if you have severe or persistent abdominal or chest pain, fever, difficulty swallowing or if you have any questions.  Our phone number is [phone].    Time Capsule ingested:__________730am_____    You may drink clear liquids (water, apple juice) 4 hours after swallowing the capsule.  You may eat a light meal 6 hours after swallowing the capsule.  Medications may be resumed at 6 hours after swallowing the capsule.  Do not exercise, avoid heavy lifting.  You may walk, sit and lay down.  You can drive a car.  You may return to work, if you work allows avoiding unsuitable environments and /or physical movements.  Avoid going near MRI machines and radio transmitters.  You may use a computer, cell phone, radio or stereo.  Do not stand directly next to another person undergoing capsule endoscopy.  Try not to touch the recorder or the sensor array leads.  Do not remove the leads before 8 hours.  Avoid getting the data recorder or sensor array leads wet.  You may loosen the belt to allow yourself to go to the bathroom.  Do not take the belt off until the 8 hours have passed.  Observe the LED light on the data recorder at least every 15 minutes.  If the light stops blinking, document the time and call our office.  Return the unit to the hospital at the completion of 8 hour time frame.    May have clear liquids at __1130am____________    May have light snack and medications at _____130pm_________    May remove the unit at _______330pm_______    Return the unit to Registration Desk at the completion of the study.    Post Capsule Instructions     This information is what to expect over the next 2 days.  Please call us or your doctor if you have severe or persistent abdominal or chest pain, fever, difficulty swallowing, or if you have any questions.   Our phone number is [phone].    Pain:  Pain is uncommon following capsule endoscopy.  Should you feel sharp or persistent pain, please call your doctor's office.  Nausea:  Nausea is also very uncommon and should it occur, please notify your doctor's office  Diet:  You may eat and drink with no restrictions 8 hours after ingesting capsule.  Activities:  Following the exam you may resume normal activities, including exercise.  Medications:  You may resume your medications 6 hours after ingesting the capsule.  Do NOT make up doses you have missed, just resume your normal dosage.  Further Testing:  Until the capsule passes, further testing which includes any type of MRI should be avoided.  If you have any type of MRI examination scheduled in the next 30 days, it should be postponed.  The Capsule:  The capsule passes naturally in a bowel movement typically in about 24 hours.  Most likely, you will be unaware of its passage.  It does not need to be retrieved and can safely be flushed down the toilet.  Occasionally the capsule light will still be flashing when it passes.  Should you be concerned that the capsule didn't pass, in the absence of symptoms; an abdominal x-ray can be obtained after 7 days to confirm its passage.  The  will make a beeping noise when finished.  It will shut off automatically.

## 2024-12-11 VITALS
HEART RATE: 66 BPM | OXYGEN SATURATION: 92 % | RESPIRATION RATE: 18 BRPM | SYSTOLIC BLOOD PRESSURE: 125 MMHG | DIASTOLIC BLOOD PRESSURE: 73 MMHG | TEMPERATURE: 99 F

## 2024-12-13 PROCEDURE — 91110 GI TRC IMG INTRAL ESOPH-ILE: CPT | Mod: 26,,, | Performed by: INTERNAL MEDICINE

## 2024-12-13 NOTE — PROVATION PATIENT INSTRUCTIONS
Discharge Summary/Instructions after an Endoscopic Procedure  Patient Name: Mathew Rodrigues  Patient MRN: [id number]  Patient YOB: 1955  Tuesday, December 10, 2024  Kameron Walters MD  Dear patient,  As a result of recent federal legislation (The Federal Cures Act), you may   receive lab or pathology results from your procedure in your [facility]   account before your physician is able to contact you. Your physician or   their representative will relay the results to you with their   recommendations at their soonest availability.  Thank you,  RESTRICTIONS:  During your procedure today, you received medications for sedation.  These   medications may affect your judgment, balance and coordination.  Therefore,   for 24 hours, you have the following restrictions:   - DO NOT drive a car, operate machinery, make legal/financial decisions,   sign important papers or drink alcohol.    ACTIVITY:  Today: no heavy lifting, straining or running due to procedural   sedation/anesthesia.  The following day: return to full activity including work.  DIET:  Eat and drink normally unless instructed otherwise.     TREATMENT FOR COMMON SIDE EFFECTS:  - Mild abdominal pain, nausea, belching, bloating or excessive gas:  rest,   eat lightly and use a heating pad.  - Sore Throat: treat with throat lozenges and/or gargle with warm salt   water.  - Because air was used during the procedure, expelling large amounts of air   from your rectum or belching is normal.  - If a bowel prep was taken, you may not have a bowel movement for 1-3 days.    This is normal.  SYMPTOMS TO WATCH FOR AND REPORT TO YOUR PHYSICIAN:  1. Abdominal pain or bloating, other than gas cramps.  2. Chest pain.  3. Back pain.  4. Signs of infection such as: chills or fever occurring within 24 hours   after the procedure.  5. Rectal bleeding, which would show as bright red, maroon, or black stools.   (A tablespoon of blood from the rectum is not serious, especially  if   hemorrhoids are present.)  6. Vomiting.  7. Weakness or dizziness.  GO DIRECTLY TO THE NEAREST EMERGENCY ROOM IF YOU HAVE ANY OF THE FOLLOWING:      Difficulty breathing              Chills and/or fever over 101 F   Persistent vomiting and/or vomiting blood   Severe abdominal pain   Severe chest pain   Black, tarry stools   Bleeding- more than one tablespoon   Any other symptom or condition that you feel may need urgent attention  Your doctor recommends these additional instructions:  If any biopsies were taken, your doctors clinic will contact you in 1 to 2   weeks with any results.  1.  Iron supplementation PRN  2.  Follow up with PCP  3.  No further GI endoscopy at this time unless overt bleeding occurs.  For questions, problems or results please call your physician - Kameron Walters MD at Work:  [phone].  [facility], EMERGENCY ROOM PHONE NUMBER: [phone]  IF A COMPLICATION OR EMERGENCY SITUATION ARISES AND YOU ARE UNABLE TO REACH   YOUR PHYSICIAN - GO DIRECTLY TO THE EMERGENCY ROOM.  Kameron Walters MD  12/13/2024 2:23:29 PM  This report has been verified and signed electronically.  Dear patient,  As a result of recent federal legislation (The Federal Cures Act), you may   receive lab or pathology results from your procedure in your [facility]   account before your physician is able to contact you. Your physician or   their representative will relay the results to you with their   recommendations at their soonest availability.  Thank you,  PROVATION

## 2024-12-18 ENCOUNTER — PATIENT MESSAGE (OUTPATIENT)
Dept: ADMINISTRATIVE | Facility: OTHER | Age: 69
End: 2024-12-18
Payer: MEDICARE

## 2024-12-23 ENCOUNTER — PATIENT MESSAGE (OUTPATIENT)
Dept: FAMILY MEDICINE | Facility: CLINIC | Age: 69
End: 2024-12-23
Payer: MEDICARE

## 2024-12-23 DIAGNOSIS — N40.0 BENIGN PROSTATIC HYPERPLASIA WITHOUT LOWER URINARY TRACT SYMPTOMS: ICD-10-CM

## 2024-12-23 RX ORDER — TAMSULOSIN HYDROCHLORIDE 0.4 MG/1
0.4 CAPSULE ORAL NIGHTLY
Qty: 90 CAPSULE | Refills: 3 | Status: SHIPPED | OUTPATIENT
Start: 2024-12-23

## 2024-12-23 NOTE — TELEPHONE ENCOUNTER
No care due was identified.  [facility] Embedded Care Due Messages. Reference number: [id number].   12/23/2024 4:38:16 PM CST

## 2024-12-23 NOTE — TELEPHONE ENCOUNTER
Refill Decision Note   Mathew Rodrigues  is requesting a refill authorization.  Brief Assessment and Rationale for Refill:  Approve     Medication Therapy Plan:        Comments:     Note composed:4:56 PM 12/23/2024

## 2025-01-02 ENCOUNTER — OFFICE VISIT (OUTPATIENT)
Dept: PODIATRY | Facility: CLINIC | Age: 70
End: 2025-01-02
Payer: MEDICARE

## 2025-01-02 ENCOUNTER — HOSPITAL ENCOUNTER (OUTPATIENT)
Dept: RADIOLOGY | Facility: HOSPITAL | Age: 70
Discharge: HOME | End: 2025-01-02
Attending: PODIATRIST
Payer: MEDICARE

## 2025-01-02 VITALS
BODY MASS INDEX: 29.65 KG/M2 | DIASTOLIC BLOOD PRESSURE: 65 MMHG | SYSTOLIC BLOOD PRESSURE: 102 MMHG | HEART RATE: 81 BPM | HEIGHT: 68 IN | WEIGHT: 195.63 LBS

## 2025-01-02 DIAGNOSIS — M21.6X1: ICD-10-CM

## 2025-01-02 DIAGNOSIS — L84 PRE-ULCERATIVE CALLUSES: ICD-10-CM

## 2025-01-02 DIAGNOSIS — M89.8X7 PAIN IN METATARSUS OF RIGHT FOOT: ICD-10-CM

## 2025-01-02 DIAGNOSIS — G57.91 NEURITIS OF RIGHT FOOT: Primary | ICD-10-CM

## 2025-01-02 PROCEDURE — 99999 PR PBB SHADOW E&M-EST. PATIENT-LVL III: CPT | Mod: PBBFAC,,, | Performed by: PODIATRIST

## 2025-01-02 PROCEDURE — 99213 OFFICE O/P EST LOW 20 MIN: CPT | Mod: PBBFAC,25 | Performed by: PODIATRIST

## 2025-01-02 PROCEDURE — 99214 OFFICE O/P EST MOD 30 MIN: CPT | Mod: S$PBB,,, | Performed by: PODIATRIST

## 2025-01-02 PROCEDURE — 73630 X-RAY EXAM OF FOOT: CPT | Mod: TC,RT

## 2025-01-02 PROCEDURE — 73630 X-RAY EXAM OF FOOT: CPT | Mod: 26,RT,, | Performed by: RADIOLOGY

## 2025-01-02 PROCEDURE — 99999PBSHW PR PBB SHADOW TECHNICAL ONLY FILED TO HB: Mod: PBBFAC,,,

## 2025-01-02 PROCEDURE — 96372 THER/PROPH/DIAG INJ SC/IM: CPT | Mod: PBBFAC

## 2025-01-02 RX ORDER — KETOROLAC TROMETHAMINE 30 MG/ML
60 INJECTION, SOLUTION INTRAMUSCULAR; INTRAVENOUS
Status: COMPLETED | OUTPATIENT
Start: 2025-01-02 | End: 2025-01-02

## 2025-01-02 RX ADMIN — KETOROLAC TROMETHAMINE 60 MG: 30 INJECTION, SOLUTION INTRAMUSCULAR at 04:01

## 2025-01-02 NOTE — PROGRESS NOTES
Subjective:       Patient ID: Mathew Rodrigues is a 69 y.o. male.    Chief Complaint: Callouses (Under Right Foot), Wound Check (Under Right Foot), Foot Pain (Under Right Foot), and Diabetes Mellitus  Patient presents with his wife with complaint of continued pain under 5th digit right foot.  Relates plantar fasciitis of the right foot healed without complication as he received a IM Toradol injection last visit, took oral Toradol for a few days. Does confirm wearing a good supportive shoe at all times indoors and out  Patient relates he can not increase his activity or participate in any type of walking due to pain and callus under the 5th digit of the same foot.  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      Past Medical History:   Diagnosis Date    Arthritis     COPD (chronic obstructive pulmonary disease)     Diabetes mellitus, type 2     DVT (deep venous thrombosis)     Hyperlipidemia     Hypertension     Kidney stones     LVH (left ventricular hypertrophy) due to hypertensive disease, with heart failure     Neuropathy     Personal history of colonic polyps 03/24/2000    colonoscopy report in media    PTSD (post-traumatic stress disorder)     Shortness of breath     fatigue    Sleep apnea, unspecified     Torn rotator cuff      Past Surgical History:   Procedure Laterality Date    achilles repair Left 2003    ADENOIDECTOMY      ANGIOGRAM, CORONARY, WITH LEFT HEART CATHETERIZATION N/A 8/6/2024    Procedure: Angiogram, Coronary, with  Left Heart Cath;  Surgeon: Elliot Butler MD;  Location: [facility] CATH/EP LAB;  Service: Cardiology;  Laterality: N/A;    ANKLE FRACTURE SURGERY Right 1987    CATARACT EXTRACTION Left 2004    CATARACT EXTRACTION Left 2005    2nd    COLONOSCOPY N/A 05/09/2017    results in media    COLONOSCOPY N/A 01/13/2023    Procedure: COLONOSCOPY;  Surgeon: Kenney Keller MD;  Location: [facility] ENDO;  Service: General;  Laterality: N/A;    COLONOSCOPY N/A 11/19/2024    Procedure: COLONOSCOPY;  Surgeon: Kameron Chandler MD;  Location: [facility];  Service: Endoscopy;  Laterality: N/A;    COLONOSCOPY W/ POLYPECTOMY N/A 03/24/2000    results in media    ELBOW SURGERY Right 07/24/2020    ESOPHAGOGASTRODUODENOSCOPY N/A 11/19/2024    Procedure: EGD (ESOPHAGOGASTRODUODENOSCOPY) (pt diabetic);  Surgeon: Kameron Chandler MD;  Location: [facility];  Service: Endoscopy;  Laterality: N/A;    EYE SURGERY  2005    finger joint replacement Right 01/22/2020    middle finger    hair follicle  1998    HAND SURGERY Right 04/25/2017    HAND SURGERY Right 05/12/2017    2nd surgery    INTRALUMINAL GASTROINTESTINAL TRACT IMAGING VIA CAPSULE N/A 12/10/2024    Procedure: IMAGING PROCEDURE, GI TRACT, INTRALUMINAL, VIA CAPSULE;  Surgeon: Kameron Chandler MD;  Location: [facility];  Service: Endoscopy;  Laterality: N/A;    NASAL SINUS SURGERY  08/2015    ROTATOR CUFF REPAIR Right 09/25/2015    ROTATOR CUFF REPAIR Left 04/08/2016    skin grafts Right 07/2016    shin from stingray wound    TONSILLECTOMY      VASECTOMY       Family History   Problem Relation Name Age of Onset    Hypertension Mother Sierra     Arthritis Mother Sierra     Hypertension Father Misael     Diabetes Father Misael     Arthritis Father Misael     Hearing loss Father Misael     Cancer Brother Mickey Douglas     COPD Brother Mickey Douglas      Social History     Socioeconomic History    Marital status:    Tobacco Use    Smoking status: Former     Current packs/day: 1.50     Average  packs/day: 1.5 packs/day for 30.0 years (45.0 ttl pk-yrs)     Types: Cigarettes    Smokeless tobacco: Never   Substance and Sexual Activity    Alcohol use: Not Currently    Drug use: Never     Social Drivers of Health     Financial Resource Strain: Low Risk  (11/1/2024)    Overall Financial Resource Strain (CARDIA)     Difficulty of Paying Living Expenses: Not hard at all   Food Insecurity: No Food Insecurity (11/1/2024)    Hunger Vital Sign     Worried About Running Out of Food in the Last Year: Never true     Ran Out of Food in the Last Year: Never true   Transportation Needs: No Transportation Needs (11/1/2024)    TRANSPORTATION NEEDS     Transportation : No   Physical Activity: Insufficiently Active (12/18/2023)    Exercise Vital Sign     Days of Exercise per Week: 1 day     Minutes of Exercise per Session: 20 min   Stress: No Stress Concern Present (11/1/2024)    [employer] [place] [employer] - Occupational Stress Questionnaire     Feeling of Stress : Not at all   Housing Stability: Low Risk  (11/1/2024)    Housing Stability Vital Sign     Unable to Pay for Housing in the Last Year: No     Homeless in the Last Year: No       Current Outpatient Medications   Medication Sig Dispense Refill    [START ON 1/8/2025] acetaminophen-codeine 300-30mg (TYLENOL #3) 300-30 mg Tab Take 1 tablet by mouth every 6 (six) hours as needed (pain, coughing). 30 tablet 0    albuterol (PROVENTIL/VENTOLIN HFA) 90 mcg/actuation inhaler Inhale 2 puffs into the lungs every 4 (four) hours as needed for Shortness of Breath or Wheezing (cough). 2 puffs every 4 hours as needed for cough, wheeze, or shortness of breath 36 g 11    amLODIPine (NORVASC) 5 MG tablet Take 1 tablet (5 mg total) by mouth once daily. 90 tablet 0    ascorbic acid, vitamin C, (VITAMIN C) 1000 MG tablet Take 1,000 mg by mouth once daily.      atorvastatin (LIPITOR) 10 MG tablet Take 1 tablet (10 mg total) by mouth every evening. 90 tablet 3    BD SHAHZAD 2ND GEN  "PEN NEEDLE 32 gauge x 5/32" Ndle       busPIRone (BUSPAR) 5 MG Tab Take 1 tablet (5 mg total) by mouth every evening. 90 tablet 3    cyanocobalamin 1,000 mcg/mL injection Inject 1 mL (1,000 mcg total) into the muscle every 14 (fourteen) days. 6 mL 3    empagliflozin (JARDIANCE) 10 mg tablet Take 1 tablet (10 mg total) by mouth once daily. 90 tablet 3    fenofibrate 160 MG Tab TAKE 1 TABLET(160 MG) BY MOUTH EVERY NIGHT 90 tablet 2    ferrous sulfate 325 (65 FE) MG EC tablet Take 325 mg by mouth 2 (two) times daily.      fluticasone propionate (FLONASE) 50 mcg/actuation nasal spray 1 spray (50 mcg total) by Each Nostril route once daily. 9.9 mL 0    glipiZIDE (GLUCOTROL) 5 MG tablet Take 1 tablet (5 mg total) by mouth 2 (two) times daily with meals. 180 tablet 1    guanFACINE (TENEX) 2 MG tablet TAKE 1 TABLET(2 MG) BY MOUTH EVERY MORNING 90 tablet 0    insulin degludec (TRESIBA FLEXTOUCH U-100) 100 unit/mL (3 mL) insulin pen Inject 32 Units into the skin every morning. 5 Pen 3    lisinopriL-hydrochlorothiazide (PRINZIDE,ZESTORETIC) 20-12.5 mg per tablet Take 1 tablet by mouth once daily.      loratadine (CLARITIN ORAL) Take 1 tablet by mouth Daily.      melatonin 10 mg Tab Take 2 tablets by mouth every evening.      metFORMIN (GLUCOPHAGE) 1000 MG tablet Take 1 tablet (1,000 mg total) by mouth 2 (two) times daily with meals. 180 tablet 1    METOPROLOL GOVEA-HYDROCHLOROTHIAZ ORAL       omeprazole (PRILOSEC) 40 MG capsule Take 1 capsule (40 mg total) by mouth 2 (two) times daily before meals. 60 capsule 11    semaglutide (OZEMPIC) 0.25 mg or 0.5 mg (2 mg/3 mL) pen injector Inject 0.5 mg into the skin every 7 days. 9 mL 1    tamsulosin (FLOMAX) 0.4 mg Cap Take 1 capsule (0.4 mg total) by mouth nightly. 90 capsule 3    testosterone cypionate (DEPOTESTOTERONE CYPIONATE) 200 mg/mL injection ADMINISTER 0.75 ML(150 MG) IN THE MUSCLE EVERY 7 DAYS Strength: 200 mg/mL 12 mL 0    tezepelumab-ekko (TEZSPIRE) 210 mg/1.91 mL (110 mg/mL) " "PnIj Inject 210 mg into the skin every 28 days. 1.91 mL 11    TRELEGY ELLIPTA 200-62.5-25 mcg inhaler INHALE 1 PUFF INTO THE LUNGS DAILY 180 each 3    sildenafiL (VIAGRA) 100 MG tablet Take 1 tablet (100 mg total) by mouth daily as needed for Erectile Dysfunction. 30 tablet 5    traZODone (DESYREL) 50 MG tablet Take 1-3 tablets ( mg total) by mouth every evening. 270 tablet 3     No current facility-administered medications for this visit.     Review of patient's allergies indicates:   Allergen Reactions    Ibuprofen Itching       Review of Systems   Musculoskeletal:  Positive for gait problem.   All other systems reviewed and are negative.      Objective:      Vitals:    01/02/25 1528   BP: 102/65   Pulse: 81   Weight: 88.7 kg (195 lb 9.6 oz)   Height: 5' 8" (1.727 m)     Physical Exam  Vitals and nursing note reviewed. Exam conducted with a chaperone present.   Constitutional:       General: He is not in acute distress.     Appearance: Normal appearance.   Cardiovascular:      Pulses:           Dorsalis pedis pulses are 2+ on the right side.        Posterior tibial pulses are 2+ on the right side.   Musculoskeletal:         General: Tenderness and deformity present.      Right foot: Decreased range of motion. Deformity (Clubfoot with limited range of motion right) and prominent metatarsal heads (Prominent 5th met head right) present.      Comments: Tender macerated callus lesion sub 5th met head right but majority of the pain is upon lateral compression of the metatarsal at, no erythema or calor   Feet:      Right foot:      Skin integrity: Callus (Tender macerated hyperkeratotic lesion, chronic sub 5th met head right without discoloration) present.   Skin:     Capillary Refill: Capillary refill takes less than 2 seconds.   Neurological:      General: No focal deficit present.      Mental Status: He is alert.      Sensory: Sensory deficit present.      Comments: Neuritis involving branch of the sural/lateral " dorsal cutaneous nerve to the 5th met head right foot   Psychiatric:         Thought Content: Thought content normal.                 EXAMINATION:  XR FOOT COMPLETE 3 VIEW RIGHT     CLINICAL HISTORY:  . Other specified disorders of bone, ankle and foot     TECHNIQUE:  AP, lateral, and oblique views of the right foot were performed.     COMPARISON:  June 4, 2024     FINDINGS:  No definite radiopaque foreign body or focal bony erosion is seen about the foot. Joint space narrowing is seen at the 1st metatarsophalangeal joint and prominent degenerative changes are seen at the talonavicular joint.  Osteophyte formation and sclerosis are seen about the posterior subtalar joint.        Electronically signed by:Momo Urbano MD  Date:                                            01/02/2025     Assessment:       1. Neuritis of right foot    2. Pain in metatarsus of right foot    3. Collapse of right talus    4. Pre-ulcerative calluses - Right Foot          Plan:         X-RAY COMPLETE RIGHT FOOT  60 MG TORADOL IM LEFT HIP  RX [facility] - CUSTOM ORTHOTIC RIGHT       Reviewed today's x-rays at length with patient and wife confirming there is no abnormality or bony problem involving the 5th metatarsal head.  Explained head of the metatarsal is smooth, no spurs  Explained this area has developed due to repetitive pressure even with his limited weight-bearing it is the area of his foot that receives the greatest amount of force when standing and walking  We also had a lengthy discussion regarding deformity of the talus, this is essentially his clubfoot deformity.  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  There is a significant change in this bone from the x-ray done after elevator fall 6 months ago  He has no pain in this location but explained it still requires bracing, it will continue to deteriorate over time  We discussed an ankle brace, patient declined  We discussed custom-molded arch support to  help take pressure off the 5th metatarsal head and ultimately to help stabilize the talus from underneath the foot  Dispensed prescription for custom-molded orthotic to help offload pressure from the 5th metatarsal head  We reviewed at length the level of pain he is experiencing in this location is due to pressure/impingement on the nerve  Demonstrated for patient his pain is on the outside/lateral aspect of the 5th metatarsal when pushing it in, , this causes pressure on the nerve  And we discussed neuritis, inflammation of the local nerve in this location which passes the 5th metatarsal so the 5th toe  Discussed IM Toradol injection to help address this area and explained to patient he needs to treat it topically aggressive aggressively  Patient relates 1 of the most therapeutic treatments for him is to put this area right on the jets of the tub in a helps soothe deep pain he is experiencing  We discussed soaking, jets, motion, movement and massage for this area if comfortable should be done every day  In addition to over-the-counter topical Voltaren gel with a 4% lidocaine patch over the area once the gel has dried  Topical treatment should be 3 to 4 times a day with the gel in the lidocaine patch changed out every 12 hours  We reviewed care and maintenance of the callus, continue to smooth at home, needs to discontinue the acid patches it is causing the skin to remain white moist and deteriorate, he can ultimately develop an open sore in this area, discontinue patches and smoothing of callus needs to be done very gently  Recommended they come in more often to have callus trimmed if it gives him a lot of relief.  Callus sub 5th met head right was debrided today with no complications at this time  Patient was in understanding and agreement with treatment plan.  I counseled the patient on their conditions, implications and medical management.  Instructed patient/family to contact the office with any changes,  questions, concerns, worsening of symptoms.   Total face to face time 30 minutes, exam, assessment, treatment, discussion, additional time for review of chart prior to and following appointment and visit documentation, consultation and coordination of care.    Follow up 2 weeks     This note was created using [employer] voice recognition software that occasionally misinterpreted phrases or words.

## 2025-01-06 ENCOUNTER — PATIENT MESSAGE (OUTPATIENT)
Dept: OTOLARYNGOLOGY | Facility: CLINIC | Age: 70
End: 2025-01-06
Payer: MEDICARE

## 2025-01-06 ENCOUNTER — PATIENT MESSAGE (OUTPATIENT)
Dept: FAMILY MEDICINE | Facility: CLINIC | Age: 70
End: 2025-01-06
Payer: MEDICARE

## 2025-01-06 DIAGNOSIS — E11.9 TYPE 2 DIABETES MELLITUS WITHOUT COMPLICATION, WITH LONG-TERM CURRENT USE OF INSULIN: ICD-10-CM

## 2025-01-06 DIAGNOSIS — Z79.4 TYPE 2 DIABETES MELLITUS WITHOUT COMPLICATION, WITH LONG-TERM CURRENT USE OF INSULIN: ICD-10-CM

## 2025-01-06 RX ORDER — SEMAGLUTIDE 1.34 MG/ML
1 INJECTION, SOLUTION SUBCUTANEOUS
Qty: 3 ML | Refills: 11 | Status: SHIPPED | OUTPATIENT
Start: 2025-01-06 | End: 2026-01-06

## 2025-01-09 ENCOUNTER — CLINICAL SUPPORT (OUTPATIENT)
Dept: AUDIOLOGY | Facility: CLINIC | Age: 70
End: 2025-01-09
Payer: MEDICARE

## 2025-01-09 DIAGNOSIS — H91.13 PRESBYCUSIS OF BOTH EARS: Primary | ICD-10-CM

## 2025-01-09 NOTE — PROGRESS NOTES
HEARING AID FOLLOW-UP    Mathewmarielle Rodrigues was seen today for a hearing aid check visit. He has [employer] Nera2 CHRISTIAN hearing aids that were purchased in [place] in 2015. The right ear hearing aid is broken. Pt did not wear these hearing aids due to discomfort with over the ear hearing aid and glasses. The right hearing aid was dropped and run over and repaired several years ago. Pt and wife discussed the need for new audiogram and hearing aid consultation for set of new hearing aids. Appt was scheduled for 1/30/25.

## 2025-01-10 ENCOUNTER — PATIENT MESSAGE (OUTPATIENT)
Dept: FAMILY MEDICINE | Facility: CLINIC | Age: 70
End: 2025-01-10
Payer: MEDICARE

## 2025-01-13 DIAGNOSIS — E11.9 TYPE 2 DIABETES MELLITUS WITHOUT COMPLICATION, WITHOUT LONG-TERM CURRENT USE OF INSULIN: ICD-10-CM

## 2025-01-13 RX ORDER — METFORMIN HYDROCHLORIDE 1000 MG/1
1000 TABLET ORAL 2 TIMES DAILY WITH MEALS
Qty: 180 TABLET | Refills: 1 | Status: SHIPPED | OUTPATIENT
Start: 2025-01-13

## 2025-01-13 NOTE — TELEPHONE ENCOUNTER
Refill Decision Note   Mathew Rodrigues  is requesting a refill authorization.  Brief Assessment and Rationale for Refill:        Medication Therapy Plan:  eGFR/CrCl data reviewed. Current dosage is within recommendations for patient's renal function.      Pharmacist review requested: Yes   Comments:     Note composed:1:02 PM 01/13/2025

## 2025-01-13 NOTE — TELEPHONE ENCOUNTER
No care due was identified.  [facility] Embedded Care Due Messages. Reference number: [id number].   1/13/2025 7:08:13 AM CST

## 2025-01-13 NOTE — TELEPHONE ENCOUNTER
Refill Routing Note   Medication(s) are not appropriate for processing by [facility] for the following reason(s):        Required labs abnormal    [facility] action(s):  Defer           Pharmacist review requested: Yes     Appointments  past 12m or future 3m with PCP    Date Provider   Last Visit   11/15/2024 Kiley Vasquez MD   Next Visit   5/15/2025 Kiley Vasquez MD   ED visits in past 90 days: 1        Note composed:8:01 AM 01/13/2025

## 2025-01-16 ENCOUNTER — PATIENT MESSAGE (OUTPATIENT)
Dept: ADMINISTRATIVE | Facility: OTHER | Age: 70
End: 2025-01-16
Payer: MEDICARE

## 2025-01-16 ENCOUNTER — OFFICE VISIT (OUTPATIENT)
Dept: PODIATRY | Facility: CLINIC | Age: 70
End: 2025-01-16
Payer: MEDICARE

## 2025-01-16 VITALS
HEIGHT: 68 IN | WEIGHT: 194 LBS | RESPIRATION RATE: 18 BRPM | BODY MASS INDEX: 29.4 KG/M2 | SYSTOLIC BLOOD PRESSURE: 130 MMHG | DIASTOLIC BLOOD PRESSURE: 76 MMHG | HEART RATE: 79 BPM

## 2025-01-16 DIAGNOSIS — L84 FOOT CALLUS: ICD-10-CM

## 2025-01-16 DIAGNOSIS — M19.071 ARTHRITIS OF MIDTARSAL JOINT OF RIGHT FOOT: Primary | ICD-10-CM

## 2025-01-16 DIAGNOSIS — M21.961 ACQUIRED DEFORMITY OF JOINT OF RIGHT FOOT: ICD-10-CM

## 2025-01-16 DIAGNOSIS — M21.6X1: ICD-10-CM

## 2025-01-16 DIAGNOSIS — M89.8X7 PAIN IN METATARSUS OF RIGHT FOOT: ICD-10-CM

## 2025-01-16 PROCEDURE — 99213 OFFICE O/P EST LOW 20 MIN: CPT | Mod: S$PBB,,, | Performed by: PODIATRIST

## 2025-01-16 PROCEDURE — 99215 OFFICE O/P EST HI 40 MIN: CPT | Mod: PBBFAC | Performed by: PODIATRIST

## 2025-01-16 PROCEDURE — 99999 PR PBB SHADOW E&M-EST. PATIENT-LVL V: CPT | Mod: PBBFAC,,, | Performed by: PODIATRIST

## 2025-01-20 NOTE — PROGRESS NOTES
Subjective:       Patient ID: Mtahew Rodrigues is a 69 y.o. male.    Chief Complaint: Follow-up, Foot Pain, and Diabetes Mellitus  Patient presents with his wife for follow up pain under 5th digit, arthritis and collapse talus right foot  Has not pursue custom orthotic  Did have a lot of relief with IM toradol and debridement  Has been soaking      Past Medical History:   Diagnosis Date    Arthritis     COPD (chronic obstructive pulmonary disease)     Diabetes mellitus, type 2     DVT (deep venous thrombosis)     Hyperlipidemia     Hypertension     Kidney stones     LVH (left ventricular hypertrophy) due to hypertensive disease, with heart failure     Neuropathy     Personal history of colonic polyps 03/24/2000    colonoscopy report in media    PTSD (post-traumatic stress disorder)     Shortness of breath     fatigue    Sleep apnea, unspecified     Torn rotator cuff      Past Surgical History:   Procedure Laterality Date    achilles repair Left 2003    ADENOIDECTOMY      ANGIOGRAM, CORONARY, WITH LEFT HEART CATHETERIZATION N/A 8/6/2024    Procedure: Angiogram, Coronary, with Left Heart Cath;  Surgeon: Elliot Butler MD;  Location: [facility] CATH/EP LAB;  Service: Cardiology;  Laterality: N/A;    ANKLE FRACTURE SURGERY Right 1987    CATARACT EXTRACTION Left 2004    CATARACT EXTRACTION Left 2005    2nd    COLONOSCOPY N/A 05/09/2017    results in media    COLONOSCOPY N/A 01/13/2023    Procedure: COLONOSCOPY;  Surgeon: Kenney Keller MD;  Location: [facility];  Service: General;  Laterality: N/A;    COLONOSCOPY N/A 11/19/2024    Procedure: COLONOSCOPY;  Surgeon: Kameron Chandler MD;  Location: [facility];  Service: Endoscopy;  Laterality: N/A;    COLONOSCOPY W/ POLYPECTOMY N/A 03/24/2000    results in media    ELBOW SURGERY Right 07/24/2020    ESOPHAGOGASTRODUODENOSCOPY N/A 11/19/2024    Procedure: EGD (ESOPHAGOGASTRODUODENOSCOPY) (pt diabetic);  Surgeon: Kameron Chandler MD;  Location: [facility];  Service:  Endoscopy;  Laterality: N/A;    EYE SURGERY  2005    finger joint replacement Right 01/22/2020    middle finger    hair follicle  1998    HAND SURGERY Right 04/25/2017    HAND SURGERY Right 05/12/2017    2nd surgery    INTRALUMINAL GASTROINTESTINAL TRACT IMAGING VIA CAPSULE N/A 12/10/2024    Procedure: IMAGING PROCEDURE, GI TRACT, INTRALUMINAL, VIA CAPSULE;  Surgeon: Kameron Chandler MD;  Location: [facility];  Service: Endoscopy;  Laterality: N/A;    NASAL SINUS SURGERY  08/2015    ROTATOR CUFF REPAIR Right 09/25/2015    ROTATOR CUFF REPAIR Left 04/08/2016    skin grafts Right 07/2016    shin from stingray wound    TONSILLECTOMY      VASECTOMY       Family History   Problem Relation Name Age of Onset    Hypertension Mother Sierra     Arthritis Mother Sierra     Hypertension Father Misael     Diabetes Father Misael     Arthritis Father Misael     Hearing loss Father Misael     Cancer Brother López Douglasneth     COPD Brother López Douglasneth      Social History     Socioeconomic History    Marital status:    Tobacco Use    Smoking status: Former     Current packs/day: 1.50     Average packs/day: 1.5 packs/day for 30.0 years (45.0 ttl pk-yrs)     Types: Cigarettes    Smokeless tobacco: Never   Substance and Sexual Activity    Alcohol use: Not Currently    Drug use: Never     Social Drivers of Health     Financial Resource Strain: Low Risk  (11/1/2024)    Overall Financial Resource Strain (CARDIA)     Difficulty of Paying Living Expenses: Not hard at all   Food Insecurity: No Food Insecurity (11/1/2024)    Hunger Vital Sign     Worried About Running Out of Food in the Last Year: Never true     Ran Out of Food in the Last Year: Never true   Transportation Needs: No Transportation Needs (11/1/2024)    TRANSPORTATION NEEDS     Transportation : No   Physical Activity: Insufficiently Active (12/18/2023)    Exercise Vital Sign     Days of Exercise per Week: 1 day     Minutes of Exercise per Session: 20 min   Stress: No Stress Concern  "Present (11/1/2024)    [employer] - Occupational Stress Questionnaire     Feeling of Stress : Not at all   Housing Stability: Low Risk  (11/1/2024)    Housing Stability Vital Sign     Unable to Pay for Housing in the Last Year: No     Homeless in the Last Year: No       Current Outpatient Medications   Medication Sig Dispense Refill    acetaminophen-codeine 300-30mg (TYLENOL #3) 300-30 mg Tab Take 1 tablet by mouth every 6 (six) hours as needed (pain, coughing). 30 tablet 0    albuterol (PROVENTIL/VENTOLIN HFA) 90 mcg/actuation inhaler Inhale 2 puffs into the lungs every 4 (four) hours as needed for Shortness of Breath or Wheezing (cough). 2 puffs every 4 hours as needed for cough, wheeze, or shortness of breath 36 g 11    amLODIPine (NORVASC) 5 MG tablet Take 1 tablet (5 mg total) by mouth once daily. 90 tablet 0    atorvastatin (LIPITOR) 10 MG tablet Take 1 tablet (10 mg total) by mouth every evening. 90 tablet 3    BD SHAHZAD 2ND GEN PEN NEEDLE 32 gauge x 5/32" Ndle       busPIRone (BUSPAR) 5 MG Tab Take 1 tablet (5 mg total) by mouth every evening. 90 tablet 3    cyanocobalamin 1,000 mcg/mL injection Inject 1 mL (1,000 mcg total) into the muscle every 14 (fourteen) days. 6 mL 3    empagliflozin (JARDIANCE) 10 mg tablet Take 1 tablet (10 mg total) by mouth once daily. 90 tablet 3    fenofibrate 160 MG Tab TAKE 1 TABLET(160 MG) BY MOUTH EVERY NIGHT 90 tablet 2    fluticasone propionate (FLONASE) 50 mcg/actuation nasal spray 1 spray (50 mcg total) by Each Nostril route once daily. 9.9 mL 0    glipiZIDE (GLUCOTROL) 5 MG tablet Take 1 tablet (5 mg total) by mouth 2 (two) times daily with meals. 180 tablet 1    guanFACINE (TENEX) 2 MG tablet TAKE 1 TABLET(2 MG) BY MOUTH EVERY MORNING 90 tablet 0    insulin degludec (TRESIBA FLEXTOUCH U-100) 100 unit/mL (3 mL) insulin pen Inject 32 Units into the skin every morning. 5 Pen 3    lisinopriL-hydrochlorothiazide (PRINZIDE,ZESTORETIC) 20-12.5 mg " "per tablet Take 1 tablet by mouth once daily.      loratadine (CLARITIN ORAL) Take 1 tablet by mouth Daily.      melatonin 10 mg Tab Take 2 tablets by mouth every evening.      metFORMIN (GLUCOPHAGE) 1000 MG tablet Take 1 tablet (1,000 mg total) by mouth 2 (two) times daily with meals. 180 tablet 1    omeprazole (PRILOSEC) 40 MG capsule Take 1 capsule (40 mg total) by mouth 2 (two) times daily before meals. 60 capsule 11    semaglutide (OZEMPIC) 1 mg/dose (4 mg/3 mL) Inject 1 mg into the skin every 7 days. 3 mL 11    tamsulosin (FLOMAX) 0.4 mg Cap Take 1 capsule (0.4 mg total) by mouth nightly. 90 capsule 3    testosterone cypionate (DEPOTESTOTERONE CYPIONATE) 200 mg/mL injection ADMINISTER 0.75 ML(150 MG) IN THE MUSCLE EVERY 7 DAYS Strength: 200 mg/mL 12 mL 0    tezepelumab-ekko (TEZSPIRE) 210 mg/1.91 mL (110 mg/mL) PnIj Inject 210 mg into the skin every 28 days. 1.91 mL 11    TRELEGY ELLIPTA 200-62.5-25 mcg inhaler INHALE 1 PUFF INTO THE LUNGS DAILY 180 each 3    sildenafiL (VIAGRA) 100 MG tablet Take 1 tablet (100 mg total) by mouth daily as needed for Erectile Dysfunction. 30 tablet 5    traZODone (DESYREL) 50 MG tablet Take 1-3 tablets ( mg total) by mouth every evening. 270 tablet 3     No current facility-administered medications for this visit.     Review of patient's allergies indicates:   Allergen Reactions    Ibuprofen Itching       Review of Systems   Musculoskeletal:  Positive for gait problem.   All other systems reviewed and are negative.      Objective:      Vitals:    01/16/25 0852   BP: 130/76   Pulse: 79   Resp: 18   Weight: 88 kg (194 lb 0.1 oz)   Height: 5' 8" (1.727 m)     Physical Exam  Vitals and nursing note reviewed. Exam conducted with a chaperone present.   Constitutional:       General: He is not in acute distress.  Cardiovascular:      Pulses:           Dorsalis pedis pulses are 2+ on the right side.        Posterior tibial pulses are 2+ on the right side.   Musculoskeletal:       "   General: Tenderness and deformity present.      Right foot: Decreased range of motion. Deformity (Clubfoot with limited range of motion right) and prominent metatarsal heads (Prominent 5th met head right) present.      Comments: Decreased pain callused lesion sub 5th met head right, acquired deformity joint/5th metatarsal secondary to collapsed talus, arthritis midtarsal joint right   Feet:      Right foot:      Skin integrity: Callus (decreased hyperkeratotic lesion, chronic sub 5th met head right without discoloration) present.   Skin:     Capillary Refill: Capillary refill takes less than 2 seconds.   Neurological:      General: No focal deficit present.      Sensory: Sensory deficit present.   Psychiatric:         Thought Content: Thought content normal.         EXAMINATION:  XR FOOT COMPLETE 3 VIEW RIGHT     CLINICAL HISTORY:  Other specified disorders of bone, ankle and foot     TECHNIQUE:  AP, lateral, and oblique views of the right foot were performed.     COMPARISON:  June 4, 2024     FINDINGS:  No definite radiopaque foreign body or focal bony erosion is seen about the foot. Joint space narrowing is seen at the 1st metatarsophalangeal joint and prominent degenerative changes are seen at the talonavicular joint.  Osteophyte formation and sclerosis are seen about the posterior subtalar joint.        Electronically signed by:Momo Urbano MD  Date:                                            01/02/2025                       Assessment:       1. Arthritis of midtarsal joint of right foot    2. Acquired deformity of joint of right foot    3. Collapse of right talus    4. Pain in metatarsus of right foot    5. Foot callus - Right Foot          Plan:         Advised patient it is a good sign he has had a lot of relief however he needs to continue multiple treatments to continue improvement  Treatment with ant inflammatory has continue topically to avoid nsaids  Apply voltaren gel 3 times daily  Continue soaking  in epsom salt  Next step is to pursue custom-molded orthotic, explained off loading pressure is absolutely needed to prevent recurrence  Debrided callus sub 5th met head right  Much improved  Patient was in understanding and agreement with treatment plan.  I counseled the patient on their conditions, implications and medical management.  Instructed patient/family to contact the office with any changes, questions, concerns, worsening of symptoms.   Total face to face time 20 minutes, exam, assessment, treatment, discussion, additional time for review of chart prior to and following appointment and visit documentation, consultation and coordination of care.    Follow up 3 weeks     This note was created using M*Modal voice recognition software that occasionally misinterpreted phrases or words.

## 2025-01-24 DIAGNOSIS — E29.1 HYPOGONADISM IN MALE: ICD-10-CM

## 2025-01-24 NOTE — TELEPHONE ENCOUNTER
No care due was identified.  [facility] Embedded Care Due Messages. Reference number: [id number].   1/24/2025 5:45:06 PM CST

## 2025-01-25 NOTE — TELEPHONE ENCOUNTER
Refill Routing Note   Medication(s) are not appropriate for processing by [facility] for the following reason(s):      Medication outside of protocol    [facility] action(s):  Route Care Due:  None identified            Appointments  past 12m or future 3m with PCP    Date Provider   Last Visit   11/15/2024 Kiley Vasquez MD   Next Visit   5/15/2025 Kiley Vasquez MD   ED visits in past 90 days: 1        Note composed:8:50 PM 01/24/2025

## 2025-01-26 RX ORDER — TESTOSTERONE CYPIONATE 200 MG/ML
INJECTION, SOLUTION INTRAMUSCULAR
Qty: 12 ML | Refills: 0 | Status: SHIPPED | OUTPATIENT
Start: 2025-01-26 | End: 2025-01-27 | Stop reason: SDUPTHER

## 2025-01-27 ENCOUNTER — PATIENT MESSAGE (OUTPATIENT)
Dept: FAMILY MEDICINE | Facility: CLINIC | Age: 70
End: 2025-01-27
Payer: MEDICARE

## 2025-01-27 DIAGNOSIS — E29.1 HYPOGONADISM IN MALE: ICD-10-CM

## 2025-01-27 DIAGNOSIS — I10 ESSENTIAL HYPERTENSION, BENIGN: ICD-10-CM

## 2025-01-27 RX ORDER — AMLODIPINE BESYLATE 5 MG/1
5 TABLET ORAL DAILY
Qty: 90 TABLET | Refills: 2 | Status: SHIPPED | OUTPATIENT
Start: 2025-01-27

## 2025-01-27 RX ORDER — TESTOSTERONE CYPIONATE 200 MG/ML
INJECTION, SOLUTION INTRAMUSCULAR
Qty: 12 ML | Refills: 0 | Status: SHIPPED | OUTPATIENT
Start: 2025-01-27

## 2025-01-27 NOTE — TELEPHONE ENCOUNTER
LOV 11/15/24. NOV 05/15/25  
No care due was identified.  [facility] Embedded Care Due Messages. Reference number: [id number].   1/27/2025 11:26:52 AM CST  
Prescription drug monitoring program has been reviewed and is consistent with patient's prescription history. There is no evidence of early fills or obtaining controlled rx's from multiple providers.   
not examined

## 2025-01-30 ENCOUNTER — CLINICAL SUPPORT (OUTPATIENT)
Dept: AUDIOLOGY | Facility: CLINIC | Age: 70
End: 2025-01-30
Payer: MEDICARE

## 2025-01-30 DIAGNOSIS — H90.3 SENSORINEURAL HEARING LOSS (SNHL), BILATERAL: Primary | ICD-10-CM

## 2025-01-30 DIAGNOSIS — Z00.00 ENCOUNTER FOR MEDICARE ANNUAL WELLNESS EXAM: ICD-10-CM

## 2025-01-30 PROCEDURE — 92557 COMPREHENSIVE HEARING TEST: CPT | Mod: PBBFAC,PN | Performed by: AUDIOLOGIST

## 2025-01-30 PROCEDURE — 92567 TYMPANOMETRY: CPT | Mod: PBBFAC,PN | Performed by: AUDIOLOGIST

## 2025-01-30 NOTE — PROGRESS NOTES
HEARING AID CONSULTATION    Mathew IDA Rodrigues was seen 01/30/2025 for a hearing aid consultation. He was accompanied by his wife, Fide. We reviewed his hearing test results and discussed different levels of technology of hearing aids. Mr. Rodrigues was informed of all pricing and service options available through [facility] ([employer]). He was also advised to verify what, if any, discounts or benefits are available with his insurance. Mr. Rodrigues understood that [facility] is not in network with any insurance payor. It was decided, based on the patient's lifestyle, that the best choice would be Itemized ReSound Nexia 5 ITC rechargeable hearing aids in medium beige AU.     Mr. Rodrigues was informed of the non-refundable $250.00 deposit required to order and that the remaining balance is due the day of .     Impressions for custom hearing aids were taken without incident.     Pt was scheduled to return for the hearing aid evaluation on 02/20/2025.

## 2025-01-30 NOTE — PROGRESS NOTES
Mathew Rodrigues was seen 01/30/2025 for an audiological evaluation. Pt was accompanied by his wife, Fide, during today's visit. Pertinent complaints today include gradual hearing loss. He has had hearing loss for many years, which he believes is due to occupational and recreational noise exposure. Otoscopy revealed minimal cerumen in both ears. The tympanic membrane was visualized AU prior to proceeding with the hearing testing.     Results reveal a mild-to-severe sensorineural hearing loss for the right ear and  mild-to-profound sensorineural hearing loss for the left ear.    Speech Reception Thresholds were  40 dBHL for the right ear and 30 dBHL for the left ear.    Word recognition scores were excellent for the right ear and poor for the left ear.   Tympanograms were Type Ad for the right ear and Type Ad for the left ear.    Audiogram results were reviewed in detail with patient and all questions were answered. Results will be reviewed by the referring provider at the completion of this note. All complaints were addressed during this visit to the patient's satisfaction.    Recommendations:  Annual audiogram  Hearing protection in noise  Hearing aid consultation

## 2025-02-05 ENCOUNTER — PATIENT MESSAGE (OUTPATIENT)
Dept: FAMILY MEDICINE | Facility: CLINIC | Age: 70
End: 2025-02-05
Payer: MEDICARE

## 2025-02-05 RX ORDER — GUANFACINE 2 MG/1
TABLET ORAL
Qty: 90 TABLET | Refills: 0 | Status: SHIPPED | OUTPATIENT
Start: 2025-02-05

## 2025-02-05 NOTE — TELEPHONE ENCOUNTER
No care due was identified.  [facility] Embedded Care Due Messages. Reference number: [id number].   2/05/2025 10:15:35 AM CST

## 2025-02-05 NOTE — TELEPHONE ENCOUNTER
Refill Routing Note   Medication(s) are not appropriate for processing by [facility] for the following reason(s):        Outside of protocol    [facility] action(s):  Route               Appointments  past 12m or future 3m with PCP    Date Provider   Last Visit   11/15/2024 Kiley Vasquez MD   Next Visit   5/15/2025 Kiley Vasquez MD   ED visits in past 90 days: 0        Note composed:10:36 AM 02/05/2025

## 2025-02-06 ENCOUNTER — OFFICE VISIT (OUTPATIENT)
Dept: PODIATRY | Facility: CLINIC | Age: 70
End: 2025-02-06
Payer: MEDICARE

## 2025-02-06 VITALS
SYSTOLIC BLOOD PRESSURE: 139 MMHG | DIASTOLIC BLOOD PRESSURE: 80 MMHG | HEIGHT: 68 IN | BODY MASS INDEX: 29.6 KG/M2 | WEIGHT: 195.31 LBS | HEART RATE: 78 BPM

## 2025-02-06 DIAGNOSIS — M21.961 ACQUIRED DEFORMITY OF JOINT OF RIGHT FOOT: Primary | ICD-10-CM

## 2025-02-06 DIAGNOSIS — M89.8X7 PAIN IN METATARSUS OF RIGHT FOOT: ICD-10-CM

## 2025-02-06 DIAGNOSIS — L84 FOOT CALLUS: ICD-10-CM

## 2025-02-06 DIAGNOSIS — M21.6X1: ICD-10-CM

## 2025-02-06 PROCEDURE — 99999 PR PBB SHADOW E&M-EST. PATIENT-LVL V: CPT | Mod: PBBFAC,,, | Performed by: PODIATRIST

## 2025-02-06 PROCEDURE — 99215 OFFICE O/P EST HI 40 MIN: CPT | Mod: PBBFAC | Performed by: PODIATRIST

## 2025-02-06 PROCEDURE — 99213 OFFICE O/P EST LOW 20 MIN: CPT | Mod: S$PBB,,, | Performed by: PODIATRIST

## 2025-02-06 RX ORDER — BENZONATATE 200 MG/1
CAPSULE ORAL
COMMUNITY
Start: 2025-01-28

## 2025-02-07 ENCOUNTER — TELEPHONE (OUTPATIENT)
Dept: FAMILY MEDICINE | Facility: CLINIC | Age: 70
End: 2025-02-07
Payer: MEDICARE

## 2025-02-08 NOTE — PROGRESS NOTES
Subjective:       Patient ID: Mathew Rodrigues is a 70 y.o. male.    Chief Complaint: Callouses (Right foot ), Foot Pain, and Foot Injury  Patient presents with his wife for follow up pain under 5th digit, chronic callus, pain upon weight bearing, arthritis and collapse talus right foot  No topical treatment has been helpful  Has not pursued custom orthotic  Pain level 2/10      Past Medical History:   Diagnosis Date    Arthritis     COPD (chronic obstructive pulmonary disease)     Diabetes mellitus, type 2     DVT (deep venous thrombosis)     Hyperlipidemia     Hypertension     Kidney stones     LVH (left ventricular hypertrophy) due to hypertensive disease, with heart failure     Neuropathy     Personal history of colonic polyps 03/24/2000    colonoscopy report in media    PTSD (post-traumatic stress disorder)     Shortness of breath     fatigue    Sleep apnea, unspecified     Torn rotator cuff      Past Surgical History:   Procedure Laterality Date    achilles repair Left 2003    ADENOIDECTOMY      ANGIOGRAM, CORONARY, WITH LEFT HEART CATHETERIZATION N/A 8/6/2024    Procedure: Angiogram, Coronary, with Left Heart Cath;  Surgeon: Elliot Butler MD;  Location: [facility] CATH/EP LAB;  Service: Cardiology;  Laterality: N/A;    ANKLE FRACTURE SURGERY Right 1987    CATARACT EXTRACTION Left 2004    CATARACT EXTRACTION Left 2005    2nd    COLONOSCOPY N/A 05/09/2017    results in media    COLONOSCOPY N/A 01/13/2023    Procedure: COLONOSCOPY;  Surgeon: Kenney Keller MD;  Location: [facility] ENDO;  Service: General;  Laterality: N/A;    COLONOSCOPY N/A 11/19/2024    Procedure: COLONOSCOPY;  Surgeon: Kameron Chandler MD;  Location: [facility] ENDO;  Service: Endoscopy;  Laterality: N/A;    COLONOSCOPY W/ POLYPECTOMY N/A 03/24/2000    results in media    ELBOW SURGERY Right 07/24/2020    ESOPHAGOGASTRODUODENOSCOPY N/A 11/19/2024    Procedure: EGD (ESOPHAGOGASTRODUODENOSCOPY) (pt diabetic);  Surgeon: Kameron Chandler MD;   Location: [facility] ENDO;  Service: Endoscopy;  Laterality: N/A;    EYE SURGERY  2005    finger joint replacement Right 01/22/2020    middle finger    hair follicle  1998    HAND SURGERY Right 04/25/2017    HAND SURGERY Right 05/12/2017    2nd surgery    INTRALUMINAL GASTROINTESTINAL TRACT IMAGING VIA CAPSULE N/A 12/10/2024    Procedure: IMAGING PROCEDURE, GI TRACT, INTRALUMINAL, VIA CAPSULE;  Surgeon: Kameron Chandler MD;  Location: [facility] ENDO;  Service: Endoscopy;  Laterality: N/A;    NASAL SINUS SURGERY  08/2015    ROTATOR CUFF REPAIR Right 09/25/2015    ROTATOR CUFF REPAIR Left 04/08/2016    skin grafts Right 07/2016    shin from stingray wound    TONSILLECTOMY      VASECTOMY       Family History   Problem Relation Name Age of Onset    Hypertension Mother Sierar     Arthritis Mother Sierra     Hypertension Father Misael     Diabetes Father Misael     Arthritis Father Misael     Hearing loss Father Misael     Cancer Brother Mickey Douglas     COPD Brother Mickey Douglas      Social History     Socioeconomic History    Marital status:    Tobacco Use    Smoking status: Former     Current packs/day: 1.50     Average packs/day: 1.5 packs/day for 30.0 years (45.0 ttl pk-yrs)     Types: Cigarettes    Smokeless tobacco: Never   Substance and Sexual Activity    Alcohol use: Not Currently    Drug use: Never     Social Drivers of Health     Financial Resource Strain: Low Risk  (11/1/2024)    Overall Financial Resource Strain (CARDIA)     Difficulty of Paying Living Expenses: Not hard at all   Food Insecurity: No Food Insecurity (11/1/2024)    Hunger Vital Sign     Worried About Running Out of Food in the Last Year: Never true     Ran Out of Food in the Last Year: Never true   Transportation Needs: No Transportation Needs (11/1/2024)    TRANSPORTATION NEEDS     Transportation : No   Physical Activity: Insufficiently Active (12/18/2023)    Exercise Vital Sign     Days of Exercise per Week: 1 day     Minutes of Exercise per Session: 20  "min   Stress: No Stress Concern Present (11/1/2024)    [employer] [place] [employer] - Occupational Stress Questionnaire     Feeling of Stress : Not at all   Housing Stability: Low Risk  (11/1/2024)    Housing Stability Vital Sign     Unable to Pay for Housing in the Last Year: No     Homeless in the Last Year: No       Current Outpatient Medications   Medication Sig Dispense Refill    acetaminophen-codeine 300-30mg (TYLENOL #3) 300-30 mg Tab Take 1 tablet by mouth every 6 (six) hours as needed (pain, coughing). 30 tablet 0    albuterol (PROVENTIL/VENTOLIN HFA) 90 mcg/actuation inhaler Inhale 2 puffs into the lungs every 4 (four) hours as needed for Shortness of Breath or Wheezing (cough). 2 puffs every 4 hours as needed for cough, wheeze, or shortness of breath 36 g 11    amLODIPine (NORVASC) 5 MG tablet Take 1 tablet (5 mg total) by mouth once daily. 90 tablet 2    atorvastatin (LIPITOR) 10 MG tablet Take 1 tablet (10 mg total) by mouth every evening. 90 tablet 3    BD SHAHZAD 2ND GEN PEN NEEDLE 32 gauge x 5/32" Ndle       benzonatate (TESSALON) 200 MG capsule Take by mouth.      busPIRone (BUSPAR) 5 MG Tab Take 1 tablet (5 mg total) by mouth every evening. 90 tablet 3    cyanocobalamin 1,000 mcg/mL injection Inject 1 mL (1,000 mcg total) into the muscle every 14 (fourteen) days. 6 mL 3    empagliflozin (JARDIANCE) 10 mg tablet Take 1 tablet (10 mg total) by mouth once daily. 90 tablet 3    fenofibrate 160 MG Tab TAKE 1 TABLET(160 MG) BY MOUTH EVERY NIGHT 90 tablet 2    fluticasone propionate (FLONASE) 50 mcg/actuation nasal spray 1 spray (50 mcg total) by Each Nostril route once daily. 9.9 mL 0    glipiZIDE (GLUCOTROL) 5 MG tablet Take 1 tablet (5 mg total) by mouth 2 (two) times daily with meals. 180 tablet 1    guanFACINE (TENEX) 2 MG tablet TAKE 1 TABLET(2 MG) BY MOUTH EVERY MORNING 90 tablet 0    insulin degludec (TRESIBA FLEXTOUCH U-100) 100 unit/mL (3 mL) insulin pen Inject 32 Units into the skin " "every morning. 5 Pen 3    lisinopriL-hydrochlorothiazide (PRINZIDE,ZESTORETIC) 20-12.5 mg per tablet Take 1 tablet by mouth once daily.      loratadine (CLARITIN ORAL) Take 1 tablet by mouth Daily.      melatonin 10 mg Tab Take 2 tablets by mouth every evening.      metFORMIN (GLUCOPHAGE) 1000 MG tablet Take 1 tablet (1,000 mg total) by mouth 2 (two) times daily with meals. 180 tablet 1    omeprazole (PRILOSEC) 40 MG capsule Take 1 capsule (40 mg total) by mouth 2 (two) times daily before meals. 60 capsule 11    semaglutide (OZEMPIC) 1 mg/dose (4 mg/3 mL) Inject 1 mg into the skin every 7 days. 3 mL 11    tamsulosin (FLOMAX) 0.4 mg Cap Take 1 capsule (0.4 mg total) by mouth nightly. 90 capsule 3    testosterone cypionate (DEPOTESTOTERONE CYPIONATE) 200 mg/mL injection ADMINISTER 0.75ML IN THE MUSCLE EVERY 7 DAYS 12 mL 0    tezepelumab-ekko (TEZSPIRE) 210 mg/1.91 mL (110 mg/mL) PnIj Inject 210 mg into the skin every 28 days. 1.91 mL 11    TRELEGY ELLIPTA 200-62.5-25 mcg inhaler INHALE 1 PUFF INTO THE LUNGS DAILY 180 each 3    sildenafiL (VIAGRA) 100 MG tablet Take 1 tablet (100 mg total) by mouth daily as needed for Erectile Dysfunction. 30 tablet 5    traZODone (DESYREL) 50 MG tablet Take 1-3 tablets ( mg total) by mouth every evening. 270 tablet 3     No current facility-administered medications for this visit.     Review of patient's allergies indicates:   Allergen Reactions    Ibuprofen Itching       Review of Systems   Musculoskeletal:  Positive for gait problem.   All other systems reviewed and are negative.      Objective:      Vitals:    02/06/25 0859   BP: 139/80   BP Location: Left arm   Patient Position: Sitting   Pulse: 78   Weight: 88.6 kg (195 lb 4.8 oz)   Height: 5' 8" (1.727 m)     Physical Exam  Vitals and nursing note reviewed. Exam conducted with a chaperone present.   Constitutional:       General: He is not in acute distress.     Appearance: Normal appearance.   Cardiovascular:      Pulses: "           Dorsalis pedis pulses are 2+ on the right side.        Posterior tibial pulses are 2+ on the right side.   Musculoskeletal:         General: Tenderness and deformity present.      Right foot: Decreased range of motion. Deformity (Clubfoot with limited range of motion right) and prominent metatarsal heads (Prominent 5th met head right) present.      Comments: Decreased pain callused lesion sub 5th met head right, acquired deformity joint/5th metatarsal secondary to collapsed talus, arthritis midtarsal joint right   Feet:      Right foot:      Skin integrity: Callus (decreased hyperkeratotic lesion, reduced chronic sub 5th met head right without discoloration) present.   Skin:     Capillary Refill: Capillary refill takes less than 2 seconds.   Neurological:      General: No focal deficit present.      Mental Status: He is alert.      Sensory: Sensory deficit present.     EXAMINATION:  XR FOOT COMPLETE 3 VIEW RIGHT  CLINICAL HISTORY:  Other specified disorders of bone, ankle and foot   COMPARISON:  June 4, 2024  FINDINGS:  No definite radiopaque foreign body or focal bony erosion is seen about the foot. Joint space narrowing is seen at the 1st metatarsophalangeal joint and prominent degenerative changes are seen at the talonavicular joint.  Osteophyte formation and sclerosis are seen about the posterior subtalar joint.     Electronically signed by:Momo Urbano MD  Date:                                            01/02/2025                     Assessment:       1. Acquired deformity of joint of right foot    2. Collapse of right talus    3. Pain in metatarsus of right foot    4. Foot callus - Right Foot            Plan:         Advised patient due to the walk he walks (arthritis, collapse talus/midfoot arthritis, placing  a lot of his pressure on the outside of the foot) this area will not resolve unless he uses both topical medication to reduced callus and pain/inflammation and off loads pressure  Explained  he absolutely needs to go to have a custom molded orthotic fabricated  Again reviewed location for medial supply, wife confirms she still has Rx for orthotic from our previous visit  Treatment with topical anti inflammatory to avoid nsaids  Apply voltaren gel 3 times daily  Continue soaking in epsom salt, once a day, preferably evening 10-15 minutes  Debrided callus sub 5th met head right  Patient was in understanding and agreement with treatment plan.  I counseled the patient on their conditions, implications and medical management.  Instructed patient/family to contact the office with any changes, questions, concerns, worsening of symptoms.   Total face to face time 20 minutes, exam, assessment, treatment, discussion, additional time for review of chart prior to and following appointment and visit documentation, consultation and coordination of care.    Follow up as needed     This note was created using M*Modal voice recognition software that occasionally misinterpreted phrases or words.

## 2025-02-13 ENCOUNTER — OFFICE VISIT (OUTPATIENT)
Dept: PULMONOLOGY | Facility: CLINIC | Age: 70
End: 2025-02-13
Payer: MEDICARE

## 2025-02-13 DIAGNOSIS — J45.50 SEVERE PERSISTENT ASTHMA WITHOUT COMPLICATION: Primary | ICD-10-CM

## 2025-02-13 DIAGNOSIS — J41.1 BRONCHITIS, CHRONIC, MUCOPURULENT: ICD-10-CM

## 2025-02-13 DIAGNOSIS — L30.9 SEVERE ECZEMA: ICD-10-CM

## 2025-02-13 DIAGNOSIS — G47.33 OBSTRUCTIVE SLEEP APNEA ON CPAP: ICD-10-CM

## 2025-02-13 PROCEDURE — 99213 OFFICE O/P EST LOW 20 MIN: CPT | Mod: PBBFAC,PO | Performed by: INTERNAL MEDICINE

## 2025-02-13 PROCEDURE — 99999 PR PBB SHADOW E&M-EST. PATIENT-LVL III: CPT | Mod: PBBFAC,,, | Performed by: INTERNAL MEDICINE

## 2025-02-13 RX ORDER — AZITHROMYCIN 500 MG/1
TABLET, FILM COATED ORAL
Qty: 3 TABLET | Refills: 3 | Status: SHIPPED | OUTPATIENT
Start: 2025-02-13

## 2025-02-13 RX ORDER — ACETAMINOPHEN AND CODEINE PHOSPHATE 300; 30 MG/1; MG/1
1 TABLET ORAL EVERY 8 HOURS PRN
Qty: 21 TABLET | Refills: 0 | Status: ACTIVE | OUTPATIENT
Start: 2025-02-13

## 2025-02-13 NOTE — PROGRESS NOTES
2/13/2025    Mathew Rodrigues  New Patient Consult    Chief Complaint   Patient presents with    Follow-up    Shortness of Breath       HPI:  2/13/2025 no sign germ found culture last yr.  Pt used t3 nightly and stopped 1/2025 with irratable, rls.  Pt required prednisone again.    Pt still on tezspire but clear that tylenol 3 was covering respiratory respiratory.  Pt will have yellow mucous   Cpap mask having leak issues  Pt was using t3 one daily and feels had some  abstinence when stopped..  still had t3 at home but wished to stop..    Developed eczema on face with severe itching on Jardiance.          November 13, 2024- pt will still have violent coughing at bedtime -- pt will still have occ wheezes.   Pt breathing good with occ wheezes.  Tezspire ongoing.    Patient is still brings up yellow mucus.  Prednisone stopped and doing much better but significant  Pt sleeps better on trazodone but will have excess sleepiness in morning.      The patient has severe sleep apnea.  He has done well with CPAP therapy.  Use nasal pillows.  He wants to try a different mask.  We discuss nasal cradle.  CPAP therapy as ordered.  Patient Instructions   Still green mucous -- check afb cultures    Will place order airfit n30i mask as we discuss--- complinace report in September was <1.5 from 61 bedtime...    May use prednisone    Would use codeine for chest pains and coughing for next 3 months    Will plan to stop tylenol 3 and consider other medicines if needed for sleeping    Expect tezspire should be more effective    May use albuterol up top 2-4 puffs up to every 4 hrs        10/8/2024  pt over last 2 wks and doing well, got 2nd tezspire yesterday,  prednisone tapering, uses t3 bedtime , uses cpap    Sugar <120 in am.  Patient Instructions   Stop prednisone this week -- ok to resume if needed      Continue trelegy   Continue tezpsire.      Chest xrays and ct abd viewed    Use tyleonol 3  for cough/pain..... will renew for later  October-- should not need long term .....      September 25, 2024-pt had covid   8/24  Pt having wheezes and cough violent. Neb ppt cough, sneezes with cough.  Pt started on tezspire but developed covid 2 wks later....  Pt uses nebs, prednisone    Took prednisone twice -- on 30 mg last wk with no great benefit.  Pt having cough and rattle, worse nightly  Cough very violent -- had cough emesis -- no gerd symptoms on daily omeprazole..         From sticky note 2 weeks ago-Prednisone, Tessalon, and cough medicine are all renewed.  He should use the prednisone only as needed if he can get by.  Patient Instructions   Would use tylenol 3 for pains -- should suppress cough-- will give tylenol 3 -- has 3 times dose codeine compared to cough codeine syrup-- use for pain or cough.     If uses bedtime -- need to use cpap.    Check sputum culture  Dose Levaquin    Check chest xray    Use nebulizer, trelegy    Covid worsened underlying asthma.  Asthma may not remit til covid completely resolved.........    Increase prednisone to 60 mg daily for 2-5 days, then 40 mg daily for 2-5 days, then 20 mg daily for 2-5 days -- cut dose if good result or sugar over 200.    Would see in 2 weeks if not clearing-- would see in 2 months others  8/12/2024 pt  having cough with yellow mucous that interfers with cough.  Ent eval and suspected cough from reflux.    Pt had Prilosec double -- no gerd    Pt took prednisone 3 days courses -- was coughing on prednisone for 2 wks.   Steroids seemed to help  Uses trelegy daily, albuterol helps but not lasting  Notes wheezes with albuterol    Eos were up to 1.5 or 1500 in June...     Pt was taken off prednisone for heart cath-- heart was good at cath.  No diarrhea.  Pt off prednisone 10 days.      Patient has a partial response to prednisone and albuterol but has not been able to be controlled  Patient Instructions   Pft had 6% bronchodilator  There is a response to inhalers and prednisone.   Wheezes and  coughing and nocturnal worsening are very prominent..    Breathing test not too too bad-- no obstruction but cough is major symptom.      Woud treat severe persistent eosinophilic asthma with shots....      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  Pt worked construction.  Pt smoked 16-38 ppd or so..    No h/o asthma.  Pt had covid likel illness 2020 with wk in hosp.   Pt felt ot have copd, uses trelegy and albuterol-- no great benefit.  Patient however would improve his breathing would steroids.  He is breathing with normalize.  When he got off steroids to breathing would relapse.  Very dramatic benefit and clear relapse.    Pt has had thick creamy mucous but no culture nor abx-- took abx deceember with      Pt will have freq wheezes--- not particularly nocturnal ---   Chr renal failure  H/o dvt -- on eliquis 5 bid...  Pt on teststerone  Used dose pack in past-- pt felt better and was breathing better but relapsed...      Uses cpap nightly -uses nasal mask.  Compliant.  No problem.  Patient Instructions   Chest xray and ct abd 6/2024 viewed and good    Saline rinse for nasal passages, if stuffy may use 4 way nasal to open passages.  Use flonase daily        You relate cough and wheezes and short breath,  You are on good high dose controller with trelegy.    Albuterol not effective    Steroids will remit problems-- you used once - avoiding due to diabetes.     You have severe persistent eosinophilic asthma.  Expect you are steroid dependant.    Would do breathing test and plan to start dupixent at follow up....      May do ct chest if lingering.......    PFSH:  Past Medical History:   Diagnosis Date    Arthritis     COPD (chronic obstructive pulmonary disease)     Diabetes mellitus, type 2     DVT (deep  venous thrombosis)     Hyperlipidemia     Hypertension     Kidney stones     LVH (left ventricular hypertrophy) due to hypertensive disease, with heart failure     Neuropathy     Personal history of colonic polyps 03/24/2000    colonoscopy report in media    PTSD (post-traumatic stress disorder)     Shortness of breath     fatigue    Sleep apnea, unspecified     Torn rotator cuff          Past Surgical History:   Procedure Laterality Date    achilles repair Left 2003    ADENOIDECTOMY      ANGIOGRAM, CORONARY, WITH LEFT HEART CATHETERIZATION N/A 8/6/2024    Procedure: Angiogram, Coronary, with Left Heart Cath;  Surgeon: Elilot Butler MD;  Location: [facility] CATH/EP LAB;  Service: Cardiology;  Laterality: N/A;    ANKLE FRACTURE SURGERY Right 1987    CATARACT EXTRACTION Left 2004    CATARACT EXTRACTION Left 2005    2nd    COLONOSCOPY N/A 05/09/2017    results in media    COLONOSCOPY N/A 01/13/2023    Procedure: COLONOSCOPY;  Surgeon: Kenney Keller MD;  Location: [facility] ENDO;  Service: General;  Laterality: N/A;    COLONOSCOPY N/A 11/19/2024    Procedure: COLONOSCOPY;  Surgeon: Kameron Chandler MD;  Location: [facility];  Service: Endoscopy;  Laterality: N/A;    COLONOSCOPY W/ POLYPECTOMY N/A 03/24/2000    results in media    ELBOW SURGERY Right 07/24/2020    ESOPHAGOGASTRODUODENOSCOPY N/A 11/19/2024    Procedure: EGD (ESOPHAGOGASTRODUODENOSCOPY) (pt diabetic);  Surgeon: Kameron Chandler MD;  Location: [facility];  Service: Endoscopy;  Laterality: N/A;    EYE SURGERY  2005    finger joint replacement Right 01/22/2020    middle finger    hair follicle  1998    HAND SURGERY Right 04/25/2017    HAND SURGERY Right 05/12/2017    2nd surgery    INTRALUMINAL GASTROINTESTINAL TRACT IMAGING VIA CAPSULE N/A 12/10/2024    Procedure: IMAGING PROCEDURE, GI TRACT, INTRALUMINAL, VIA CAPSULE;  Surgeon: Kameron Chandler MD;  Location: [facility];  Service: Endoscopy;  Laterality: N/A;    NASAL SINUS SURGERY  08/2015    ROTATOR  CUFF REPAIR Right 09/25/2015    ROTATOR CUFF REPAIR Left 04/08/2016    skin grafts Right 07/2016    shin from stingray wound    TONSILLECTOMY      VASECTOMY       Social History     Tobacco Use    Smoking status: Former     Current packs/day: 1.50     Average packs/day: 1.5 packs/day for 30.0 years (45.0 ttl pk-yrs)     Types: Cigarettes    Smokeless tobacco: Never   Substance Use Topics    Alcohol use: Not Currently    Drug use: Never     Family History   Problem Relation Name Age of Onset    Hypertension Mother Sierra     Arthritis Mother Sierra     Hypertension Father Misael     Diabetes Father Misael     Arthritis Father Misael     Hearing loss Father Misael     Cancer Brother Mickey Douglas     COPD Brother Mickey Douglas      Review of patient's allergies indicates:   Allergen Reactions    Ibuprofen Itching          Review of Systems:  a review of eleven systems covering constitutional, Eye, HEENT, Psych, Respiratory, Cardiac, GI, , Musculoskeletal, Endocrine, Dermatologic was negative except for pertinent findings as listed ABOVE and below: pertinent positives as above, rest good        Exam:Comprehensive exam done. There were no vitals taken for this visit.  Exam included Vitals as listed, and patient's appearance and affect and alertness and mood, oral exam for yeast and hygiene and pharynx lesions and Mallapatti (M) score, neck with inspection for jvd and masses and thyroid abnormalities and lymph nodes (supraclavicular and infraclavicular nodes and axillary also examined and noted if abn), chest exam included symmetry and effort and fremitus and percussion and auscultation, cardiac exam included rhythm and gallops and murmur and rubs and jvd and edema, abdominal exam for mass and hepatosplenomegaly and tenderness and hernias and bowel sounds, Musculoskeletal exam with muscle tone and posture and mobility/gait and  strength, and skin for rashes and cyanosis and pallor and turgor, extremity for clubbing.  Findings  were normal except for pertinent findings listed below:  M3, chest is symmetric, no distress, normal percussion, normal fremitus and good normal breath sounds  Arthritic deformed hands...         Radiographs (ct chest and cxr) reviewed: view by direct vision      Labs none available        PFT will be done and results to be reviewed       Plan:  Clinical impression is apparently straight forward and impression with management as below.    Mathew was seen today for follow-up and shortness of breath.    Diagnoses and all orders for this visit:    Severe persistent asthma without complication  -     dupilumab (DUPIXENT) 300 mg/2 mL Syrg; Inject 2 mLs (300 mg total) into the skin every 14 (fourteen) days.  -     acetaminophen-codeine 300-30mg (TYLENOL #3) 300-30 mg Tab; Take 1 tablet by mouth every 8 (eight) hours as needed (cough).    Bronchitis, chronic, mucopurulent  -     azithromycin (ZITHROMAX) 500 MG tablet; One daily for yellow mucous, repeat if needed    Severe eczema  -     dupilumab (DUPIXENT) 300 mg/2 mL Syrg; Inject 2 mLs (300 mg total) into the skin every 14 (fourteen) days.    Obstructive sleep apnea on CPAP                  Follow up in about 3 months (around 5/13/2025), or if symptoms worsen or fail to improve.    Discussed with patient above for education the following:      Patient Instructions   Will discontinue tezspire and dose dupixent    300 mg dupixent dosed each mid abd wall -- totald 600 mg  sq-- mid abd wall-- lot [id number], exp  7/3/2026      Will dose tylenol 3 -- give 21 tablets-- may refil if needed-- call if needed    Recheck 3 months if tylenol 3 needed.      Use azithromycin if yellow mucous-- prior cultures were good..    We discuss cpap mask          Eval took 32 min

## 2025-02-13 NOTE — PATIENT INSTRUCTIONS
Will discontinue tezspire and dose dupixent    300 mg dupixent dosed each mid abd wall -- totald 600 mg  sq-- mid abd wall-- lot [id number], exp  7/3/2026      Will dose tylenol 3 -- give 21 tablets-- may refil if needed-- call if needed    Recheck 3 months if tylenol 3 needed.      Use azithromycin if yellow mucous-- prior cultures were good..    We discuss cpap mask

## 2025-02-18 ENCOUNTER — PATIENT MESSAGE (OUTPATIENT)
Dept: PULMONOLOGY | Facility: CLINIC | Age: 70
End: 2025-02-18
Payer: MEDICARE

## 2025-02-20 ENCOUNTER — PATIENT MESSAGE (OUTPATIENT)
Dept: INTERNAL MEDICINE | Facility: CLINIC | Age: 70
End: 2025-02-20
Payer: MEDICARE

## 2025-02-20 ENCOUNTER — CLINICAL SUPPORT (OUTPATIENT)
Dept: AUDIOLOGY | Facility: CLINIC | Age: 70
End: 2025-02-20
Payer: MEDICARE

## 2025-02-20 ENCOUNTER — PATIENT MESSAGE (OUTPATIENT)
Dept: PULMONOLOGY | Facility: CLINIC | Age: 70
End: 2025-02-20
Payer: MEDICARE

## 2025-02-20 DIAGNOSIS — H90.3 SENSORINEURAL HEARING LOSS (SNHL), BILATERAL: Primary | ICD-10-CM

## 2025-02-20 DIAGNOSIS — G47.33 OSA ON CPAP: Primary | ICD-10-CM

## 2025-02-20 NOTE — PROGRESS NOTES
Hearing aids arrived from ; devices were charged in preparation for fitting.     Hearing Aid Details      & Model: ReSound Nexia 5 ITC  Color: Medium brown faceplate with red right, blue left   Rt SN: [id number]  Lt SN: [id number]  Battery: LI Rechargeable    Repair Warranty Exp: 03/10/2028  L&D Warranty Exp: 03/10/2028   SN: [id number]     HEARING AID FITTING    Mathew IDA Rodrigues was seen today for a hearing aid fitting.  All parts of the hearing aid, including rechargeable battery, wax filters, and microphones, were discussed with the patient.  Battery charging was also reviewed and practiced with the patient.  Feedback measures were taken and hearing aid was fit to patient's satisfaction.  Counseled patient on daily wear and maintenance of the hearing aid.  Mr. Rodrigues acknowledged that he understood.  He practiced removing/inserting his hearing aids. He had no issues. The hearing aids were not connected to the patient's phone.  Pt does not want hearing aids paired to phone at this time.  The purchase agreement, 30-day trial period, and warranties were also reviewed with patient.  It is recommended Mr. Rodrigues return to clinic in 2 weeks or as needed. We set an appointment for follow up on 3/6/25.

## 2025-02-22 DIAGNOSIS — G47.9 SLEEP DISTURBANCE: ICD-10-CM

## 2025-02-22 NOTE — TELEPHONE ENCOUNTER
Care Due:                  Date            Visit Type   Department     Provider  --------------------------------------------------------------------------------                                EP -                              [facility] FAMILY  Last Visit: 11-      CARE ([facility])   MEDICINE       Kiley Vasquez                              [facility]  Next Visit: 05-      CARE ([facility])   MEDICINE       Kiley Vasquez                                                            Last  Test          Frequency    Reason                     Performed    Due Date  --------------------------------------------------------------------------------    HBA1C.......  6 months...  glipiZIDE, insulin,        11- 05-                             metFORMIN, semaglutide...    Health [facility] Embedded Care Due Messages. Reference number: [id number].   2/22/2025 4:03:12 PM CST

## 2025-02-23 RX ORDER — TRAZODONE HYDROCHLORIDE 50 MG/1
TABLET ORAL
Qty: 270 TABLET | Refills: 2 | Status: SHIPPED | OUTPATIENT
Start: 2025-02-23

## 2025-02-23 NOTE — TELEPHONE ENCOUNTER
Provider Staff:  Action required for this patient    Requires labs      Please see care gap opportunities below in Care Due Message.    Thanks!  [facility]     Appointments      Date Provider   Last Visit   11/15/2024 Kiley Vasquez MD   Next Visit   5/15/2025 Kiley Vasquez MD     Refill Decision Note   Mathew Lilia  is requesting a refill authorization.  Brief Assessment and Rationale for Refill:  Approve     Medication Therapy Plan:         Comments:     Note composed:11:02 AM 02/23/2025

## 2025-03-03 DIAGNOSIS — G47.33 OBSTRUCTIVE SLEEP APNEA SYNDROME: Primary | ICD-10-CM

## 2025-03-05 ENCOUNTER — TELEPHONE (OUTPATIENT)
Dept: PULMONOLOGY | Facility: CLINIC | Age: 70
End: 2025-03-05
Payer: MEDICARE

## 2025-03-05 NOTE — TELEPHONE ENCOUNTER
----- Message from Darci Quinteros MD sent at 3/3/2025  5:24 PM CST -----  Regarding: RE: APAP Rx & heated tube  Orders placed for heated tubing-HME order and also CPAP supply order.  Notify  ----- Message -----  From: Josse Neely RRT  Sent: 2/28/2025   1:52 PM CST  To: Darci Quinteros MD  Subject: APAP Rx & heated tube                            Hey Dr. Quinteros. I have the patient in office with me and he wants heated tubing because he used heated tubing on his prior machine.Also the order for APAP 8-20 he does not like. He wants to start at pressure of 14. Could you place order to increase minimum to 14?

## 2025-03-06 ENCOUNTER — CLINICAL SUPPORT (OUTPATIENT)
Dept: AUDIOLOGY | Facility: CLINIC | Age: 70
End: 2025-03-06
Payer: MEDICARE

## 2025-03-06 ENCOUNTER — OFFICE VISIT (OUTPATIENT)
Dept: PODIATRY | Facility: CLINIC | Age: 70
End: 2025-03-06
Payer: MEDICARE

## 2025-03-06 VITALS
HEART RATE: 70 BPM | DIASTOLIC BLOOD PRESSURE: 61 MMHG | WEIGHT: 198.63 LBS | HEIGHT: 68 IN | SYSTOLIC BLOOD PRESSURE: 131 MMHG | BODY MASS INDEX: 30.1 KG/M2

## 2025-03-06 DIAGNOSIS — L84 FOOT CALLUS: ICD-10-CM

## 2025-03-06 DIAGNOSIS — M21.6X1: ICD-10-CM

## 2025-03-06 DIAGNOSIS — M21.961 ACQUIRED DEFORMITY OF JOINT OF RIGHT FOOT: ICD-10-CM

## 2025-03-06 DIAGNOSIS — H90.3 SENSORINEURAL HEARING LOSS (SNHL), BILATERAL: Primary | ICD-10-CM

## 2025-03-06 DIAGNOSIS — M89.8X7 PAIN IN METATARSUS OF RIGHT FOOT: Primary | ICD-10-CM

## 2025-03-06 PROCEDURE — 99213 OFFICE O/P EST LOW 20 MIN: CPT | Mod: PBBFAC | Performed by: PODIATRIST

## 2025-03-06 PROCEDURE — 99999 PR PBB SHADOW E&M-EST. PATIENT-LVL III: CPT | Mod: PBBFAC,,, | Performed by: PODIATRIST

## 2025-03-06 NOTE — PROGRESS NOTES
HEARING AID FOLLOW-UP    Mathew Rodrigues was seen today for a hearing aid follow-up visit. This is a two week follow up from his hearing aid dispense. Pt states he is doing very well with hearing aids. He has no difficulty inserting the right ear hearing aid but the left ear hearing aid is a little more difficult. He does not want to send for remake at this time. We adjusted some of the high frequency tones, AU, due to slight discomfort with certain sounds. We went over how to change programs and turn the hearing aids on/off with the button. He did not want to connect his phone at this time. We completed aided/unaided testing, which showed significant improvement in pure tone and speech thresholds. He would like to return in one week to report on today's programming changes. His 30 day trial period ends 3/24/25.

## 2025-03-08 NOTE — PROGRESS NOTES
Subjective:       Patient ID: Mathew Rodrigues is a 70 y.o. male.    Chief Complaint: Callouses (Right foot; causing pain )  Patient presents with his wife for follow up pain under 5th met head right with chronic callus, pain upon weight bearing, arthritis and collapsed talus right foot  Patient has pursued custom insole through [employer] limb and brace, states it has been ordered, he is hopeful this may take some pressure off this area  Reports continued benefit with treatment of callus  Patient relates he has had this callus for a long time but has not had consistent pain with it since an injury when his home elevator cable broke and he had a high impact injury to this foot  He has not been able to increase his activity due to pain in this location  Pain level 2/10      Past Medical History:   Diagnosis Date    Arthritis     COPD (chronic obstructive pulmonary disease)     Diabetes mellitus, type 2     DVT (deep venous thrombosis)     Hyperlipidemia     Hypertension     Kidney stones     LVH (left ventricular hypertrophy) due to hypertensive disease, with heart failure     Neuropathy     Personal history of colonic polyps 03/24/2000    colonoscopy report in media    PTSD (post-traumatic stress disorder)     Shortness of breath     fatigue    Sleep apnea, unspecified     Torn rotator cuff      Past Surgical History:   Procedure Laterality Date    achilles repair Left 2003    ADENOIDECTOMY      ANGIOGRAM, CORONARY, WITH LEFT HEART CATHETERIZATION N/A 8/6/2024    Procedure: Angiogram, Coronary, with Left Heart Cath;  Surgeon: Elliot Butler MD;  Location: [facility] CATH/EP LAB;  Service: Cardiology;  Laterality: N/A;    ANKLE FRACTURE SURGERY Right 1987    CATARACT EXTRACTION Left 2004    CATARACT EXTRACTION Left 2005    2nd    COLONOSCOPY N/A 05/09/2017    results in media    COLONOSCOPY N/A 01/13/2023    Procedure: COLONOSCOPY;  Surgeon: Kenney Keller MD;  Location: [facility] ENDO;  Service: General;  Laterality:  N/A;    COLONOSCOPY N/A 11/19/2024    Procedure: COLONOSCOPY;  Surgeon: Kameron Chandler MD;  Location: [facility] ENDO;  Service: Endoscopy;  Laterality: N/A;    COLONOSCOPY W/ POLYPECTOMY N/A 03/24/2000    results in media    ELBOW SURGERY Right 07/24/2020    ESOPHAGOGASTRODUODENOSCOPY N/A 11/19/2024    Procedure: EGD (ESOPHAGOGASTRODUODENOSCOPY) (pt diabetic);  Surgeon: Kameron Chandler MD;  Location: [facility] ENDO;  Service: Endoscopy;  Laterality: N/A;    EYE SURGERY  2005    finger joint replacement Right 01/22/2020    middle finger    hair follicle  1998    HAND SURGERY Right 04/25/2017    HAND SURGERY Right 05/12/2017    2nd surgery    INTRALUMINAL GASTROINTESTINAL TRACT IMAGING VIA CAPSULE N/A 12/10/2024    Procedure: IMAGING PROCEDURE, GI TRACT, INTRALUMINAL, VIA CAPSULE;  Surgeon: Kameron Chandler MD;  Location: [facility] ENDO;  Service: Endoscopy;  Laterality: N/A;    NASAL SINUS SURGERY  08/2015    ROTATOR CUFF REPAIR Right 09/25/2015    ROTATOR CUFF REPAIR Left 04/08/2016    skin grafts Right 07/2016    shin from stingray wound    TONSILLECTOMY      VASECTOMY       Family History   Problem Relation Name Age of Onset    Hypertension Mother Sierra     Arthritis Mother Sierra     Hypertension Father Misael     Diabetes Father Misael     Arthritis Father Misael     Hearing loss Father Misael     Cancer Brother López Duoglasneth     COPD Brother López Douglasneth      Social History     Socioeconomic History    Marital status:    Tobacco Use    Smoking status: Former     Current packs/day: 1.50     Average packs/day: 1.5 packs/day for 30.0 years (45.0 ttl pk-yrs)     Types: Cigarettes    Smokeless tobacco: Never   Substance and Sexual Activity    Alcohol use: Not Currently    Drug use: Never     Social Drivers of Health     Financial Resource Strain: Low Risk  (11/1/2024)    Overall Financial Resource Strain (CARDIA)     Difficulty of Paying Living Expenses: Not hard at all   Food Insecurity: No Food Insecurity (11/1/2024)     "Hunger Vital Sign     Worried About Running Out of Food in the Last Year: Never true     Ran Out of Food in the Last Year: Never true   Transportation Needs: No Transportation Needs (11/1/2024)    TRANSPORTATION NEEDS     Transportation : No   Physical Activity: Insufficiently Active (12/18/2023)    Exercise Vital Sign     Days of Exercise per Week: 1 day     Minutes of Exercise per Session: 20 min   Stress: No Stress Concern Present (11/1/2024)    [employer] - Occupational Stress Questionnaire     Feeling of Stress : Not at all   Housing Stability: Unknown (11/1/2024)    Housing Stability Vital Sign     Unable to Pay for Housing in the Last Year: No     Homeless in the Last Year: No       Current Outpatient Medications   Medication Sig Dispense Refill    acetaminophen-codeine 300-30mg (TYLENOL #3) 300-30 mg Tab Take 1 tablet by mouth every 6 (six) hours as needed (pain, coughing). 30 tablet 0    acetaminophen-codeine 300-30mg (TYLENOL #3) 300-30 mg Tab Take 1 tablet by mouth every 8 (eight) hours as needed (cough). 21 tablet 0    albuterol (PROVENTIL/VENTOLIN HFA) 90 mcg/actuation inhaler Inhale 2 puffs into the lungs every 4 (four) hours as needed for Shortness of Breath or Wheezing (cough). 2 puffs every 4 hours as needed for cough, wheeze, or shortness of breath 36 g 11    amLODIPine (NORVASC) 5 MG tablet Take 1 tablet (5 mg total) by mouth once daily. 90 tablet 2    atorvastatin (LIPITOR) 10 MG tablet Take 1 tablet (10 mg total) by mouth every evening. 90 tablet 3    azithromycin (ZITHROMAX) 500 MG tablet One daily for yellow mucous, repeat if needed 3 tablet 3    BD SHAHZAD 2ND GEN PEN NEEDLE 32 gauge x 5/32" Ndle       benzonatate (TESSALON) 200 MG capsule Take by mouth. (Patient taking differently: Take 200 mg by mouth 3 (three) times daily as needed for Cough.)      busPIRone (BUSPAR) 5 MG Tab Take 1 tablet (5 mg total) by mouth every evening. 90 tablet 3    cyanocobalamin 1,000 " mcg/mL injection Inject 1 mL (1,000 mcg total) into the muscle every 14 (fourteen) days. 6 mL 3    dupilumab 300 mg/2 mL PnIj Inject 2 mLs (300 mg total) into the skin every 14 (fourteen) days. 2 mL 26    empagliflozin (JARDIANCE) 10 mg tablet Take 1 tablet (10 mg total) by mouth once daily. 90 tablet 3    fenofibrate 160 MG Tab TAKE 1 TABLET(160 MG) BY MOUTH EVERY NIGHT 90 tablet 2    fluticasone propionate (FLONASE) 50 mcg/actuation nasal spray 1 spray (50 mcg total) by Each Nostril route once daily. 9.9 mL 0    glipiZIDE (GLUCOTROL) 5 MG tablet Take 1 tablet (5 mg total) by mouth 2 (two) times daily with meals. 180 tablet 1    guanFACINE (TENEX) 2 MG tablet TAKE 1 TABLET(2 MG) BY MOUTH EVERY MORNING 90 tablet 0    insulin degludec (TRESIBA FLEXTOUCH U-100) 100 unit/mL (3 mL) insulin pen Inject 32 Units into the skin every morning. 5 Pen 3    lisinopriL-hydrochlorothiazide (PRINZIDE,ZESTORETIC) 20-12.5 mg per tablet Take 1 tablet by mouth once daily.      loratadine (CLARITIN ORAL) Take 1 tablet by mouth Daily.      melatonin 10 mg Tab Take 2 tablets by mouth every evening.      metFORMIN (GLUCOPHAGE) 1000 MG tablet Take 1 tablet (1,000 mg total) by mouth 2 (two) times daily with meals. 180 tablet 1    omeprazole (PRILOSEC) 40 MG capsule Take 1 capsule (40 mg total) by mouth 2 (two) times daily before meals. 60 capsule 11    semaglutide (OZEMPIC) 1 mg/dose (4 mg/3 mL) Inject 1 mg into the skin every 7 days. 3 mL 11    tamsulosin (FLOMAX) 0.4 mg Cap Take 1 capsule (0.4 mg total) by mouth nightly. 90 capsule 3    testosterone cypionate (DEPOTESTOTERONE CYPIONATE) 200 mg/mL injection ADMINISTER 0.75ML IN THE MUSCLE EVERY 7 DAYS 12 mL 0    traZODone (DESYREL) 50 MG tablet TAKE 1 TO 3 TABLETS(50  MG) BY MOUTH EVERY EVENING 270 tablet 2    TRELEGY ELLIPTA 200-62.5-25 mcg inhaler INHALE 1 PUFF INTO THE LUNGS DAILY 180 each 3    sildenafiL (VIAGRA) 100 MG tablet Take 1 tablet (100 mg total) by mouth daily as needed  "for Erectile Dysfunction. 30 tablet 5     No current facility-administered medications for this visit.     Review of patient's allergies indicates:   Allergen Reactions    Ibuprofen Itching       Review of Systems   Musculoskeletal:  Positive for gait problem.   All other systems reviewed and are negative.      Objective:      Vitals:    03/06/25 0924   BP: 131/61   BP Location: Right arm   Patient Position: Sitting   Pulse: 70   Weight: 90.1 kg (198 lb 9.6 oz)   Height: 5' 8" (1.727 m)     Physical Exam  Vitals and nursing note reviewed. Exam conducted with a chaperone present.   Constitutional:       General: He is not in acute distress.  Cardiovascular:      Pulses:           Dorsalis pedis pulses are 2+ on the right side.        Posterior tibial pulses are 2+ on the right side.   Musculoskeletal:         General: Tenderness and deformity present.      Right foot: Decreased range of motion. Deformity (Clubfoot with limited range of motion right) and prominent metatarsal heads (Prominent 5th met head right) present.      Comments: Decreased pain callused lesion sub 5th met head right, acquired deformity joint/5th metatarsal secondary to collapsed talus, arthritis midtarsal joint right   Feet:      Right foot:      Skin integrity: Callus (decreased hyperkeratotic lesion, reduced chronic sub 5th met head right without discoloration) present.   Skin:     Capillary Refill: Capillary refill takes less than 2 seconds.                              Assessment:       1. Pain in metatarsus of right foot    2. Acquired deformity of joint of right foot    3. Collapse of right talus    4. Foot callus - Right Foot              Plan:         Reviewed multiple contributing factors including injury, arthritis, collapse talus/midfoot arthritis, placing a lot of his pressure on the outside of this foot   Patient understands this causes adjacent nerve pain underlying this area, branch of the nerve complex to the outside of the foot " underlying the area of the 5th metatarsal/callus  We reviewed the need to apply topicals to treat pain and inflammation including ice, Voltaren gel and we discussed other topical treatments including over-the-counter lidocaine patches, CBD  Explained if orthotic the DME/medical supply as ordered him offload pressure in this area comfortably and he wears it in his shoes at all times it could resolve pain in this location  Debrided callus sub 5th met head right  Contact office with any changes  Patient was in understanding and agreement with treatment plan  I counseled the patient on their conditions, implications and medical management.  Instructed patient/family to contact the office with any changes, questions, concerns, worsening of symptoms.   Total face to face time 20 minutes, exam, assessment, treatment, discussion, additional time for review of chart prior to and following appointment and visit documentation, consultation and coordination of care.    Follow up as needed     This note was created using M*Modal voice recognition software that occasionally misinterpreted phrases or words.

## 2025-03-13 ENCOUNTER — CLINICAL SUPPORT (OUTPATIENT)
Dept: AUDIOLOGY | Facility: CLINIC | Age: 70
End: 2025-03-13
Payer: MEDICARE

## 2025-03-13 DIAGNOSIS — H90.3 SENSORINEURAL HEARING LOSS (SNHL), BILATERAL: Primary | ICD-10-CM

## 2025-03-13 NOTE — PROGRESS NOTES
HEARING AID FOLLOW-UP    Mathew IDA Rodrigues was seen today for a hearing aid follow-up visit. Pt is doing very well with hearing aids. He says insertion of left ear hearing aid is much better. Both are comfortable in his ears. He did not want any additional adjustments to the hearing aids today. He downloaded the [employer] minerva for his phone but only wants to use it for minor adjustments, not for telephone calls. He has worked on sound systems for many years and likes being able to make small temporary adjustments in the minerva.     We discussed his BC/ Federal reimbursement. Pt's wife, Fide, spoke with someone from BC/ yesterday after receiving a BC/BS [place] denial letter. They stated that in [place], BC/ has no preferred provider. We will need to submit a pre-approval form, but the customer care rep, [employer], assured her they will cover the $2500 hearing aid benefit. Pt has already submitted documents they told him to submit (purchase agreement, audiogram, notes, receipt that shows paid in full, etc) for self-file reimbursement. I will contact pt once all information is completed/approved. I mentioned that we may need to return for credit and start process over again, but pt is very pleased with these hearing aids and really does not want to begin again.

## 2025-03-25 DIAGNOSIS — E11.9 TYPE 2 DIABETES MELLITUS WITHOUT COMPLICATION, WITHOUT LONG-TERM CURRENT USE OF INSULIN: ICD-10-CM

## 2025-03-25 RX ORDER — INSULIN DEGLUDEC 100 U/ML
32 INJECTION, SOLUTION SUBCUTANEOUS EVERY MORNING
Qty: 30 ML | Refills: 0 | Status: SHIPPED | OUTPATIENT
Start: 2025-03-25 | End: 2025-03-27 | Stop reason: SDUPTHER

## 2025-03-25 NOTE — TELEPHONE ENCOUNTER
No care due was identified.  Health [facility] Embedded Care Due Messages. Reference number: [id number].   3/25/2025 12:28:30 PM CDT

## 2025-03-25 NOTE — TELEPHONE ENCOUNTER
Refill Decision Note   Mathew Rodrigues  is requesting a refill authorization.  Brief Assessment and Rationale for Refill:  Approve     Medication Therapy Plan:  No dose adjustment recommended per renal fxn.       Comments:     Note composed:5:50 PM 03/25/2025

## 2025-03-27 ENCOUNTER — PATIENT MESSAGE (OUTPATIENT)
Dept: FAMILY MEDICINE | Facility: CLINIC | Age: 70
End: 2025-03-27
Payer: MEDICARE

## 2025-03-27 DIAGNOSIS — E11.9 TYPE 2 DIABETES MELLITUS WITHOUT COMPLICATION, WITHOUT LONG-TERM CURRENT USE OF INSULIN: ICD-10-CM

## 2025-03-27 RX ORDER — INSULIN DEGLUDEC 100 U/ML
32 INJECTION, SOLUTION SUBCUTANEOUS EVERY MORNING
Qty: 30 ML | Refills: 0 | Status: SHIPPED | OUTPATIENT
Start: 2025-03-27

## 2025-03-27 NOTE — TELEPHONE ENCOUNTER
No care due was identified.  [facility] Embedded Care Due Messages. Reference number: [id number].   3/27/2025 2:03:21 PM CDT

## 2025-03-28 NOTE — TELEPHONE ENCOUNTER
Refill Decision Note   Mathew Rodrigues  is requesting a refill authorization.  Brief Assessment and Rationale for Refill:  Approve     Medication Therapy Plan:  Pt requesting different pharmacy    Medication Reconciliation Completed: No   Comments:     No Care Gaps recommended.     Note composed:11:10 PM 03/27/2025

## 2025-03-28 NOTE — TELEPHONE ENCOUNTER
Refill Routing Note   Medication(s) are not appropriate for processing by [facility] for the following reason(s):        Required labs abnormal    [facility] action(s):  Defer        Medication Therapy Plan: Pt requesting different pharmacy    Pharmacist review requested: Yes     Appointments  past 12m or future 3m with PCP    Date Provider   Last Visit   11/15/2024 Kiley Vasquez MD   Next Visit   5/15/2025 Kiley Vasquez MD   ED visits in past 90 days: 0        Note composed:7:37 PM 03/27/2025

## 2025-04-02 ENCOUNTER — OFFICE VISIT (OUTPATIENT)
Dept: PODIATRY | Facility: CLINIC | Age: 70
End: 2025-04-02
Payer: MEDICARE

## 2025-04-02 VITALS
HEIGHT: 68 IN | WEIGHT: 191.63 LBS | SYSTOLIC BLOOD PRESSURE: 103 MMHG | HEART RATE: 72 BPM | BODY MASS INDEX: 29.04 KG/M2 | DIASTOLIC BLOOD PRESSURE: 55 MMHG

## 2025-04-02 DIAGNOSIS — L84 FOOT CALLUS: ICD-10-CM

## 2025-04-02 DIAGNOSIS — M89.8X7 PAIN IN METATARSUS OF RIGHT FOOT: ICD-10-CM

## 2025-04-02 DIAGNOSIS — M21.6X1: ICD-10-CM

## 2025-04-02 DIAGNOSIS — M21.961 ACQUIRED DEFORMITY OF JOINT OF RIGHT FOOT: Primary | ICD-10-CM

## 2025-04-02 PROCEDURE — 99213 OFFICE O/P EST LOW 20 MIN: CPT | Mod: S$PBB,,, | Performed by: PODIATRIST

## 2025-04-02 PROCEDURE — 99999 PR PBB SHADOW E&M-EST. PATIENT-LVL IV: CPT | Mod: PBBFAC,,, | Performed by: PODIATRIST

## 2025-04-02 PROCEDURE — 99214 OFFICE O/P EST MOD 30 MIN: CPT | Mod: PBBFAC | Performed by: PODIATRIST

## 2025-04-02 RX ORDER — AMOXICILLIN 500 MG/1
500 CAPSULE ORAL EVERY 6 HOURS
COMMUNITY
Start: 2025-03-11

## 2025-04-02 RX ORDER — HYDROCODONE BITARTRATE AND ACETAMINOPHEN 7.5; 325 MG/1; MG/1
1 TABLET ORAL EVERY 6 HOURS PRN
COMMUNITY
Start: 2025-03-24

## 2025-04-04 NOTE — PROGRESS NOTES
Subjective:       Patient ID: Mathew Rodrigues is a 70 y.o. male.    Chief Complaint: Follow-up and Callouses  Patient presents with his wife for follow up chronic pain under 5th met head right with callus/IPK, pain upon weight bearing, arthritis and collapsed talus right foot  Patient has received custom accommodative insole through [facility] limb and brace to help him pronate off this areas  Patient relates he has gradually increase the amount of time he has worn it daily and it has been helpful  Pain level 1/10      Past Medical History:   Diagnosis Date    Arthritis     COPD (chronic obstructive pulmonary disease)     Diabetes mellitus, type 2     DVT (deep venous thrombosis)     Hyperlipidemia     Hypertension     Kidney stones     LVH (left ventricular hypertrophy) due to hypertensive disease, with heart failure     Neuropathy     Personal history of colonic polyps 03/24/2000    colonoscopy report in media    PTSD (post-traumatic stress disorder)     Shortness of breath     fatigue    Sleep apnea, unspecified     Torn rotator cuff      Past Surgical History:   Procedure Laterality Date    achilles repair Left 2003    ADENOIDECTOMY      ANGIOGRAM, CORONARY, WITH LEFT HEART CATHETERIZATION N/A 8/6/2024    Procedure: Angiogram, Coronary, with Left Heart Cath;  Surgeon: Elliot Butler MD;  Location: [facility] CATH/EP LAB;  Service: Cardiology;  Laterality: N/A;    ANKLE FRACTURE SURGERY Right 1987    CATARACT EXTRACTION Left 2004    CATARACT EXTRACTION Left 2005 2nd    COLONOSCOPY N/A 05/09/2017    results in media    COLONOSCOPY N/A 01/13/2023    Procedure: COLONOSCOPY;  Surgeon: Kenney Keller MD;  Location: [facility] ENDO;  Service: General;  Laterality: N/A;    COLONOSCOPY N/A 11/19/2024    Procedure: COLONOSCOPY;  Surgeon: Kameron Chandler MD;  Location: [facility] ENDO;  Service: Endoscopy;  Laterality: N/A;    COLONOSCOPY W/ POLYPECTOMY N/A 03/24/2000    results in media    ELBOW SURGERY Right 07/24/2020     ESOPHAGOGASTRODUODENOSCOPY N/A 11/19/2024    Procedure: EGD (ESOPHAGOGASTRODUODENOSCOPY) (pt diabetic);  Surgeon: Kameron Chandler MD;  Location: [facility] ENDO;  Service: Endoscopy;  Laterality: N/A;    EYE SURGERY  2005    finger joint replacement Right 01/22/2020    middle finger    hair follicle  1998    HAND SURGERY Right 04/25/2017    HAND SURGERY Right 05/12/2017    2nd surgery    INTRALUMINAL GASTROINTESTINAL TRACT IMAGING VIA CAPSULE N/A 12/10/2024    Procedure: IMAGING PROCEDURE, GI TRACT, INTRALUMINAL, VIA CAPSULE;  Surgeon: Kameron Chandler MD;  Location: [facility] ENDO;  Service: Endoscopy;  Laterality: N/A;    NASAL SINUS SURGERY  08/2015    ROTATOR CUFF REPAIR Right 09/25/2015    ROTATOR CUFF REPAIR Left 04/08/2016    skin grafts Right 07/2016    shin from stingray wound    TONSILLECTOMY      VASECTOMY       Family History   Problem Relation Name Age of Onset    Hypertension Mother Sierra     Arthritis Mother Sierra     Hypertension Father Misael     Diabetes Father Misael     Arthritis Father Misael     Hearing loss Father Misael     Cancer Brother Mickey Douglas     COPD Brother Mickey Douglas      Social History     Socioeconomic History    Marital status:    Tobacco Use    Smoking status: Former     Current packs/day: 1.50     Average packs/day: 1.5 packs/day for 30.0 years (45.0 ttl pk-yrs)     Types: Cigarettes    Smokeless tobacco: Never   Substance and Sexual Activity    Alcohol use: Not Currently    Drug use: Never     Social Drivers of Health     Financial Resource Strain: Low Risk  (11/1/2024)    Overall Financial Resource Strain (CARDIA)     Difficulty of Paying Living Expenses: Not hard at all   Food Insecurity: No Food Insecurity (11/1/2024)    Hunger Vital Sign     Worried About Running Out of Food in the Last Year: Never true     Ran Out of Food in the Last Year: Never true   Transportation Needs: No Transportation Needs (11/1/2024)    TRANSPORTATION NEEDS     Transportation : No   Physical  "Activity: Insufficiently Active (12/18/2023)    Exercise Vital Sign     Days of Exercise per Week: 1 day     Minutes of Exercise per Session: 20 min   Stress: No Stress Concern Present (11/1/2024)    [employer] - Occupational Stress Questionnaire     Feeling of Stress : Not at all   Housing Stability: Unknown (11/1/2024)    Housing Stability Vital Sign     Unable to Pay for Housing in the Last Year: No     Homeless in the Last Year: No       Current Outpatient Medications   Medication Sig Dispense Refill    acetaminophen-codeine 300-30mg (TYLENOL #3) 300-30 mg Tab Take 1 tablet by mouth every 8 (eight) hours as needed (cough). 21 tablet 0    albuterol (PROVENTIL/VENTOLIN HFA) 90 mcg/actuation inhaler Inhale 2 puffs into the lungs every 4 (four) hours as needed for Shortness of Breath or Wheezing (cough). 2 puffs every 4 hours as needed for cough, wheeze, or shortness of breath 36 g 11    amLODIPine (NORVASC) 5 MG tablet Take 1 tablet (5 mg total) by mouth once daily. 90 tablet 2    amoxicillin (AMOXIL) 500 MG capsule Take 500 mg by mouth every 6 (six) hours.      atorvastatin (LIPITOR) 10 MG tablet Take 1 tablet (10 mg total) by mouth every evening. 90 tablet 3    azithromycin (ZITHROMAX) 500 MG tablet One daily for yellow mucous, repeat if needed 3 tablet 3    BD SHAHZAD 2ND GEN PEN NEEDLE 32 gauge x 5/32" Ndle       benzonatate (TESSALON) 200 MG capsule Take by mouth. (Patient taking differently: Take 200 mg by mouth 3 (three) times daily as needed for Cough.)      busPIRone (BUSPAR) 5 MG Tab Take 1 tablet (5 mg total) by mouth every evening. 90 tablet 3    cyanocobalamin 1,000 mcg/mL injection Inject 1 mL (1,000 mcg total) into the muscle every 14 (fourteen) days. 6 mL 3    dupilumab 300 mg/2 mL PnIj Inject 2 mLs (300 mg total) into the skin every 14 (fourteen) days. 2 mL 26    empagliflozin (JARDIANCE) 10 mg tablet Take 1 tablet (10 mg total) by mouth once daily. 90 tablet 3    " fenofibrate 160 MG Tab TAKE 1 TABLET(160 MG) BY MOUTH EVERY NIGHT 90 tablet 2    fluticasone propionate (FLONASE) 50 mcg/actuation nasal spray 1 spray (50 mcg total) by Each Nostril route once daily. 9.9 mL 0    glipiZIDE (GLUCOTROL) 5 MG tablet Take 1 tablet (5 mg total) by mouth 2 (two) times daily with meals. 180 tablet 1    guanFACINE (TENEX) 2 MG tablet TAKE 1 TABLET(2 MG) BY MOUTH EVERY MORNING 90 tablet 0    HYDROcodone-acetaminophen (NORCO) 7.5-325 mg per tablet Take 1 tablet by mouth every 6 (six) hours as needed.      insulin degludec (TRESIBA FLEXTOUCH U-100) 100 unit/mL (3 mL) insulin pen Inject 32 Units into the skin every morning. 30 mL 0    lisinopriL-hydrochlorothiazide (PRINZIDE,ZESTORETIC) 20-12.5 mg per tablet Take 1 tablet by mouth once daily.      loratadine (CLARITIN ORAL) Take 1 tablet by mouth Daily.      melatonin 10 mg Tab Take 2 tablets by mouth every evening.      metFORMIN (GLUCOPHAGE) 1000 MG tablet Take 1 tablet (1,000 mg total) by mouth 2 (two) times daily with meals. 180 tablet 1    omeprazole (PRILOSEC) 40 MG capsule Take 1 capsule (40 mg total) by mouth 2 (two) times daily before meals. 60 capsule 11    semaglutide (OZEMPIC) 1 mg/dose (4 mg/3 mL) Inject 1 mg into the skin every 7 days. 3 mL 11    tamsulosin (FLOMAX) 0.4 mg Cap Take 1 capsule (0.4 mg total) by mouth nightly. 90 capsule 3    testosterone cypionate (DEPOTESTOTERONE CYPIONATE) 200 mg/mL injection ADMINISTER 0.75ML IN THE MUSCLE EVERY 7 DAYS 12 mL 0    traZODone (DESYREL) 50 MG tablet TAKE 1 TO 3 TABLETS(50  MG) BY MOUTH EVERY EVENING 270 tablet 2    trazodone HCl (TRAZODONE ORAL)       TRELEGY ELLIPTA 200-62.5-25 mcg inhaler INHALE 1 PUFF INTO THE LUNGS DAILY 180 each 3    sildenafiL (VIAGRA) 100 MG tablet Take 1 tablet (100 mg total) by mouth daily as needed for Erectile Dysfunction. 30 tablet 5     No current facility-administered medications for this visit.     Review of patient's allergies indicates:  "  Allergen Reactions    Ibuprofen Itching       Review of Systems   Musculoskeletal:  Positive for gait problem.   All other systems reviewed and are negative.      Objective:      Vitals:    04/02/25 1424   BP: (!) 103/55   Pulse: 72   Weight: 86.9 kg (191 lb 9.6 oz)   Height: 5' 8" (1.727 m)     Physical Exam  Vitals and nursing note reviewed. Exam conducted with a chaperone present.   Constitutional:       General: He is not in acute distress.  Cardiovascular:      Pulses:           Dorsalis pedis pulses are 2+ on the right side.        Posterior tibial pulses are 2+ on the right side.   Musculoskeletal:         General: Tenderness and deformity present.      Right foot: Decreased range of motion. Deformity (Clubfoot with limited range of motion right) and prominent metatarsal heads (Prominent 5th met head right) present.      Comments: Decreased pain, much improved callused lesion with chronic inflammation sub 5th met head right, acquired deformity joint/5th metatarsal secondary to collapsed talus, arthritis midtarsal joint right   Feet:      Right foot:      Skin integrity: Callus (decreased hyperkeratotic lesion, reduced chronic sub 5th met head right without discoloration) present.   Skin:     Capillary Refill: Capillary refill takes less than 2 seconds.          Assessment:       No diagnosis found.            Plan:         Assessed accommodative device for a right foot  Patient is going to continue to wear hopefully will continue to reduce pain swelling inflammation and callus formation  If not pain-free within a few months would recommend additional lateral posting  Reviewed multiple contributing factors including injury, arthritis, collapse talus/midfoot arthritis, placing a lot of his pressure on the outside of this foot affecting the lateral nerve  Patient understands this will take some time to heal, and feels condition is improving slowly  Highly encouraged patient to continue topical treatment of any " kind whether to moisturize, utilize a callus cream, or use the pain cream twice daily  Debrided callus sub 5th met head right  Contact office with any changes  Patient was in understanding and agreement with treatment plan  I counseled the patient on their conditions, implications and medical management.  Instructed patient/family to contact the office with any changes, questions, concerns, worsening of symptoms.   Total face to face time 20 minutes, exam, assessment, treatment, discussion, additional time for review of chart prior to and following appointment and visit documentation, consultation and coordination of care.    Follow up as needed     This note was created using M*Modal voice recognition software that occasionally misinterpreted phrases or words.

## 2025-04-11 ENCOUNTER — PATIENT MESSAGE (OUTPATIENT)
Dept: FAMILY MEDICINE | Facility: CLINIC | Age: 70
End: 2025-04-11
Payer: MEDICARE

## 2025-04-11 DIAGNOSIS — I10 ESSENTIAL HYPERTENSION, BENIGN: ICD-10-CM

## 2025-04-11 RX ORDER — LISINOPRIL AND HYDROCHLOROTHIAZIDE 12.5; 2 MG/1; MG/1
1 TABLET ORAL DAILY
Start: 2025-04-11

## 2025-04-11 NOTE — TELEPHONE ENCOUNTER
No care due was identified.  [facility] Embedded Care Due Messages. Reference number: [id number].   4/11/2025 2:59:52 PM CDT

## 2025-04-28 ENCOUNTER — PATIENT MESSAGE (OUTPATIENT)
Dept: FAMILY MEDICINE | Facility: CLINIC | Age: 70
End: 2025-04-28
Payer: MEDICARE

## 2025-05-07 ENCOUNTER — OFFICE VISIT (OUTPATIENT)
Dept: PODIATRY | Facility: CLINIC | Age: 70
End: 2025-05-07
Payer: MEDICARE

## 2025-05-07 VITALS
HEART RATE: 67 BPM | SYSTOLIC BLOOD PRESSURE: 140 MMHG | BODY MASS INDEX: 29.46 KG/M2 | WEIGHT: 194.38 LBS | HEIGHT: 68 IN | RESPIRATION RATE: 16 BRPM | DIASTOLIC BLOOD PRESSURE: 72 MMHG

## 2025-05-07 DIAGNOSIS — L84 FOOT CALLUS: ICD-10-CM

## 2025-05-07 DIAGNOSIS — M21.6X1: ICD-10-CM

## 2025-05-07 DIAGNOSIS — M19.071 ARTHRITIS OF MIDTARSAL JOINT OF RIGHT FOOT: ICD-10-CM

## 2025-05-07 DIAGNOSIS — M89.8X7 PAIN IN METATARSUS OF RIGHT FOOT: Primary | ICD-10-CM

## 2025-05-07 PROCEDURE — 99215 OFFICE O/P EST HI 40 MIN: CPT | Mod: PBBFAC | Performed by: PODIATRIST

## 2025-05-07 PROCEDURE — 99999 PR PBB SHADOW E&M-EST. PATIENT-LVL V: CPT | Mod: PBBFAC,,, | Performed by: PODIATRIST

## 2025-05-07 RX ORDER — PREDNISONE 20 MG/1
20 TABLET ORAL 2 TIMES DAILY
COMMUNITY
Start: 2025-04-28

## 2025-05-07 NOTE — PROGRESS NOTES
Subjective:       Patient ID: Mathew Rodrigues is a 70 y.o. male.    Chief Complaint: Follow-up, Callouses, Foot Pain, and Diabetes Mellitus  Patient presents with his wife for follow up chronic pain under 5th met head right with callus/IPK, pain upon weight bearing, arthritis and collapsed talus right foot  Continues to wear custom accommodative insole through [facility] limb and brace   Patient relates it remains comfortable, he is wearing it daily, he is wearing a very good supportive walking shoe  Has been able to participate in his normal daily activities without the assistance of something to help him walk which he feels is an improvement  Continues to develop pain due to callus in this area which he does relate has improved as well  Pain level 1/10      Past Medical History:   Diagnosis Date    Arthritis     COPD (chronic obstructive pulmonary disease)     Diabetes mellitus, type 2     DVT (deep venous thrombosis)     Hyperlipidemia     Hypertension     Kidney stones     LVH (left ventricular hypertrophy) due to hypertensive disease, with heart failure     Neuropathy     Personal history of colonic polyps 03/24/2000    colonoscopy report in media    PTSD (post-traumatic stress disorder)     Shortness of breath     fatigue    Sleep apnea, unspecified     Torn rotator cuff      Past Surgical History:   Procedure Laterality Date    achilles repair Left 2003    ADENOIDECTOMY      ANGIOGRAM, CORONARY, WITH LEFT HEART CATHETERIZATION N/A 8/6/2024    Procedure: Angiogram, Coronary, with Left Heart Cath;  Surgeon: Elliot Butler MD;  Location: [facility] CATH/EP LAB;  Service: Cardiology;  Laterality: N/A;    ANKLE FRACTURE SURGERY Right 1987    CATARACT EXTRACTION Left 2004    CATARACT EXTRACTION Left 2005    2nd    COLONOSCOPY N/A 05/09/2017    results in media    COLONOSCOPY N/A 01/13/2023    Procedure: COLONOSCOPY;  Surgeon: Kenney Keller MD;  Location: [facility] ENDO;  Service: General;  Laterality: N/A;     COLONOSCOPY N/A 11/19/2024    Procedure: COLONOSCOPY;  Surgeon: Kameron Chandler MD;  Location: [facility] ENDO;  Service: Endoscopy;  Laterality: N/A;    COLONOSCOPY W/ POLYPECTOMY N/A 03/24/2000    results in media    ELBOW SURGERY Right 07/24/2020    ESOPHAGOGASTRODUODENOSCOPY N/A 11/19/2024    Procedure: EGD (ESOPHAGOGASTRODUODENOSCOPY) (pt diabetic);  Surgeon: Kameron Chandler MD;  Location: [facility] ENDO;  Service: Endoscopy;  Laterality: N/A;    EYE SURGERY  2005    finger joint replacement Right 01/22/2020    middle finger    hair follicle  1998    HAND SURGERY Right 04/25/2017    HAND SURGERY Right 05/12/2017    2nd surgery    INTRALUMINAL GASTROINTESTINAL TRACT IMAGING VIA CAPSULE N/A 12/10/2024    Procedure: IMAGING PROCEDURE, GI TRACT, INTRALUMINAL, VIA CAPSULE;  Surgeon: Kameron Chandler MD;  Location: [facility] ENDO;  Service: Endoscopy;  Laterality: N/A;    NASAL SINUS SURGERY  08/2015    ROTATOR CUFF REPAIR Right 09/25/2015    ROTATOR CUFF REPAIR Left 04/08/2016    skin grafts Right 07/2016    shin from stingray wound    TONSILLECTOMY      VASECTOMY       Family History   Problem Relation Name Age of Onset    Hypertension Mother Sierra     Arthritis Mother Sierra     Hypertension Father Misael     Diabetes Father Misael     Arthritis Father Misael     Hearing loss Father Misael     Cancer Brother Mickey Douglas     COPD Brother Mickey Douglas      Social History     Socioeconomic History    Marital status:    Tobacco Use    Smoking status: Former     Current packs/day: 1.50     Average packs/day: 1.5 packs/day for 30.0 years (45.0 ttl pk-yrs)     Types: Cigarettes    Smokeless tobacco: Never   Substance and Sexual Activity    Alcohol use: Not Currently    Drug use: Never     Social Drivers of Health     Financial Resource Strain: Low Risk  (11/1/2024)    Overall Financial Resource Strain (CARDIA)     Difficulty of Paying Living Expenses: Not hard at all   Food Insecurity: No Food Insecurity (11/1/2024)    Hunger  "Vital Sign     Worried About Running Out of Food in the Last Year: Never true     Ran Out of Food in the Last Year: Never true   Transportation Needs: No Transportation Needs (11/1/2024)    TRANSPORTATION NEEDS     Transportation : No   Physical Activity: Insufficiently Active (12/18/2023)    Exercise Vital Sign     Days of Exercise per Week: 1 day     Minutes of Exercise per Session: 20 min   Stress: No Stress Concern Present (11/1/2024)    [employer] - Occupational Stress Questionnaire     Feeling of Stress : Not at all   Housing Stability: Unknown (11/1/2024)    Housing Stability Vital Sign     Unable to Pay for Housing in the Last Year: No     Homeless in the Last Year: No       Current Outpatient Medications   Medication Sig Dispense Refill    acetaminophen-codeine 300-30mg (TYLENOL #3) 300-30 mg Tab Take 1 tablet by mouth every 8 (eight) hours as needed (cough). 21 tablet 0    albuterol (PROVENTIL/VENTOLIN HFA) 90 mcg/actuation inhaler Inhale 2 puffs into the lungs every 4 (four) hours as needed for Shortness of Breath or Wheezing (cough). 2 puffs every 4 hours as needed for cough, wheeze, or shortness of breath 36 g 11    amLODIPine (NORVASC) 5 MG tablet Take 1 tablet (5 mg total) by mouth once daily. 90 tablet 2    BD SHAHZAD 2ND GEN PEN NEEDLE 32 gauge x 5/32" Ndle       benzonatate (TESSALON) 200 MG capsule Take by mouth. (Patient taking differently: Take 200 mg by mouth 3 (three) times daily as needed for Cough.)      busPIRone (BUSPAR) 5 MG Tab Take 1 tablet (5 mg total) by mouth every evening. 90 tablet 3    cyanocobalamin 1,000 mcg/mL injection Inject 1 mL (1,000 mcg total) into the muscle every 14 (fourteen) days. 6 mL 3    dupilumab 300 mg/2 mL PnIj Inject 2 mLs (300 mg total) into the skin every 14 (fourteen) days. 2 mL 26    empagliflozin (JARDIANCE) 10 mg tablet Take 1 tablet (10 mg total) by mouth once daily. 90 tablet 3    fenofibrate 160 MG Tab TAKE 1 TABLET(160 MG) " BY MOUTH EVERY NIGHT 90 tablet 2    fluticasone propionate (FLONASE) 50 mcg/actuation nasal spray 1 spray (50 mcg total) by Each Nostril route once daily. 9.9 mL 0    glipiZIDE (GLUCOTROL) 5 MG tablet Take 1 tablet (5 mg total) by mouth 2 (two) times daily with meals. 180 tablet 1    guanFACINE (TENEX) 2 MG tablet TAKE 1 TABLET(2 MG) BY MOUTH EVERY MORNING 90 tablet 0    HYDROcodone-acetaminophen (NORCO) 7.5-325 mg per tablet Take 1 tablet by mouth every 6 (six) hours as needed.      insulin degludec (TRESIBA FLEXTOUCH U-100) 100 unit/mL (3 mL) insulin pen Inject 32 Units into the skin every morning. 30 mL 0    lisinopriL-hydrochlorothiazide (PRINZIDE,ZESTORETIC) 20-12.5 mg per tablet Take 1 tablet by mouth once daily. 90 tablet 2    loratadine (CLARITIN ORAL) Take 1 tablet by mouth Daily.      melatonin 10 mg Tab Take 2 tablets by mouth every evening.      metFORMIN (GLUCOPHAGE) 1000 MG tablet Take 1 tablet (1,000 mg total) by mouth 2 (two) times daily with meals. 180 tablet 1    omeprazole (PRILOSEC) 40 MG capsule Take 1 capsule (40 mg total) by mouth 2 (two) times daily before meals. 60 capsule 11    predniSONE (DELTASONE) 20 MG tablet Take 20 mg by mouth 2 (two) times daily.      semaglutide (OZEMPIC) 1 mg/dose (4 mg/3 mL) Inject 1 mg into the skin every 7 days. 3 mL 11    tamsulosin (FLOMAX) 0.4 mg Cap Take 1 capsule (0.4 mg total) by mouth nightly. 90 capsule 3    testosterone cypionate (DEPOTESTOTERONE CYPIONATE) 200 mg/mL injection ADMINISTER 0.75ML IN THE MUSCLE EVERY 7 DAYS 12 mL 0    traZODone (DESYREL) 50 MG tablet TAKE 1 TO 3 TABLETS(50  MG) BY MOUTH EVERY EVENING 270 tablet 2    TRELEGY ELLIPTA 200-62.5-25 mcg inhaler INHALE 1 PUFF INTO THE LUNGS DAILY 180 each 3    amoxicillin (AMOXIL) 500 MG capsule Take 500 mg by mouth every 6 (six) hours. (Patient not taking: Reported on 5/7/2025)      atorvastatin (LIPITOR) 10 MG tablet Take 1 tablet (10 mg total) by mouth every evening. 90 tablet 3     "sildenafiL (VIAGRA) 100 MG tablet Take 1 tablet (100 mg total) by mouth daily as needed for Erectile Dysfunction. 30 tablet 5    trazodone HCl (TRAZODONE ORAL)  (Patient not taking: Reported on 5/7/2025)       No current facility-administered medications for this visit.     Review of patient's allergies indicates:   Allergen Reactions    Ibuprofen Itching       Review of Systems   Musculoskeletal:  Positive for gait problem.   All other systems reviewed and are negative.      Objective:      Vitals:    05/07/25 0859   BP: (!) 140/72   Pulse: 67   Resp: 16   Weight: 88.2 kg (194 lb 6.4 oz)   Height: 5' 8" (1.727 m)     Physical Exam  Vitals and nursing note reviewed. Exam conducted with a chaperone present.   Constitutional:       General: He is not in acute distress.     Appearance: Normal appearance.   Cardiovascular:      Pulses:           Dorsalis pedis pulses are 2+ on the right side.        Posterior tibial pulses are 2+ on the right side.   Musculoskeletal:         General: Tenderness and deformity present.      Right foot: Decreased range of motion. Deformity (Clubfoot with limited range of motion right) and prominent metatarsal heads (Prominent 5th met head right) present.      Comments: Decreased pain, stable, slowly improving chronic callused lesion with chronic inflammation sub 5th met head right, acquired deformity joint/5th metatarsal secondary to collapsed talus, arthritis midtarsal joint right   Feet:      Right foot:      Skin integrity: Callus (decreased hyperkeratotic lesion, reduced chronic sub 5th met head right without discoloration) present.   Skin:     Capillary Refill: Capillary refill takes less than 2 seconds.   Neurological:      Mental Status: He is alert.                    Assessment:       1. Pain in metatarsus of right foot    2. 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 Collapse of right talus    4. Foot callus - Right Foot                Plan:         Advised patient it is a " good sign he has a adjusted well to wearing the insole in his wearing it at all times  Over time it can continue to remove pressure from this location but topical treatment is needed as well  Reviewed multiple contributing factors including injury, arthritis, collapse talus/midfoot arthritis, placing a lot of his pressure on the outside of this foot affecting the lateral nerve  We reviewed multiple other options topical treatment to help with callus and a chronic inflammatory condition in this area due to repetitive pressure  Topical treatment needs to be done initially to 2 3 times a day and then long term every night  Debrided callus with very small IPK sub 5th met head right  Contact office with any changes  Patient was in understanding and agreement with treatment plan  I counseled the patient on their conditions, implications and medical management.  Instructed patient/family to contact the office with any changes, questions, concerns, worsening of symptoms.   Total face to face time 20 minutes, exam, assessment, treatment, discussion, additional time for review of chart prior to and following appointment and visit documentation, consultation and coordination of care.    Follow up as needed     This note was created using M*Modal voice recognition software that occasionally misinterpreted phrases or words.

## 2025-05-09 ENCOUNTER — PATIENT MESSAGE (OUTPATIENT)
Dept: FAMILY MEDICINE | Facility: CLINIC | Age: 70
End: 2025-05-09
Payer: MEDICARE

## 2025-05-09 DIAGNOSIS — N52.9 ERECTILE DYSFUNCTION, UNSPECIFIED ERECTILE DYSFUNCTION TYPE: ICD-10-CM

## 2025-05-09 DIAGNOSIS — E29.1 HYPOGONADISM IN MALE: ICD-10-CM

## 2025-05-09 RX ORDER — SILDENAFIL 100 MG/1
100 TABLET, FILM COATED ORAL DAILY PRN
Qty: 30 TABLET | Refills: 1 | Status: SHIPPED | OUTPATIENT
Start: 2025-05-09

## 2025-05-09 RX ORDER — SILDENAFIL 100 MG/1
100 TABLET, FILM COATED ORAL DAILY PRN
Qty: 30 TABLET | Refills: 5 | OUTPATIENT
Start: 2025-05-09

## 2025-05-09 NOTE — TELEPHONE ENCOUNTER
Refill Routing Note   Medication(s) are not appropriate for processing by [facility] for the following reason(s):        Outside of protocol    [facility] action(s):  Route  Quick Discontinue   Requires labs : Yes      Medication Therapy Plan: Receipt confirmed by pharmacy (5/9/2025 12:19 PM CDT)      Appointments  past 12m or future 3m with PCP    Date Provider   Last Visit   11/15/2024 Kiley Vasquez MD   Next Visit   6/23/2025 Kiley Vasquez MD   ED visits in past 90 days: 0        Note composed:4:19 PM 05/09/2025

## 2025-05-09 NOTE — TELEPHONE ENCOUNTER
Care Due:                  Date            Visit Type   Department     Provider  --------------------------------------------------------------------------------                                EP -                              [facility] FAMILY  Last Visit: 11-      CARE ([facility])   MEDICINE       Kiley Vasquez                              [facility]  Next Visit: 06-      CARE ([facility])   MEDICINE       Kiley Vasquez                                                            Last  Test          Frequency    Reason                     Performed    Due Date  --------------------------------------------------------------------------------    HBA1C.......  6 months...  glipiZIDE, insulin,        11- 05-                             metFORMIN, semaglutide...    Lipid Panel.  12 months..  atorvastatin, fenofibrate  08- 08-    [facility] Embedded Care Due Messages. Reference number: [id number].   5/09/2025 11:02:37 AM CDT

## 2025-05-09 NOTE — TELEPHONE ENCOUNTER
Refill Decision Note   Mathew Rodrigues  is requesting a refill authorization.  Brief Assessment and Rationale for Refill:  Approve     Medication Therapy Plan:        Comments:     Note composed:12:19 PM 05/09/2025

## 2025-05-09 NOTE — TELEPHONE ENCOUNTER
Provider Staff:  Action required for this patient    Requires labs      Please see care gap opportunities below in Care Due Message.    Thanks!  [facility]     Appointments      Date Provider   Last Visit   11/15/2024 Kiley Vasquez MD   Next Visit   6/23/2025 Kiley Vasquez MD     Refill Decision Note   Mathew Lilia  is requesting a refill authorization.  Brief Assessment and Rationale for Refill:  Quick Discontinue     Medication Therapy Plan: Receipt confirmed by pharmacy (5/9/2025 12:19 PM CDT)      Comments:     Note composed:12:22 PM 05/09/2025

## 2025-05-09 NOTE — TELEPHONE ENCOUNTER
No care due was identified.  [facility] Embedded Care Due Messages. Reference number: [id number].   5/09/2025 11:26:53 AM CDT

## 2025-05-12 ENCOUNTER — TELEPHONE (OUTPATIENT)
Dept: FAMILY MEDICINE | Facility: CLINIC | Age: 70
End: 2025-05-12
Payer: MEDICARE

## 2025-05-12 DIAGNOSIS — E78.2 MIXED HYPERLIPIDEMIA: Primary | ICD-10-CM

## 2025-05-12 DIAGNOSIS — E11.9 TYPE 2 DIABETES MELLITUS WITHOUT COMPLICATION, WITH LONG-TERM CURRENT USE OF INSULIN: ICD-10-CM

## 2025-05-12 DIAGNOSIS — Z79.4 TYPE 2 DIABETES MELLITUS WITHOUT COMPLICATION, WITH LONG-TERM CURRENT USE OF INSULIN: ICD-10-CM

## 2025-05-12 DIAGNOSIS — E29.1 HYPOGONADISM IN MALE: ICD-10-CM

## 2025-05-12 NOTE — TELEPHONE ENCOUNTER
Patient requesting labs prior to upcoming visit.  A1C, iron and testosterone and anything else that might be needed.

## 2025-05-15 RX ORDER — TESTOSTERONE CYPIONATE 200 MG/ML
INJECTION, SOLUTION INTRAMUSCULAR
Qty: 12 ML | Refills: 0 | Status: SHIPPED | OUTPATIENT
Start: 2025-05-15

## 2025-05-20 ENCOUNTER — OFFICE VISIT (OUTPATIENT)
Dept: PULMONOLOGY | Facility: CLINIC | Age: 70
End: 2025-05-20
Payer: MEDICARE

## 2025-05-20 VITALS
SYSTOLIC BLOOD PRESSURE: 115 MMHG | OXYGEN SATURATION: 93 % | HEIGHT: 68 IN | DIASTOLIC BLOOD PRESSURE: 72 MMHG | BODY MASS INDEX: 29.54 KG/M2 | HEART RATE: 71 BPM | WEIGHT: 194.88 LBS

## 2025-05-20 DIAGNOSIS — J45.50 SEVERE PERSISTENT ASTHMA WITHOUT COMPLICATION: ICD-10-CM

## 2025-05-20 DIAGNOSIS — G47.33 OBSTRUCTIVE SLEEP APNEA: Primary | ICD-10-CM

## 2025-05-20 PROCEDURE — 99213 OFFICE O/P EST LOW 20 MIN: CPT | Mod: PBBFAC,PO | Performed by: INTERNAL MEDICINE

## 2025-05-20 PROCEDURE — 99999 PR PBB SHADOW E&M-EST. PATIENT-LVL III: CPT | Mod: PBBFAC,,, | Performed by: INTERNAL MEDICINE

## 2025-05-20 RX ORDER — ACETAMINOPHEN AND CODEINE PHOSPHATE 300; 30 MG/1; MG/1
1 TABLET ORAL EVERY 8 HOURS PRN
Qty: 21 TABLET | Refills: 0 | Status: SHIPPED | OUTPATIENT
Start: 2025-05-20
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
## 2025-05-20 NOTE — PATIENT INSTRUCTIONS
Will give up to 30 tylenol 3 monthly-- if called -- will give 21 for next week    Cough and breathing is better on dupixent.    Ok to use prednisone if needed    Use trelegy daily    At 3 month follow up -- will decrease tylenol 3       Addendum May 21, 2025-compliance report indicates patient uses his CPAP over 6 hours nightly with an apnea-hypopnea index down to 1.1.  Results of very good.  The patient is doing well with the CPAP therapy.

## 2025-05-29 ENCOUNTER — PATIENT MESSAGE (OUTPATIENT)
Dept: FAMILY MEDICINE | Facility: CLINIC | Age: 70
End: 2025-05-29
Payer: MEDICARE

## 2025-05-29 DIAGNOSIS — E11.9 TYPE 2 DIABETES MELLITUS WITHOUT COMPLICATION, WITHOUT LONG-TERM CURRENT USE OF INSULIN: ICD-10-CM

## 2025-05-29 DIAGNOSIS — E78.2 MIXED HYPERLIPIDEMIA: ICD-10-CM

## 2025-05-30 RX ORDER — ATORVASTATIN CALCIUM 10 MG/1
10 TABLET, FILM COATED ORAL NIGHTLY
Qty: 90 TABLET | Refills: 0 | Status: SHIPPED | OUTPATIENT
Start: 2025-05-30 | End: 2026-05-30

## 2025-05-30 NOTE — TELEPHONE ENCOUNTER
Refill Routing Note   Medication(s) are not appropriate for processing by [facility] for the following reason(s):        No active prescription written by provider    [facility] action(s):  Defer             Appointments  past 12m or future 3m with PCP    Date Provider   Last Visit   11/15/2024 Kiley Vasquez MD   Next Visit   6/23/2025 Kiley Vasquez MD   ED visits in past 90 days: 0        Note composed:8:04 AM 05/30/2025

## 2025-05-30 NOTE — TELEPHONE ENCOUNTER
No care due was identified.  [facility] Embedded Care Due Messages. Reference number: [id number].   5/30/2025 7:53:56 AM CDT

## 2025-06-02 ENCOUNTER — PATIENT MESSAGE (OUTPATIENT)
Dept: PULMONOLOGY | Facility: CLINIC | Age: 70
End: 2025-06-02
Payer: MEDICARE

## 2025-06-04 ENCOUNTER — OFFICE VISIT (OUTPATIENT)
Dept: FAMILY MEDICINE | Facility: CLINIC | Age: 70
End: 2025-06-04
Payer: MEDICARE

## 2025-06-04 VITALS
HEART RATE: 66 BPM | DIASTOLIC BLOOD PRESSURE: 60 MMHG | HEIGHT: 68 IN | RESPIRATION RATE: 18 BRPM | OXYGEN SATURATION: 97 % | BODY MASS INDEX: 29.42 KG/M2 | SYSTOLIC BLOOD PRESSURE: 102 MMHG | WEIGHT: 194.13 LBS

## 2025-06-04 DIAGNOSIS — E53.8 VITAMIN B12 DEFICIENCY: ICD-10-CM

## 2025-06-04 DIAGNOSIS — E29.1 HYPOGONADISM IN MALE: ICD-10-CM

## 2025-06-04 DIAGNOSIS — F41.9 ANXIETY: ICD-10-CM

## 2025-06-04 DIAGNOSIS — N52.9 ERECTILE DYSFUNCTION, UNSPECIFIED ERECTILE DYSFUNCTION TYPE: ICD-10-CM

## 2025-06-04 DIAGNOSIS — F51.01 PRIMARY INSOMNIA: Primary | ICD-10-CM

## 2025-06-04 DIAGNOSIS — F11.20 UNCOMPLICATED OPIOID DEPENDENCE: ICD-10-CM

## 2025-06-04 DIAGNOSIS — I10 PRIMARY HYPERTENSION: ICD-10-CM

## 2025-06-04 DIAGNOSIS — E11.9 TYPE 2 DIABETES MELLITUS WITHOUT COMPLICATION, WITHOUT LONG-TERM CURRENT USE OF INSULIN: ICD-10-CM

## 2025-06-04 DIAGNOSIS — N18.31 CHRONIC KIDNEY DISEASE, STAGE 3A: ICD-10-CM

## 2025-06-04 DIAGNOSIS — U07.1 COVID-19: ICD-10-CM

## 2025-06-04 DIAGNOSIS — N18.32 CKD STAGE 3B, GFR 30-44 ML/MIN: ICD-10-CM

## 2025-06-04 DIAGNOSIS — E78.2 MIXED HYPERLIPIDEMIA: ICD-10-CM

## 2025-06-04 DIAGNOSIS — Z79.890 ENCOUNTER FOR MONITORING TESTOSTERONE REPLACEMENT THERAPY: ICD-10-CM

## 2025-06-04 DIAGNOSIS — Z51.81 ENCOUNTER FOR MONITORING TESTOSTERONE REPLACEMENT THERAPY: ICD-10-CM

## 2025-06-04 DIAGNOSIS — E11.9 TYPE 2 DIABETES MELLITUS WITHOUT COMPLICATION, WITH LONG-TERM CURRENT USE OF INSULIN: ICD-10-CM

## 2025-06-04 DIAGNOSIS — I50.30 DIASTOLIC HEART FAILURE, UNSPECIFIED HF CHRONICITY: ICD-10-CM

## 2025-06-04 DIAGNOSIS — I10 ESSENTIAL HYPERTENSION, BENIGN: ICD-10-CM

## 2025-06-04 DIAGNOSIS — E65 ABDOMINAL OBESITY: ICD-10-CM

## 2025-06-04 DIAGNOSIS — Z79.4 TYPE 2 DIABETES MELLITUS WITHOUT COMPLICATION, WITH LONG-TERM CURRENT USE OF INSULIN: ICD-10-CM

## 2025-06-04 DIAGNOSIS — R05.9 COUGH, UNSPECIFIED TYPE: ICD-10-CM

## 2025-06-04 PROBLEM — I82.5Y2 CHRONIC DEEP VEIN THROMBOSIS (DVT) OF PROXIMAL VEIN OF LEFT LOWER EXTREMITY: Status: RESOLVED | Noted: 2024-06-26 | Resolved: 2025-06-04

## 2025-06-04 PROCEDURE — 99999 PR PBB SHADOW E&M-EST. PATIENT-LVL IV: CPT | Mod: PBBFAC,,, | Performed by: FAMILY MEDICINE

## 2025-06-04 PROCEDURE — 99214 OFFICE O/P EST MOD 30 MIN: CPT | Mod: PBBFAC,PN | Performed by: FAMILY MEDICINE

## 2025-06-04 RX ORDER — LISINOPRIL AND HYDROCHLOROTHIAZIDE 10; 12.5 MG/1; MG/1
1 TABLET ORAL DAILY
Qty: 90 TABLET | Refills: 3 | Status: SHIPPED | OUTPATIENT
Start: 2025-06-04 | End: 2026-06-04

## 2025-06-04 RX ORDER — GUANFACINE 2 MG/1
TABLET ORAL
Qty: 90 TABLET | Refills: 0 | Status: SHIPPED | OUTPATIENT
Start: 2025-06-04

## 2025-06-04 RX ORDER — FENOFIBRATE 160 MG/1
TABLET ORAL
Qty: 90 TABLET | Refills: 2 | Status: CANCELLED | OUTPATIENT
Start: 2025-06-04

## 2025-06-04 RX ORDER — INSULIN DEGLUDEC 100 U/ML
32 INJECTION, SOLUTION SUBCUTANEOUS EVERY MORNING
Qty: 30 ML | Refills: 0 | Status: SHIPPED | OUTPATIENT
Start: 2025-06-04

## 2025-06-04 RX ORDER — BUSPIRONE HYDROCHLORIDE 5 MG/1
5 TABLET ORAL NIGHTLY
Qty: 90 TABLET | Refills: 3 | Status: SHIPPED | OUTPATIENT
Start: 2025-06-04 | End: 2026-06-04

## 2025-06-04 RX ORDER — CYANOCOBALAMIN 1000 UG/ML
1000 INJECTION, SOLUTION INTRAMUSCULAR; SUBCUTANEOUS
Qty: 6 ML | Refills: 3 | Status: SHIPPED | OUTPATIENT
Start: 2025-06-04

## 2025-06-04 RX ORDER — GLIPIZIDE 5 MG/1
5 TABLET ORAL 2 TIMES DAILY WITH MEALS
Qty: 180 TABLET | Refills: 1 | Status: SHIPPED | OUTPATIENT
Start: 2025-06-04

## 2025-06-04 RX ORDER — METFORMIN HYDROCHLORIDE 1000 MG/1
1000 TABLET ORAL 2 TIMES DAILY WITH MEALS
Qty: 180 TABLET | Refills: 1 | Status: SHIPPED | OUTPATIENT
Start: 2025-06-04

## 2025-06-04 RX ORDER — TESTOSTERONE CYPIONATE 200 MG/ML
INJECTION, SOLUTION INTRAMUSCULAR
Qty: 12 ML | Refills: 0 | Status: SHIPPED | OUTPATIENT
Start: 2025-06-04

## 2025-06-04 RX ORDER — FLUTICASONE PROPIONATE 50 MCG
1 SPRAY, SUSPENSION (ML) NASAL DAILY
Qty: 9.9 ML | Refills: 0 | Status: SHIPPED | OUTPATIENT
Start: 2025-06-04

## 2025-06-04 RX ORDER — ATORVASTATIN CALCIUM 10 MG/1
10 TABLET, FILM COATED ORAL NIGHTLY
Qty: 90 TABLET | Refills: 0 | Status: SHIPPED | OUTPATIENT
Start: 2025-06-04 | End: 2026-06-04

## 2025-06-04 RX ORDER — DOXEPIN HYDROCHLORIDE 10 MG/1
10 CAPSULE ORAL NIGHTLY
Qty: 30 CAPSULE | Refills: 11 | Status: SHIPPED | OUTPATIENT
Start: 2025-06-04 | End: 2026-06-04

## 2025-06-04 RX ORDER — SILDENAFIL 100 MG/1
100 TABLET, FILM COATED ORAL DAILY PRN
Qty: 30 TABLET | Refills: 1 | Status: SHIPPED | OUTPATIENT
Start: 2025-06-04

## 2025-06-09 ENCOUNTER — PATIENT MESSAGE (OUTPATIENT)
Dept: OTOLARYNGOLOGY | Facility: CLINIC | Age: 70
End: 2025-06-09
Payer: MEDICARE

## 2025-06-13 ENCOUNTER — LAB VISIT (OUTPATIENT)
Dept: LAB | Facility: HOSPITAL | Age: 70
End: 2025-06-13
Attending: FAMILY MEDICINE
Payer: MEDICARE

## 2025-06-13 DIAGNOSIS — E11.9 TYPE 2 DIABETES MELLITUS WITHOUT COMPLICATION, WITH LONG-TERM CURRENT USE OF INSULIN: ICD-10-CM

## 2025-06-13 DIAGNOSIS — E29.1 HYPOGONADISM IN MALE: ICD-10-CM

## 2025-06-13 DIAGNOSIS — Z79.4 TYPE 2 DIABETES MELLITUS WITHOUT COMPLICATION, WITH LONG-TERM CURRENT USE OF INSULIN: ICD-10-CM

## 2025-06-13 DIAGNOSIS — E78.2 MIXED HYPERLIPIDEMIA: ICD-10-CM

## 2025-06-13 LAB
ALBUMIN SERPL BCP-MCNC: 3.8 G/DL (ref 3.5–5.2)
ALP SERPL-CCNC: 52 UNIT/L (ref 40–150)
ALT SERPL W/O P-5'-P-CCNC: 24 UNIT/L (ref 10–44)
ANION GAP (OHS): 10 MMOL/L (ref 8–16)
AST SERPL-CCNC: 16 UNIT/L (ref 11–45)
BILIRUB SERPL-MCNC: 0.4 MG/DL (ref 0.1–1)
BUN SERPL-MCNC: 19 MG/DL (ref 8–23)
CALCIUM SERPL-MCNC: 9.9 MG/DL (ref 8.7–10.5)
CHLORIDE SERPL-SCNC: 105 MMOL/L (ref 95–110)
CHOLEST SERPL-MCNC: 119 MG/DL (ref 120–199)
CHOLEST/HDLC SERPL: 3.1 {RATIO} (ref 2–5)
CO2 SERPL-SCNC: 23 MMOL/L (ref 23–29)
CREAT SERPL-MCNC: 1.3 MG/DL (ref 0.5–1.4)
EAG (OHS): 134 MG/DL (ref 68–131)
GFR SERPLBLD CREATININE-BSD FMLA CKD-EPI: 59 ML/MIN/1.73/M2
GLUCOSE SERPL-MCNC: 136 MG/DL (ref 70–110)
HBA1C MFR BLD: 6.3 % (ref 4–5.6)
HDLC SERPL-MCNC: 39 MG/DL (ref 40–75)
HDLC SERPL: 32.8 % (ref 20–50)
IRON SATN MFR SERPL: 16 % (ref 20–50)
IRON SERPL-MCNC: 74 UG/DL (ref 45–160)
LDLC SERPL CALC-MCNC: 46.8 MG/DL (ref 63–159)
NONHDLC SERPL-MCNC: 80 MG/DL
POTASSIUM SERPL-SCNC: 4.9 MMOL/L (ref 3.5–5.1)
PROT SERPL-MCNC: 7.2 GM/DL (ref 6–8.4)
SODIUM SERPL-SCNC: 138 MMOL/L (ref 136–145)
TESTOST SERPL-MCNC: 564 NG/DL (ref 304–1227)
TIBC SERPL-MCNC: 451 UG/DL (ref 250–450)
TRANSFERRIN SERPL-MCNC: 305 MG/DL (ref 200–375)
TRIGL SERPL-MCNC: 166 MG/DL (ref 30–150)
TSH SERPL-ACNC: 1.69 UIU/ML (ref 0.4–4)

## 2025-06-13 PROCEDURE — 84443 ASSAY THYROID STIM HORMONE: CPT

## 2025-06-13 PROCEDURE — 83036 HEMOGLOBIN GLYCOSYLATED A1C: CPT

## 2025-06-13 PROCEDURE — 84466 ASSAY OF TRANSFERRIN: CPT

## 2025-06-13 PROCEDURE — 82947 ASSAY GLUCOSE BLOOD QUANT: CPT

## 2025-06-13 PROCEDURE — 36415 COLL VENOUS BLD VENIPUNCTURE: CPT

## 2025-06-13 PROCEDURE — 82465 ASSAY BLD/SERUM CHOLESTEROL: CPT

## 2025-06-13 PROCEDURE — 84403 ASSAY OF TOTAL TESTOSTERONE: CPT

## 2025-06-19 ENCOUNTER — OFFICE VISIT (OUTPATIENT)
Dept: PODIATRY | Facility: CLINIC | Age: 70
End: 2025-06-19
Payer: MEDICARE

## 2025-06-19 VITALS
BODY MASS INDEX: 29.99 KG/M2 | HEART RATE: 65 BPM | DIASTOLIC BLOOD PRESSURE: 64 MMHG | SYSTOLIC BLOOD PRESSURE: 137 MMHG | WEIGHT: 197.88 LBS | HEIGHT: 68 IN | RESPIRATION RATE: 18 BRPM

## 2025-06-19 DIAGNOSIS — L84 FOOT CALLUS: ICD-10-CM

## 2025-06-19 DIAGNOSIS — M77.51 BURSITIS OF RIGHT FOOT: ICD-10-CM

## 2025-06-19 DIAGNOSIS — M89.8X7 PAIN IN METATARSUS OF RIGHT FOOT: Primary | ICD-10-CM

## 2025-06-19 DIAGNOSIS — M21.6X1: ICD-10-CM

## 2025-06-19 DIAGNOSIS — M19.071 ARTHRITIS OF MIDTARSAL JOINT OF RIGHT FOOT: ICD-10-CM

## 2025-06-19 PROCEDURE — 99999 PR PBB SHADOW E&M-EST. PATIENT-LVL V: CPT | Mod: PBBFAC,,, | Performed by: PODIATRIST

## 2025-06-19 PROCEDURE — 99213 OFFICE O/P EST LOW 20 MIN: CPT | Mod: 25,S$PBB,, | Performed by: PODIATRIST

## 2025-06-19 PROCEDURE — 99999PBSHW PR PBB SHADOW TECHNICAL ONLY FILED TO HB: Mod: PBBFAC,RT,,

## 2025-06-19 PROCEDURE — 20600 DRAIN/INJ JOINT/BURSA W/O US: CPT | Mod: PBBFAC,RT | Performed by: PODIATRIST

## 2025-06-19 PROCEDURE — 20600 DRAIN/INJ JOINT/BURSA W/O US: CPT | Mod: S$PBB,RT,, | Performed by: PODIATRIST

## 2025-06-19 PROCEDURE — 99215 OFFICE O/P EST HI 40 MIN: CPT | Mod: PBBFAC | Performed by: PODIATRIST

## 2025-06-19 RX ORDER — FLUTICASONE PROPIONATE 50 MCG
SPRAY, SUSPENSION (ML) NASAL
COMMUNITY

## 2025-06-19 RX ORDER — ALBUTEROL SULFATE 90 UG/1
INHALANT RESPIRATORY (INHALATION)
COMMUNITY
End: 2025-06-21 | Stop reason: SDUPTHER

## 2025-06-19 RX ORDER — TRAZODONE HYDROCHLORIDE 50 MG/1
TABLET ORAL
COMMUNITY
End: 2025-06-23

## 2025-06-19 RX ORDER — LISINOPRIL AND HYDROCHLOROTHIAZIDE 12.5; 2 MG/1; MG/1
TABLET ORAL
COMMUNITY
End: 2025-06-21 | Stop reason: SDUPTHER

## 2025-06-19 RX ORDER — METHYLPREDNISOLONE ACETATE 40 MG/ML
40 INJECTION, SUSPENSION INTRA-ARTICULAR; INTRALESIONAL; INTRAMUSCULAR; SOFT TISSUE
Status: COMPLETED | OUTPATIENT
Start: 2025-06-19 | End: 2025-06-19

## 2025-06-19 RX ORDER — DEXAMETHASONE SODIUM PHOSPHATE 4 MG/ML
4 INJECTION, SOLUTION INTRA-ARTICULAR; INTRALESIONAL; INTRAMUSCULAR; INTRAVENOUS; SOFT TISSUE ONCE
Status: COMPLETED | OUTPATIENT
Start: 2025-06-19 | End: 2025-06-19

## 2025-06-19 RX ORDER — FLUTICASONE PROPIONATE 93 UG/1
SPRAY, METERED NASAL
COMMUNITY
Start: 2025-06-10

## 2025-06-19 RX ORDER — TAMSULOSIN HYDROCHLORIDE 0.4 MG/1
1 CAPSULE ORAL
COMMUNITY
End: 2025-06-23

## 2025-06-19 RX ORDER — FEXOFENADINE HYDROCHLORIDE 180 MG/1
180 TABLET, FILM COATED ORAL
COMMUNITY
Start: 2025-06-11

## 2025-06-19 RX ORDER — METOPROLOL TARTRATE 25 MG/1
TABLET, FILM COATED ORAL
COMMUNITY

## 2025-06-19 RX ORDER — BUSPIRONE HYDROCHLORIDE 10 MG/1
TABLET ORAL
COMMUNITY

## 2025-06-19 RX ORDER — GUANFACINE 2 MG/1
TABLET ORAL
COMMUNITY

## 2025-06-19 RX ORDER — OMEPRAZOLE 40 MG/1
CAPSULE, DELAYED RELEASE ORAL
COMMUNITY
End: 2025-06-21 | Stop reason: SDUPTHER

## 2025-06-19 RX ORDER — AMLODIPINE BESYLATE 5 MG/1
TABLET ORAL
COMMUNITY

## 2025-06-19 RX ORDER — ATORVASTATIN CALCIUM 10 MG/1
TABLET, FILM COATED ORAL
COMMUNITY
End: 2025-06-21 | Stop reason: SDUPTHER

## 2025-06-19 RX ORDER — GLIPIZIDE 5 MG/1
TABLET ORAL
COMMUNITY
End: 2025-06-21 | Stop reason: SDUPTHER

## 2025-06-19 RX ORDER — FENOFIBRATE 160 MG/1
TABLET ORAL
COMMUNITY
End: 2025-06-23

## 2025-06-19 RX ORDER — ALBUTEROL SULFATE 0.83 MG/ML
SOLUTION RESPIRATORY (INHALATION)
COMMUNITY

## 2025-06-19 RX ORDER — INSULIN DEGLUDEC 100 U/ML
INJECTION, SOLUTION SUBCUTANEOUS
COMMUNITY
End: 2025-06-21 | Stop reason: ALTCHOICE

## 2025-06-19 RX ORDER — METFORMIN HYDROCHLORIDE 1000 MG/1
TABLET ORAL
COMMUNITY
End: 2025-06-21 | Stop reason: SDUPTHER

## 2025-06-19 RX ADMIN — DEXAMETHASONE SODIUM PHOSPHATE 4 MG: 4 INJECTION, SOLUTION INTRA-ARTICULAR; INTRALESIONAL; INTRAMUSCULAR; INTRAVENOUS; SOFT TISSUE at 09:06

## 2025-06-19 RX ADMIN — METHYLPREDNISOLONE ACETATE 40 MG: 40 INJECTION, SUSPENSION INTRA-ARTICULAR; INTRALESIONAL; INTRAMUSCULAR; SOFT TISSUE at 08:06

## 2025-06-21 NOTE — PROGRESS NOTES
Subjective:       Patient ID: Mathew Rodrigues is a 70 y.o. male.    Chief Complaint: Follow-up and Foot Pain  Patient presents with his wife for follow up chronic pain under 5th met head right with callus, pain upon weight bearing, arthritis and collapsed talus right foot  Continues to wear custom accommodative insole through [facility] limb and brace, which patient states is comfortable but he had expected more prolong pain relief with insole.  Patient relates he still has pain with every step in this area and it has prevented him from increasing his activity  Patient relates he has walked with a limp for a very long time due severe right foot/ankle injury/torn ligaments which required surgical repair.  This did force him to start walking on the outside of his foot but he never had pain like this in this location until high impact/home elevator injury which occurred a few weeks before our 1st visit 06/01/2024  It has been a year since this injury  Patient has tried multiple topical treatments, change in shoes and offloading insoles.  Currently the custom 1 from [facility] been most effective but not changed pain level  Pain level 2/10      Past Medical History:   Diagnosis Date    Arthritis     COPD (chronic obstructive pulmonary disease)     Diabetes mellitus, type 2     DVT (deep venous thrombosis)     Hyperlipidemia     Hypertension     Kidney stones     LVH (left ventricular hypertrophy) due to hypertensive disease, with heart failure     Neuropathy     Personal history of colonic polyps 03/24/2000    colonoscopy report in media    PTSD (post-traumatic stress disorder)     Shortness of breath     fatigue    Sleep apnea, unspecified     Torn rotator cuff      Past Surgical History:   Procedure Laterality Date    achilles repair Left 2003    ADENOIDECTOMY      ANGIOGRAM, CORONARY, WITH LEFT HEART CATHETERIZATION N/A 8/6/2024    Procedure: Angiogram, Coronary, with Left Heart Cath;  Surgeon: Elliot Butler MD;   Location: [facility] CATH/EP LAB;  Service: Cardiology;  Laterality: N/A;    ANKLE FRACTURE SURGERY Right 1987    CATARACT EXTRACTION Left 2004    CATARACT EXTRACTION Left 2005    2nd    COLONOSCOPY N/A 05/09/2017    results in media    COLONOSCOPY N/A 01/13/2023    Procedure: COLONOSCOPY;  Surgeon: Kenney Keller MD;  Location: [facility] ENDO;  Service: General;  Laterality: N/A;    COLONOSCOPY N/A 11/19/2024    Procedure: COLONOSCOPY;  Surgeon: Kameron Chandler MD;  Location: [facility];  Service: Endoscopy;  Laterality: N/A;    COLONOSCOPY W/ POLYPECTOMY N/A 03/24/2000    results in media    ELBOW SURGERY Right 07/24/2020    ESOPHAGOGASTRODUODENOSCOPY N/A 11/19/2024    Procedure: EGD (ESOPHAGOGASTRODUODENOSCOPY) (pt diabetic);  Surgeon: Kameron Chandler MD;  Location: [facility];  Service: Endoscopy;  Laterality: N/A;    EYE SURGERY  2005    finger joint replacement Right 01/22/2020    middle finger    hair follicle  1998    HAND SURGERY Right 04/25/2017    HAND SURGERY Right 05/12/2017    2nd surgery    INTRALUMINAL GASTROINTESTINAL TRACT IMAGING VIA CAPSULE N/A 12/10/2024    Procedure: IMAGING PROCEDURE, GI TRACT, INTRALUMINAL, VIA CAPSULE;  Surgeon: Kameron Chandler MD;  Location: [facility];  Service: Endoscopy;  Laterality: N/A;    NASAL SINUS SURGERY  08/2015    ROTATOR CUFF REPAIR Right 09/25/2015    ROTATOR CUFF REPAIR Left 04/08/2016    skin grafts Right 07/2016    shin from stingray wound    TONSILLECTOMY      VASECTOMY       Family History   Problem Relation Name Age of Onset    Hypertension Mother Sierra     Arthritis Mother Sierra     Hypertension Father Misael     Diabetes Father Misael     Arthritis Father Misael     Hearing loss Father Misael     Cancer Brother López Douglasneth     COPD Brother Mickey Douglas      Social History     Socioeconomic History    Marital status:    Tobacco Use    Smoking status: Former     Current packs/day: 1.50     Average packs/day: 1.5 packs/day for 30.0 years (45.0 ttl  pk-yrs)     Types: Cigarettes    Smokeless tobacco: Never   Substance and Sexual Activity    Alcohol use: Not Currently    Drug use: Never     Social Drivers of Health     Financial Resource Strain: Low Risk  (6/2/2025)    Overall Financial Resource Strain (CARDIA)     Difficulty of Paying Living Expenses: Not hard at all   Food Insecurity: No Food Insecurity (6/2/2025)    Hunger Vital Sign     Worried About Running Out of Food in the Last Year: Never true     Ran Out of Food in the Last Year: Never true   Transportation Needs: No Transportation Needs (6/2/2025)    PRAPARE - Transportation     Lack of Transportation (Medical): No     Lack of Transportation (Non-Medical): No   Physical Activity: Insufficiently Active (6/2/2025)    Exercise Vital Sign     Days of Exercise per Week: 5 days     Minutes of Exercise per Session: 20 min   Stress: No Stress Concern Present (6/2/2025)    [employer] [place] [employer] - Occupational Stress Questionnaire     Feeling of Stress : Not at all   Housing Stability: Low Risk  (6/2/2025)    Housing Stability Vital Sign     Unable to Pay for Housing in the Last Year: No     Number of Times Moved in the Last Year: 0     Homeless in the Last Year: No       Current Outpatient Medications   Medication Sig Dispense Refill    acetaminophen-codeine 300-30mg (TYLENOL #3) 300-30 mg Tab Take 1 tablet by mouth every 8 (eight) hours as needed (cough). 21 tablet 0    albuterol (PROVENTIL) 2.5 mg /3 mL (0.083 %) nebulizer solution 3 ml as needed Inhalation every 6 hrs for 90 days      albuterol (PROVENTIL/VENTOLIN HFA) 90 mcg/actuation inhaler Inhale 2 puffs into the lungs every 4 (four) hours as needed for Shortness of Breath or Wheezing (cough). 2 puffs every 4 hours as needed for cough, wheeze, or shortness of breath 36 g 11    ALLERGY RELIEF, FEXOFENADINE, 180 mg tablet Take 180 mg by mouth.      amLODIPine (NORVASC) 5 MG tablet 1 tablet Oral Once a day for 90 days      atorvastatin  "(LIPITOR) 10 MG tablet Take 1 tablet (10 mg total) by mouth every evening. 90 tablet 0    BD SHAHZAD 2ND GEN PEN NEEDLE 32 gauge x 5/32" Ndle       cyanocobalamin 1,000 mcg/mL injection Inject 1 mL (1,000 mcg total) into the muscle every 14 (fourteen) days. 6 mL 3    doxepin (SINEQUAN) 10 MG capsule Take 1 capsule (10 mg total) by mouth every evening. 30 capsule 11    dupilumab 300 mg/2 mL PnIj Inject 2 mLs (300 mg total) into the skin every 14 (fourteen) days. 2 mL 26    empagliflozin (JARDIANCE) 10 mg tablet Take 1 tablet (10 mg total) by mouth once daily. 90 tablet 3    fenofibrate 160 MG Tab 1 tablet Oral Once a day for 90 days      fluticasone propionate (FLONASE) 50 mcg/actuation nasal spray two sprays to each nostirl Nasal Once a day for 90 days      glipiZIDE (GLUCOTROL) 5 MG tablet Take 1 tablet (5 mg total) by mouth 2 (two) times daily with meals. 180 tablet 1    guanFACINE (TENEX) 2 MG tablet 1 tablet at bedtime Orally Once a day for 90 days      HYDROcodone-acetaminophen (NORCO) 7.5-325 mg per tablet Take 1 tablet by mouth every 6 (six) hours as needed.      insulin degludec (TRESIBA FLEXTOUCH U-100) 100 unit/mL (3 mL) insulin pen Inject 32 Units into the skin every morning. 30 mL 0    lisinopriL-hydrochlorothiazide (PRINZIDE,ZESTORETIC) 10-12.5 mg per tablet Take 1 tablet by mouth once daily. 90 tablet 3    melatonin 10 mg Tab Take 2 tablets by mouth every evening.      metFORMIN (GLUCOPHAGE) 1000 MG tablet Take 1 tablet (1,000 mg total) by mouth 2 (two) times daily with meals. 180 tablet 1    metoprolol tartrate (LOPRESSOR) 25 MG tablet 1 tablet with food Oral Twice a day for 90 days      omeprazole (PRILOSEC) 40 MG capsule Take 1 capsule (40 mg total) by mouth 2 (two) times daily before meals. 60 capsule 11    semaglutide (OZEMPIC) 1 mg/dose (4 mg/3 mL) Inject 1 mg into the skin every 7 days. 3 mL 11    sildenafiL (VIAGRA) 100 MG tablet Take 1 tablet (100 mg total) by mouth daily as needed for Erectile " "Dysfunction. 30 tablet 1    tamsulosin (FLOMAX) 0.4 mg Cap Take 1 capsule (0.4 mg total) by mouth nightly. 90 capsule 3    tamsulosin (FLOMAX) 0.4 mg Cap 1 capsule.      tapentadoL (NUCYNTA) 50 mg Tab 1 tablet Orally every 6 hrs      testosterone cypionate (DEPOTESTOTERONE CYPIONATE) 200 mg/mL injection ADMINISTER 0.75ML IN THE MUSCLE EVERY 7 DAYS 12 mL 0    traZODone (DESYREL) 50 MG tablet TAKE ONE TO TTHREE TABLETS BY MOUTH EVERY DAY FOR 30 DAYS Orally Once a day for 90 days      TRELEGY ELLIPTA 200-62.5-25 mcg inhaler INHALE 1 PUFF INTO THE LUNGS DAILY 180 each 3    XHANCE 93 mcg/actuation AerB       busPIRone (BUSPAR) 10 MG tablet 1 tablet Oral Twice a day for 90 days      busPIRone (BUSPAR) 5 MG Tab Take 1 tablet (5 mg total) by mouth every evening. 90 tablet 3     No current facility-administered medications for this visit.     Review of patient's allergies indicates:   Allergen Reactions    Ibuprofen Itching       Review of Systems   Musculoskeletal:  Positive for gait problem.   All other systems reviewed and are negative.      Objective:      Vitals:    06/19/25 0818   BP: 137/64   Pulse: 65   Resp: 18   Weight: 89.8 kg (197 lb 14.4 oz)   Height: 5' 8" (1.727 m)     Physical Exam  Vitals and nursing note reviewed. Exam conducted with a chaperone present.   Constitutional:       General: He is not in acute distress.     Appearance: Normal appearance.   Cardiovascular:      Pulses:           Dorsalis pedis pulses are 2+ on the right side.        Posterior tibial pulses are 2+ on the right side.   Musculoskeletal:         General: Tenderness and deformity present.      Right foot: Decreased range of motion. Deformity (Clubfoot with limited range of motion right) and prominent metatarsal heads (Prominent 5th met head right) present.      Comments: No change in chronic low-level pain with chronic inflammatory bursitis sub 5th met head right, acquired deformity joint/5th metatarsal secondary to collapsed talus, " arthritis midtarsal joint right   Feet:      Right foot:      Skin integrity: Callus (Well-maintained callus sub 5th met head without IPK) present.   Skin:     Capillary Refill: Capillary refill takes less than 2 seconds.   Neurological:      Mental Status: He is alert.                  Assessment:       1. Pain in metatarsus of right foot    2. 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 Collapse of right talus    4. Foot callus - Right Foot    5. Bursitis of right foot            Plan:           INJECTION SMALL BURSA SUB 5TH MET HEAD RIGHT FOOT UTILIZING 1 CC 1% LIDOCAINE PLAIN, 1 CC 0.5% BUPIVACAINE PLAIN, 40 MG DEPO-MEDROL/ METHYLPREDNISONE, 4 MG DEXAMETHASONE        We reviewed foot type and structure, mechanics all the way he walks on the outside of the foot and more recent injury contributing to compensating  Reviewed patient's x-ray with him and reviewed this severe arthritis involving of the talus, the bone that makes up the bottom portion of the ankle  We discussed how he walks to compensate for both this and injury a year ago  Advised patient the callus in this location has improved significantly and I do not feel further debridement will offer him much relief, the initial IPK/seed corn he presented with has resolved  We discussed chronic inflammation, bursitis, an area of localized inflammation which develops due to repetitive pressure due to the underlying bone and daily weight-bearing  Since topical treatments have not been successful we discussed steroid injection  Reviewed medications used, effectiveness in decreasing inflammation, potential side effects and length of time injection may be beneficial  Advised it can take 3 or 4 days for the benefits of steroid injection to kit in  During this period of time utilize ice/cool therapy, 20 minutes on, 20 minutes off and repeat to help with post injection pain  Recommended he stay off his foot as much as possible for about 3 days  Continue to wear  custom mood insole with shoes indoors  Debrided callus sub 5th met head right  Contact office with any changes  Patient was in understanding and agreement with treatment plan  Injection administered with the above medication small bursal underlying 5th met head right foot  Patient tolerated well  Reviewed home going instructions  I counseled the patient on their conditions, implications and medical management.  Instructed patient/family to contact the office with any changes, questions, concerns, worsening of symptoms.   Total face to face time 20 minutes, exam, assessment, treatment, discussion, additional time for review of chart prior to and following appointment and visit documentation, consultation and coordination of care.    Additional time required for injection small bursa right foot  Follow up 4 weeks     This note was created using [employer] voice recognition software that occasionally misinterpreted phrases or words.

## 2025-06-22 ENCOUNTER — RESULTS FOLLOW-UP (OUTPATIENT)
Dept: FAMILY MEDICINE | Facility: CLINIC | Age: 70
End: 2025-06-22

## 2025-06-23 ENCOUNTER — OFFICE VISIT (OUTPATIENT)
Dept: FAMILY MEDICINE | Facility: CLINIC | Age: 70
End: 2025-06-23
Payer: MEDICARE

## 2025-06-23 VITALS
OXYGEN SATURATION: 94 % | RESPIRATION RATE: 18 BRPM | HEART RATE: 62 BPM | SYSTOLIC BLOOD PRESSURE: 132 MMHG | DIASTOLIC BLOOD PRESSURE: 64 MMHG | HEIGHT: 68 IN | BODY MASS INDEX: 30.13 KG/M2 | WEIGHT: 198.81 LBS

## 2025-06-23 DIAGNOSIS — E11.9 TYPE 2 DIABETES MELLITUS WITHOUT COMPLICATION, WITH LONG-TERM CURRENT USE OF INSULIN: ICD-10-CM

## 2025-06-23 DIAGNOSIS — Z79.4 TYPE 2 DIABETES MELLITUS WITHOUT COMPLICATION, WITH LONG-TERM CURRENT USE OF INSULIN: ICD-10-CM

## 2025-06-23 DIAGNOSIS — R06.09 COVID-19 LONG HAULER MANIFESTING CHRONIC DYSPNEA: Primary | ICD-10-CM

## 2025-06-23 DIAGNOSIS — U09.9 COVID-19 LONG HAULER MANIFESTING CHRONIC DYSPNEA: Primary | ICD-10-CM

## 2025-06-23 DIAGNOSIS — J45.50 SEVERE PERSISTENT ASTHMA WITHOUT COMPLICATION: ICD-10-CM

## 2025-06-23 DIAGNOSIS — E11.9 TYPE 2 DIABETES MELLITUS WITHOUT COMPLICATION, WITHOUT LONG-TERM CURRENT USE OF INSULIN: ICD-10-CM

## 2025-06-23 PROCEDURE — 99214 OFFICE O/P EST MOD 30 MIN: CPT | Mod: S$PBB,,, | Performed by: FAMILY MEDICINE

## 2025-06-23 PROCEDURE — 99999 PR PBB SHADOW E&M-EST. PATIENT-LVL III: CPT | Mod: PBBFAC,,, | Performed by: FAMILY MEDICINE

## 2025-06-23 PROCEDURE — 99213 OFFICE O/P EST LOW 20 MIN: CPT | Mod: PBBFAC,PN | Performed by: FAMILY MEDICINE

## 2025-06-23 RX ORDER — GLIPIZIDE 5 MG/1
5 TABLET ORAL
Qty: 90 TABLET | Refills: 1 | Status: SHIPPED | OUTPATIENT
Start: 2025-06-23

## 2025-06-23 RX ORDER — SEMAGLUTIDE 0.68 MG/ML
0.5 INJECTION, SOLUTION SUBCUTANEOUS
Qty: 3 ML | Refills: 2 | Status: SHIPPED | OUTPATIENT
Start: 2025-06-23

## 2025-06-23 NOTE — PROGRESS NOTES
"  Subjective:       Patient ID: Mathew Rodrigues is a 70 y.o. male.    Chief Complaint: Follow-up    History of Present Illness    CHIEF COMPLAINT:  Mr. Rodrigues presents today for follow up of diabetes management and multiple chronic conditions.    DIABETES MANAGEMENT:  He reports recurrent nocturnal hypoglycemic episodes, characterized by waking up at approximately 2:00 AM feeling light-headed with glucose levels dropping to 50-45 mg/dL. These episodes require intervention with peanut butter and jelly sandwich or orange juice, disrupting his sleep pattern and causing difficulty returning to sleep. He currently takes Jardiance during the day, Glipizide (transitioning to daily with breakfast), and Tresiba 32 units. He expresses frustration with managing diabetes and medication regimen, and desires to lose weight while managing glucose levels.    WEIGHT MANAGEMENT:  He reports significant side effects from Ozempic, including severe nausea and stomach upset that prevents eating even minimal amounts. He describes central obesity with primary fat accumulation in the abdominal region. He expresses concern about medication tolerability and its impact on effective weight management.    RESPIRATORY:  He reports chronic respiratory issues following COVID-19 infection, including chronic bronchitis with frequent productive cough causing vocal hoarseness. His oxygen levels are consistently around 90-91%, occasionally improving to 94% with deep breathing. He is currently under evaluation by multiple specialists including ENT and denies improvement in respiratory symptoms.    SLEEP:  He reports ongoing sleep difficulties and problems with CPAP device, describing it as "aggravating". He recently underwent a home sleep study with consideration for Inspire implant. He was transitioned from trazodone to doxepin for sleep management, which provides some benefit. He uses cannabis gummies strategically for additional sleep support, " "typically taking a half dose at bedtime or when unable to return to sleep after waking around 2-3 AM. He notes morning grogginess if taken too late.    MEDICAL HISTORY:  He has a history of electrocution resulting in significant nerve damage, for which he receives B12 injections as recommended by neurology for nerve regeneration and energy improvement. He is considering transitioning to oral B12 supplements. Iron levels are low with previous investigation for GI bleeding. Cholesterol levels are normal.      ROS:  ENT: +hoarseness, +rhinitis  Respiratory: +cough, +productive cough, +chest congestion, +intermittent breathing while sleeping, +apnea  Gastrointestinal: +nausea  Endocrine: +lightheaded if meals are missed         Review of Systems    Problem List[1]  Mathew has a current medication list which includes the following prescription(s): acetaminophen-codeine 300-30mg, albuterol, albuterol, allergy relief (fexofenadine), amlodipine, atorvastatin, bd almas 2nd gen pen needle, buspirone, buspirone, cyanocobalamin, doxepin, dupilumab, empagliflozin, fluticasone propionate, guanfacine, hydrocodone-acetaminophen, insulin degludec, lisinopril-hydrochlorothiazide, melatonin, metformin, metoprolol tartrate, omeprazole, sildenafil, tamsulosin, tapentadol, testosterone cypionate, trelegy ellipta, xhance, glipizide, and ozempic.        Health Maintenance Due   Topic Date Due    TETANUS VACCINE  Never done    Abdominal Aortic Aneurysm Screening  Never done    Pneumococcal Vaccines (Age 50+) (2 of 2 - PCV) 08/24/2021    Diabetic Eye Exam  07/31/2025      Health Maintenance reviewed and discussed.   Objective:      Vitals:    06/23/25 1536   BP: 132/64   Pulse: 62   Resp: 18   SpO2: (!) 94%   Weight: 90.2 kg (198 lb 12.8 oz)   Height: 5' 8" (1.727 m)   PainSc: 0-No pain     Body mass index is 30.23 kg/m².  Physical Exam  Physical Exam    Constitutional: No acute distress. Well-appearing.  Cardiovascular: Regular rate. Regular " rhythm. No murmurs.  Pulmonary: Normal respiratory effort. No wheezing. Normal breath sounds.  Skin: Warm. Dry. No rash.  Neurological: Alert.  Psychiatric: Normal mood. Normal behavior.  Vitals: SpO2: 94% with deep breaths. Normal blood pressure. BMI: 30. Obese.         Assessment:       Assessment & Plan    1. Diabetes management is good, with A1C at its lowest (6.3).  2. Evaluated weight management strategy, considering central obesity and challenges with Ozempic side effects; decreased Ozempic to 0.5 mg injection every other week.  3. Reviewed sleep issues, including ongoing CPAP therapy and consideration of Inspire device.  4. Addressed chronic respiratory symptoms, likely related to post-COVID syndrome, managed by Dr. Quinteros and ENT.  5. Maintained testosterone at current dose due to age-appropriate levels and potential risks of increasing dosage; discussed natural decline of testosterone levels with age and potential side effects of excessive supplementation.  6. Continued B12 injections until current prescription is finished for potential nerve regeneration benefits.           Plan:       1. COVID-19 long hauler manifesting chronic dyspnea, sputum and cough    2. Type 2 diabetes mellitus without complication, without long-term current use of insulin  -     glipiZIDE (GLUCOTROL) 5 MG tablet; Take 1 tablet (5 mg total) by mouth daily with breakfast.  Dispense: 90 tablet; Refill: 1  -     semaglutide (OZEMPIC) 0.25 mg or 0.5 mg (2 mg/3 mL) pen injector; Inject 0.5 mg into the skin every 7 days.  Dispense: 3 mL; Refill: 2    3. Type 2 diabetes mellitus without complication, with long-term current use of insulin  -     semaglutide (OZEMPIC) 0.25 mg or 0.5 mg (2 mg/3 mL) pen injector; Inject 0.5 mg into the skin every 7 days.  Dispense: 3 mL; Refill: 2    4. Severe persistent asthma without complication         This note was generated with the assistance of ambient listening technology. Verbal consent was obtained  by the patient and accompanying visitor(s) for the recording of patient appointment to facilitate this note. I attest to having reviewed and edited the generated note for accuracy, though some syntax or spelling errors may persist. Please contact the author of this note for any clarification.         [1]   Patient Active Problem List  Diagnosis    CRICKET on CPAP, 100% use    Primary hypertension, dx 2015    Type 2 diabetes mellitus, with long-term current use of insulin    Mixed hyperlipidemia    Gastroesophageal reflux disease without esophagitis    Benign prostatic hyperplasia without lower urinary tract symptoms    Chronic pain syndrome    Chronic right shoulder pain    Hypogonadism in male    Primary osteoarthritis involving multiple joints    Chronic rhinitis    Insomnia    CKD stage 3b, GFR 30-44 ml/min    Chronic obstructive lung disease    Diastolic heart failure, unspecified HF chronicity, at time of COVID 2020    OTT (dyspnea on exertion), post COVID, 2/2020    COVID-19 long hauler manifesting chronic dyspnea, sputum and cough    Electrocution and nonfatal effects of electric current, 2015    Uncomplicated opioid dependence, Nucynta bid since 2015    Abnormal ECG    Anticoagulant long-term use    Abdominal obesity    Abnormal nuclear stress test, NOCAD    LVH (left ventricular hypertrophy) due to hypertensive disease, with heart failure    Chronic fatigue    Severe persistent asthma without complication    Anemia    Chronic cough    Excessive daytime sleepiness    White coat syndrome with diagnosis of hypertension    Perianal abscess    CKD (chronic kidney disease), stage III    Severe persistent asthma with acute exacerbation    Other chest pain    Obstructive sleep apnea on CPAP    Bronchitis, chronic, mucopurulent    Severe eczema

## 2025-06-26 ENCOUNTER — PATIENT MESSAGE (OUTPATIENT)
Dept: PODIATRY | Facility: CLINIC | Age: 70
End: 2025-06-26
Payer: MEDICARE

## 2025-06-26 NOTE — TELEPHONE ENCOUNTER
Not 100% sure which cream, if it was for pain/Voltaren gel he can discontinue it if he is pain-free. If it was medication for the callus it is recommended he still apply at night as it can recur.

## 2025-06-27 ENCOUNTER — PATIENT MESSAGE (OUTPATIENT)
Dept: ADMINISTRATIVE | Facility: OTHER | Age: 70
End: 2025-06-27
Payer: MEDICARE

## 2025-07-11 DIAGNOSIS — E11.9 TYPE 2 DIABETES MELLITUS WITHOUT COMPLICATION, WITHOUT LONG-TERM CURRENT USE OF INSULIN: ICD-10-CM

## 2025-07-11 NOTE — TELEPHONE ENCOUNTER
Refill Routing Note   Medication(s) are not appropriate for processing by [facility] for the following reason(s):        Drug-disease interactionDrug-Disease: metFORMIN and CKD (chronic kidney disease), stage III; CKD stage 3b, GFR 30-44 ml/min (no prev provider override found)    [facility] action(s):  Defer        Medication Therapy Plan: Drug-Disease: metFORMIN and CKD (chronic kidney disease), stage III; CKD stage 3b, GFR 30-44 ml/min      Appointments  past 12m or future 3m with PCP    Date Provider   Last Visit   6/23/2025 Kiley Vasquez MD   Next Visit   12/15/2025 Kiley Vasquez MD   ED visits in past 90 days: 0        Note composed:4:25 PM 07/11/2025

## 2025-07-11 NOTE — TELEPHONE ENCOUNTER
No care due was identified.  [facility] Embedded Care Due Messages. Reference number: [id number].   7/11/2025 8:46:37 AM CDT

## 2025-07-13 ENCOUNTER — PATIENT MESSAGE (OUTPATIENT)
Dept: FAMILY MEDICINE | Facility: CLINIC | Age: 70
End: 2025-07-13
Payer: MEDICARE

## 2025-07-13 DIAGNOSIS — E11.9 TYPE 2 DIABETES MELLITUS WITHOUT COMPLICATION, WITHOUT LONG-TERM CURRENT USE OF INSULIN: ICD-10-CM

## 2025-07-13 RX ORDER — METFORMIN HYDROCHLORIDE 1000 MG/1
1000 TABLET ORAL 2 TIMES DAILY WITH MEALS
Qty: 180 TABLET | Refills: 1 | Status: SHIPPED | OUTPATIENT
Start: 2025-07-13

## 2025-07-14 RX ORDER — INSULIN DEGLUDEC 100 U/ML
32 INJECTION, SOLUTION SUBCUTANEOUS EVERY MORNING
Qty: 30 ML | Refills: 1 | Status: SHIPPED | OUTPATIENT
Start: 2025-07-14

## 2025-07-14 NOTE — TELEPHONE ENCOUNTER
No care due was identified.  [facility] Embedded Care Due Messages. Reference number: [id number].   7/14/2025 9:47:35 AM CDT

## 2025-07-14 NOTE — TELEPHONE ENCOUNTER
Refill Decision Note   Mathew Rodrigues  is requesting a refill authorization.  Brief Assessment and Rationale for Refill:  Approve     Medication Therapy Plan:  Approved due to pharmacy change       Comments:     Note composed:11:43 AM 07/14/2025

## 2025-07-31 ENCOUNTER — OFFICE VISIT (OUTPATIENT)
Dept: PODIATRY | Facility: CLINIC | Age: 70
End: 2025-07-31
Payer: MEDICARE

## 2025-07-31 VITALS
HEART RATE: 81 BPM | WEIGHT: 198.88 LBS | DIASTOLIC BLOOD PRESSURE: 93 MMHG | BODY MASS INDEX: 30.14 KG/M2 | SYSTOLIC BLOOD PRESSURE: 156 MMHG | HEIGHT: 68 IN

## 2025-07-31 DIAGNOSIS — M21.961 ACQUIRED DEFORMITY OF JOINT OF RIGHT FOOT: ICD-10-CM

## 2025-07-31 DIAGNOSIS — L84 FOOT CALLUS: ICD-10-CM

## 2025-07-31 DIAGNOSIS — M21.6X1: ICD-10-CM

## 2025-07-31 DIAGNOSIS — M19.071 ARTHRITIS OF MIDTARSAL JOINT OF RIGHT FOOT: Primary | ICD-10-CM

## 2025-07-31 PROCEDURE — 99999 PR PBB SHADOW E&M-EST. PATIENT-LVL V: CPT | Mod: PBBFAC,,, | Performed by: PODIATRIST

## 2025-07-31 PROCEDURE — 99215 OFFICE O/P EST HI 40 MIN: CPT | Mod: PBBFAC | Performed by: PODIATRIST

## 2025-07-31 PROCEDURE — 99213 OFFICE O/P EST LOW 20 MIN: CPT | Mod: S$PBB,,, | Performed by: PODIATRIST

## 2025-08-02 NOTE — PROGRESS NOTES
Subjective:       Patient ID: Mathew Rodrigues is a 70 y.o. male.    Chief Complaint: Foot Pain and Follow-up  Patient presents for follow up with his wife for chronic pain under 5th met head right with callus, pain upon weight bearing secondary to midfoot arthritis and collapsed talus right foot  Patient relates this steroid injection administered in this area last visit decreased his pain significantly  He is using the custom-made insoles from [facility] and brace when he goes out of the house.  At home he has been wearing a slip-on shoe comfortably  Never barefoot  Pain level 1/10      Past Medical History:   Diagnosis Date    Arthritis     COPD (chronic obstructive pulmonary disease)     Diabetes mellitus, type 2     DVT (deep venous thrombosis)     Hyperlipidemia     Hypertension     Kidney stones     LVH (left ventricular hypertrophy) due to hypertensive disease, with heart failure     Neuropathy     Personal history of colonic polyps 03/24/2000    colonoscopy report in media    PTSD (post-traumatic stress disorder)     Shortness of breath     fatigue    Sleep apnea, unspecified     Torn rotator cuff      Past Surgical History:   Procedure Laterality Date    achilles repair Left 2003    ADENOIDECTOMY      ANGIOGRAM, CORONARY, WITH LEFT HEART CATHETERIZATION N/A 8/6/2024    Procedure: Angiogram, Coronary, with Left Heart Cath;  Surgeon: Elliot Butler MD;  Location: [facility] CATH/EP LAB;  Service: Cardiology;  Laterality: N/A;    ANKLE FRACTURE SURGERY Right 1987    CATARACT EXTRACTION Left 2004    CATARACT EXTRACTION Left 2005 2nd    COLONOSCOPY N/A 05/09/2017    results in media    COLONOSCOPY N/A 01/13/2023    Procedure: COLONOSCOPY;  Surgeon: Kenney Keller MD;  Location: [facility] ENDO;  Service: General;  Laterality: N/A;    COLONOSCOPY N/A 11/19/2024    Procedure: COLONOSCOPY;  Surgeon: Kameron Chandler MD;  Location: [facility] ENDO;  Service: Endoscopy;  Laterality: N/A;    COLONOSCOPY W/ POLYPECTOMY  N/A 03/24/2000    results in media    ELBOW SURGERY Right 07/24/2020    ESOPHAGOGASTRODUODENOSCOPY N/A 11/19/2024    Procedure: EGD (ESOPHAGOGASTRODUODENOSCOPY) (pt diabetic);  Surgeon: Kameron Chandler MD;  Location: [facility] ENDO;  Service: Endoscopy;  Laterality: N/A;    EYE SURGERY  2005    finger joint replacement Right 01/22/2020    middle finger    hair follicle  1998    HAND SURGERY Right 04/25/2017    HAND SURGERY Right 05/12/2017    2nd surgery    INTRALUMINAL GASTROINTESTINAL TRACT IMAGING VIA CAPSULE N/A 12/10/2024    Procedure: IMAGING PROCEDURE, GI TRACT, INTRALUMINAL, VIA CAPSULE;  Surgeon: Kameron Chandler MD;  Location: [facility] ENDO;  Service: Endoscopy;  Laterality: N/A;    NASAL SINUS SURGERY  08/2015    ROTATOR CUFF REPAIR Right 09/25/2015    ROTATOR CUFF REPAIR Left 04/08/2016    skin grafts Right 07/2016    shin from [place] wound    TONSILLECTOMY      VASECTOMY       Family History   Problem Relation Name Age of Onset    Hypertension Mother Sierra     Arthritis Mother Sierra     Hypertension Father Misael     Diabetes Father Misael     Arthritis Father Misael     Hearing loss Father Misael     Cancer Brother Mickey Douglas     COPD Brother Mickey Douglas      Social History     Socioeconomic History    Marital status:    Tobacco Use    Smoking status: Former     Current packs/day: 1.50     Average packs/day: 1.5 packs/day for 30.0 years (45.0 ttl pk-yrs)     Types: Cigarettes    Smokeless tobacco: Never   Substance and Sexual Activity    Alcohol use: Not Currently    Drug use: Never     Social Drivers of Health     Financial Resource Strain: Low Risk  (6/2/2025)    Overall Financial Resource Strain (CARDIA)     Difficulty of Paying Living Expenses: Not hard at all   Food Insecurity: No Food Insecurity (6/2/2025)    Hunger Vital Sign     Worried About Running Out of Food in the Last Year: Never true     Ran Out of Food in the Last Year: Never true   Transportation Needs: No Transportation Needs  "(6/2/2025)    PRAPARE - Transportation     Lack of Transportation (Medical): No     Lack of Transportation (Non-Medical): No   Physical Activity: Insufficiently Active (6/2/2025)    Exercise Vital Sign     Days of Exercise per Week: 5 days     Minutes of Exercise per Session: 20 min   Stress: No Stress Concern Present (6/2/2025)    [employer] [place] [employer] - Occupational Stress Questionnaire     Feeling of Stress : Not at all   Housing Stability: Low Risk  (6/2/2025)    Housing Stability Vital Sign     Unable to Pay for Housing in the Last Year: No     Number of Times Moved in the Last Year: 0     Homeless in the Last Year: No       Current Outpatient Medications   Medication Sig Dispense Refill    acetaminophen-codeine 300-30mg (TYLENOL #3) 300-30 mg Tab Take 1 tablet by mouth every 8 (eight) hours as needed (cough). 21 tablet 0    albuterol (PROVENTIL) 2.5 mg /3 mL (0.083 %) nebulizer solution 3 ml as needed Inhalation every 6 hrs for 90 days      albuterol (PROVENTIL/VENTOLIN HFA) 90 mcg/actuation inhaler Inhale 2 puffs into the lungs every 4 (four) hours as needed for Shortness of Breath or Wheezing (cough). 2 puffs every 4 hours as needed for cough, wheeze, or shortness of breath 36 g 11    ALLERGY RELIEF, FEXOFENADINE, 180 mg tablet Take 180 mg by mouth.      amLODIPine (NORVASC) 5 MG tablet 1 tablet Oral Once a day for 90 days      atorvastatin (LIPITOR) 10 MG tablet Take 1 tablet (10 mg total) by mouth every evening. 90 tablet 0    BD SHAHZAD 2ND GEN PEN NEEDLE 32 gauge x 5/32" Ndle       busPIRone (BUSPAR) 10 MG tablet 1 tablet Oral Twice a day for 90 days      busPIRone (BUSPAR) 5 MG Tab Take 1 tablet (5 mg total) by mouth every evening. 90 tablet 3    cyanocobalamin 1,000 mcg/mL injection Inject 1 mL (1,000 mcg total) into the muscle every 14 (fourteen) days. 6 mL 3    doxepin (SINEQUAN) 10 MG capsule Take 1 capsule (10 mg total) by mouth every evening. 30 capsule 11    dupilumab 300 mg/2 mL " PnIj Inject 2 mLs (300 mg total) into the skin every 14 (fourteen) days. 2 mL 26    empagliflozin (JARDIANCE) 10 mg tablet Take 1 tablet (10 mg total) by mouth once daily. 90 tablet 3    glipiZIDE (GLUCOTROL) 5 MG tablet Take 1 tablet (5 mg total) by mouth daily with breakfast. 90 tablet 1    guanFACINE (TENEX) 2 MG tablet 1 tablet at bedtime Orally Once a day for 90 days      insulin degludec (TRESIBA FLEXTOUCH U-100) 100 unit/mL (3 mL) insulin pen Inject 32 Units into the skin every morning. 30 mL 1    lisinopriL-hydrochlorothiazide (PRINZIDE,ZESTORETIC) 10-12.5 mg per tablet Take 1 tablet by mouth once daily. 90 tablet 3    melatonin 10 mg Tab Take 2 tablets by mouth every evening.      metFORMIN (GLUCOPHAGE) 1000 MG tablet TAKE 1 TABLET(1000 MG) BY MOUTH TWICE DAILY WITH MEALS 180 tablet 1    metoprolol tartrate (LOPRESSOR) 25 MG tablet 1 tablet with food Oral Twice a day for 90 days      omeprazole (PRILOSEC) 40 MG capsule Take 1 capsule (40 mg total) by mouth 2 (two) times daily before meals. 60 capsule 11    semaglutide (OZEMPIC) 0.25 mg or 0.5 mg (2 mg/3 mL) pen injector Inject 0.5 mg into the skin every 7 days. 3 mL 2    sildenafiL (VIAGRA) 100 MG tablet Take 1 tablet (100 mg total) by mouth daily as needed for Erectile Dysfunction. 30 tablet 1    tamsulosin (FLOMAX) 0.4 mg Cap Take 1 capsule (0.4 mg total) by mouth nightly. 90 capsule 3    testosterone cypionate (DEPOTESTOTERONE CYPIONATE) 200 mg/mL injection ADMINISTER 0.75ML IN THE MUSCLE EVERY 7 DAYS 12 mL 0    TRELEGY ELLIPTA 200-62.5-25 mcg inhaler INHALE 1 PUFF INTO THE LUNGS DAILY 180 each 3    XHANCE 93 mcg/actuation AerB       fluticasone propionate (FLONASE) 50 mcg/actuation nasal spray two sprays to each nostirl Nasal Once a day for 90 days       No current facility-administered medications for this visit.     Review of patient's allergies indicates:   Allergen Reactions    Ibuprofen Itching       Review of Systems   Musculoskeletal:  Positive  "for gait problem.   All other systems reviewed and are negative.      Objective:      Vitals:    07/31/25 0820   BP: (!) 156/93   Pulse: 81   Weight: 90.2 kg (198 lb 13.7 oz)   Height: 5' 8" (1.727 m)     Physical Exam  Vitals and nursing note reviewed. Exam conducted with a chaperone present.   Constitutional:       General: He is not in acute distress.  Cardiovascular:      Pulses:           Dorsalis pedis pulses are 2+ on the right side.        Posterior tibial pulses are 2+ on the right side.   Musculoskeletal:         General: Deformity present.      Right foot: Decreased range of motion. Deformity (Clubfoot with limited range of motion right) and prominent metatarsal heads (Prominent 5th met head right) present.      Comments: Significant improvement, decreased pain sub 5th met head right, acquired deformity joint/5th metatarsal secondary to collapsed talus, arthritis midtarsal joint right   Feet:      Right foot:      Skin integrity: Callus (mild callus sub 5th met head) present.   Skin:     Capillary Refill: Capillary refill takes less than 2 seconds.                        Assessment:       1. Arthritis of midtarsal joint of right foot    2. Collapse of right talus    3. Acquired deformity of joint of right foot    4. Foot callus - Right Foot              Plan:           Advised patient it is a good sign injection was helpful, this most likely decreased chronic inflammation, bursitis and neuritis in the area which buildup over the last year due to the way he walks  Again we reviewed severe arthritis involving the midfoot in the talus, how this is changed his foot type, structure and the way he uses his foot putting a good amount of pressure under the 5th met head  Patient understands he will continue to put pressure on this area and pain and inflammation in this region is at risk for recurrence  Advised patient even though his pain has decreased significantly he needs to wear the custom insole and " appropriate tennis shoe at all times even indoors, doing this he is at less risk for recurrence  Reviewed continued topical treatment to moisturize and hydrate chronic callus in this area, over the last few visits this has improved significantly  Reviewed ice/cool therapy for any flare-up, treat any increased pain swelling or inflammation immediately  Debrided callus sub 5th met head right  Contact office with any changes  Patient was in understanding and agreement with treatment plan  I counseled the patient on their conditions, implications and medical management.  Instructed patient to contact the office with any changes, questions, concerns, worsening of symptoms.   Total face to face time 20 minutes, exam, assessment, treatment, discussion, additional time for review of chart prior to and following appointment and visit documentation, consultation and coordination of care.    Follow up 6 weeks     This note was created using [employer] voice recognition software that occasionally misinterpreted phrases or words.

## 2025-08-21 ENCOUNTER — OFFICE VISIT (OUTPATIENT)
Dept: PULMONOLOGY | Facility: CLINIC | Age: 70
End: 2025-08-21
Payer: MEDICARE

## 2025-08-21 VITALS
HEIGHT: 68 IN | HEART RATE: 75 BPM | WEIGHT: 200.06 LBS | DIASTOLIC BLOOD PRESSURE: 70 MMHG | BODY MASS INDEX: 30.32 KG/M2 | OXYGEN SATURATION: 93 % | SYSTOLIC BLOOD PRESSURE: 134 MMHG

## 2025-08-21 DIAGNOSIS — J32.9 RECURRENT SINUS INFECTIONS: Primary | ICD-10-CM

## 2025-08-21 DIAGNOSIS — J45.50 SEVERE PERSISTENT ASTHMA WITHOUT COMPLICATION: ICD-10-CM

## 2025-08-21 DIAGNOSIS — R05.3 CHRONIC COUGH: ICD-10-CM

## 2025-08-21 DIAGNOSIS — G47.33 OSA ON CPAP: ICD-10-CM

## 2025-08-21 PROCEDURE — 99999 PR PBB SHADOW E&M-EST. PATIENT-LVL III: CPT | Mod: PBBFAC,,, | Performed by: INTERNAL MEDICINE

## 2025-08-21 PROCEDURE — 99213 OFFICE O/P EST LOW 20 MIN: CPT | Mod: PBBFAC,PO | Performed by: INTERNAL MEDICINE

## 2025-08-21 PROCEDURE — 99213 OFFICE O/P EST LOW 20 MIN: CPT | Mod: S$PBB,,, | Performed by: INTERNAL MEDICINE

## 2025-08-21 RX ORDER — FENOFIBRATE 160 MG/1
TABLET ORAL
COMMUNITY

## 2025-08-21 RX ORDER — ACETAMINOPHEN AND CODEINE PHOSPHATE 300; 30 MG/1; MG/1
1 TABLET ORAL EVERY 8 HOURS PRN
Qty: 60 TABLET | Refills: 0 | Status: SHIPPED | OUTPATIENT
Start: 2025-08-21

## 2025-08-21 RX ORDER — CEPHALEXIN 500 MG/1
500 TABLET, FILM COATED ORAL 3 TIMES DAILY
COMMUNITY
Start: 2025-08-14

## 2025-08-21 RX ORDER — AZITHROMYCIN 500 MG/1
TABLET, FILM COATED ORAL
Qty: 3 TABLET | Refills: 3 | Status: SHIPPED | OUTPATIENT
Start: 2025-08-21

## 2025-08-29 ENCOUNTER — LAB VISIT (OUTPATIENT)
Dept: LAB | Facility: HOSPITAL | Age: 70
End: 2025-08-29
Attending: INTERNAL MEDICINE
Payer: MEDICARE

## 2025-08-29 DIAGNOSIS — J32.9 RECURRENT SINUS INFECTIONS: ICD-10-CM

## 2025-08-29 LAB
IGG SERPL-MCNC: 953 MG/DL (ref 650–1600)
IGM SERPL-MCNC: 103 MG/DL (ref 50–300)

## 2025-08-29 PROCEDURE — 36415 COLL VENOUS BLD VENIPUNCTURE: CPT

## 2025-08-29 PROCEDURE — 82784 ASSAY IGA/IGD/IGG/IGM EACH: CPT

## 2025-08-29 PROCEDURE — 82784 ASSAY IGA/IGD/IGG/IGM EACH: CPT | Mod: 59

## 2025-09-02 ENCOUNTER — LAB VISIT (OUTPATIENT)
Dept: LAB | Facility: HOSPITAL | Age: 70
End: 2025-09-02
Attending: INTERNAL MEDICINE
Payer: MEDICARE

## 2025-09-02 DIAGNOSIS — J32.9 RECURRENT SINUS INFECTIONS: ICD-10-CM

## 2025-09-02 PROCEDURE — 87070 CULTURE OTHR SPECIMN AEROBIC: CPT

## 2025-09-04 ENCOUNTER — PATIENT MESSAGE (OUTPATIENT)
Dept: FAMILY MEDICINE | Facility: CLINIC | Age: 70
End: 2025-09-04
Payer: MEDICARE

## 2025-09-04 ENCOUNTER — TELEPHONE (OUTPATIENT)
Dept: PULMONOLOGY | Facility: CLINIC | Age: 70
End: 2025-09-04
Payer: MEDICARE

## 2025-09-04 DIAGNOSIS — R05.3 CHRONIC COUGH: ICD-10-CM

## 2025-09-04 DIAGNOSIS — E78.2 MIXED HYPERLIPIDEMIA: ICD-10-CM

## 2025-09-04 DIAGNOSIS — E11.9 TYPE 2 DIABETES MELLITUS WITHOUT COMPLICATION, WITHOUT LONG-TERM CURRENT USE OF INSULIN: ICD-10-CM

## 2025-09-04 DIAGNOSIS — J45.50 SEVERE PERSISTENT ASTHMA WITHOUT COMPLICATION: ICD-10-CM

## 2025-09-04 DIAGNOSIS — J41.1 BRONCHITIS, CHRONIC, MUCOPURULENT: Primary | ICD-10-CM

## 2025-09-04 LAB
IMMUNOLOGIST REVIEW: NORMAL
S PN DA SERO 19F IGG SER-MCNC: 3.2 MCG/ML
S PNEUM DA 1 IGG SER-MCNC: NORMAL MCG/ML
S PNEUM DA 10A IGG SER-MCNC: 1.6 MCG/ML
S PNEUM DA 11A IGG SER-MCNC: 0.5 MCG/ML
S PNEUM DA 12F IGG SER-MCNC: 1.6 MCG/ML
S PNEUM DA 14 IGG SER-MCNC: 0.2 MCG/ML
S PNEUM DA 15B IGG SER-MCNC: 1.7 MCG/ML
S PNEUM DA 17F IGG SER-MCNC: 1.2 MCG/ML
S PNEUM DA 18C IGG SER-MCNC: 1.8 MCG/ML
S PNEUM DA 19A IGG SER-MCNC: 2.1 MCG/ML
S PNEUM DA 2 IGG SER-MCNC: 0.5 MCG/ML
S PNEUM DA 20A IGG SER-MCNC: 3.3 MCG/ML
S PNEUM DA 22F IGG SER-MCNC: 2 MCG/ML
S PNEUM DA 23F IGG SER-MCNC: 1.1 MCG/ML
S PNEUM DA 3 IGG SER-MCNC: 0.2 MCG/ML
S PNEUM DA 33F IGG SER-MCNC: 8.5 MCG/ML
S PNEUM DA 4 IGG SER-MCNC: 1.7 MCG/ML
S PNEUM DA 5 IGG SER-MCNC: 2.5 MCG/ML
S PNEUM DA 6B IGG SER-MCNC: 0.2 MCG/ML
S PNEUM DA 7F IGG SER-MCNC: 62.8 MCG/ML
S PNEUM DA 8 IGG SER-MCNC: 1.5 MCG/ML
S PNEUM DA 9N IGG SER-MCNC: 1.1 MCG/ML
S PNEUM DA 9V IGG SER-MCNC: 0.9 MCG/ML

## 2025-09-04 RX ORDER — ATORVASTATIN CALCIUM 10 MG/1
10 TABLET, FILM COATED ORAL NIGHTLY
Qty: 90 TABLET | Refills: 3 | Status: SHIPPED | OUTPATIENT
Start: 2025-09-04 | End: 2026-08-30

## 2025-09-05 LAB
BACTERIA SPT CULT: NORMAL
GRAM STN SPEC: NORMAL

## (undated) DEVICE — GUIDEWIRE INQWIRE SE 3MM JTIP

## (undated) DEVICE — GLOVE SURG ULTRA TOUCH 7.5

## (undated) DEVICE — KIT CANIST SUCTION 1200CC

## (undated) DEVICE — KIT ENDO CARRY-ON PROC 100310

## (undated) DEVICE — KIT GLIDESHEATH SLEND 6FR 10CM

## (undated) DEVICE — SYR SLIP TIP 5CC

## (undated) DEVICE — OMNIPAQUE CONTRAST 300MG/100ML

## (undated) DEVICE — CATH JL3.5 5FR

## (undated) DEVICE — CATH 5FR JR4 100CM

## (undated) DEVICE — SOL WATER STRL IRR 1000ML